# Patient Record
Sex: FEMALE | Race: WHITE | NOT HISPANIC OR LATINO | Employment: OTHER | URBAN - METROPOLITAN AREA
[De-identification: names, ages, dates, MRNs, and addresses within clinical notes are randomized per-mention and may not be internally consistent; named-entity substitution may affect disease eponyms.]

---

## 2017-01-25 ENCOUNTER — COMMUNICATION - HEALTHEAST (OUTPATIENT)
Dept: INTERNAL MEDICINE | Facility: CLINIC | Age: 77
End: 2017-01-25

## 2017-01-25 DIAGNOSIS — E61.2 MAGNESIUM DEFICIENCY: ICD-10-CM

## 2017-01-27 ENCOUNTER — COMMUNICATION - HEALTHEAST (OUTPATIENT)
Dept: INTERNAL MEDICINE | Facility: CLINIC | Age: 77
End: 2017-01-27

## 2017-01-27 DIAGNOSIS — E61.2 MAGNESIUM DEFICIENCY: ICD-10-CM

## 2017-02-19 ENCOUNTER — COMMUNICATION - HEALTHEAST (OUTPATIENT)
Dept: INTERNAL MEDICINE | Facility: CLINIC | Age: 77
End: 2017-02-19

## 2017-02-19 DIAGNOSIS — K21.9 ESOPHAGEAL REFLUX: ICD-10-CM

## 2017-02-21 ENCOUNTER — OFFICE VISIT - HEALTHEAST (OUTPATIENT)
Dept: INTERNAL MEDICINE | Facility: CLINIC | Age: 77
End: 2017-02-21

## 2017-02-21 DIAGNOSIS — J44.89 CHRONIC AIRWAY OBSTRUCTION, NOT ELSEWHERE CLASSIFIED: ICD-10-CM

## 2017-02-21 DIAGNOSIS — K56.609 SMALL BOWEL OBSTRUCTION (H): ICD-10-CM

## 2017-03-02 ENCOUNTER — OFFICE VISIT - HEALTHEAST (OUTPATIENT)
Dept: INTERNAL MEDICINE | Facility: CLINIC | Age: 77
End: 2017-03-02

## 2017-03-02 DIAGNOSIS — K56.609 BOWEL OBSTRUCTION (H): ICD-10-CM

## 2017-03-02 DIAGNOSIS — K43.9 ABDOMINAL WALL HERNIA: ICD-10-CM

## 2017-03-02 DIAGNOSIS — R19.7 DIARRHEA: ICD-10-CM

## 2017-03-07 ENCOUNTER — COMMUNICATION - HEALTHEAST (OUTPATIENT)
Dept: LAB | Facility: CLINIC | Age: 77
End: 2017-03-07

## 2017-03-07 ENCOUNTER — OFFICE VISIT - HEALTHEAST (OUTPATIENT)
Dept: INTERNAL MEDICINE | Facility: CLINIC | Age: 77
End: 2017-03-07

## 2017-03-07 ENCOUNTER — COMMUNICATION - HEALTHEAST (OUTPATIENT)
Dept: NURSING | Facility: CLINIC | Age: 77
End: 2017-03-07

## 2017-03-07 ENCOUNTER — HOSPITAL ENCOUNTER (OUTPATIENT)
Dept: CARDIOLOGY | Facility: CLINIC | Age: 77
Discharge: HOME | End: 2017-03-07
Attending: INTERNAL MEDICINE

## 2017-03-07 DIAGNOSIS — I48.92 ATRIAL FIBRILLATION AND FLUTTER (H): ICD-10-CM

## 2017-03-07 DIAGNOSIS — I48.91 ATRIAL FIBRILLATION AND FLUTTER (H): ICD-10-CM

## 2017-03-07 DIAGNOSIS — R00.9 HEART BEAT ABNORMALITY: ICD-10-CM

## 2017-03-07 LAB
AORTIC ROOT: 3.5 CM
ASCENDING AORTA: 3 CM
BSA FOR ECHO PROCEDURE: 2.04 M2
CV BLOOD PRESSURE: NORMAL MMHG
CV ECHO HEIGHT: 65.3 IN
CV ECHO WEIGHT: 199 LBS
DOP CALC LVOT AREA: 3.14 CM2
DOP CALC LVOT DIAMETER: 2 CM
DOP CALC LVOT PEAK VEL: 112 CM/S
DOP CALC LVOT STROKE VOLUME: 63.4 CM3
DOP CALCLVOT PEAK VEL VTI: 20.2 CM
EJECTION FRACTION: 56 % (ref 55–75)
FRACTIONAL SHORTENING: 18.9 % (ref 28–44)
INTERVENTRICULAR SEPTUM IN END DIASTOLE: 1.43 CM (ref 0.6–0.9)
IVS/PW RATIO: 1
LA AREA 1: 21.8 CM2
LA AREA 2: 22.4 CM2
LEFT ATRIUM LENGTH: 5.62 CM
LEFT ATRIUM SIZE: 4.8 CM
LEFT ATRIUM TO AORTIC ROOT RATIO: 1.37 NO UNITS
LEFT ATRIUM VOLUME INDEX: 36.2 ML/M2
LEFT ATRIUM VOLUME: 73.9 CM3
LEFT VENTRICLE CARDIAC INDEX: 2.5 L/MIN/M2
LEFT VENTRICLE CARDIAC OUTPUT: 5.1 L/MIN
LEFT VENTRICLE DIASTOLIC VOLUME INDEX: 46.1 CM3/M2 (ref 34–74)
LEFT VENTRICLE DIASTOLIC VOLUME: 94 CM3 (ref 46–106)
LEFT VENTRICLE HEART RATE: 80 BPM
LEFT VENTRICLE MASS INDEX: 117.8 G/M2
LEFT VENTRICLE SYSTOLIC VOLUME INDEX: 20.1 CM3/M2 (ref 11–31)
LEFT VENTRICLE SYSTOLIC VOLUME: 41 CM3 (ref 14–42)
LEFT VENTRICULAR INTERNAL DIMENSION IN DIASTOLE: 4.29 CM (ref 3.8–5.2)
LEFT VENTRICULAR INTERNAL DIMENSION IN SYSTOLE: 3.48 CM (ref 2.2–3.5)
LEFT VENTRICULAR MASS: 240.3 G
LEFT VENTRICULAR OUTFLOW TRACT MEAN GRADIENT: 2 MMHG
LEFT VENTRICULAR OUTFLOW TRACT MEAN VELOCITY: 70.1 CM/S
LEFT VENTRICULAR OUTFLOW TRACT PEAK GRADIENT: 5 MMHG
LEFT VENTRICULAR POSTERIOR WALL IN END DIASTOLE: 1.44 CM (ref 0.6–0.9)
LV STROKE VOLUME INDEX: 31.1 ML/M2
MITRAL VALVE E/A RATIO: 3.1
MV AVERAGE E/E' RATIO: 14.6 CM/S
MV DECELERATION TIME: 159 MS
MV E'TISSUE VEL-LAT: 7.9 CM/S
MV E'TISSUE VEL-MED: 6.04 CM/S
MV LATERAL E/E' RATIO: 12.9
MV MEDIAL E/E' RATIO: 16.9
MV PEAK A VELOCITY: 33.3 CM/S
MV PEAK E VELOCITY: 102 CM/S
NUC REST DIASTOLIC VOLUME INDEX: 3184 LBS
NUC REST SYSTOLIC VOLUME INDEX: 65.25 IN
TRICUSPID REGURGITATION PEAK PRESSURE GRADIENT: 29.8 MMHG
TRICUSPID VALVE PEAK REGURGITANT VELOCITY: 273 CM/S

## 2017-03-07 ASSESSMENT — MIFFLIN-ST. JEOR: SCORE: 1382.5

## 2017-03-08 ENCOUNTER — OFFICE VISIT - HEALTHEAST (OUTPATIENT)
Dept: CARDIOLOGY | Facility: CLINIC | Age: 77
End: 2017-03-08

## 2017-03-08 DIAGNOSIS — I48.19 PERSISTENT ATRIAL FIBRILLATION (H): ICD-10-CM

## 2017-03-08 DIAGNOSIS — I10 ESSENTIAL HYPERTENSION: ICD-10-CM

## 2017-03-08 ASSESSMENT — MIFFLIN-ST. JEOR: SCORE: 1319.9

## 2017-03-09 ENCOUNTER — HOSPITAL ENCOUNTER (OUTPATIENT)
Dept: ULTRASOUND IMAGING | Facility: CLINIC | Age: 77
Discharge: HOME | End: 2017-03-09
Attending: INTERNAL MEDICINE

## 2017-03-09 ENCOUNTER — AMBULATORY - HEALTHEAST (OUTPATIENT)
Dept: LAB | Facility: CLINIC | Age: 77
End: 2017-03-09

## 2017-03-09 ENCOUNTER — COMMUNICATION - HEALTHEAST (OUTPATIENT)
Dept: INTERNAL MEDICINE | Facility: CLINIC | Age: 77
End: 2017-03-09

## 2017-03-09 DIAGNOSIS — I48.92 ATRIAL FIBRILLATION AND FLUTTER (H): ICD-10-CM

## 2017-03-09 DIAGNOSIS — I10 ESSENTIAL HYPERTENSION: ICD-10-CM

## 2017-03-09 DIAGNOSIS — K43.9 ABDOMINAL WALL HERNIA: ICD-10-CM

## 2017-03-09 DIAGNOSIS — I48.91 ATRIAL FIBRILLATION AND FLUTTER (H): ICD-10-CM

## 2017-03-13 ENCOUNTER — COMMUNICATION - HEALTHEAST (OUTPATIENT)
Dept: INTERNAL MEDICINE | Facility: CLINIC | Age: 77
End: 2017-03-13

## 2017-03-13 ENCOUNTER — AMBULATORY - HEALTHEAST (OUTPATIENT)
Dept: LAB | Facility: CLINIC | Age: 77
End: 2017-03-13

## 2017-03-13 ENCOUNTER — AMBULATORY - HEALTHEAST (OUTPATIENT)
Dept: INTERNAL MEDICINE | Facility: CLINIC | Age: 77
End: 2017-03-13

## 2017-03-13 DIAGNOSIS — I48.92 ATRIAL FIBRILLATION AND FLUTTER (H): ICD-10-CM

## 2017-03-13 DIAGNOSIS — I48.91 ATRIAL FIBRILLATION AND FLUTTER (H): ICD-10-CM

## 2017-03-13 DIAGNOSIS — R10.32 LLQ PAIN: ICD-10-CM

## 2017-03-13 LAB
ATRIAL RATE - MUSE: 65 BPM
DIASTOLIC BLOOD PRESSURE - MUSE: NORMAL MMHG
INTERPRETATION ECG - MUSE: NORMAL
P AXIS - MUSE: NORMAL DEGREES
PR INTERVAL - MUSE: NORMAL MS
QRS DURATION - MUSE: 92 MS
QT - MUSE: 410 MS
QTC - MUSE: 457 MS
R AXIS - MUSE: 45 DEGREES
SYSTOLIC BLOOD PRESSURE - MUSE: NORMAL MMHG
T AXIS - MUSE: 263 DEGREES
VENTRICULAR RATE- MUSE: 75 BPM

## 2017-03-14 ENCOUNTER — HOSPITAL ENCOUNTER (OUTPATIENT)
Dept: CARDIOLOGY | Facility: CLINIC | Age: 77
Discharge: HOME | End: 2017-03-14
Attending: INTERNAL MEDICINE

## 2017-03-14 DIAGNOSIS — I48.19 PERSISTENT ATRIAL FIBRILLATION (H): ICD-10-CM

## 2017-03-16 ENCOUNTER — AMBULATORY - HEALTHEAST (OUTPATIENT)
Dept: LAB | Facility: CLINIC | Age: 77
End: 2017-03-16

## 2017-03-16 ENCOUNTER — COMMUNICATION - HEALTHEAST (OUTPATIENT)
Dept: INTERNAL MEDICINE | Facility: CLINIC | Age: 77
End: 2017-03-16

## 2017-03-16 DIAGNOSIS — I48.91 ATRIAL FIBRILLATION AND FLUTTER (H): ICD-10-CM

## 2017-03-16 DIAGNOSIS — I48.92 ATRIAL FIBRILLATION AND FLUTTER (H): ICD-10-CM

## 2017-03-17 ENCOUNTER — HOSPITAL ENCOUNTER (OUTPATIENT)
Dept: CT IMAGING | Facility: CLINIC | Age: 77
Discharge: HOME | End: 2017-03-17
Attending: INTERNAL MEDICINE

## 2017-03-17 DIAGNOSIS — R10.32 LLQ PAIN: ICD-10-CM

## 2017-03-21 ENCOUNTER — AMBULATORY - HEALTHEAST (OUTPATIENT)
Dept: LAB | Facility: CLINIC | Age: 77
End: 2017-03-21

## 2017-03-21 ENCOUNTER — AMBULATORY - HEALTHEAST (OUTPATIENT)
Dept: NURSING | Facility: CLINIC | Age: 77
End: 2017-03-21

## 2017-03-21 DIAGNOSIS — I48.91 ATRIAL FIBRILLATION AND FLUTTER (H): ICD-10-CM

## 2017-03-21 DIAGNOSIS — I48.92 ATRIAL FIBRILLATION AND FLUTTER (H): ICD-10-CM

## 2017-03-24 ENCOUNTER — AMBULATORY - HEALTHEAST (OUTPATIENT)
Dept: LAB | Facility: CLINIC | Age: 77
End: 2017-03-24

## 2017-03-24 ENCOUNTER — COMMUNICATION - HEALTHEAST (OUTPATIENT)
Dept: INTERNAL MEDICINE | Facility: CLINIC | Age: 77
End: 2017-03-24

## 2017-03-24 DIAGNOSIS — I48.91 ATRIAL FIBRILLATION AND FLUTTER (H): ICD-10-CM

## 2017-03-24 DIAGNOSIS — I48.92 ATRIAL FIBRILLATION AND FLUTTER (H): ICD-10-CM

## 2017-03-31 ENCOUNTER — AMBULATORY - HEALTHEAST (OUTPATIENT)
Dept: LAB | Facility: CLINIC | Age: 77
End: 2017-03-31

## 2017-03-31 ENCOUNTER — COMMUNICATION - HEALTHEAST (OUTPATIENT)
Dept: INTERNAL MEDICINE | Facility: CLINIC | Age: 77
End: 2017-03-31

## 2017-03-31 DIAGNOSIS — I48.92 ATRIAL FIBRILLATION AND FLUTTER (H): ICD-10-CM

## 2017-03-31 DIAGNOSIS — I48.91 ATRIAL FIBRILLATION AND FLUTTER (H): ICD-10-CM

## 2017-04-04 ENCOUNTER — OFFICE VISIT - HEALTHEAST (OUTPATIENT)
Dept: SURGERY | Facility: CLINIC | Age: 77
End: 2017-04-04

## 2017-04-04 DIAGNOSIS — K43.2 INCISIONAL HERNIA WITHOUT OBSTRUCTION OR GANGRENE: ICD-10-CM

## 2017-04-04 ASSESSMENT — MIFFLIN-ST. JEOR: SCORE: 1322.62

## 2017-04-06 ENCOUNTER — OFFICE VISIT - HEALTHEAST (OUTPATIENT)
Dept: INTERNAL MEDICINE | Facility: CLINIC | Age: 77
End: 2017-04-06

## 2017-04-06 ENCOUNTER — COMMUNICATION - HEALTHEAST (OUTPATIENT)
Dept: INTERNAL MEDICINE | Facility: CLINIC | Age: 77
End: 2017-04-06

## 2017-04-06 DIAGNOSIS — E61.2 MAGNESIUM DEFICIENCY: ICD-10-CM

## 2017-04-06 DIAGNOSIS — I48.92 ATRIAL FIBRILLATION AND FLUTTER (H): ICD-10-CM

## 2017-04-06 DIAGNOSIS — I48.91 ATRIAL FIBRILLATION AND FLUTTER (H): ICD-10-CM

## 2017-04-06 DIAGNOSIS — I10 ESSENTIAL HYPERTENSION: ICD-10-CM

## 2017-04-20 ENCOUNTER — COMMUNICATION - HEALTHEAST (OUTPATIENT)
Dept: INTERNAL MEDICINE | Facility: CLINIC | Age: 77
End: 2017-04-20

## 2017-04-20 ENCOUNTER — AMBULATORY - HEALTHEAST (OUTPATIENT)
Dept: LAB | Facility: CLINIC | Age: 77
End: 2017-04-20

## 2017-04-20 DIAGNOSIS — I48.92 ATRIAL FIBRILLATION AND FLUTTER (H): ICD-10-CM

## 2017-04-20 DIAGNOSIS — I48.91 ATRIAL FIBRILLATION AND FLUTTER (H): ICD-10-CM

## 2017-05-03 ENCOUNTER — COMMUNICATION - HEALTHEAST (OUTPATIENT)
Dept: INTERNAL MEDICINE | Facility: CLINIC | Age: 77
End: 2017-05-03

## 2017-05-03 ENCOUNTER — AMBULATORY - HEALTHEAST (OUTPATIENT)
Dept: LAB | Facility: CLINIC | Age: 77
End: 2017-05-03

## 2017-05-03 DIAGNOSIS — I48.92 ATRIAL FIBRILLATION AND FLUTTER (H): ICD-10-CM

## 2017-05-03 DIAGNOSIS — I48.91 ATRIAL FIBRILLATION AND FLUTTER (H): ICD-10-CM

## 2017-05-11 ENCOUNTER — COMMUNICATION - HEALTHEAST (OUTPATIENT)
Dept: INTERNAL MEDICINE | Facility: CLINIC | Age: 77
End: 2017-05-11

## 2017-05-11 DIAGNOSIS — I10 ESSENTIAL HYPERTENSION: ICD-10-CM

## 2017-05-11 DIAGNOSIS — I10 UNSPECIFIED ESSENTIAL HYPERTENSION: ICD-10-CM

## 2017-05-18 ENCOUNTER — AMBULATORY - HEALTHEAST (OUTPATIENT)
Dept: LAB | Facility: CLINIC | Age: 77
End: 2017-05-18

## 2017-05-18 ENCOUNTER — COMMUNICATION - HEALTHEAST (OUTPATIENT)
Dept: INTERNAL MEDICINE | Facility: CLINIC | Age: 77
End: 2017-05-18

## 2017-05-18 DIAGNOSIS — I48.91 ATRIAL FIBRILLATION AND FLUTTER (H): ICD-10-CM

## 2017-05-18 DIAGNOSIS — I48.92 ATRIAL FIBRILLATION AND FLUTTER (H): ICD-10-CM

## 2017-05-18 DIAGNOSIS — K21.9 ESOPHAGEAL REFLUX: ICD-10-CM

## 2017-06-05 ENCOUNTER — OFFICE VISIT - HEALTHEAST (OUTPATIENT)
Dept: INTERNAL MEDICINE | Facility: CLINIC | Age: 77
End: 2017-06-05

## 2017-06-05 ENCOUNTER — COMMUNICATION - HEALTHEAST (OUTPATIENT)
Dept: FAMILY MEDICINE | Facility: CLINIC | Age: 77
End: 2017-06-05

## 2017-06-05 DIAGNOSIS — I10 ESSENTIAL HYPERTENSION: ICD-10-CM

## 2017-06-05 DIAGNOSIS — J44.89 CHRONIC AIRWAY OBSTRUCTION, NOT ELSEWHERE CLASSIFIED: ICD-10-CM

## 2017-06-05 DIAGNOSIS — Z01.818 PREOP EXAM FOR INTERNAL MEDICINE: ICD-10-CM

## 2017-06-05 DIAGNOSIS — I48.92 ATRIAL FIBRILLATION AND FLUTTER (H): ICD-10-CM

## 2017-06-05 DIAGNOSIS — I48.91 ATRIAL FIBRILLATION AND FLUTTER (H): ICD-10-CM

## 2017-06-05 ASSESSMENT — MIFFLIN-ST. JEOR: SCORE: 1266.95

## 2017-07-06 ENCOUNTER — COMMUNICATION - HEALTHEAST (OUTPATIENT)
Dept: INTERNAL MEDICINE | Facility: CLINIC | Age: 77
End: 2017-07-06

## 2017-07-06 ENCOUNTER — AMBULATORY - HEALTHEAST (OUTPATIENT)
Dept: LAB | Facility: CLINIC | Age: 77
End: 2017-07-06

## 2017-07-06 DIAGNOSIS — I48.92 ATRIAL FIBRILLATION AND FLUTTER (H): ICD-10-CM

## 2017-07-06 DIAGNOSIS — I48.91 ATRIAL FIBRILLATION AND FLUTTER (H): ICD-10-CM

## 2017-07-17 ENCOUNTER — OFFICE VISIT - HEALTHEAST (OUTPATIENT)
Dept: INTERNAL MEDICINE | Facility: CLINIC | Age: 77
End: 2017-07-17

## 2017-07-17 DIAGNOSIS — I48.92 ATRIAL FIBRILLATION AND FLUTTER (H): ICD-10-CM

## 2017-07-17 DIAGNOSIS — I48.91 ATRIAL FIBRILLATION AND FLUTTER (H): ICD-10-CM

## 2017-07-17 DIAGNOSIS — I10 ESSENTIAL HYPERTENSION: ICD-10-CM

## 2017-07-20 ENCOUNTER — AMBULATORY - HEALTHEAST (OUTPATIENT)
Dept: LAB | Facility: CLINIC | Age: 77
End: 2017-07-20

## 2017-07-20 DIAGNOSIS — I48.91 ATRIAL FIBRILLATION AND FLUTTER (H): ICD-10-CM

## 2017-07-20 DIAGNOSIS — I48.92 ATRIAL FIBRILLATION AND FLUTTER (H): ICD-10-CM

## 2017-07-30 ENCOUNTER — COMMUNICATION - HEALTHEAST (OUTPATIENT)
Dept: INTERNAL MEDICINE | Facility: CLINIC | Age: 77
End: 2017-07-30

## 2017-07-30 DIAGNOSIS — F34.1 DYSTHYMIC DISORDER: ICD-10-CM

## 2017-08-04 ENCOUNTER — COMMUNICATION - HEALTHEAST (OUTPATIENT)
Dept: INTERNAL MEDICINE | Facility: CLINIC | Age: 77
End: 2017-08-04

## 2017-08-04 ENCOUNTER — AMBULATORY - HEALTHEAST (OUTPATIENT)
Dept: LAB | Facility: CLINIC | Age: 77
End: 2017-08-04

## 2017-08-04 DIAGNOSIS — I48.91 ATRIAL FIBRILLATION AND FLUTTER (H): ICD-10-CM

## 2017-08-04 DIAGNOSIS — I48.92 ATRIAL FIBRILLATION AND FLUTTER (H): ICD-10-CM

## 2017-08-10 ENCOUNTER — COMMUNICATION - HEALTHEAST (OUTPATIENT)
Dept: INTERNAL MEDICINE | Facility: CLINIC | Age: 77
End: 2017-08-10

## 2017-08-10 DIAGNOSIS — I10 ESSENTIAL HYPERTENSION: ICD-10-CM

## 2017-08-14 ENCOUNTER — HOSPITAL ENCOUNTER (OUTPATIENT)
Dept: MAMMOGRAPHY | Facility: CLINIC | Age: 77
Discharge: HOME | End: 2017-08-14
Attending: INTERNAL MEDICINE

## 2017-08-14 DIAGNOSIS — Z12.31 VISIT FOR SCREENING MAMMOGRAM: ICD-10-CM

## 2017-08-15 ENCOUNTER — RECORDS - HEALTHEAST (OUTPATIENT)
Dept: ADMINISTRATIVE | Facility: OTHER | Age: 77
End: 2017-08-15

## 2017-08-16 ENCOUNTER — OFFICE VISIT - HEALTHEAST (OUTPATIENT)
Dept: INTERNAL MEDICINE | Facility: CLINIC | Age: 77
End: 2017-08-16

## 2017-08-16 ENCOUNTER — COMMUNICATION - HEALTHEAST (OUTPATIENT)
Dept: INTERNAL MEDICINE | Facility: CLINIC | Age: 77
End: 2017-08-16

## 2017-08-16 DIAGNOSIS — N39.0 UTI (URINARY TRACT INFECTION): ICD-10-CM

## 2017-08-16 DIAGNOSIS — R60.9 EDEMA: ICD-10-CM

## 2017-08-16 DIAGNOSIS — R30.0 DYSURIA: ICD-10-CM

## 2017-08-18 ENCOUNTER — COMMUNICATION - HEALTHEAST (OUTPATIENT)
Dept: NURSING | Facility: CLINIC | Age: 77
End: 2017-08-18

## 2017-08-18 ENCOUNTER — AMBULATORY - HEALTHEAST (OUTPATIENT)
Dept: LAB | Facility: CLINIC | Age: 77
End: 2017-08-18

## 2017-08-18 DIAGNOSIS — I48.91 ATRIAL FIBRILLATION AND FLUTTER (H): ICD-10-CM

## 2017-08-18 DIAGNOSIS — I48.92 ATRIAL FIBRILLATION AND FLUTTER (H): ICD-10-CM

## 2017-08-22 ENCOUNTER — COMMUNICATION - HEALTHEAST (OUTPATIENT)
Dept: NURSING | Facility: CLINIC | Age: 77
End: 2017-08-22

## 2017-08-22 ENCOUNTER — AMBULATORY - HEALTHEAST (OUTPATIENT)
Dept: LAB | Facility: CLINIC | Age: 77
End: 2017-08-22

## 2017-08-22 DIAGNOSIS — I48.92 ATRIAL FIBRILLATION AND FLUTTER (H): ICD-10-CM

## 2017-08-22 DIAGNOSIS — I48.91 ATRIAL FIBRILLATION AND FLUTTER (H): ICD-10-CM

## 2017-08-29 ENCOUNTER — COMMUNICATION - HEALTHEAST (OUTPATIENT)
Dept: FAMILY MEDICINE | Facility: CLINIC | Age: 77
End: 2017-08-29

## 2017-08-29 ENCOUNTER — AMBULATORY - HEALTHEAST (OUTPATIENT)
Dept: LAB | Facility: CLINIC | Age: 77
End: 2017-08-29

## 2017-08-29 DIAGNOSIS — I48.91 ATRIAL FIBRILLATION AND FLUTTER (H): ICD-10-CM

## 2017-08-29 DIAGNOSIS — I48.92 ATRIAL FIBRILLATION AND FLUTTER (H): ICD-10-CM

## 2017-08-31 ENCOUNTER — COMMUNICATION - HEALTHEAST (OUTPATIENT)
Dept: INTERNAL MEDICINE | Facility: CLINIC | Age: 77
End: 2017-08-31

## 2017-08-31 DIAGNOSIS — E61.2 MAGNESIUM DEFICIENCY: ICD-10-CM

## 2017-09-05 ENCOUNTER — COMMUNICATION - HEALTHEAST (OUTPATIENT)
Dept: INTERNAL MEDICINE | Facility: CLINIC | Age: 77
End: 2017-09-05

## 2017-09-05 ENCOUNTER — AMBULATORY - HEALTHEAST (OUTPATIENT)
Dept: INTERNAL MEDICINE | Facility: CLINIC | Age: 77
End: 2017-09-05

## 2017-09-11 ENCOUNTER — COMMUNICATION - HEALTHEAST (OUTPATIENT)
Dept: INTERNAL MEDICINE | Facility: CLINIC | Age: 77
End: 2017-09-11

## 2017-09-11 ENCOUNTER — AMBULATORY - HEALTHEAST (OUTPATIENT)
Dept: LAB | Facility: CLINIC | Age: 77
End: 2017-09-11

## 2017-09-11 DIAGNOSIS — I48.92 ATRIAL FIBRILLATION AND FLUTTER (H): ICD-10-CM

## 2017-09-11 DIAGNOSIS — I48.91 ATRIAL FIBRILLATION AND FLUTTER (H): ICD-10-CM

## 2017-09-19 ENCOUNTER — AMBULATORY - HEALTHEAST (OUTPATIENT)
Dept: LAB | Facility: CLINIC | Age: 77
End: 2017-09-19

## 2017-09-19 ENCOUNTER — COMMUNICATION - HEALTHEAST (OUTPATIENT)
Dept: INTERNAL MEDICINE | Facility: CLINIC | Age: 77
End: 2017-09-19

## 2017-09-19 DIAGNOSIS — I48.91 ATRIAL FIBRILLATION AND FLUTTER (H): ICD-10-CM

## 2017-09-19 DIAGNOSIS — I48.92 ATRIAL FIBRILLATION AND FLUTTER (H): ICD-10-CM

## 2017-10-10 ENCOUNTER — COMMUNICATION - HEALTHEAST (OUTPATIENT)
Dept: INTERNAL MEDICINE | Facility: CLINIC | Age: 77
End: 2017-10-10

## 2017-10-10 ENCOUNTER — OFFICE VISIT - HEALTHEAST (OUTPATIENT)
Dept: INTERNAL MEDICINE | Facility: CLINIC | Age: 77
End: 2017-10-10

## 2017-10-10 DIAGNOSIS — J44.9 COPD (CHRONIC OBSTRUCTIVE PULMONARY DISEASE) (H): ICD-10-CM

## 2017-10-10 DIAGNOSIS — I48.91 ATRIAL FIBRILLATION AND FLUTTER (H): ICD-10-CM

## 2017-10-10 DIAGNOSIS — I48.92 ATRIAL FIBRILLATION AND FLUTTER (H): ICD-10-CM

## 2017-10-10 DIAGNOSIS — I10 ESSENTIAL HYPERTENSION: ICD-10-CM

## 2017-10-10 DIAGNOSIS — Z01.818 PREOP EXAM FOR INTERNAL MEDICINE: ICD-10-CM

## 2017-10-10 LAB
ATRIAL RATE - MUSE: 366 BPM
DIASTOLIC BLOOD PRESSURE - MUSE: NORMAL MMHG
INTERPRETATION ECG - MUSE: NORMAL
P AXIS - MUSE: NORMAL DEGREES
PR INTERVAL - MUSE: NORMAL MS
QRS DURATION - MUSE: 90 MS
QT - MUSE: 414 MS
QTC - MUSE: 430 MS
R AXIS - MUSE: 55 DEGREES
SYSTOLIC BLOOD PRESSURE - MUSE: NORMAL MMHG
T AXIS - MUSE: 235 DEGREES
VENTRICULAR RATE- MUSE: 65 BPM

## 2017-10-10 ASSESSMENT — MIFFLIN-ST. JEOR: SCORE: 1256.52

## 2017-10-13 ENCOUNTER — OFFICE VISIT - HEALTHEAST (OUTPATIENT)
Dept: INTERNAL MEDICINE | Facility: CLINIC | Age: 77
End: 2017-10-13

## 2017-10-13 ENCOUNTER — COMMUNICATION - HEALTHEAST (OUTPATIENT)
Dept: INTERNAL MEDICINE | Facility: CLINIC | Age: 77
End: 2017-10-13

## 2017-10-13 DIAGNOSIS — I48.92 ATRIAL FIBRILLATION AND FLUTTER (H): ICD-10-CM

## 2017-10-13 DIAGNOSIS — I48.91 ATRIAL FIBRILLATION AND FLUTTER (H): ICD-10-CM

## 2017-10-27 ENCOUNTER — AMBULATORY - HEALTHEAST (OUTPATIENT)
Dept: LAB | Facility: CLINIC | Age: 77
End: 2017-10-27

## 2017-10-27 ENCOUNTER — COMMUNICATION - HEALTHEAST (OUTPATIENT)
Dept: INTERNAL MEDICINE | Facility: CLINIC | Age: 77
End: 2017-10-27

## 2017-10-27 DIAGNOSIS — I48.92 ATRIAL FIBRILLATION AND FLUTTER (H): ICD-10-CM

## 2017-10-27 DIAGNOSIS — I48.91 ATRIAL FIBRILLATION AND FLUTTER (H): ICD-10-CM

## 2017-11-02 ENCOUNTER — COMMUNICATION - HEALTHEAST (OUTPATIENT)
Dept: INTERNAL MEDICINE | Facility: CLINIC | Age: 77
End: 2017-11-02

## 2017-11-02 DIAGNOSIS — I48.92 ATRIAL FIBRILLATION AND FLUTTER (H): ICD-10-CM

## 2017-11-02 DIAGNOSIS — I48.91 ATRIAL FIBRILLATION AND FLUTTER (H): ICD-10-CM

## 2017-11-10 ENCOUNTER — AMBULATORY - HEALTHEAST (OUTPATIENT)
Dept: LAB | Facility: CLINIC | Age: 77
End: 2017-11-10

## 2017-11-10 ENCOUNTER — COMMUNICATION - HEALTHEAST (OUTPATIENT)
Dept: INTERNAL MEDICINE | Facility: CLINIC | Age: 77
End: 2017-11-10

## 2017-11-10 DIAGNOSIS — I48.92 ATRIAL FIBRILLATION AND FLUTTER (H): ICD-10-CM

## 2017-11-10 DIAGNOSIS — I48.91 ATRIAL FIBRILLATION AND FLUTTER (H): ICD-10-CM

## 2017-12-07 ENCOUNTER — COMMUNICATION - HEALTHEAST (OUTPATIENT)
Dept: INTERNAL MEDICINE | Facility: CLINIC | Age: 77
End: 2017-12-07

## 2017-12-07 DIAGNOSIS — I10 ESSENTIAL HYPERTENSION: ICD-10-CM

## 2017-12-08 ENCOUNTER — AMBULATORY - HEALTHEAST (OUTPATIENT)
Dept: LAB | Facility: CLINIC | Age: 77
End: 2017-12-08

## 2017-12-08 ENCOUNTER — COMMUNICATION - HEALTHEAST (OUTPATIENT)
Dept: INTERNAL MEDICINE | Facility: CLINIC | Age: 77
End: 2017-12-08

## 2017-12-08 DIAGNOSIS — I48.92 ATRIAL FIBRILLATION AND FLUTTER (H): ICD-10-CM

## 2017-12-08 DIAGNOSIS — I48.91 ATRIAL FIBRILLATION AND FLUTTER (H): ICD-10-CM

## 2018-01-05 ENCOUNTER — AMBULATORY - HEALTHEAST (OUTPATIENT)
Dept: LAB | Facility: CLINIC | Age: 78
End: 2018-01-05

## 2018-01-05 ENCOUNTER — COMMUNICATION - HEALTHEAST (OUTPATIENT)
Dept: INTERNAL MEDICINE | Facility: CLINIC | Age: 78
End: 2018-01-05

## 2018-01-05 DIAGNOSIS — I48.91 ATRIAL FIBRILLATION AND FLUTTER (H): ICD-10-CM

## 2018-01-05 DIAGNOSIS — I48.92 ATRIAL FIBRILLATION AND FLUTTER (H): ICD-10-CM

## 2018-01-05 LAB — INR PPP: 3.2 (ref 0.9–1.1)

## 2018-01-09 ENCOUNTER — COMMUNICATION - HEALTHEAST (OUTPATIENT)
Dept: INTERNAL MEDICINE | Facility: CLINIC | Age: 78
End: 2018-01-09

## 2018-01-09 DIAGNOSIS — I48.92 ATRIAL FIBRILLATION AND FLUTTER (H): ICD-10-CM

## 2018-01-09 DIAGNOSIS — I48.91 ATRIAL FIBRILLATION AND FLUTTER (H): ICD-10-CM

## 2018-01-19 ENCOUNTER — COMMUNICATION - HEALTHEAST (OUTPATIENT)
Dept: INTERNAL MEDICINE | Facility: CLINIC | Age: 78
End: 2018-01-19

## 2018-01-19 ENCOUNTER — AMBULATORY - HEALTHEAST (OUTPATIENT)
Dept: LAB | Facility: CLINIC | Age: 78
End: 2018-01-19

## 2018-01-19 DIAGNOSIS — I48.91 ATRIAL FIBRILLATION AND FLUTTER (H): ICD-10-CM

## 2018-01-19 DIAGNOSIS — I48.92 ATRIAL FIBRILLATION AND FLUTTER (H): ICD-10-CM

## 2018-01-19 LAB — INR PPP: 3.4 (ref 0.9–1.1)

## 2018-01-22 ENCOUNTER — COMMUNICATION - HEALTHEAST (OUTPATIENT)
Dept: INTERNAL MEDICINE | Facility: CLINIC | Age: 78
End: 2018-01-22

## 2018-01-22 DIAGNOSIS — E78.5 HYPERLIPIDEMIA: ICD-10-CM

## 2018-01-31 ENCOUNTER — RECORDS - HEALTHEAST (OUTPATIENT)
Dept: ADMINISTRATIVE | Facility: OTHER | Age: 78
End: 2018-01-31

## 2018-02-02 ENCOUNTER — AMBULATORY - HEALTHEAST (OUTPATIENT)
Dept: LAB | Facility: CLINIC | Age: 78
End: 2018-02-02

## 2018-02-02 ENCOUNTER — COMMUNICATION - HEALTHEAST (OUTPATIENT)
Dept: INTERNAL MEDICINE | Facility: CLINIC | Age: 78
End: 2018-02-02

## 2018-02-02 DIAGNOSIS — I48.91 ATRIAL FIBRILLATION AND FLUTTER (H): ICD-10-CM

## 2018-02-02 DIAGNOSIS — I48.92 ATRIAL FIBRILLATION AND FLUTTER (H): ICD-10-CM

## 2018-02-02 LAB — INR PPP: 3 (ref 0.9–1.1)

## 2018-02-07 ENCOUNTER — COMMUNICATION - HEALTHEAST (OUTPATIENT)
Dept: INTERNAL MEDICINE | Facility: CLINIC | Age: 78
End: 2018-02-07

## 2018-02-07 DIAGNOSIS — K21.9 ESOPHAGEAL REFLUX: ICD-10-CM

## 2018-02-16 ENCOUNTER — COMMUNICATION - HEALTHEAST (OUTPATIENT)
Dept: INTERNAL MEDICINE | Facility: CLINIC | Age: 78
End: 2018-02-16

## 2018-02-16 ENCOUNTER — RECORDS - HEALTHEAST (OUTPATIENT)
Dept: ADMINISTRATIVE | Facility: OTHER | Age: 78
End: 2018-02-16

## 2018-02-18 ENCOUNTER — HOME CARE/HOSPICE - HEALTHEAST (OUTPATIENT)
Dept: HOME HEALTH SERVICES | Facility: HOME HEALTH | Age: 78
End: 2018-02-18

## 2018-02-19 ENCOUNTER — AMBULATORY - HEALTHEAST (OUTPATIENT)
Dept: MEDSURG UNIT | Facility: CLINIC | Age: 78
End: 2018-02-19

## 2018-02-19 ENCOUNTER — AMBULATORY - HEALTHEAST (OUTPATIENT)
Dept: NURSING | Facility: CLINIC | Age: 78
End: 2018-02-19

## 2018-02-19 DIAGNOSIS — Z71.89 COMPLEX CARE COORDINATION: ICD-10-CM

## 2018-02-20 ENCOUNTER — CARE COORDINATION (OUTPATIENT)
Dept: CARE COORDINATION | Facility: CLINIC | Age: 78
End: 2018-02-20

## 2018-02-20 ENCOUNTER — COMMUNICATION - HEALTHEAST (OUTPATIENT)
Dept: SCHEDULING | Facility: CLINIC | Age: 78
End: 2018-02-20

## 2018-02-20 ENCOUNTER — COMMUNICATION - HEALTHEAST (OUTPATIENT)
Dept: INTERNAL MEDICINE | Facility: CLINIC | Age: 78
End: 2018-02-20

## 2018-02-20 DIAGNOSIS — I48.20 CHRONIC ATRIAL FIBRILLATION (H): ICD-10-CM

## 2018-02-20 NOTE — PROGRESS NOTES
Clinic Care Coordination Contact  OUTREACH    Referral Information:  Referral Source: IP/TCU Report  Reason for Contact: Post Hospital Follow-up for COPD exacerbation with hypoxia, requiring outpatient oxygen therapy  Care Conference: No     Universal Utilization:   ED Visits in last year: 0  Hospital visits in last year: 1  Last PCP appointment: 10/13/17  Missed Appointments: 0  Concerns: No major concerns  Multiple Providers or Specialists: No    Clinical Concerns:  Current Medical Concerns: Pt admitted to [facility] with complaints of shortness of breath.  Pt has a history of paroxysmal atrial fibrillation and COPD with active ongoing tobacco abuse. During hospital stay pt was found to have hypoxia, A. Fib. and quinolone resistant E. coli. Cardiology was consulted and several medication adjustments were made. It was found that pt required home oxygen for ambulation. Orders were placed. Pt reports that [facility] successfully delivered the equipment to her home. Pt was started on a nicoderm patch and has been able to abstain from tobacco use since discharge. Pt denies needing any additional support or literature regarding tobacco cessation. Pt reports that her spouse is currently trying to quite as well which has been helpful.   Current Behavioral Concerns: Tobacco Use- pt is currently working on tobacco cessation. Pt has not smoked since admission 2/16 and is utilizing patches for assistance.    Education Provided to patient: Care Coordination, Home Care Services   Clinical Pathway Name: None    Medication Management:  Pt reports adherence, reviewed medication regimen. Pt was prescribed nebulizers, however it does not appear that an order for a nebulizer machine was placed. Pt has a follow-up appointment with PCP clinic 2/22/18 and will need orders placed at that time.   Nebulizer Orders must include: qualifying diagnosis, provider notes, ABN/noncovered form signed.   Discussed with pt who  reports understanding. Also, informed pt that she can purchase a nebulizer for $230 for desk top or $245 for portable if so desired. Pt declines and reports understanding.      Functional Status:  Mobility Status: Independent- will need oxygen during ambulation  Equipment Currently Used at Home: none  Transportation: Pt's spouse provides     Psychosocial:  Current living arrangement:: I live in a private home with spouse  Financial/Insurance: [facility], No financial concerns discussed     Resources and Interventions:  Current Resources: Home Care: [facility] home care  Advanced Care Plans/Directives on file:: Yes  Patient/Caregiver understanding: Pt reports understanding and denies any additional questions or concerns at this times. BRANDON CC engaged in AIDET communication during encounter.  Frequency of Care Coordination: 4 weeks  Upcoming appointment: 02/22/18     Plan: Care Navigator will follow pt's progression in therapy and follow-up upon discharge.    HANNY Burdick  [facility]   [phone]  [email or web address]

## 2018-02-20 NOTE — LETTER
[facility] CARE COORDINATION  [facility]   [address] [place] [address]        February 20, 2018      Christina Umana  [address] [place] [address]      Dear Christina,    I am a clinic care coordinator who works with ASHISH REGAN at [facility]. I wanted to thank you for spending the time to talk with me.Included is a flyer with a description of clinic care coordination and how I can further assist you.     Please feel free to contact me at [phone], with any questions or concerns.     Sincerely,     Luna Verdugo

## 2018-02-21 ENCOUNTER — HOME CARE/HOSPICE - HEALTHEAST (OUTPATIENT)
Dept: HOME HEALTH SERVICES | Facility: HOME HEALTH | Age: 78
End: 2018-02-21

## 2018-02-21 ENCOUNTER — COMMUNICATION - HEALTHEAST (OUTPATIENT)
Dept: INTERNAL MEDICINE | Facility: CLINIC | Age: 78
End: 2018-02-21

## 2018-02-21 ENCOUNTER — COMMUNICATION - HEALTHEAST (OUTPATIENT)
Dept: HOME HEALTH SERVICES | Facility: HOME HEALTH | Age: 78
End: 2018-02-21

## 2018-02-21 ENCOUNTER — AMBULATORY - HEALTHEAST (OUTPATIENT)
Dept: LAB | Facility: CLINIC | Age: 78
End: 2018-02-21

## 2018-02-21 DIAGNOSIS — I48.20 CHRONIC ATRIAL FIBRILLATION (H): ICD-10-CM

## 2018-02-21 DIAGNOSIS — I48.92 ATRIAL FIBRILLATION AND FLUTTER (H): ICD-10-CM

## 2018-02-21 DIAGNOSIS — J44.9 COPD (CHRONIC OBSTRUCTIVE PULMONARY DISEASE) (H): ICD-10-CM

## 2018-02-21 DIAGNOSIS — I48.91 ATRIAL FIBRILLATION AND FLUTTER (H): ICD-10-CM

## 2018-02-21 LAB — INR PPP: 1.8 (ref 0.9–1.1)

## 2018-02-22 ENCOUNTER — OFFICE VISIT - HEALTHEAST (OUTPATIENT)
Dept: INTERNAL MEDICINE | Facility: CLINIC | Age: 78
End: 2018-02-22

## 2018-02-22 DIAGNOSIS — N39.0 UTI (URINARY TRACT INFECTION): ICD-10-CM

## 2018-02-22 DIAGNOSIS — J96.01 ACUTE RESPIRATORY FAILURE WITH HYPOXIA (H): ICD-10-CM

## 2018-02-22 DIAGNOSIS — I50.9 ACUTE ON CHRONIC CONGESTIVE HEART FAILURE, UNSPECIFIED CONGESTIVE HEART FAILURE TYPE: ICD-10-CM

## 2018-02-22 DIAGNOSIS — I48.20 CHRONIC ATRIAL FIBRILLATION (H): ICD-10-CM

## 2018-02-22 DIAGNOSIS — J44.1 COPD EXACERBATION (H): ICD-10-CM

## 2018-02-22 DIAGNOSIS — Z09 HOSPITAL DISCHARGE FOLLOW-UP: ICD-10-CM

## 2018-02-22 LAB
ANION GAP SERPL CALCULATED.3IONS-SCNC: 11 MMOL/L (ref 5–18)
BUN SERPL-MCNC: 29 MG/DL (ref 8–28)
CALCIUM SERPL-MCNC: 8.8 MG/DL (ref 8.5–10.5)
CHLORIDE BLD-SCNC: 97 MMOL/L (ref 98–107)
CO2 SERPL-SCNC: 28 MMOL/L (ref 22–31)
CREAT SERPL-MCNC: 0.97 MG/DL (ref 0.6–1.1)
GFR SERPL CREATININE-BSD FRML MDRD: 56 ML/MIN/1.73M2
GLUCOSE BLD-MCNC: 184 MG/DL (ref 70–125)
POTASSIUM BLD-SCNC: 4.6 MMOL/L (ref 3.5–5)
SODIUM SERPL-SCNC: 136 MMOL/L (ref 136–145)

## 2018-02-23 ENCOUNTER — COMMUNICATION - HEALTHEAST (OUTPATIENT)
Dept: HOME HEALTH SERVICES | Facility: HOME HEALTH | Age: 78
End: 2018-02-23

## 2018-02-23 ENCOUNTER — HOME CARE/HOSPICE - HEALTHEAST (OUTPATIENT)
Dept: HOME HEALTH SERVICES | Facility: HOME HEALTH | Age: 78
End: 2018-02-23

## 2018-02-23 ENCOUNTER — COMMUNICATION - HEALTHEAST (OUTPATIENT)
Dept: PHYSICAL THERAPY | Age: 78
End: 2018-02-23

## 2018-02-26 ENCOUNTER — COMMUNICATION - HEALTHEAST (OUTPATIENT)
Dept: NURSING | Facility: CLINIC | Age: 78
End: 2018-02-26

## 2018-02-26 ENCOUNTER — HOME CARE/HOSPICE - HEALTHEAST (OUTPATIENT)
Dept: HOME HEALTH SERVICES | Facility: HOME HEALTH | Age: 78
End: 2018-02-26

## 2018-02-26 DIAGNOSIS — I48.20 CHRONIC ATRIAL FIBRILLATION (H): ICD-10-CM

## 2018-02-26 LAB
INR PPP: 4.5 (ref 0.9–1.1)
POC INR - HE - HISTORICAL: 4.5 (ref 0.9–1.1)

## 2018-02-28 ENCOUNTER — HOME CARE/HOSPICE - HEALTHEAST (OUTPATIENT)
Dept: HOME HEALTH SERVICES | Facility: HOME HEALTH | Age: 78
End: 2018-02-28

## 2018-03-01 ENCOUNTER — HOME CARE/HOSPICE - HEALTHEAST (OUTPATIENT)
Dept: HOME HEALTH SERVICES | Facility: HOME HEALTH | Age: 78
End: 2018-03-01

## 2018-03-01 ENCOUNTER — COMMUNICATION - HEALTHEAST (OUTPATIENT)
Dept: OTHER | Facility: CLINIC | Age: 78
End: 2018-03-01

## 2018-03-01 ENCOUNTER — COMMUNICATION - HEALTHEAST (OUTPATIENT)
Dept: INTERNAL MEDICINE | Facility: CLINIC | Age: 78
End: 2018-03-01

## 2018-03-01 DIAGNOSIS — I48.20 CHRONIC ATRIAL FIBRILLATION (H): ICD-10-CM

## 2018-03-01 LAB — INR PPP: 1.4 (ref 0.9–1.1)

## 2018-03-02 ENCOUNTER — HOME CARE/HOSPICE - HEALTHEAST (OUTPATIENT)
Dept: HOME HEALTH SERVICES | Facility: HOME HEALTH | Age: 78
End: 2018-03-02

## 2018-03-05 ENCOUNTER — HOME CARE/HOSPICE - HEALTHEAST (OUTPATIENT)
Dept: HOME HEALTH SERVICES | Facility: HOME HEALTH | Age: 78
End: 2018-03-05

## 2018-03-05 ENCOUNTER — COMMUNICATION - HEALTHEAST (OUTPATIENT)
Dept: INTERNAL MEDICINE | Facility: CLINIC | Age: 78
End: 2018-03-05

## 2018-03-05 DIAGNOSIS — I48.20 CHRONIC ATRIAL FIBRILLATION (H): ICD-10-CM

## 2018-03-05 LAB — INR PPP: 2.4 (ref 0.9–1.1)

## 2018-03-06 ENCOUNTER — HOME CARE/HOSPICE - HEALTHEAST (OUTPATIENT)
Dept: HOME HEALTH SERVICES | Facility: HOME HEALTH | Age: 78
End: 2018-03-06

## 2018-03-06 ENCOUNTER — COMMUNICATION - HEALTHEAST (OUTPATIENT)
Dept: CARE COORDINATION | Facility: CLINIC | Age: 78
End: 2018-03-06

## 2018-03-06 ENCOUNTER — AMBULATORY - HEALTHEAST (OUTPATIENT)
Dept: NURSING | Facility: CLINIC | Age: 78
End: 2018-03-06

## 2018-03-08 ENCOUNTER — HOME CARE/HOSPICE - HEALTHEAST (OUTPATIENT)
Dept: HOME HEALTH SERVICES | Facility: HOME HEALTH | Age: 78
End: 2018-03-08

## 2018-03-09 ENCOUNTER — OFFICE VISIT - HEALTHEAST (OUTPATIENT)
Dept: INTERNAL MEDICINE | Facility: CLINIC | Age: 78
End: 2018-03-09

## 2018-03-09 ENCOUNTER — HOME CARE/HOSPICE - HEALTHEAST (OUTPATIENT)
Dept: HOME HEALTH SERVICES | Facility: HOME HEALTH | Age: 78
End: 2018-03-09

## 2018-03-09 DIAGNOSIS — J44.1 COPD EXACERBATION (H): ICD-10-CM

## 2018-03-09 DIAGNOSIS — I10 ESSENTIAL HYPERTENSION: ICD-10-CM

## 2018-03-09 DIAGNOSIS — I48.20 CHRONIC ATRIAL FIBRILLATION (H): ICD-10-CM

## 2018-03-09 LAB
ANION GAP SERPL CALCULATED.3IONS-SCNC: 11 MMOL/L (ref 5–18)
BUN SERPL-MCNC: 17 MG/DL (ref 8–28)
CALCIUM SERPL-MCNC: 8.6 MG/DL (ref 8.5–10.5)
CHLORIDE BLD-SCNC: 100 MMOL/L (ref 98–107)
CO2 SERPL-SCNC: 27 MMOL/L (ref 22–31)
CREAT SERPL-MCNC: 0.91 MG/DL (ref 0.6–1.1)
ERYTHROCYTE [DISTWIDTH] IN BLOOD BY AUTOMATED COUNT: 13.2 % (ref 11–14.5)
GFR SERPL CREATININE-BSD FRML MDRD: 60 ML/MIN/1.73M2
GLUCOSE BLD-MCNC: 112 MG/DL (ref 70–125)
HCT VFR BLD AUTO: 42.1 % (ref 35–47)
HGB BLD-MCNC: 14.4 G/DL (ref 12–16)
MCH RBC QN AUTO: 28.5 PG (ref 27–34)
MCHC RBC AUTO-ENTMCNC: 34.1 G/DL (ref 32–36)
MCV RBC AUTO: 84 FL (ref 80–100)
PLATELET # BLD AUTO: 109 THOU/UL (ref 140–440)
PMV BLD AUTO: 7.3 FL (ref 7–10)
POTASSIUM BLD-SCNC: 3.8 MMOL/L (ref 3.5–5)
RBC # BLD AUTO: 5.03 MILL/UL (ref 3.8–5.4)
SODIUM SERPL-SCNC: 138 MMOL/L (ref 136–145)
WBC: 5.4 THOU/UL (ref 4–11)

## 2018-03-12 ENCOUNTER — HOME CARE/HOSPICE - HEALTHEAST (OUTPATIENT)
Dept: HOME HEALTH SERVICES | Facility: HOME HEALTH | Age: 78
End: 2018-03-12

## 2018-03-12 ENCOUNTER — COMMUNICATION - HEALTHEAST (OUTPATIENT)
Dept: INTERNAL MEDICINE | Facility: CLINIC | Age: 78
End: 2018-03-12

## 2018-03-12 DIAGNOSIS — I48.20 CHRONIC ATRIAL FIBRILLATION (H): ICD-10-CM

## 2018-03-12 LAB — INR PPP: 3 (ref 0.9–1.1)

## 2018-03-13 ENCOUNTER — HOME CARE/HOSPICE - HEALTHEAST (OUTPATIENT)
Dept: HOME HEALTH SERVICES | Facility: HOME HEALTH | Age: 78
End: 2018-03-13

## 2018-03-14 ENCOUNTER — HOME CARE/HOSPICE - HEALTHEAST (OUTPATIENT)
Dept: HOME HEALTH SERVICES | Facility: HOME HEALTH | Age: 78
End: 2018-03-14

## 2018-03-15 ENCOUNTER — COMMUNICATION - HEALTHEAST (OUTPATIENT)
Dept: INTERNAL MEDICINE | Facility: CLINIC | Age: 78
End: 2018-03-15

## 2018-03-15 ENCOUNTER — HOME CARE/HOSPICE - HEALTHEAST (OUTPATIENT)
Dept: HOME HEALTH SERVICES | Facility: HOME HEALTH | Age: 78
End: 2018-03-15

## 2018-03-15 DIAGNOSIS — I50.9 ACUTE ON CHRONIC CONGESTIVE HEART FAILURE, UNSPECIFIED CONGESTIVE HEART FAILURE TYPE: ICD-10-CM

## 2018-03-15 DIAGNOSIS — I48.20 CHRONIC ATRIAL FIBRILLATION (H): ICD-10-CM

## 2018-03-16 ENCOUNTER — HOME CARE/HOSPICE - HEALTHEAST (OUTPATIENT)
Dept: HOME HEALTH SERVICES | Facility: HOME HEALTH | Age: 78
End: 2018-03-16

## 2018-03-19 ENCOUNTER — HOME CARE/HOSPICE - HEALTHEAST (OUTPATIENT)
Dept: ADMINISTRATIVE | Facility: OTHER | Age: 78
End: 2018-03-19

## 2018-03-19 ENCOUNTER — AMBULATORY - HEALTHEAST (OUTPATIENT)
Dept: INTERNAL MEDICINE | Facility: CLINIC | Age: 78
End: 2018-03-19

## 2018-03-19 DIAGNOSIS — R35.0 URINARY FREQUENCY: ICD-10-CM

## 2018-03-19 DIAGNOSIS — R30.0 DYSURIA: ICD-10-CM

## 2018-03-20 ENCOUNTER — COMMUNICATION - HEALTHEAST (OUTPATIENT)
Dept: INTERNAL MEDICINE | Facility: CLINIC | Age: 78
End: 2018-03-20

## 2018-03-20 ENCOUNTER — AMBULATORY - HEALTHEAST (OUTPATIENT)
Dept: LAB | Facility: CLINIC | Age: 78
End: 2018-03-20

## 2018-03-20 DIAGNOSIS — R35.0 URINARY FREQUENCY: ICD-10-CM

## 2018-03-20 DIAGNOSIS — R30.0 DYSURIA: ICD-10-CM

## 2018-03-20 LAB
ALBUMIN UR-MCNC: ABNORMAL MG/DL
APPEARANCE UR: CLEAR
BACTERIA #/AREA URNS HPF: ABNORMAL HPF
BILIRUB UR QL STRIP: NEGATIVE
COLOR UR AUTO: YELLOW
GLUCOSE UR STRIP-MCNC: NEGATIVE MG/DL
HGB UR QL STRIP: ABNORMAL
KETONES UR STRIP-MCNC: NEGATIVE MG/DL
LEUKOCYTE ESTERASE UR QL STRIP: ABNORMAL
NITRATE UR QL: POSITIVE
PH UR STRIP: 7 [PH] (ref 5–8)
RBC #/AREA URNS AUTO: ABNORMAL HPF
SP GR UR STRIP: 1.02 (ref 1–1.03)
SQUAMOUS #/AREA URNS AUTO: ABNORMAL LPF
UROBILINOGEN UR STRIP-ACNC: ABNORMAL
WBC #/AREA URNS AUTO: ABNORMAL HPF

## 2018-03-22 LAB — BACTERIA SPEC CULT: ABNORMAL

## 2018-03-26 ENCOUNTER — OFFICE VISIT - HEALTHEAST (OUTPATIENT)
Dept: INTERNAL MEDICINE | Facility: CLINIC | Age: 78
End: 2018-03-26

## 2018-03-26 ENCOUNTER — COMMUNICATION - HEALTHEAST (OUTPATIENT)
Dept: INTERNAL MEDICINE | Facility: CLINIC | Age: 78
End: 2018-03-26

## 2018-03-26 ENCOUNTER — AMBULATORY - HEALTHEAST (OUTPATIENT)
Dept: INTERNAL MEDICINE | Facility: CLINIC | Age: 78
End: 2018-03-26

## 2018-03-26 DIAGNOSIS — I42.9 CARDIOMYOPATHY OF UNDETERMINED TYPE (H): ICD-10-CM

## 2018-03-26 DIAGNOSIS — I48.91 ATRIAL FIBRILLATION AND FLUTTER (H): ICD-10-CM

## 2018-03-26 DIAGNOSIS — I48.20 CHRONIC ATRIAL FIBRILLATION (H): ICD-10-CM

## 2018-03-26 DIAGNOSIS — I48.92 ATRIAL FIBRILLATION AND FLUTTER (H): ICD-10-CM

## 2018-03-26 DIAGNOSIS — I10 ESSENTIAL HYPERTENSION: ICD-10-CM

## 2018-03-26 LAB
ANION GAP SERPL CALCULATED.3IONS-SCNC: 10 MMOL/L (ref 5–18)
BUN SERPL-MCNC: 18 MG/DL (ref 8–28)
CALCIUM SERPL-MCNC: 8.9 MG/DL (ref 8.5–10.5)
CHLORIDE BLD-SCNC: 101 MMOL/L (ref 98–107)
CO2 SERPL-SCNC: 28 MMOL/L (ref 22–31)
CREAT SERPL-MCNC: 0.94 MG/DL (ref 0.6–1.1)
GFR SERPL CREATININE-BSD FRML MDRD: 58 ML/MIN/1.73M2
GLUCOSE BLD-MCNC: 125 MG/DL (ref 70–125)
INR PPP: 2.3 (ref 0.9–1.1)
POTASSIUM BLD-SCNC: 4 MMOL/L (ref 3.5–5)
SODIUM SERPL-SCNC: 139 MMOL/L (ref 136–145)

## 2018-04-05 ENCOUNTER — COMMUNICATION - HEALTHEAST (OUTPATIENT)
Dept: INTERNAL MEDICINE | Facility: CLINIC | Age: 78
End: 2018-04-05

## 2018-04-05 DIAGNOSIS — I10 ESSENTIAL HYPERTENSION: ICD-10-CM

## 2018-04-10 ENCOUNTER — COMMUNICATION - HEALTHEAST (OUTPATIENT)
Dept: INTERNAL MEDICINE | Facility: CLINIC | Age: 78
End: 2018-04-10

## 2018-04-10 DIAGNOSIS — N39.0 FREQUENT UTI: ICD-10-CM

## 2018-04-12 ENCOUNTER — COMMUNICATION - HEALTHEAST (OUTPATIENT)
Dept: INTERNAL MEDICINE | Facility: CLINIC | Age: 78
End: 2018-04-12

## 2018-04-13 ENCOUNTER — COMMUNICATION - HEALTHEAST (OUTPATIENT)
Dept: ANTICOAGULATION | Facility: CLINIC | Age: 78
End: 2018-04-13

## 2018-04-13 DIAGNOSIS — I48.20 CHRONIC ATRIAL FIBRILLATION (H): ICD-10-CM

## 2018-04-20 ENCOUNTER — PATIENT OUTREACH (OUTPATIENT)
Dept: CARE COORDINATION | Facility: CLINIC | Age: 78
End: 2018-04-20

## 2018-04-20 ENCOUNTER — COMMUNICATION - HEALTHEAST (OUTPATIENT)
Dept: ANTICOAGULATION | Facility: CLINIC | Age: 78
End: 2018-04-20

## 2018-04-20 DIAGNOSIS — I48.20 CHRONIC ATRIAL FIBRILLATION (H): ICD-10-CM

## 2018-04-23 ENCOUNTER — COMMUNICATION - HEALTHEAST (OUTPATIENT)
Dept: ANTICOAGULATION | Facility: CLINIC | Age: 78
End: 2018-04-23

## 2018-04-23 ENCOUNTER — COMMUNICATION - HEALTHEAST (OUTPATIENT)
Dept: SCHEDULING | Facility: CLINIC | Age: 78
End: 2018-04-23

## 2018-04-23 ENCOUNTER — OFFICE VISIT - HEALTHEAST (OUTPATIENT)
Dept: INTERNAL MEDICINE | Facility: CLINIC | Age: 78
End: 2018-04-23

## 2018-04-23 DIAGNOSIS — I48.92 ATRIAL FIBRILLATION AND FLUTTER (H): ICD-10-CM

## 2018-04-23 DIAGNOSIS — I42.9 CARDIOMYOPATHY OF UNDETERMINED TYPE (H): ICD-10-CM

## 2018-04-23 DIAGNOSIS — I48.20 CHRONIC ATRIAL FIBRILLATION (H): ICD-10-CM

## 2018-04-23 DIAGNOSIS — I10 ESSENTIAL HYPERTENSION: ICD-10-CM

## 2018-04-23 DIAGNOSIS — I48.91 ATRIAL FIBRILLATION AND FLUTTER (H): ICD-10-CM

## 2018-04-23 DIAGNOSIS — I48.19 PERSISTENT ATRIAL FIBRILLATION (H): ICD-10-CM

## 2018-04-23 DIAGNOSIS — J44.1 COPD EXACERBATION (H): ICD-10-CM

## 2018-04-23 LAB
ANION GAP SERPL CALCULATED.3IONS-SCNC: 13 MMOL/L (ref 5–18)
BUN SERPL-MCNC: 34 MG/DL (ref 8–28)
CALCIUM SERPL-MCNC: 9 MG/DL (ref 8.5–10.5)
CHLORIDE BLD-SCNC: 96 MMOL/L (ref 98–107)
CO2 SERPL-SCNC: 29 MMOL/L (ref 22–31)
CREAT SERPL-MCNC: 1.28 MG/DL (ref 0.6–1.1)
GFR SERPL CREATININE-BSD FRML MDRD: 41 ML/MIN/1.73M2
GLUCOSE BLD-MCNC: 141 MG/DL (ref 70–125)
INR PPP: 1.4 (ref 0.9–1.1)
POTASSIUM BLD-SCNC: 4.4 MMOL/L (ref 3.5–5)
SODIUM SERPL-SCNC: 138 MMOL/L (ref 136–145)

## 2018-04-23 NOTE — PROGRESS NOTES
Clinic Care Coordination Contact    Situation: Patient chart reviewed by care coordinator.    Acute exacerbation of CHF - recent hospitalization at [facility]      Plan/Recommendations: RN CC chart review note pt is schedule for follow up with PCP today. RN CC will outreach in 3-5 days for post visit call to assess care coordination needs.   Yaquelin Portillo RN  Clinic Care Coordinator  Mobile: [phone]  [email or web address]

## 2018-04-30 ENCOUNTER — OFFICE VISIT - HEALTHEAST (OUTPATIENT)
Dept: CARDIOLOGY | Facility: CLINIC | Age: 78
End: 2018-04-30

## 2018-04-30 ENCOUNTER — AMBULATORY - HEALTHEAST (OUTPATIENT)
Dept: CARDIOLOGY | Facility: CLINIC | Age: 78
End: 2018-04-30

## 2018-04-30 DIAGNOSIS — I48.19 PERSISTENT ATRIAL FIBRILLATION (H): ICD-10-CM

## 2018-04-30 DIAGNOSIS — I50.20 HEART FAILURE WITH REDUCED EJECTION FRACTION (H): ICD-10-CM

## 2018-04-30 DIAGNOSIS — I48.20 CHRONIC ATRIAL FIBRILLATION (H): ICD-10-CM

## 2018-04-30 LAB
ANION GAP SERPL CALCULATED.3IONS-SCNC: 12 MMOL/L (ref 5–18)
BUN SERPL-MCNC: 33 MG/DL (ref 8–28)
CALCIUM SERPL-MCNC: 9.1 MG/DL (ref 8.5–10.5)
CHLORIDE BLD-SCNC: 100 MMOL/L (ref 98–107)
CO2 SERPL-SCNC: 26 MMOL/L (ref 22–31)
CREAT SERPL-MCNC: 1.32 MG/DL (ref 0.6–1.1)
GFR SERPL CREATININE-BSD FRML MDRD: 39 ML/MIN/1.73M2
GLUCOSE BLD-MCNC: 147 MG/DL (ref 70–125)
POTASSIUM BLD-SCNC: 5.2 MMOL/L (ref 3.5–5)
SODIUM SERPL-SCNC: 138 MMOL/L (ref 136–145)

## 2018-04-30 ASSESSMENT — MIFFLIN-ST. JEOR: SCORE: 1224.31

## 2018-05-01 ENCOUNTER — OFFICE VISIT - HEALTHEAST (OUTPATIENT)
Dept: INTERNAL MEDICINE | Facility: CLINIC | Age: 78
End: 2018-05-01

## 2018-05-01 ENCOUNTER — COMMUNICATION - HEALTHEAST (OUTPATIENT)
Dept: ANTICOAGULATION | Facility: CLINIC | Age: 78
End: 2018-05-01

## 2018-05-01 ENCOUNTER — COMMUNICATION - HEALTHEAST (OUTPATIENT)
Dept: CARDIOLOGY | Facility: CLINIC | Age: 78
End: 2018-05-01

## 2018-05-01 DIAGNOSIS — I48.20 CHRONIC ATRIAL FIBRILLATION (H): ICD-10-CM

## 2018-05-01 DIAGNOSIS — I50.20 HEART FAILURE WITH REDUCED EJECTION FRACTION (H): ICD-10-CM

## 2018-05-01 DIAGNOSIS — I10 ESSENTIAL HYPERTENSION: ICD-10-CM

## 2018-05-01 DIAGNOSIS — I48.91 ATRIAL FIBRILLATION AND FLUTTER (H): ICD-10-CM

## 2018-05-01 DIAGNOSIS — I48.92 ATRIAL FIBRILLATION AND FLUTTER (H): ICD-10-CM

## 2018-05-01 DIAGNOSIS — I50.9 CHF (CONGESTIVE HEART FAILURE) (H): ICD-10-CM

## 2018-05-01 LAB — INR PPP: 3.1 (ref 0.9–1.1)

## 2018-05-13 ENCOUNTER — COMMUNICATION - HEALTHEAST (OUTPATIENT)
Dept: SCHEDULING | Facility: CLINIC | Age: 78
End: 2018-05-13

## 2018-05-14 ENCOUNTER — OFFICE VISIT - HEALTHEAST (OUTPATIENT)
Dept: INTERNAL MEDICINE | Facility: CLINIC | Age: 78
End: 2018-05-14

## 2018-05-14 ENCOUNTER — COMMUNICATION - HEALTHEAST (OUTPATIENT)
Dept: ANTICOAGULATION | Facility: CLINIC | Age: 78
End: 2018-05-14

## 2018-05-14 DIAGNOSIS — I48.91 ATRIAL FIBRILLATION AND FLUTTER (H): ICD-10-CM

## 2018-05-14 DIAGNOSIS — I50.20 HEART FAILURE WITH REDUCED EJECTION FRACTION (H): ICD-10-CM

## 2018-05-14 DIAGNOSIS — I48.20 CHRONIC ATRIAL FIBRILLATION (H): ICD-10-CM

## 2018-05-14 DIAGNOSIS — N28.9 MILD RENAL INSUFFICIENCY: ICD-10-CM

## 2018-05-14 DIAGNOSIS — I48.92 ATRIAL FIBRILLATION AND FLUTTER (H): ICD-10-CM

## 2018-05-14 DIAGNOSIS — I50.9 CHF (CONGESTIVE HEART FAILURE) (H): ICD-10-CM

## 2018-05-14 LAB
ANION GAP SERPL CALCULATED.3IONS-SCNC: 12 MMOL/L (ref 5–18)
BUN SERPL-MCNC: 26 MG/DL (ref 8–28)
CALCIUM SERPL-MCNC: 8.9 MG/DL (ref 8.5–10.5)
CHLORIDE BLD-SCNC: 101 MMOL/L (ref 98–107)
CO2 SERPL-SCNC: 25 MMOL/L (ref 22–31)
CREAT SERPL-MCNC: 1.28 MG/DL (ref 0.6–1.1)
GFR SERPL CREATININE-BSD FRML MDRD: 41 ML/MIN/1.73M2
GLUCOSE BLD-MCNC: 90 MG/DL (ref 70–125)
INR PPP: 2.1 (ref 0.9–1.1)
POTASSIUM BLD-SCNC: 4.6 MMOL/L (ref 3.5–5)
SODIUM SERPL-SCNC: 138 MMOL/L (ref 136–145)

## 2018-05-17 ENCOUNTER — COMMUNICATION - HEALTHEAST (OUTPATIENT)
Dept: INTERNAL MEDICINE | Facility: CLINIC | Age: 78
End: 2018-05-17

## 2018-05-17 DIAGNOSIS — I10 ESSENTIAL HYPERTENSION: ICD-10-CM

## 2018-05-17 RX ORDER — SPIRONOLACTONE 25 MG/1
TABLET ORAL
Qty: 90 TABLET | Refills: 3 | Status: SHIPPED | OUTPATIENT
Start: 2018-05-17 | End: 2022-12-18

## 2018-05-24 ENCOUNTER — COMMUNICATION - HEALTHEAST (OUTPATIENT)
Dept: ANTICOAGULATION | Facility: CLINIC | Age: 78
End: 2018-05-24

## 2018-05-24 ENCOUNTER — AMBULATORY - HEALTHEAST (OUTPATIENT)
Dept: LAB | Facility: CLINIC | Age: 78
End: 2018-05-24

## 2018-05-24 DIAGNOSIS — I48.91 ATRIAL FIBRILLATION AND FLUTTER (H): ICD-10-CM

## 2018-05-24 DIAGNOSIS — I48.92 ATRIAL FIBRILLATION AND FLUTTER (H): ICD-10-CM

## 2018-05-24 DIAGNOSIS — I48.20 CHRONIC ATRIAL FIBRILLATION (H): ICD-10-CM

## 2018-05-24 LAB — INR PPP: 1.9 (ref 0.9–1.1)

## 2018-06-07 ENCOUNTER — COMMUNICATION - HEALTHEAST (OUTPATIENT)
Dept: INTERNAL MEDICINE | Facility: CLINIC | Age: 78
End: 2018-06-07

## 2018-06-07 ENCOUNTER — AMBULATORY - HEALTHEAST (OUTPATIENT)
Dept: LAB | Facility: CLINIC | Age: 78
End: 2018-06-07

## 2018-06-07 DIAGNOSIS — I48.92 ATRIAL FIBRILLATION AND FLUTTER (H): ICD-10-CM

## 2018-06-07 DIAGNOSIS — I48.20 CHRONIC ATRIAL FIBRILLATION (H): ICD-10-CM

## 2018-06-07 DIAGNOSIS — I48.91 ATRIAL FIBRILLATION AND FLUTTER (H): ICD-10-CM

## 2018-06-07 LAB — INR PPP: 1.6 (ref 0.9–1.1)

## 2018-06-14 ENCOUNTER — COMMUNICATION - HEALTHEAST (OUTPATIENT)
Dept: INTERNAL MEDICINE | Facility: CLINIC | Age: 78
End: 2018-06-14

## 2018-06-14 DIAGNOSIS — I48.91 ATRIAL FIBRILLATION AND FLUTTER (H): ICD-10-CM

## 2018-06-14 DIAGNOSIS — I48.92 ATRIAL FIBRILLATION AND FLUTTER (H): ICD-10-CM

## 2018-06-19 ENCOUNTER — OFFICE VISIT - HEALTHEAST (OUTPATIENT)
Dept: CARDIOLOGY | Facility: CLINIC | Age: 78
End: 2018-06-19

## 2018-06-19 DIAGNOSIS — I10 ESSENTIAL HYPERTENSION: ICD-10-CM

## 2018-06-19 DIAGNOSIS — I48.20 CHRONIC ATRIAL FIBRILLATION (H): ICD-10-CM

## 2018-06-19 DIAGNOSIS — J44.9 CHRONIC OBSTRUCTIVE PULMONARY DISEASE, UNSPECIFIED COPD TYPE (H): ICD-10-CM

## 2018-06-19 DIAGNOSIS — I42.8 NONISCHEMIC CARDIOMYOPATHY (H): ICD-10-CM

## 2018-06-19 ASSESSMENT — MIFFLIN-ST. JEOR: SCORE: 1260.6

## 2018-06-21 ENCOUNTER — COMMUNICATION - HEALTHEAST (OUTPATIENT)
Dept: INTERNAL MEDICINE | Facility: CLINIC | Age: 78
End: 2018-06-21

## 2018-06-21 ENCOUNTER — AMBULATORY - HEALTHEAST (OUTPATIENT)
Dept: LAB | Facility: CLINIC | Age: 78
End: 2018-06-21

## 2018-06-21 DIAGNOSIS — I48.92 ATRIAL FIBRILLATION AND FLUTTER (H): ICD-10-CM

## 2018-06-21 DIAGNOSIS — I48.20 CHRONIC ATRIAL FIBRILLATION (H): ICD-10-CM

## 2018-06-21 DIAGNOSIS — I48.91 ATRIAL FIBRILLATION AND FLUTTER (H): ICD-10-CM

## 2018-06-21 LAB — INR PPP: 3.3 (ref 0.9–1.1)

## 2018-07-02 ENCOUNTER — AMBULATORY - HEALTHEAST (OUTPATIENT)
Dept: CARDIOLOGY | Facility: CLINIC | Age: 78
End: 2018-07-02

## 2018-07-02 ENCOUNTER — OFFICE VISIT - HEALTHEAST (OUTPATIENT)
Dept: CARDIOLOGY | Facility: CLINIC | Age: 78
End: 2018-07-02

## 2018-07-02 DIAGNOSIS — I10 ESSENTIAL HYPERTENSION: ICD-10-CM

## 2018-07-02 DIAGNOSIS — I50.22 CHRONIC SYSTOLIC HEART FAILURE (H): ICD-10-CM

## 2018-07-02 RX ORDER — METOPROLOL TARTRATE 50 MG
75 TABLET ORAL 2 TIMES DAILY
Qty: 270 TABLET | Refills: 3 | Status: ON HOLD | OUTPATIENT
Start: 2018-07-02 | End: 2022-12-21

## 2018-07-02 ASSESSMENT — MIFFLIN-ST. JEOR: SCORE: 1256.07

## 2018-07-05 ENCOUNTER — AMBULATORY - HEALTHEAST (OUTPATIENT)
Dept: LAB | Facility: CLINIC | Age: 78
End: 2018-07-05

## 2018-07-05 ENCOUNTER — COMMUNICATION - HEALTHEAST (OUTPATIENT)
Dept: ANTICOAGULATION | Facility: CLINIC | Age: 78
End: 2018-07-05

## 2018-07-05 DIAGNOSIS — I48.91 ATRIAL FIBRILLATION AND FLUTTER (H): ICD-10-CM

## 2018-07-05 DIAGNOSIS — I48.92 ATRIAL FIBRILLATION AND FLUTTER (H): ICD-10-CM

## 2018-07-05 DIAGNOSIS — I48.20 CHRONIC ATRIAL FIBRILLATION (H): ICD-10-CM

## 2018-07-05 LAB — INR PPP: 3.5 (ref 0.9–1.1)

## 2018-07-10 ENCOUNTER — COMMUNICATION - HEALTHEAST (OUTPATIENT)
Dept: INTERNAL MEDICINE | Facility: CLINIC | Age: 78
End: 2018-07-10

## 2018-07-19 ENCOUNTER — COMMUNICATION - HEALTHEAST (OUTPATIENT)
Dept: ANTICOAGULATION | Facility: CLINIC | Age: 78
End: 2018-07-19

## 2018-07-19 ENCOUNTER — AMBULATORY - HEALTHEAST (OUTPATIENT)
Dept: LAB | Facility: CLINIC | Age: 78
End: 2018-07-19

## 2018-07-19 DIAGNOSIS — I48.20 CHRONIC ATRIAL FIBRILLATION (H): ICD-10-CM

## 2018-07-19 DIAGNOSIS — I48.91 ATRIAL FIBRILLATION AND FLUTTER (H): ICD-10-CM

## 2018-07-19 DIAGNOSIS — I48.92 ATRIAL FIBRILLATION AND FLUTTER (H): ICD-10-CM

## 2018-07-19 LAB — INR PPP: 2.7 (ref 0.9–1.1)

## 2018-08-02 ENCOUNTER — COMMUNICATION - HEALTHEAST (OUTPATIENT)
Dept: INTERNAL MEDICINE | Facility: CLINIC | Age: 78
End: 2018-08-02

## 2018-08-02 DIAGNOSIS — F34.1 DYSTHYMIC DISORDER: ICD-10-CM

## 2018-08-03 ENCOUNTER — COMMUNICATION - HEALTHEAST (OUTPATIENT)
Dept: INTERNAL MEDICINE | Facility: CLINIC | Age: 78
End: 2018-08-03

## 2018-08-03 ENCOUNTER — OFFICE VISIT - HEALTHEAST (OUTPATIENT)
Dept: INTERNAL MEDICINE | Facility: CLINIC | Age: 78
End: 2018-08-03

## 2018-08-03 DIAGNOSIS — I50.22 CHRONIC SYSTOLIC HEART FAILURE (H): ICD-10-CM

## 2018-08-03 DIAGNOSIS — I48.91 ATRIAL FIBRILLATION AND FLUTTER (H): ICD-10-CM

## 2018-08-03 DIAGNOSIS — I48.20 CHRONIC ATRIAL FIBRILLATION (H): ICD-10-CM

## 2018-08-03 DIAGNOSIS — I48.19 PERSISTENT ATRIAL FIBRILLATION (H): ICD-10-CM

## 2018-08-03 DIAGNOSIS — Z86.0100 HISTORY OF COLONIC POLYPS: ICD-10-CM

## 2018-08-03 DIAGNOSIS — Z51.81 MEDICATION MONITORING ENCOUNTER: ICD-10-CM

## 2018-08-03 DIAGNOSIS — R53.83 FATIGUE, UNSPECIFIED TYPE: ICD-10-CM

## 2018-08-03 DIAGNOSIS — E78.00 HYPERCHOLESTEROLEMIA: ICD-10-CM

## 2018-08-03 DIAGNOSIS — J44.9 CHRONIC OBSTRUCTIVE PULMONARY DISEASE, UNSPECIFIED COPD TYPE (H): ICD-10-CM

## 2018-08-03 DIAGNOSIS — I48.92 ATRIAL FIBRILLATION AND FLUTTER (H): ICD-10-CM

## 2018-08-03 DIAGNOSIS — I10 ESSENTIAL HYPERTENSION: ICD-10-CM

## 2018-08-03 DIAGNOSIS — G25.81 RESTLESS LEGS SYNDROME: ICD-10-CM

## 2018-08-03 LAB
ALBUMIN SERPL-MCNC: 3.6 G/DL (ref 3.5–5)
ALP SERPL-CCNC: 61 U/L (ref 45–120)
ALT SERPL W P-5'-P-CCNC: 13 U/L (ref 0–45)
ANION GAP SERPL CALCULATED.3IONS-SCNC: 12 MMOL/L (ref 5–18)
AST SERPL W P-5'-P-CCNC: 15 U/L (ref 0–40)
BILIRUB SERPL-MCNC: 0.7 MG/DL (ref 0–1)
BNP SERPL-MCNC: 855 PG/ML (ref 0–144)
BUN SERPL-MCNC: 33 MG/DL (ref 8–28)
CALCIUM SERPL-MCNC: 9.3 MG/DL (ref 8.5–10.5)
CHLORIDE BLD-SCNC: 102 MMOL/L (ref 98–107)
CHOLEST SERPL-MCNC: 108 MG/DL
CO2 SERPL-SCNC: 26 MMOL/L (ref 22–31)
CREAT SERPL-MCNC: 1.34 MG/DL (ref 0.6–1.1)
ERYTHROCYTE [DISTWIDTH] IN BLOOD BY AUTOMATED COUNT: 13.8 % (ref 11–14.5)
FASTING STATUS PATIENT QL REPORTED: NO
FERRITIN SERPL-MCNC: 28 NG/ML (ref 10–130)
GFR SERPL CREATININE-BSD FRML MDRD: 38 ML/MIN/1.73M2
GLUCOSE BLD-MCNC: 131 MG/DL (ref 70–125)
HCT VFR BLD AUTO: 46.3 % (ref 35–47)
HDLC SERPL-MCNC: 37 MG/DL
HGB BLD-MCNC: 15.2 G/DL (ref 12–16)
INR PPP: 2.3 (ref 0.9–1.1)
IRON SATN MFR SERPL: 17 % (ref 20–50)
IRON SERPL-MCNC: 55 UG/DL (ref 42–175)
LDLC SERPL CALC-MCNC: 54 MG/DL
MCH RBC QN AUTO: 26.7 PG (ref 27–34)
MCHC RBC AUTO-ENTMCNC: 32.9 G/DL (ref 32–36)
MCV RBC AUTO: 81 FL (ref 80–100)
PLATELET # BLD AUTO: 95 THOU/UL (ref 140–440)
PMV BLD AUTO: 8.2 FL (ref 7–10)
POTASSIUM BLD-SCNC: 4.3 MMOL/L (ref 3.5–5)
PROT SERPL-MCNC: 6 G/DL (ref 6–8)
RBC # BLD AUTO: 5.71 MILL/UL (ref 3.8–5.4)
SODIUM SERPL-SCNC: 140 MMOL/L (ref 136–145)
TIBC SERPL-MCNC: 333 UG/DL (ref 313–563)
TRANSFERRIN SERPL-MCNC: 266 MG/DL (ref 212–360)
TRIGL SERPL-MCNC: 83 MG/DL
TSH SERPL DL<=0.005 MIU/L-ACNC: 2.29 UIU/ML (ref 0.3–5)
WBC: 5.9 THOU/UL (ref 4–11)

## 2018-08-03 ASSESSMENT — MIFFLIN-ST. JEOR: SCORE: 1256.07

## 2018-08-06 ENCOUNTER — COMMUNICATION - HEALTHEAST (OUTPATIENT)
Dept: INTERNAL MEDICINE | Facility: CLINIC | Age: 78
End: 2018-08-06

## 2018-08-08 ENCOUNTER — COMMUNICATION - HEALTHEAST (OUTPATIENT)
Dept: ANTICOAGULATION | Facility: CLINIC | Age: 78
End: 2018-08-08

## 2018-08-08 ENCOUNTER — COMMUNICATION - HEALTHEAST (OUTPATIENT)
Dept: ADMINISTRATIVE | Facility: CLINIC | Age: 78
End: 2018-08-08

## 2018-08-08 DIAGNOSIS — I48.20 CHRONIC ATRIAL FIBRILLATION (H): ICD-10-CM

## 2018-08-09 ENCOUNTER — COMMUNICATION - HEALTHEAST (OUTPATIENT)
Dept: CARE COORDINATION | Facility: CLINIC | Age: 78
End: 2018-08-09

## 2018-08-09 ENCOUNTER — AMBULATORY - HEALTHEAST (OUTPATIENT)
Dept: CARE COORDINATION | Facility: CLINIC | Age: 78
End: 2018-08-09

## 2018-08-09 DIAGNOSIS — J44.1 COPD EXACERBATION (H): ICD-10-CM

## 2018-08-10 ENCOUNTER — AMBULATORY - HEALTHEAST (OUTPATIENT)
Dept: LAB | Facility: CLINIC | Age: 78
End: 2018-08-10

## 2018-08-10 ENCOUNTER — COMMUNICATION - HEALTHEAST (OUTPATIENT)
Dept: ANTICOAGULATION | Facility: CLINIC | Age: 78
End: 2018-08-10

## 2018-08-10 DIAGNOSIS — I48.20 CHRONIC ATRIAL FIBRILLATION (H): ICD-10-CM

## 2018-08-10 DIAGNOSIS — I48.92 ATRIAL FIBRILLATION AND FLUTTER (H): ICD-10-CM

## 2018-08-10 DIAGNOSIS — I48.91 ATRIAL FIBRILLATION AND FLUTTER (H): ICD-10-CM

## 2018-08-10 LAB — INR PPP: 2.1 (ref 0.9–1.1)

## 2018-08-13 ENCOUNTER — COMMUNICATION - HEALTHEAST (OUTPATIENT)
Dept: ANTICOAGULATION | Facility: CLINIC | Age: 78
End: 2018-08-13

## 2018-08-13 ENCOUNTER — OFFICE VISIT - HEALTHEAST (OUTPATIENT)
Dept: INTERNAL MEDICINE | Facility: CLINIC | Age: 78
End: 2018-08-13

## 2018-08-13 DIAGNOSIS — I48.91 ATRIAL FIBRILLATION AND FLUTTER (H): ICD-10-CM

## 2018-08-13 DIAGNOSIS — J44.1 COPD EXACERBATION (H): ICD-10-CM

## 2018-08-13 DIAGNOSIS — I50.9 ACUTE ON CHRONIC CONGESTIVE HEART FAILURE, UNSPECIFIED CONGESTIVE HEART FAILURE TYPE: ICD-10-CM

## 2018-08-13 DIAGNOSIS — I48.92 ATRIAL FIBRILLATION AND FLUTTER (H): ICD-10-CM

## 2018-08-13 DIAGNOSIS — Z51.81 MEDICATION MONITORING ENCOUNTER: ICD-10-CM

## 2018-08-13 DIAGNOSIS — I50.23 ACUTE ON CHRONIC SYSTOLIC CHF (CONGESTIVE HEART FAILURE), NYHA CLASS 3 (H): ICD-10-CM

## 2018-08-13 DIAGNOSIS — I48.20 CHRONIC ATRIAL FIBRILLATION (H): ICD-10-CM

## 2018-08-13 DIAGNOSIS — I10 ESSENTIAL HYPERTENSION: ICD-10-CM

## 2018-08-13 LAB
ANION GAP SERPL CALCULATED.3IONS-SCNC: 14 MMOL/L (ref 5–18)
BUN SERPL-MCNC: 44 MG/DL (ref 8–28)
CALCIUM SERPL-MCNC: 9.4 MG/DL (ref 8.5–10.5)
CHLORIDE BLD-SCNC: 97 MMOL/L (ref 98–107)
CO2 SERPL-SCNC: 28 MMOL/L (ref 22–31)
CREAT SERPL-MCNC: 1.45 MG/DL (ref 0.6–1.1)
GFR SERPL CREATININE-BSD FRML MDRD: 35 ML/MIN/1.73M2
GLUCOSE BLD-MCNC: 146 MG/DL (ref 70–125)
INR PPP: 3 (ref 0.9–1.1)
POTASSIUM BLD-SCNC: 4.7 MMOL/L (ref 3.5–5)
SODIUM SERPL-SCNC: 139 MMOL/L (ref 136–145)

## 2018-08-13 ASSESSMENT — MIFFLIN-ST. JEOR: SCORE: 1264

## 2018-08-14 ENCOUNTER — COMMUNICATION - HEALTHEAST (OUTPATIENT)
Dept: INTERNAL MEDICINE | Facility: CLINIC | Age: 78
End: 2018-08-14

## 2018-08-20 ENCOUNTER — COMMUNICATION - HEALTHEAST (OUTPATIENT)
Dept: ANTICOAGULATION | Facility: CLINIC | Age: 78
End: 2018-08-20

## 2018-08-20 ENCOUNTER — AMBULATORY - HEALTHEAST (OUTPATIENT)
Dept: LAB | Facility: CLINIC | Age: 78
End: 2018-08-20

## 2018-08-20 DIAGNOSIS — I48.91 ATRIAL FIBRILLATION AND FLUTTER (H): ICD-10-CM

## 2018-08-20 DIAGNOSIS — I48.20 CHRONIC ATRIAL FIBRILLATION (H): ICD-10-CM

## 2018-08-20 DIAGNOSIS — I48.92 ATRIAL FIBRILLATION AND FLUTTER (H): ICD-10-CM

## 2018-08-20 LAB — INR PPP: 3.1 (ref 0.9–1.1)

## 2018-08-21 ENCOUNTER — COMMUNICATION - HEALTHEAST (OUTPATIENT)
Dept: NURSING | Facility: CLINIC | Age: 78
End: 2018-08-21

## 2018-09-04 ENCOUNTER — OFFICE VISIT - HEALTHEAST (OUTPATIENT)
Dept: INTERNAL MEDICINE | Facility: CLINIC | Age: 78
End: 2018-09-04

## 2018-09-04 ENCOUNTER — COMMUNICATION - HEALTHEAST (OUTPATIENT)
Dept: INTERNAL MEDICINE | Facility: CLINIC | Age: 78
End: 2018-09-04

## 2018-09-04 ENCOUNTER — COMMUNICATION - HEALTHEAST (OUTPATIENT)
Dept: ANTICOAGULATION | Facility: CLINIC | Age: 78
End: 2018-09-04

## 2018-09-04 DIAGNOSIS — I50.22 CHRONIC SYSTOLIC CONGESTIVE HEART FAILURE (H): ICD-10-CM

## 2018-09-04 DIAGNOSIS — Z86.0100 HISTORY OF COLONIC POLYPS: ICD-10-CM

## 2018-09-04 DIAGNOSIS — I48.92 ATRIAL FIBRILLATION AND FLUTTER (H): ICD-10-CM

## 2018-09-04 DIAGNOSIS — Z51.81 MEDICATION MONITORING ENCOUNTER: ICD-10-CM

## 2018-09-04 DIAGNOSIS — I48.91 ATRIAL FIBRILLATION AND FLUTTER (H): ICD-10-CM

## 2018-09-04 DIAGNOSIS — J44.9 CHRONIC OBSTRUCTIVE PULMONARY DISEASE, UNSPECIFIED COPD TYPE (H): ICD-10-CM

## 2018-09-04 DIAGNOSIS — Z12.31 VISIT FOR SCREENING MAMMOGRAM: ICD-10-CM

## 2018-09-04 DIAGNOSIS — I48.20 CHRONIC ATRIAL FIBRILLATION (H): ICD-10-CM

## 2018-09-04 LAB
ANION GAP SERPL CALCULATED.3IONS-SCNC: 11 MMOL/L (ref 5–18)
BUN SERPL-MCNC: 24 MG/DL (ref 8–28)
CALCIUM SERPL-MCNC: 9.4 MG/DL (ref 8.5–10.5)
CHLORIDE BLD-SCNC: 99 MMOL/L (ref 98–107)
CO2 SERPL-SCNC: 29 MMOL/L (ref 22–31)
CREAT SERPL-MCNC: 1.26 MG/DL (ref 0.6–1.1)
GFR SERPL CREATININE-BSD FRML MDRD: 41 ML/MIN/1.73M2
GLUCOSE BLD-MCNC: 149 MG/DL (ref 70–125)
INR PPP: 2.1 (ref 0.9–1.1)
POTASSIUM BLD-SCNC: 4 MMOL/L (ref 3.5–5)
SODIUM SERPL-SCNC: 139 MMOL/L (ref 136–145)

## 2018-09-04 ASSESSMENT — MIFFLIN-ST. JEOR: SCORE: 1259.46

## 2018-09-06 ENCOUNTER — COMMUNICATION - HEALTHEAST (OUTPATIENT)
Dept: INTERNAL MEDICINE | Facility: CLINIC | Age: 78
End: 2018-09-06

## 2018-09-06 DIAGNOSIS — F34.1 DYSTHYMIC DISORDER: ICD-10-CM

## 2018-09-08 RX ORDER — PAROXETINE 20 MG/1
20 TABLET, FILM COATED ORAL DAILY
Qty: 90 TABLET | Refills: 3 | Status: SHIPPED | OUTPATIENT
Start: 2018-09-08

## 2018-09-18 ENCOUNTER — COMMUNICATION - HEALTHEAST (OUTPATIENT)
Dept: ANTICOAGULATION | Facility: CLINIC | Age: 78
End: 2018-09-18

## 2018-09-18 ENCOUNTER — AMBULATORY - HEALTHEAST (OUTPATIENT)
Dept: LAB | Facility: CLINIC | Age: 78
End: 2018-09-18

## 2018-09-18 DIAGNOSIS — I48.20 CHRONIC ATRIAL FIBRILLATION (H): ICD-10-CM

## 2018-09-18 DIAGNOSIS — I48.92 ATRIAL FIBRILLATION AND FLUTTER (H): ICD-10-CM

## 2018-09-18 DIAGNOSIS — I48.91 ATRIAL FIBRILLATION AND FLUTTER (H): ICD-10-CM

## 2018-09-18 LAB — INR PPP: 4.1 (ref 0.9–1.1)

## 2018-10-02 ENCOUNTER — COMMUNICATION - HEALTHEAST (OUTPATIENT)
Dept: ANTICOAGULATION | Facility: CLINIC | Age: 78
End: 2018-10-02

## 2018-10-02 ENCOUNTER — AMBULATORY - HEALTHEAST (OUTPATIENT)
Dept: LAB | Facility: CLINIC | Age: 78
End: 2018-10-02

## 2018-10-02 ENCOUNTER — HOSPITAL ENCOUNTER (OUTPATIENT)
Dept: MAMMOGRAPHY | Facility: CLINIC | Age: 78
Discharge: HOME | End: 2018-10-02
Attending: INTERNAL MEDICINE

## 2018-10-02 DIAGNOSIS — I48.92 ATRIAL FIBRILLATION AND FLUTTER (H): ICD-10-CM

## 2018-10-02 DIAGNOSIS — I48.20 CHRONIC ATRIAL FIBRILLATION (H): ICD-10-CM

## 2018-10-02 DIAGNOSIS — Z12.31 VISIT FOR SCREENING MAMMOGRAM: ICD-10-CM

## 2018-10-02 DIAGNOSIS — I48.91 ATRIAL FIBRILLATION AND FLUTTER (H): ICD-10-CM

## 2018-10-02 LAB — INR PPP: 2.5 (ref 0.9–1.1)

## 2018-10-22 ENCOUNTER — COMMUNICATION - HEALTHEAST (OUTPATIENT)
Dept: ANTICOAGULATION | Facility: CLINIC | Age: 78
End: 2018-10-22

## 2018-10-22 ENCOUNTER — AMBULATORY - HEALTHEAST (OUTPATIENT)
Dept: LAB | Facility: CLINIC | Age: 78
End: 2018-10-22

## 2018-10-22 DIAGNOSIS — I48.20 CHRONIC ATRIAL FIBRILLATION (H): ICD-10-CM

## 2018-10-22 DIAGNOSIS — I48.91 ATRIAL FIBRILLATION AND FLUTTER (H): ICD-10-CM

## 2018-10-22 DIAGNOSIS — I48.92 ATRIAL FIBRILLATION AND FLUTTER (H): ICD-10-CM

## 2018-10-22 LAB — INR PPP: 3.9 (ref 0.9–1.1)

## 2018-10-24 ENCOUNTER — OFFICE VISIT - HEALTHEAST (OUTPATIENT)
Dept: CARDIOLOGY | Facility: CLINIC | Age: 78
End: 2018-10-24

## 2018-10-24 DIAGNOSIS — I48.20 CHRONIC ATRIAL FIBRILLATION (H): ICD-10-CM

## 2018-10-24 DIAGNOSIS — I10 ESSENTIAL HYPERTENSION: ICD-10-CM

## 2018-10-24 DIAGNOSIS — I50.22 CHRONIC SYSTOLIC HEART FAILURE (H): ICD-10-CM

## 2018-10-24 ASSESSMENT — MIFFLIN-ST. JEOR: SCORE: 1273.07

## 2018-11-09 ENCOUNTER — COMMUNICATION - HEALTHEAST (OUTPATIENT)
Dept: ANTICOAGULATION | Facility: CLINIC | Age: 78
End: 2018-11-09

## 2018-11-09 ENCOUNTER — OFFICE VISIT - HEALTHEAST (OUTPATIENT)
Dept: INTERNAL MEDICINE | Facility: CLINIC | Age: 78
End: 2018-11-09

## 2018-11-09 DIAGNOSIS — J44.9 CHRONIC OBSTRUCTIVE PULMONARY DISEASE, UNSPECIFIED COPD TYPE (H): ICD-10-CM

## 2018-11-09 DIAGNOSIS — I48.20 CHRONIC ATRIAL FIBRILLATION (H): ICD-10-CM

## 2018-11-09 DIAGNOSIS — I50.22 CHRONIC SYSTOLIC HEART FAILURE (H): ICD-10-CM

## 2018-11-09 DIAGNOSIS — I10 ESSENTIAL HYPERTENSION: ICD-10-CM

## 2018-11-09 DIAGNOSIS — Z51.81 MEDICATION MONITORING ENCOUNTER: ICD-10-CM

## 2018-11-09 DIAGNOSIS — F34.1 DYSTHYMIC DISORDER: ICD-10-CM

## 2018-11-09 LAB
ALBUMIN SERPL-MCNC: 3.7 G/DL (ref 3.5–5)
ALP SERPL-CCNC: 75 U/L (ref 45–120)
ALT SERPL W P-5'-P-CCNC: 11 U/L (ref 0–45)
ANION GAP SERPL CALCULATED.3IONS-SCNC: 11 MMOL/L (ref 5–18)
AST SERPL W P-5'-P-CCNC: 15 U/L (ref 0–40)
BILIRUB SERPL-MCNC: 0.6 MG/DL (ref 0–1)
BUN SERPL-MCNC: 34 MG/DL (ref 8–28)
CALCIUM SERPL-MCNC: 9.6 MG/DL (ref 8.5–10.5)
CHLORIDE BLD-SCNC: 99 MMOL/L (ref 98–107)
CO2 SERPL-SCNC: 28 MMOL/L (ref 22–31)
CREAT SERPL-MCNC: 1.5 MG/DL (ref 0.6–1.1)
GFR SERPL CREATININE-BSD FRML MDRD: 34 ML/MIN/1.73M2
GLUCOSE BLD-MCNC: 110 MG/DL (ref 70–125)
INR PPP: 2.7 (ref 0.9–1.1)
POTASSIUM BLD-SCNC: 4.4 MMOL/L (ref 3.5–5)
PROT SERPL-MCNC: 6.3 G/DL (ref 6–8)
SODIUM SERPL-SCNC: 138 MMOL/L (ref 136–145)

## 2018-11-12 ENCOUNTER — COMMUNICATION - HEALTHEAST (OUTPATIENT)
Dept: INTERNAL MEDICINE | Facility: CLINIC | Age: 78
End: 2018-11-12

## 2018-11-16 ENCOUNTER — COMMUNICATION - HEALTHEAST (OUTPATIENT)
Dept: INTERNAL MEDICINE | Facility: CLINIC | Age: 78
End: 2018-11-16

## 2018-11-16 DIAGNOSIS — K21.9 ESOPHAGEAL REFLUX: ICD-10-CM

## 2018-11-19 ENCOUNTER — COMMUNICATION - HEALTHEAST (OUTPATIENT)
Dept: INTERNAL MEDICINE | Facility: CLINIC | Age: 78
End: 2018-11-19

## 2018-11-19 ENCOUNTER — COMMUNICATION - HEALTHEAST (OUTPATIENT)
Dept: ANTICOAGULATION | Facility: CLINIC | Age: 78
End: 2018-11-19

## 2018-11-19 ENCOUNTER — OFFICE VISIT - HEALTHEAST (OUTPATIENT)
Dept: INTERNAL MEDICINE | Facility: CLINIC | Age: 78
End: 2018-11-19

## 2018-11-19 DIAGNOSIS — I48.20 CHRONIC ATRIAL FIBRILLATION (H): ICD-10-CM

## 2018-11-19 DIAGNOSIS — F34.1 DYSTHYMIC DISORDER: ICD-10-CM

## 2018-11-19 DIAGNOSIS — K21.9 GASTROESOPHAGEAL REFLUX DISEASE WITHOUT ESOPHAGITIS: ICD-10-CM

## 2018-11-19 DIAGNOSIS — R35.0 URINARY FREQUENCY: ICD-10-CM

## 2018-11-19 DIAGNOSIS — I50.22 CHRONIC SYSTOLIC HEART FAILURE (H): ICD-10-CM

## 2018-11-19 DIAGNOSIS — I48.92 ATRIAL FIBRILLATION AND FLUTTER (H): ICD-10-CM

## 2018-11-19 DIAGNOSIS — Z51.81 MEDICATION MONITORING ENCOUNTER: ICD-10-CM

## 2018-11-19 DIAGNOSIS — I48.91 ATRIAL FIBRILLATION AND FLUTTER (H): ICD-10-CM

## 2018-11-19 DIAGNOSIS — I50.22 CHRONIC SYSTOLIC CONGESTIVE HEART FAILURE (H): ICD-10-CM

## 2018-11-19 DIAGNOSIS — J44.9 CHRONIC OBSTRUCTIVE PULMONARY DISEASE, UNSPECIFIED COPD TYPE (H): ICD-10-CM

## 2018-11-19 LAB
ALBUMIN UR-MCNC: ABNORMAL MG/DL
ANION GAP SERPL CALCULATED.3IONS-SCNC: 13 MMOL/L (ref 5–18)
APPEARANCE UR: CLEAR
BACTERIA #/AREA URNS HPF: ABNORMAL HPF
BILIRUB UR QL STRIP: NEGATIVE
BUN SERPL-MCNC: 39 MG/DL (ref 8–28)
CALCIUM SERPL-MCNC: 9.4 MG/DL (ref 8.5–10.5)
CHLORIDE BLD-SCNC: 98 MMOL/L (ref 98–107)
CO2 SERPL-SCNC: 26 MMOL/L (ref 22–31)
COLOR UR AUTO: YELLOW
CREAT SERPL-MCNC: 1.48 MG/DL (ref 0.6–1.1)
GFR SERPL CREATININE-BSD FRML MDRD: 34 ML/MIN/1.73M2
GLUCOSE BLD-MCNC: 135 MG/DL (ref 70–125)
GLUCOSE UR STRIP-MCNC: NEGATIVE MG/DL
HGB UR QL STRIP: ABNORMAL
INR PPP: 1.9 (ref 0.9–1.1)
KETONES UR STRIP-MCNC: NEGATIVE MG/DL
LEUKOCYTE ESTERASE UR QL STRIP: ABNORMAL
NITRATE UR QL: NEGATIVE
PH UR STRIP: 7 [PH] (ref 5–8)
POTASSIUM BLD-SCNC: 3.7 MMOL/L (ref 3.5–5)
RBC #/AREA URNS AUTO: ABNORMAL HPF
SODIUM SERPL-SCNC: 137 MMOL/L (ref 136–145)
SP GR UR STRIP: 1.02 (ref 1–1.03)
SQUAMOUS #/AREA URNS AUTO: ABNORMAL LPF
UROBILINOGEN UR STRIP-ACNC: ABNORMAL
WBC #/AREA URNS AUTO: ABNORMAL HPF

## 2018-11-20 ENCOUNTER — COMMUNICATION - HEALTHEAST (OUTPATIENT)
Dept: INTERNAL MEDICINE | Facility: CLINIC | Age: 78
End: 2018-11-20

## 2018-11-20 LAB — BACTERIA SPEC CULT: NO GROWTH

## 2018-12-04 ENCOUNTER — COMMUNICATION - HEALTHEAST (OUTPATIENT)
Dept: ANTICOAGULATION | Facility: CLINIC | Age: 78
End: 2018-12-04

## 2018-12-04 ENCOUNTER — AMBULATORY - HEALTHEAST (OUTPATIENT)
Dept: LAB | Facility: CLINIC | Age: 78
End: 2018-12-04

## 2018-12-04 DIAGNOSIS — I48.92 ATRIAL FIBRILLATION AND FLUTTER (H): ICD-10-CM

## 2018-12-04 DIAGNOSIS — I48.20 CHRONIC ATRIAL FIBRILLATION (H): ICD-10-CM

## 2018-12-04 DIAGNOSIS — I48.91 ATRIAL FIBRILLATION AND FLUTTER (H): ICD-10-CM

## 2018-12-04 LAB — INR PPP: 3.1 (ref 0.9–1.1)

## 2018-12-05 ENCOUNTER — COMMUNICATION - HEALTHEAST (OUTPATIENT)
Dept: ANTICOAGULATION | Facility: CLINIC | Age: 78
End: 2018-12-05

## 2018-12-05 DIAGNOSIS — I48.20 CHRONIC ATRIAL FIBRILLATION (H): ICD-10-CM

## 2018-12-11 ENCOUNTER — OFFICE VISIT - HEALTHEAST (OUTPATIENT)
Dept: CARDIOLOGY | Facility: CLINIC | Age: 78
End: 2018-12-11

## 2018-12-11 DIAGNOSIS — I10 ESSENTIAL HYPERTENSION: ICD-10-CM

## 2018-12-11 DIAGNOSIS — I48.20 CHRONIC ATRIAL FIBRILLATION (H): ICD-10-CM

## 2018-12-11 DIAGNOSIS — I42.8 NONISCHEMIC CARDIOMYOPATHY (H): ICD-10-CM

## 2018-12-11 ASSESSMENT — MIFFLIN-ST. JEOR: SCORE: 1259.46

## 2018-12-18 ENCOUNTER — AMBULATORY - HEALTHEAST (OUTPATIENT)
Dept: LAB | Facility: CLINIC | Age: 78
End: 2018-12-18

## 2018-12-18 ENCOUNTER — COMMUNICATION - HEALTHEAST (OUTPATIENT)
Dept: ANTICOAGULATION | Facility: CLINIC | Age: 78
End: 2018-12-18

## 2018-12-18 DIAGNOSIS — I48.20 CHRONIC ATRIAL FIBRILLATION (H): ICD-10-CM

## 2019-01-01 ENCOUNTER — COMMUNICATION - HEALTHEAST (OUTPATIENT)
Dept: INTERNAL MEDICINE | Facility: CLINIC | Age: 79
End: 2019-01-01

## 2019-01-01 DIAGNOSIS — I48.92 ATRIAL FIBRILLATION AND FLUTTER (H): ICD-10-CM

## 2019-01-01 DIAGNOSIS — I48.91 ATRIAL FIBRILLATION AND FLUTTER (H): ICD-10-CM

## 2019-01-02 RX ORDER — WARFARIN SODIUM 5 MG/1
TABLET ORAL
Qty: 90 TABLET | Refills: 1 | Status: SHIPPED | OUTPATIENT
Start: 2019-01-02 | End: 2022-12-18

## 2019-01-04 ENCOUNTER — OFFICE VISIT - HEALTHEAST (OUTPATIENT)
Dept: INTERNAL MEDICINE | Facility: CLINIC | Age: 79
End: 2019-01-04

## 2019-01-04 ENCOUNTER — COMMUNICATION - HEALTHEAST (OUTPATIENT)
Dept: ANTICOAGULATION | Facility: CLINIC | Age: 79
End: 2019-01-04

## 2019-01-04 DIAGNOSIS — I10 ESSENTIAL HYPERTENSION: ICD-10-CM

## 2019-01-04 DIAGNOSIS — N28.9 MILD RENAL INSUFFICIENCY: ICD-10-CM

## 2019-01-04 DIAGNOSIS — I50.22 CHRONIC SYSTOLIC CONGESTIVE HEART FAILURE (H): ICD-10-CM

## 2019-01-04 DIAGNOSIS — I48.20 CHRONIC ATRIAL FIBRILLATION (H): ICD-10-CM

## 2019-01-04 DIAGNOSIS — J44.9 CHRONIC OBSTRUCTIVE PULMONARY DISEASE, UNSPECIFIED COPD TYPE (H): ICD-10-CM

## 2019-01-04 LAB
ANION GAP SERPL CALCULATED.3IONS-SCNC: 14 MMOL/L (ref 5–18)
BUN SERPL-MCNC: 32 MG/DL (ref 8–28)
CALCIUM SERPL-MCNC: 9.4 MG/DL (ref 8.5–10.5)
CHLORIDE BLD-SCNC: 99 MMOL/L (ref 98–107)
CO2 SERPL-SCNC: 25 MMOL/L (ref 22–31)
CREAT SERPL-MCNC: 1.54 MG/DL (ref 0.6–1.1)
GFR SERPL CREATININE-BSD FRML MDRD: 33 ML/MIN/1.73M2
GLUCOSE BLD-MCNC: 110 MG/DL (ref 70–125)
INR PPP: 3 (ref 0.9–1.1)
POTASSIUM BLD-SCNC: 4.4 MMOL/L (ref 3.5–5)
SODIUM SERPL-SCNC: 138 MMOL/L (ref 136–145)

## 2019-01-07 ENCOUNTER — COMMUNICATION - HEALTHEAST (OUTPATIENT)
Dept: SCHEDULING | Facility: CLINIC | Age: 79
End: 2019-01-07

## 2019-01-07 ENCOUNTER — COMMUNICATION - HEALTHEAST (OUTPATIENT)
Dept: INTERNAL MEDICINE | Facility: CLINIC | Age: 79
End: 2019-01-07

## 2019-01-18 ENCOUNTER — COMMUNICATION - HEALTHEAST (OUTPATIENT)
Dept: ANTICOAGULATION | Facility: CLINIC | Age: 79
End: 2019-01-18

## 2019-01-18 ENCOUNTER — AMBULATORY - HEALTHEAST (OUTPATIENT)
Dept: LAB | Facility: CLINIC | Age: 79
End: 2019-01-18

## 2019-01-18 DIAGNOSIS — I48.20 CHRONIC ATRIAL FIBRILLATION (H): ICD-10-CM

## 2019-01-18 LAB — INR PPP: 2.8 (ref 0.9–1.1)

## 2019-01-21 ENCOUNTER — COMMUNICATION - HEALTHEAST (OUTPATIENT)
Dept: INTERNAL MEDICINE | Facility: CLINIC | Age: 79
End: 2019-01-21

## 2019-01-21 DIAGNOSIS — E78.5 HYPERLIPIDEMIA: ICD-10-CM

## 2019-01-23 ENCOUNTER — OFFICE VISIT - HEALTHEAST (OUTPATIENT)
Dept: CARDIOLOGY | Facility: CLINIC | Age: 79
End: 2019-01-23

## 2019-01-23 DIAGNOSIS — I42.0 DILATED CARDIOMYOPATHY (H): ICD-10-CM

## 2019-01-23 DIAGNOSIS — I50.22 CHRONIC SYSTOLIC HEART FAILURE (H): ICD-10-CM

## 2019-01-23 ASSESSMENT — MIFFLIN-ST. JEOR: SCORE: 1264

## 2019-01-24 RX ORDER — ATORVASTATIN CALCIUM 40 MG/1
TABLET, FILM COATED ORAL
Qty: 45 TABLET | Refills: 2 | Status: SHIPPED | OUTPATIENT
Start: 2019-01-24 | End: 2022-12-18

## 2019-01-30 ENCOUNTER — COMMUNICATION - HEALTHEAST (OUTPATIENT)
Dept: INTERNAL MEDICINE | Facility: CLINIC | Age: 79
End: 2019-01-30

## 2019-01-30 DIAGNOSIS — I10 ESSENTIAL HYPERTENSION: ICD-10-CM

## 2019-02-05 ENCOUNTER — RECORDS - HEALTHEAST (OUTPATIENT)
Dept: ADMINISTRATIVE | Facility: OTHER | Age: 79
End: 2019-02-05

## 2019-02-07 ENCOUNTER — COMMUNICATION - HEALTHEAST (OUTPATIENT)
Dept: ANTICOAGULATION | Facility: CLINIC | Age: 79
End: 2019-02-07

## 2019-02-07 ENCOUNTER — AMBULATORY - HEALTHEAST (OUTPATIENT)
Dept: LAB | Facility: CLINIC | Age: 79
End: 2019-02-07

## 2019-02-07 DIAGNOSIS — I48.20 CHRONIC ATRIAL FIBRILLATION (H): ICD-10-CM

## 2019-02-07 LAB — INR PPP: 2.4 (ref 0.9–1.1)

## 2019-02-15 ENCOUNTER — COMMUNICATION - HEALTHEAST (OUTPATIENT)
Dept: ANTICOAGULATION | Facility: CLINIC | Age: 79
End: 2019-02-15

## 2019-02-15 ENCOUNTER — OFFICE VISIT - HEALTHEAST (OUTPATIENT)
Dept: INTERNAL MEDICINE | Facility: CLINIC | Age: 79
End: 2019-02-15

## 2019-02-15 DIAGNOSIS — S22.42XD CLOSED FRACTURE OF MULTIPLE RIBS OF LEFT SIDE WITH ROUTINE HEALING, SUBSEQUENT ENCOUNTER: ICD-10-CM

## 2019-02-15 DIAGNOSIS — F34.1 DYSTHYMIC DISORDER: ICD-10-CM

## 2019-02-15 DIAGNOSIS — I48.20 CHRONIC ATRIAL FIBRILLATION (H): ICD-10-CM

## 2019-02-15 DIAGNOSIS — J44.9 CHRONIC OBSTRUCTIVE PULMONARY DISEASE, UNSPECIFIED COPD TYPE (H): ICD-10-CM

## 2019-02-15 DIAGNOSIS — I50.22 CHRONIC SYSTOLIC CONGESTIVE HEART FAILURE (H): ICD-10-CM

## 2019-02-15 DIAGNOSIS — I10 ESSENTIAL HYPERTENSION: ICD-10-CM

## 2019-02-15 LAB — INR PPP: 3.3 (ref 0.9–1.1)

## 2019-02-19 ENCOUNTER — COMMUNICATION - HEALTHEAST (OUTPATIENT)
Dept: ANTICOAGULATION | Facility: CLINIC | Age: 79
End: 2019-02-19

## 2019-02-19 ENCOUNTER — COMMUNICATION - HEALTHEAST (OUTPATIENT)
Dept: INTERNAL MEDICINE | Facility: CLINIC | Age: 79
End: 2019-02-19

## 2019-02-19 DIAGNOSIS — I48.20 CHRONIC ATRIAL FIBRILLATION (H): ICD-10-CM

## 2019-02-20 ENCOUNTER — COMMUNICATION - HEALTHEAST (OUTPATIENT)
Dept: INTERNAL MEDICINE | Facility: CLINIC | Age: 79
End: 2019-02-20

## 2019-02-22 ENCOUNTER — COMMUNICATION - HEALTHEAST (OUTPATIENT)
Dept: ANTICOAGULATION | Facility: CLINIC | Age: 79
End: 2019-02-22

## 2019-02-22 ENCOUNTER — AMBULATORY - HEALTHEAST (OUTPATIENT)
Dept: LAB | Facility: CLINIC | Age: 79
End: 2019-02-22

## 2019-02-22 DIAGNOSIS — I48.20 CHRONIC ATRIAL FIBRILLATION (H): ICD-10-CM

## 2019-02-22 LAB — INR PPP: 2.6 (ref 0.9–1.1)

## 2019-03-05 ENCOUNTER — AMBULATORY - HEALTHEAST (OUTPATIENT)
Dept: LAB | Facility: CLINIC | Age: 79
End: 2019-03-05

## 2019-03-05 ENCOUNTER — COMMUNICATION - HEALTHEAST (OUTPATIENT)
Dept: ANTICOAGULATION | Facility: CLINIC | Age: 79
End: 2019-03-05

## 2019-03-05 DIAGNOSIS — I48.20 CHRONIC ATRIAL FIBRILLATION (H): ICD-10-CM

## 2019-03-05 LAB — INR PPP: 2.7 (ref 0.9–1.1)

## 2019-03-18 ENCOUNTER — COMMUNICATION - HEALTHEAST (OUTPATIENT)
Dept: ADMINISTRATIVE | Facility: CLINIC | Age: 79
End: 2019-03-18

## 2019-03-18 ENCOUNTER — OFFICE VISIT - HEALTHEAST (OUTPATIENT)
Dept: INTERNAL MEDICINE | Facility: CLINIC | Age: 79
End: 2019-03-18

## 2019-03-18 ENCOUNTER — COMMUNICATION - HEALTHEAST (OUTPATIENT)
Dept: ANTICOAGULATION | Facility: CLINIC | Age: 79
End: 2019-03-18

## 2019-03-18 DIAGNOSIS — J96.11 CHRONIC RESPIRATORY FAILURE WITH HYPOXIA (H): ICD-10-CM

## 2019-03-18 DIAGNOSIS — Z51.81 MEDICATION MONITORING ENCOUNTER: ICD-10-CM

## 2019-03-18 DIAGNOSIS — J44.9 CHRONIC OBSTRUCTIVE PULMONARY DISEASE, UNSPECIFIED COPD TYPE (H): ICD-10-CM

## 2019-03-18 DIAGNOSIS — G47.34 NOCTURNAL HYPOXIA: ICD-10-CM

## 2019-03-18 DIAGNOSIS — I10 ESSENTIAL HYPERTENSION: ICD-10-CM

## 2019-03-18 DIAGNOSIS — I50.22 CHRONIC SYSTOLIC CONGESTIVE HEART FAILURE (H): ICD-10-CM

## 2019-03-18 DIAGNOSIS — I48.20 CHRONIC ATRIAL FIBRILLATION (H): ICD-10-CM

## 2019-03-18 DIAGNOSIS — S22.42XD CLOSED FRACTURE OF MULTIPLE RIBS OF LEFT SIDE WITH ROUTINE HEALING, SUBSEQUENT ENCOUNTER: ICD-10-CM

## 2019-03-18 LAB
ANION GAP SERPL CALCULATED.3IONS-SCNC: 13 MMOL/L (ref 5–18)
BUN SERPL-MCNC: 31 MG/DL (ref 8–28)
CALCIUM SERPL-MCNC: 9.4 MG/DL (ref 8.5–10.5)
CHLORIDE BLD-SCNC: 98 MMOL/L (ref 98–107)
CO2 SERPL-SCNC: 25 MMOL/L (ref 22–31)
CREAT SERPL-MCNC: 1.33 MG/DL (ref 0.6–1.1)
GFR SERPL CREATININE-BSD FRML MDRD: 39 ML/MIN/1.73M2
GLUCOSE BLD-MCNC: 104 MG/DL (ref 70–125)
INR PPP: 2.5 (ref 0.9–1.1)
POTASSIUM BLD-SCNC: 3.9 MMOL/L (ref 3.5–5)
SODIUM SERPL-SCNC: 136 MMOL/L (ref 136–145)

## 2019-03-18 RX ORDER — IPRATROPIUM BROMIDE AND ALBUTEROL SULFATE 2.5; .5 MG/3ML; MG/3ML
3 SOLUTION RESPIRATORY (INHALATION) 3 TIMES DAILY
Qty: 60 VIAL | Refills: 6 | Status: SHIPPED | OUTPATIENT
Start: 2019-03-18

## 2019-03-19 ENCOUNTER — COMMUNICATION - HEALTHEAST (OUTPATIENT)
Dept: INTERNAL MEDICINE | Facility: CLINIC | Age: 79
End: 2019-03-19

## 2019-03-25 ENCOUNTER — COMMUNICATION - HEALTHEAST (OUTPATIENT)
Dept: INTERNAL MEDICINE | Facility: CLINIC | Age: 79
End: 2019-03-25

## 2019-03-25 DIAGNOSIS — I10 ESSENTIAL HYPERTENSION: ICD-10-CM

## 2019-03-25 RX ORDER — LOSARTAN POTASSIUM 100 MG/1
100 TABLET ORAL DAILY
Qty: 90 TABLET | Refills: 3 | Status: ON HOLD | OUTPATIENT
Start: 2019-03-25 | End: 2022-12-21

## 2019-04-18 ENCOUNTER — COMMUNICATION - HEALTHEAST (OUTPATIENT)
Dept: ANTICOAGULATION | Facility: CLINIC | Age: 79
End: 2019-04-18

## 2019-04-18 ENCOUNTER — AMBULATORY - HEALTHEAST (OUTPATIENT)
Dept: LAB | Facility: CLINIC | Age: 79
End: 2019-04-18

## 2019-04-18 DIAGNOSIS — I48.20 CHRONIC ATRIAL FIBRILLATION (H): ICD-10-CM

## 2019-04-18 LAB — INR PPP: 2.4 (ref 0.9–1.1)

## 2019-05-10 ENCOUNTER — COMMUNICATION - HEALTHEAST (OUTPATIENT)
Dept: ANTICOAGULATION | Facility: CLINIC | Age: 79
End: 2019-05-10

## 2019-05-10 ENCOUNTER — OFFICE VISIT - HEALTHEAST (OUTPATIENT)
Dept: INTERNAL MEDICINE | Facility: CLINIC | Age: 79
End: 2019-05-10

## 2019-05-10 DIAGNOSIS — I10 ESSENTIAL HYPERTENSION: ICD-10-CM

## 2019-05-10 DIAGNOSIS — I48.20 CHRONIC ATRIAL FIBRILLATION (H): ICD-10-CM

## 2019-05-10 DIAGNOSIS — I50.22 CHRONIC SYSTOLIC CONGESTIVE HEART FAILURE (H): ICD-10-CM

## 2019-05-10 DIAGNOSIS — J44.9 CHRONIC OBSTRUCTIVE PULMONARY DISEASE, UNSPECIFIED COPD TYPE (H): ICD-10-CM

## 2019-05-10 LAB — INR PPP: 2 (ref 0.9–1.1)

## 2019-05-10 RX ORDER — AMLODIPINE BESYLATE 5 MG/1
5 TABLET ORAL DAILY
Qty: 90 TABLET | Refills: 1 | Status: ON HOLD | OUTPATIENT
Start: 2019-05-10 | End: 2022-12-21

## 2019-05-10 RX ORDER — MAGNESIUM OXIDE 400 MG/1
TABLET ORAL
Qty: 270 TABLET | Refills: 3 | Status: SHIPPED | OUTPATIENT
Start: 2019-05-10

## 2019-05-21 ENCOUNTER — RECORDS - HEALTHEAST (OUTPATIENT)
Dept: ADMINISTRATIVE | Facility: OTHER | Age: 79
End: 2019-05-21

## 2019-07-01 ENCOUNTER — COMMUNICATION - HEALTHEAST (OUTPATIENT)
Dept: ANTICOAGULATION | Facility: CLINIC | Age: 79
End: 2019-07-01

## 2019-07-01 DIAGNOSIS — I48.20 CHRONIC ATRIAL FIBRILLATION (H): ICD-10-CM

## 2019-07-02 ENCOUNTER — OFFICE VISIT - HEALTHEAST (OUTPATIENT)
Dept: CARDIOLOGY | Facility: CLINIC | Age: 79
End: 2019-07-02

## 2019-07-02 DIAGNOSIS — I42.8 NONISCHEMIC CARDIOMYOPATHY (H): ICD-10-CM

## 2019-07-02 DIAGNOSIS — I10 ESSENTIAL HYPERTENSION: ICD-10-CM

## 2019-07-02 DIAGNOSIS — I48.20 CHRONIC ATRIAL FIBRILLATION (H): ICD-10-CM

## 2019-07-02 ASSESSMENT — MIFFLIN-ST. JEOR: SCORE: 1236.78

## 2019-07-11 ENCOUNTER — RECORDS - HEALTHEAST (OUTPATIENT)
Dept: ADMINISTRATIVE | Facility: OTHER | Age: 79
End: 2019-07-11

## 2019-07-11 LAB — HBA1C MFR BLD: 6.3 % (ref 0–5.6)

## 2019-08-06 ENCOUNTER — AMBULATORY - HEALTHEAST (OUTPATIENT)
Dept: INFUSION THERAPY | Facility: HOSPITAL | Age: 79
End: 2019-08-06

## 2019-08-06 ENCOUNTER — OFFICE VISIT - HEALTHEAST (OUTPATIENT)
Dept: ONCOLOGY | Facility: HOSPITAL | Age: 79
End: 2019-08-06

## 2019-08-06 DIAGNOSIS — D69.6 THROMBOCYTOPENIA (H): ICD-10-CM

## 2019-08-06 LAB
ALBUMIN SERPL-MCNC: 3.9 G/DL (ref 3.5–5)
ALP SERPL-CCNC: 82 U/L (ref 45–120)
ALT SERPL W P-5'-P-CCNC: 14 U/L (ref 0–45)
ANION GAP SERPL CALCULATED.3IONS-SCNC: 8 MMOL/L (ref 5–18)
AST SERPL W P-5'-P-CCNC: 15 U/L (ref 0–40)
BASOPHILS # BLD AUTO: 0 THOU/UL (ref 0–0.2)
BASOPHILS NFR BLD AUTO: 0 % (ref 0–2)
BILIRUB SERPL-MCNC: 1 MG/DL (ref 0–1)
BUN SERPL-MCNC: 31 MG/DL (ref 8–28)
CALCIUM SERPL-MCNC: 9.9 MG/DL (ref 8.5–10.5)
CHLORIDE BLD-SCNC: 102 MMOL/L (ref 98–107)
CO2 SERPL-SCNC: 28 MMOL/L (ref 22–31)
CREAT SERPL-MCNC: 1.45 MG/DL (ref 0.6–1.1)
EOSINOPHIL # BLD AUTO: 0 THOU/UL (ref 0–0.4)
EOSINOPHIL NFR BLD AUTO: 0 % (ref 0–6)
ERYTHROCYTE [DISTWIDTH] IN BLOOD BY AUTOMATED COUNT: 14.3 % (ref 11–14.5)
GFR SERPL CREATININE-BSD FRML MDRD: 35 ML/MIN/1.73M2
GLUCOSE BLD-MCNC: 96 MG/DL (ref 70–125)
HCT VFR BLD AUTO: 48.4 % (ref 35–47)
HGB BLD-MCNC: 16.1 G/DL (ref 12–16)
LYMPHOCYTES # BLD AUTO: 0.9 THOU/UL (ref 0.8–4.4)
LYMPHOCYTES NFR BLD AUTO: 21 % (ref 20–40)
MCH RBC QN AUTO: 27.9 PG (ref 27–34)
MCHC RBC AUTO-ENTMCNC: 33.3 G/DL (ref 32–36)
MCV RBC AUTO: 84 FL (ref 80–100)
MONOCYTES # BLD AUTO: 0.3 THOU/UL (ref 0–0.9)
MONOCYTES NFR BLD AUTO: 7 % (ref 2–10)
NEUTROPHILS # BLD AUTO: 3.3 THOU/UL (ref 2–7.7)
NEUTROPHILS NFR BLD AUTO: 72 % (ref 50–70)
PLATELET # BLD AUTO: 79 THOU/UL (ref 140–440)
PMV BLD AUTO: 10.1 FL (ref 8.5–12.5)
POTASSIUM BLD-SCNC: 4.5 MMOL/L (ref 3.5–5)
PROT SERPL-MCNC: 6.8 G/DL (ref 6–8)
RBC # BLD AUTO: 5.77 MILL/UL (ref 3.8–5.4)
SODIUM SERPL-SCNC: 138 MMOL/L (ref 136–145)
T4 FREE SERPL-MCNC: 1.1 NG/DL (ref 0.7–1.8)
TSH SERPL DL<=0.005 MIU/L-ACNC: 2.08 UIU/ML (ref 0.3–5)
WBC: 4.5 THOU/UL (ref 4–11)

## 2019-08-06 ASSESSMENT — MIFFLIN-ST. JEOR: SCORE: 1211.15

## 2019-08-07 ENCOUNTER — RECORDS - HEALTHEAST (OUTPATIENT)
Dept: HEALTH INFORMATION MANAGEMENT | Facility: CLINIC | Age: 79
End: 2019-08-07

## 2019-08-07 LAB
HBV CORE AB SERPL QL IA: NEGATIVE
HCV AB SERPL QL IA: NEGATIVE
HEPATITIS B SURFACE ANTIBODY LHE- HISTORICAL: NEGATIVE

## 2019-08-08 ENCOUNTER — HOSPITAL ENCOUNTER (OUTPATIENT)
Dept: ULTRASOUND IMAGING | Facility: HOSPITAL | Age: 79
Setting detail: RADIATION/ONCOLOGY SERIES
Discharge: STILL ADMITTED OR ADMINISTRATIVE | End: 2019-08-08
Attending: INTERNAL MEDICINE

## 2019-08-08 DIAGNOSIS — D69.6 THROMBOCYTOPENIA (H): ICD-10-CM

## 2019-08-19 ENCOUNTER — COMMUNICATION - HEALTHEAST (OUTPATIENT)
Dept: PEDIATRICS | Facility: CLINIC | Age: 79
End: 2019-08-19

## 2019-08-20 ENCOUNTER — COMMUNICATION - HEALTHEAST (OUTPATIENT)
Dept: ONCOLOGY | Facility: HOSPITAL | Age: 79
End: 2019-08-20

## 2019-09-09 ENCOUNTER — OFFICE VISIT - HEALTHEAST (OUTPATIENT)
Dept: CARDIOLOGY | Facility: CLINIC | Age: 79
End: 2019-09-09

## 2019-09-09 DIAGNOSIS — I48.20 CHRONIC ATRIAL FIBRILLATION (H): ICD-10-CM

## 2019-09-09 DIAGNOSIS — Z72.0 TOBACCO USE: ICD-10-CM

## 2019-09-09 DIAGNOSIS — I50.22 CHRONIC SYSTOLIC HEART FAILURE (H): ICD-10-CM

## 2019-09-09 DIAGNOSIS — I10 ESSENTIAL HYPERTENSION: ICD-10-CM

## 2019-09-09 RX ORDER — FUROSEMIDE 20 MG
40 TABLET ORAL DAILY
Status: SHIPPED | COMMUNITY
Start: 2019-09-09 | End: 2022-12-18

## 2019-09-09 ASSESSMENT — MIFFLIN-ST. JEOR: SCORE: 1182.58

## 2019-11-01 ENCOUNTER — HOSPITAL ENCOUNTER (OUTPATIENT)
Dept: MAMMOGRAPHY | Facility: CLINIC | Age: 79
Discharge: HOME | End: 2019-11-01

## 2019-11-01 DIAGNOSIS — Z12.31 VISIT FOR SCREENING MAMMOGRAM: ICD-10-CM

## 2019-11-12 ENCOUNTER — AMBULATORY - HEALTHEAST (OUTPATIENT)
Dept: INFUSION THERAPY | Facility: HOSPITAL | Age: 79
End: 2019-11-12

## 2019-11-12 DIAGNOSIS — D69.6 THROMBOCYTOPENIA (H): ICD-10-CM

## 2019-11-12 LAB
BASOPHILS # BLD AUTO: 0 THOU/UL (ref 0–0.2)
BASOPHILS NFR BLD AUTO: 0 % (ref 0–2)
EOSINOPHIL # BLD AUTO: 0 THOU/UL (ref 0–0.4)
EOSINOPHIL NFR BLD AUTO: 0 % (ref 0–6)
ERYTHROCYTE [DISTWIDTH] IN BLOOD BY AUTOMATED COUNT: 13.7 % (ref 11–14.5)
HCT VFR BLD AUTO: 48 % (ref 35–47)
HGB BLD-MCNC: 15.8 G/DL (ref 12–16)
LYMPHOCYTES # BLD AUTO: 1.2 THOU/UL (ref 0.8–4.4)
LYMPHOCYTES NFR BLD AUTO: 26 % (ref 20–40)
MCH RBC QN AUTO: 27.2 PG (ref 27–34)
MCHC RBC AUTO-ENTMCNC: 32.9 G/DL (ref 32–36)
MCV RBC AUTO: 83 FL (ref 80–100)
MONOCYTES # BLD AUTO: 0.4 THOU/UL (ref 0–0.9)
MONOCYTES NFR BLD AUTO: 8 % (ref 2–10)
NEUTROPHILS # BLD AUTO: 3 THOU/UL (ref 2–7.7)
NEUTROPHILS NFR BLD AUTO: 65 % (ref 50–70)
PLATELET # BLD AUTO: 78 THOU/UL (ref 140–440)
PMV BLD AUTO: 10.3 FL (ref 8.5–12.5)
RBC # BLD AUTO: 5.81 MILL/UL (ref 3.8–5.4)
WBC: 4.6 THOU/UL (ref 4–11)

## 2019-11-13 ENCOUNTER — COMMUNICATION - HEALTHEAST (OUTPATIENT)
Dept: ONCOLOGY | Facility: HOSPITAL | Age: 79
End: 2019-11-13

## 2019-11-25 ENCOUNTER — RECORDS - HEALTHEAST (OUTPATIENT)
Dept: ADMINISTRATIVE | Facility: OTHER | Age: 79
End: 2019-11-25

## 2019-11-25 LAB — HBA1C MFR BLD: 6.4 % (ref 0–5.6)

## 2019-12-17 ENCOUNTER — RECORDS - HEALTHEAST (OUTPATIENT)
Dept: HEALTH INFORMATION MANAGEMENT | Facility: CLINIC | Age: 79
End: 2019-12-17

## 2020-02-04 ENCOUNTER — AMBULATORY - HEALTHEAST (OUTPATIENT)
Dept: INFUSION THERAPY | Facility: HOSPITAL | Age: 80
End: 2020-02-04

## 2020-02-04 ENCOUNTER — OFFICE VISIT - HEALTHEAST (OUTPATIENT)
Dept: ONCOLOGY | Facility: HOSPITAL | Age: 80
End: 2020-02-04

## 2020-02-04 DIAGNOSIS — D69.6 THROMBOCYTOPENIA (H): ICD-10-CM

## 2020-02-04 LAB
BASOPHILS # BLD AUTO: 0 THOU/UL (ref 0–0.2)
BASOPHILS NFR BLD AUTO: 1 % (ref 0–2)
EOSINOPHIL # BLD AUTO: 0 THOU/UL (ref 0–0.4)
EOSINOPHIL NFR BLD AUTO: 1 % (ref 0–6)
ERYTHROCYTE [DISTWIDTH] IN BLOOD BY AUTOMATED COUNT: 13.4 % (ref 11–14.5)
HCT VFR BLD AUTO: 47.5 % (ref 35–47)
HGB BLD-MCNC: 15.4 G/DL (ref 12–16)
LYMPHOCYTES # BLD AUTO: 0.9 THOU/UL (ref 0.8–4.4)
LYMPHOCYTES NFR BLD AUTO: 22 % (ref 20–40)
MCH RBC QN AUTO: 26.7 PG (ref 27–34)
MCHC RBC AUTO-ENTMCNC: 32.4 G/DL (ref 32–36)
MCV RBC AUTO: 83 FL (ref 80–100)
MONOCYTES # BLD AUTO: 0.3 THOU/UL (ref 0–0.9)
MONOCYTES NFR BLD AUTO: 7 % (ref 2–10)
NEUTROPHILS # BLD AUTO: 2.8 THOU/UL (ref 2–7.7)
NEUTROPHILS NFR BLD AUTO: 69 % (ref 50–70)
PLATELET # BLD AUTO: 67 THOU/UL (ref 140–440)
PMV BLD AUTO: 10.1 FL (ref 8.5–12.5)
RBC # BLD AUTO: 5.76 MILL/UL (ref 3.8–5.4)
WBC: 4.1 THOU/UL (ref 4–11)

## 2020-03-15 ENCOUNTER — COMMUNICATION - HEALTHEAST (OUTPATIENT)
Dept: INTERNAL MEDICINE | Facility: CLINIC | Age: 80
End: 2020-03-15

## 2020-03-15 DIAGNOSIS — I10 ESSENTIAL HYPERTENSION: ICD-10-CM

## 2020-04-17 ENCOUNTER — OFFICE VISIT - HEALTHEAST (OUTPATIENT)
Dept: CARDIOLOGY | Facility: CLINIC | Age: 80
End: 2020-04-17

## 2020-04-17 DIAGNOSIS — I42.0 DILATED CARDIOMYOPATHY (H): ICD-10-CM

## 2020-04-17 DIAGNOSIS — I50.22 CHRONIC SYSTOLIC HEART FAILURE (H): ICD-10-CM

## 2020-06-04 ENCOUNTER — AMBULATORY - HEALTHEAST (OUTPATIENT)
Dept: INFUSION THERAPY | Facility: HOSPITAL | Age: 80
End: 2020-06-04

## 2020-06-04 DIAGNOSIS — D69.6 THROMBOCYTOPENIA (H): ICD-10-CM

## 2020-06-04 LAB
ALBUMIN SERPL-MCNC: 4.3 G/DL (ref 3.5–5)
ALP SERPL-CCNC: 74 U/L (ref 45–120)
ALT SERPL W P-5'-P-CCNC: 14 U/L (ref 0–45)
ANION GAP SERPL CALCULATED.3IONS-SCNC: 11 MMOL/L (ref 5–18)
AST SERPL W P-5'-P-CCNC: 17 U/L (ref 0–40)
BASOPHILS # BLD AUTO: 0 THOU/UL (ref 0–0.2)
BASOPHILS NFR BLD AUTO: 0 % (ref 0–2)
BILIRUB SERPL-MCNC: 0.7 MG/DL (ref 0–1)
BUN SERPL-MCNC: 42 MG/DL (ref 8–28)
CALCIUM SERPL-MCNC: 9.7 MG/DL (ref 8.5–10.5)
CHLORIDE BLD-SCNC: 103 MMOL/L (ref 98–107)
CO2 SERPL-SCNC: 25 MMOL/L (ref 22–31)
CREAT SERPL-MCNC: 1.64 MG/DL (ref 0.6–1.1)
EOSINOPHIL # BLD AUTO: 0 THOU/UL (ref 0–0.4)
EOSINOPHIL NFR BLD AUTO: 1 % (ref 0–6)
ERYTHROCYTE [DISTWIDTH] IN BLOOD BY AUTOMATED COUNT: 13.9 % (ref 11–14.5)
FOLATE SERPL-MCNC: 12.7 NG/ML
GFR SERPL CREATININE-BSD FRML MDRD: 30 ML/MIN/1.73M2
GLUCOSE BLD-MCNC: 103 MG/DL (ref 70–125)
HCT VFR BLD AUTO: 50.5 % (ref 35–47)
HGB BLD-MCNC: 17 G/DL (ref 12–16)
LYMPHOCYTES # BLD AUTO: 1.2 THOU/UL (ref 0.8–4.4)
LYMPHOCYTES NFR BLD AUTO: 23 % (ref 20–40)
MCH RBC QN AUTO: 27.4 PG (ref 27–34)
MCHC RBC AUTO-ENTMCNC: 33.7 G/DL (ref 32–36)
MCV RBC AUTO: 82 FL (ref 80–100)
MONOCYTES # BLD AUTO: 0.3 THOU/UL (ref 0–0.9)
MONOCYTES NFR BLD AUTO: 7 % (ref 2–10)
NEUTROPHILS # BLD AUTO: 3.5 THOU/UL (ref 2–7.7)
NEUTROPHILS NFR BLD AUTO: 69 % (ref 50–70)
PLATELET # BLD AUTO: 107 THOU/UL (ref 140–440)
PMV BLD AUTO: 10.3 FL (ref 8.5–12.5)
POTASSIUM BLD-SCNC: 4.4 MMOL/L (ref 3.5–5)
PROT SERPL-MCNC: 7.4 G/DL (ref 6–8)
RBC # BLD AUTO: 6.2 MILL/UL (ref 3.8–5.4)
SODIUM SERPL-SCNC: 139 MMOL/L (ref 136–145)
VIT B12 SERPL-MCNC: 444 PG/ML (ref 213–816)
WBC: 5.1 THOU/UL (ref 4–11)

## 2020-06-04 ASSESSMENT — MIFFLIN-ST. JEOR: SCORE: 1190.29

## 2020-06-05 ENCOUNTER — OFFICE VISIT - HEALTHEAST (OUTPATIENT)
Dept: ONCOLOGY | Facility: HOSPITAL | Age: 80
End: 2020-06-05

## 2020-06-05 DIAGNOSIS — D69.6 THROMBOCYTOPENIA (H): ICD-10-CM

## 2020-06-08 ENCOUNTER — COMMUNICATION - HEALTHEAST (OUTPATIENT)
Dept: INTERNAL MEDICINE | Facility: CLINIC | Age: 80
End: 2020-06-08

## 2020-07-05 ENCOUNTER — COMMUNICATION - HEALTHEAST (OUTPATIENT)
Dept: INTERNAL MEDICINE | Facility: CLINIC | Age: 80
End: 2020-07-05

## 2020-07-05 DIAGNOSIS — I50.22 CHRONIC SYSTOLIC CONGESTIVE HEART FAILURE (H): ICD-10-CM

## 2020-07-05 DIAGNOSIS — I10 ESSENTIAL HYPERTENSION: ICD-10-CM

## 2020-07-07 ENCOUNTER — COMMUNICATION - HEALTHEAST (OUTPATIENT)
Dept: INTERNAL MEDICINE | Facility: CLINIC | Age: 80
End: 2020-07-07

## 2020-09-30 ENCOUNTER — COMMUNICATION - HEALTHEAST (OUTPATIENT)
Dept: INTERNAL MEDICINE | Facility: CLINIC | Age: 80
End: 2020-09-30

## 2020-09-30 DIAGNOSIS — E78.5 HYPERLIPIDEMIA: ICD-10-CM

## 2020-10-05 ENCOUNTER — OFFICE VISIT - HEALTHEAST (OUTPATIENT)
Dept: ONCOLOGY | Facility: HOSPITAL | Age: 80
End: 2020-10-05

## 2020-10-05 ENCOUNTER — AMBULATORY - HEALTHEAST (OUTPATIENT)
Dept: INFUSION THERAPY | Facility: HOSPITAL | Age: 80
End: 2020-10-05

## 2020-10-05 DIAGNOSIS — D69.6 THROMBOCYTOPENIA (H): ICD-10-CM

## 2020-10-05 LAB
BASOPHILS # BLD AUTO: 0 THOU/UL (ref 0–0.2)
BASOPHILS NFR BLD AUTO: 1 % (ref 0–2)
EOSINOPHIL # BLD AUTO: 0 THOU/UL (ref 0–0.4)
EOSINOPHIL NFR BLD AUTO: 1 % (ref 0–6)
ERYTHROCYTE [DISTWIDTH] IN BLOOD BY AUTOMATED COUNT: 13.6 % (ref 11–14.5)
HCT VFR BLD AUTO: 49.1 % (ref 35–47)
HGB BLD-MCNC: 16.4 G/DL (ref 12–16)
IMM GRANULOCYTES # BLD: 0 THOU/UL
IMM GRANULOCYTES NFR BLD: 0 %
LYMPHOCYTES # BLD AUTO: 1.2 THOU/UL (ref 0.8–4.4)
LYMPHOCYTES NFR BLD AUTO: 21 % (ref 20–40)
MCH RBC QN AUTO: 28.3 PG (ref 27–34)
MCHC RBC AUTO-ENTMCNC: 33.4 G/DL (ref 32–36)
MCV RBC AUTO: 85 FL (ref 80–100)
MONOCYTES # BLD AUTO: 0.5 THOU/UL (ref 0–0.9)
MONOCYTES NFR BLD AUTO: 8 % (ref 2–10)
NEUTROPHILS # BLD AUTO: 4.1 THOU/UL (ref 2–7.7)
NEUTROPHILS NFR BLD AUTO: 70 % (ref 50–70)
PLATELET # BLD AUTO: 111 THOU/UL (ref 140–440)
PMV BLD AUTO: 10.9 FL (ref 8.5–12.5)
RBC # BLD AUTO: 5.79 MILL/UL (ref 3.8–5.4)
WBC: 5.8 THOU/UL (ref 4–11)

## 2020-10-05 RX ORDER — TIOTROPIUM BROMIDE 18 UG/1
18 CAPSULE ORAL; RESPIRATORY (INHALATION) DAILY
Status: SHIPPED | COMMUNITY
Start: 2020-10-05

## 2020-10-05 ASSESSMENT — MIFFLIN-ST. JEOR: SCORE: 1200.27

## 2020-10-21 ENCOUNTER — COMMUNICATION - HEALTHEAST (OUTPATIENT)
Dept: INTERNAL MEDICINE | Facility: CLINIC | Age: 80
End: 2020-10-21

## 2020-10-21 DIAGNOSIS — I48.92 ATRIAL FIBRILLATION AND FLUTTER (H): ICD-10-CM

## 2020-10-21 DIAGNOSIS — I48.91 ATRIAL FIBRILLATION AND FLUTTER (H): ICD-10-CM

## 2020-11-02 ENCOUNTER — COMMUNICATION - HEALTHEAST (OUTPATIENT)
Dept: CARDIOLOGY | Facility: CLINIC | Age: 80
End: 2020-11-02

## 2020-11-03 ENCOUNTER — OFFICE VISIT - HEALTHEAST (OUTPATIENT)
Dept: CARDIOLOGY | Facility: CLINIC | Age: 80
End: 2020-11-03

## 2020-11-03 DIAGNOSIS — I50.22 CHRONIC SYSTOLIC HEART FAILURE (H): ICD-10-CM

## 2021-02-02 ENCOUNTER — AMBULATORY - HEALTHEAST (OUTPATIENT)
Dept: INFUSION THERAPY | Facility: HOSPITAL | Age: 81
End: 2021-02-02

## 2021-02-02 DIAGNOSIS — D69.6 THROMBOCYTOPENIA (H): ICD-10-CM

## 2021-02-02 LAB
BASOPHILS # BLD AUTO: 0 THOU/UL (ref 0–0.2)
BASOPHILS NFR BLD AUTO: 1 % (ref 0–2)
EOSINOPHIL # BLD AUTO: 0.5 THOU/UL (ref 0–0.4)
EOSINOPHIL NFR BLD AUTO: 8 % (ref 0–6)
ERYTHROCYTE [DISTWIDTH] IN BLOOD BY AUTOMATED COUNT: 13.8 % (ref 11–14.5)
HCT VFR BLD AUTO: 49.6 % (ref 35–47)
HGB BLD-MCNC: 15.8 G/DL (ref 12–16)
IMM GRANULOCYTES # BLD: 0 THOU/UL
IMM GRANULOCYTES NFR BLD: 0 %
LYMPHOCYTES # BLD AUTO: 1.4 THOU/UL (ref 0.8–4.4)
LYMPHOCYTES NFR BLD AUTO: 23 % (ref 20–40)
MCH RBC QN AUTO: 27.7 PG (ref 27–34)
MCHC RBC AUTO-ENTMCNC: 31.9 G/DL (ref 32–36)
MCV RBC AUTO: 87 FL (ref 80–100)
MONOCYTES # BLD AUTO: 0.6 THOU/UL (ref 0–0.9)
MONOCYTES NFR BLD AUTO: 10 % (ref 2–10)
NEUTROPHILS # BLD AUTO: 3.5 THOU/UL (ref 2–7.7)
NEUTROPHILS NFR BLD AUTO: 58 % (ref 50–70)
PLATELET # BLD AUTO: 100 THOU/UL (ref 140–440)
PMV BLD AUTO: 10.3 FL (ref 8.5–12.5)
RBC # BLD AUTO: 5.7 MILL/UL (ref 3.8–5.4)
WBC: 6.2 THOU/UL (ref 4–11)

## 2021-05-27 ENCOUNTER — RECORDS - HEALTHEAST (OUTPATIENT)
Dept: ADMINISTRATIVE | Facility: CLINIC | Age: 81
End: 2021-05-27

## 2021-05-27 NOTE — TELEPHONE ENCOUNTER
ANTICOAGULATION  MANAGEMENT    Assessment     Today's INR result of 2.4 is Therapeutic (goal INR of 2.0-3.0)        Warfarin taken as previously instructed    No new diet changes affecting INR    No new medication/supplements affecting INR    Continues to tolerate warfarin with no reported s/s of bleeding or thromboembolism     Previous INR was Therapeutic    Plan:     Spoke with Christina regarding INR result and instructed:     Warfarin Dosing Instructions:  Continue current warfarin dose 5 mg daily on Sundays and Wednesdays; and 2.5 mg daily rest of week  (0 % change)    Instructed patient to follow up no later than: 4-6 weeks.    Education provided: importance of therapeutic range, importance of following up for INR monitoring at instructed interval and importance of taking warfarin as instructed    Christina verbalizes understanding and agrees to warfarin dosing plan.    Instructed to call the [facility] for any changes, questions or concerns. (#[phone])   ?   Graciela Conde RN    Subjective/Objective:      Christina Umana, a 78 y.o. female is on warfarin.     Christina reports:     Home warfarin dose: template incorrect; verbally confirmed home dose with Christina and updated on anticoagulation calendar     Missed doses: No     Medication changes:  No     S/S of bleeding or thromboembolism:  No     New Injury or illness:  No     Changes in diet or alcohol consumption:  No     Upcoming surgery, procedure or cardioversion:  No    Anticoagulation Episode Summary     Current INR goal:   2.0-3.0   TTR:   68.7 % (2 y)   Next INR check:   5/30/2019   INR from last check:   2.40 (4/18/2019)   Weekly max warfarin dose:      Target end date:   Indefinite   INR check location:      Preferred lab:      Send INR reminders to:   ANTICOAGULATION POOL A (WBY,WBE,MID,RSC)    Indications    Chronic atrial fibrillation (H) [I48.2]           Comments:            Anticoagulation Care Providers     Provider Role Specialty Phone number     Adan Elizabeth MD Referring Internal Medicine [phone]    Thor Santamaria MD Responsible Internal Medicine [phone]

## 2021-05-27 NOTE — TELEPHONE ENCOUNTER
Refill Approved    Rx renewed per Medication Renewal Policy. Medication was last renewed on 3/16/18.    Patricia Olivera, Care Connection Triage/Med Refill 3/25/2019     Requested Prescriptions   Pending Prescriptions Disp Refills     losartan (COZAAR) 100 MG tablet [Pharmacy Med Name: LOSARTAN 100MG TABLETS] 90 tablet 0     Sig: TAKE 1 TABLET(100 MG) BY MOUTH DAILY    Angiotensin Receptor Blocker Protocol Passed - 3/25/2019 12:12 PM       Passed - PCP or prescribing provider visit in past 12 months      Last office visit with prescriber/PCP: 5/14/2018 Adan Elizabeth MD OR same dept: 3/18/2019 Thor Santamaria MD OR same specialty: 3/18/2019 Thor Santamaria MD  Last physical: 10/10/2017 Last MTM visit: Visit date not found   Next visit within 3 mo: Visit date not found  Next physical within 3 mo: Visit date not found  Prescriber OR PCP: Adan Elizabeth MD  Last diagnosis associated with med order: 1. Essential hypertension  - losartan (COZAAR) 100 MG tablet [Pharmacy Med Name: LOSARTAN 100MG TABLETS]; TAKE 1 TABLET(100 MG) BY MOUTH DAILY  Dispense: 90 tablet; Refill: 0    If protocol passes may refill for 12 months if within 3 months of last provider visit (or a total of 15 months).            Passed - Serum potassium within the past 12 months    Lab Results   Component Value Date    Potassium 3.9 03/18/2019            Passed - Blood pressure filed in past 12 months    BP Readings from Last 1 Encounters:   03/18/19 152/80            Passed - Serum creatinine within the past 12 months    Creatinine   Date Value Ref Range Status   03/18/2019 1.33 (H) 0.60 - 1.10 mg/dL Final

## 2021-05-28 ENCOUNTER — RECORDS - HEALTHEAST (OUTPATIENT)
Dept: ADMINISTRATIVE | Facility: CLINIC | Age: 81
End: 2021-05-28

## 2021-05-28 NOTE — PATIENT INSTRUCTIONS - HE
Increase amlodipine to 5 mg a day.    Avoid salty foods such as barbecue.    Goal blood pressure less than 130/80.    If you do get lightheaded dizzy spells or blood pressures less than 110/60, contact clinic and you may need to go back down in your amlodipine dose.    Continue other medications the same.    Follow-up in clinic to see nurse practitioner in approximately 3 weeks for blood pressure recheck.    Follow-up with me in 3 months.    INR checked today.    Make appointment with ophthalmology, you are overdue for your follow-up appointment.  Let me know if you need help making that appointment.

## 2021-05-28 NOTE — TELEPHONE ENCOUNTER
ANTICOAGULATION  MANAGEMENT    Assessment     Today's INR result of 2.0 is Therapeutic (goal INR of 2.0-3.0)        Warfarin taken as previously instructed    No new diet changes affecting INR    No new medication/supplements affecting INR    Continues to tolerate warfarin with no reported s/s of bleeding or thromboembolism     Previous INR was Therapeutic    Plan:     Left a detailed message for Christina regarding INR result and instructed:     Warfarin Dosing Instructions:  Continue current warfarin dose 5 mg daily on Sundays and Wednesdays; and 2.5 mg daily rest of week  (0 % change)    Instructed patient to follow up no later than: 4-6 weeks.    Education provided:     Instructed to call the [facility] for any changes, questions or concerns. (#[phone])   ?   Graciela Conde RN    Subjective/Objective:      Christina REDDY Lyndsay, a 78 y.o. female is on warfarin.     Christina reports:     Home warfarin dose: as updated on anticoagulation calendar per template     Missed doses: No     Medication changes:  No     S/S of bleeding or thromboembolism:  No     New Injury or illness:  No     Changes in diet or alcohol consumption:  No     Upcoming surgery, procedure or cardioversion:  No    Anticoagulation Episode Summary     Current INR goal:   2.0-3.0   TTR:   69.6 % (2.1 y)   Next INR check:   6/21/2019   INR from last check:   2.00 (5/10/2019)   Weekly max warfarin dose:      Target end date:   Indefinite   INR check location:      Preferred lab:      Send INR reminders to:   ANTICOAGULATION POOL A (WBY,WBE,MID,RSC)    Indications    Chronic atrial fibrillation (H) [I48.2]           Comments:            Anticoagulation Care Providers     Provider Role Specialty Phone number    Adan Elizabeth MD Referring Internal Medicine [phone]    Thor Santamaria MD Responsible Internal Medicine [phone]

## 2021-05-28 NOTE — PROGRESS NOTES
[facility] clinic Follow Up Note    Christina Umana   78 y.o. female    Date of Visit: 5/10/2019    Chief Complaint   Patient presents with     Follow-up     Blood pressure recheck and reevaluation of other medical issues     Subjective  Lissy is here for follow-up on multiple medical issues.    She has significant COPD and continues to smoke although down to just a few cigarettes a day.  She did set Mother's Day, this weekend as her quit date.    She is having less shortness of breath and dyspnea on exertion.  Her oximetry testing has been above 90% on room air.  She got a home oximeter and has been testing herself at home even at night and it still above 90%.  She is planning on returning the home oxygen now.    She still has a nebulizer, uses it occasionally.  No increased cough or worsening dyspnea on exertion.    She has chronic atrial fibrillation but no symptoms of palpitations.  No lightheaded dizzy spells.  No bleeding issues.  INR lab review from April 18 shows INR 2.4.    She has suspected sleep apnea with moderate kyphosis but she still refusing sleep study or CPAP assessment.    She has chronic systolic congestive heart failure.  Cardiac echo from August 2018 reviewed with ejection fraction 40% and mild mitral regurgitation.    She has not checked her blood pressure on her own.    At last visit on March 18 her blood pressure was 152/80.    In February I had her start on amlodipine 2.5 mg a day.    Still on losartan 100 mg a day, spinal lactone 25 mg a day and metoprolol 75 mg twice daily.    He has mild chronic renal insufficiency with a creatinine 1.3.    She had fallen in February with some rib fractures but those are healed now.  Some mild back pain with certain movements but not severe.    Not taking NSAIDs.    Chronic anxiety stable on Paxil.  She lost her  in February.  She is now moved in with her son and she likes that quite a bit.    The son is building her a raised vegetable  garden that she is excited about starting her gardening.    She has a past history of macular degeneration.  No acute vision changes but she is still not made her routine ophthalmology follow-up appointment.    PMHx:    Past Medical History:   Diagnosis Date     Arthritis      Cancer (H) 2005     Clotting disorder (H) 4/2017     Degenerative disc disease, lumbar      Emphysema lung (H)      Hyperlipidemia      Hypertension      Kidney stone 4/2017     Scoliosis      Ventral hernia      PSHx:    Past Surgical History:   Procedure Laterality Date     BREAST BIOPSY Right 2012     BREAST LUMPECTOMY Right 04/03/2009     BUNIONECTOMY Bilateral 1988     COLECTOMY N/A 03/31/2003     COLON SURGERY       CORRECTION HAMMER TOE Bilateral 1988     CYST REMOVAL Left 02/01/2007     HYSTERECTOMY  2004     OOPHORECTOMY  2004     STOMACH SURGERY      hyernia repair     VENTRAL HERNIA REPAIR  02/25/2005     Immunizations:   Immunization History   Administered Date(s) Administered     Influenza V9a5-33, 02/01/2010     Influenza high dose, seasonal 09/14/2016, 09/24/2018     Influenza, Seasonal, Inj PF IIV3 09/30/2010, 09/13/2011     Influenza, inj, historic,unspecified 10/22/2007, 10/27/2008, 09/28/2009, 09/17/2013, 10/09/2015, 09/25/2017     Influenza, seasonal,quad inj 6-35 mos 09/27/2012     Pneumo Conj 13-V (2010&after) 03/19/2015     Pneumo Polysac 23-V 11/15/2002, 11/18/2008     Td,adult,historic,unspecified 01/08/2004     Tdap 10/28/2016     ZOSTER, LIVE 10/08/2015       ROS A comprehensive review of systems was performed and was otherwise negative    Medications, allergies, and problem list were reviewed and updated    Exam  /88 (Patient Site: Right Arm, Patient Position: Sitting, Cuff Size: Adult Regular)   Pulse (!) 58   Resp 16   Wt 164 lb 9.6 oz (74.7 kg)   SpO2 98%   BMI 25.40 kg/m    Lungs show some decreased breath sounds throughout but no wheezing or crackles.  Moderate kyphosis.  Heart rate is irregularly  irregular but good heart rate control.  No murmur.  No ankle edema.    I did recheck the blood pressure myself and it was 170/80.    Assessment/Plan  1. Chronic systolic congestive heart failure (H)  Still compensated.  Significantly high blood pressure is concerning.    She had barbecue twice earlier this week.  I did  her on a low-salt diet.    Increase amlodipine from 2.5 mg a day to 5 mg a day.    If she still has persistent severe elevated blood pressure she may need to start a low-dose furosemide.    Follow-up in approximate 3 weeks with nurse practitioner.  Follow-up with me in 3 months.  - amLODIPine (NORVASC) 5 MG tablet; Take 1 tablet (5 mg total) by mouth daily.  Dispense: 90 tablet; Refill: 1    2. Chronic atrial fibrillation (H)  Rate controlled with metoprolol.  Warfarin.  - magnesium oxide (MAG-OX) 400 mg (241.3 mg magnesium) tablet; 400mg in the morning and 800 mg at bedtime  Dispense: 270 tablet; Refill: 3  - INR    3. Essential hypertension  As above, not controlled  - amLODIPine (NORVASC) 5 MG tablet; Take 1 tablet (5 mg total) by mouth daily.  Dispense: 90 tablet; Refill: 1    4. Chronic obstructive pulmonary disease, unspecified COPD type (H)  Improved.  Just mild to moderate dyspnea on exertion.  O2 sats of remained above 90% on room air.  She is returning her oxygen.  Continue inhalers/nebulizers as current.    Quit smoking this coming Sunday.  Is going to quit smoking with her son.    History of back with degeneration, she was reminded to make her ophthalmology appointment.    Chronic anxiety, improved since moving with her son.  Continue Paxil.  Grieving appropriately from death of  in February.    Past history of colon polyps.  August 2017 colonoscopy with 3 polyps and 3-year follow-up plan.  Irritable bowel, 2 scoops twice a day Metamucil recommended.    October 2018- mammogram    Return in about 3 weeks (around 5/31/2019) for Recheck.   Patient Instructions   Increase  amlodipine to 5 mg a day.    Avoid salty foods such as barbecue.    Goal blood pressure less than 130/80.    If you do get lightheaded dizzy spells or blood pressures less than 110/60, contact clinic and you may need to go back down in your amlodipine dose.    Continue other medications the same.    Follow-up in clinic to see nurse practitioner in approximately 3 weeks for blood pressure recheck.    Follow-up with me in 3 months.    INR checked today.    Make appointment with ophthalmology, you are overdue for your follow-up appointment.  Let me know if you need help making that appointment.        Thor Santamaria MD      Current Outpatient Medications   Medication Sig Dispense Refill     amLODIPine (NORVASC) 5 MG tablet Take 1 tablet (5 mg total) by mouth daily. 90 tablet 1     atorvastatin (LIPITOR) 40 MG tablet TAKE 1/2 TABLET(20 MG) BY MOUTH DAILY 45 tablet 2     calcium, as carbonate, (OS-GILL) 500 mg calcium (1,250 mg) tablet Take 1 tablet by mouth 2 (two) times a day.       fluorometholone (FML) 0.1 % ophthalmic suspension SHAKE LQ AND INT 1 GTT IN OU TID  2     furosemide (LASIX) 20 MG tablet Take 2 tablets in the morning and 1 tablet in the afternoon or early evening.. 120 tablet 4     ipratropium-albuterol (DUO-NEB) 0.5-2.5 mg/3 mL nebulizer Take 3 mL by nebulization 3 (three) times a day. Then use up to 4 times daily as needed for shortness of breath. 60 vial 6     losartan (COZAAR) 100 MG tablet Take 1 tablet (100 mg total) by mouth daily. 90 tablet 3     magnesium oxide (MAG-OX) 400 mg (241.3 mg magnesium) tablet 400mg in the morning and 800 mg at bedtime 270 tablet 3     metoprolol tartrate (LOPRESSOR) 50 MG tablet Take 1.5 tablets (75 mg total) by mouth 2 (two) times a day. 270 tablet 3     omeprazole (PRILOSEC) 20 MG capsule Take 1 capsule (20 mg total) by mouth daily before breakfast. 90 capsule 3     PARoxetine (PAXIL) 20 MG tablet Take 1 tablet (20 mg total) by mouth daily. 90 tablet 3      spironolactone (ALDACTONE) 25 MG tablet TAKE 1 TABLET(25 MG) BY MOUTH DAILY 90 tablet 3     vitamin E 200 UNIT capsule Take 200 Units by mouth daily.       warfarin (COUMADIN/JANTOVEN) 5 MG tablet Take 1/2-1 tablet (2.5-5 mg) daily as directed. Adjust dose per INR results. 90 tablet 1     No current facility-administered medications for this visit.      No Known Allergies  Social History     Tobacco Use     Smoking status: Current Some Day Smoker     Packs/day: 0.25     Years: 60.00     Pack years: 15.00     Last attempt to quit: 2018     Years since quittin.2     Smokeless tobacco: Never Used   Substance Use Topics     Alcohol use: No     Drug use: No

## 2021-05-29 ENCOUNTER — RECORDS - HEALTHEAST (OUTPATIENT)
Dept: ADMINISTRATIVE | Facility: CLINIC | Age: 81
End: 2021-05-29

## 2021-05-30 ENCOUNTER — RECORDS - HEALTHEAST (OUTPATIENT)
Dept: ADMINISTRATIVE | Facility: CLINIC | Age: 81
End: 2021-05-30

## 2021-05-30 VITALS — BODY MASS INDEX: 30.86 KG/M2 | HEIGHT: 65 IN | WEIGHT: 185.2 LBS

## 2021-05-30 VITALS — WEIGHT: 185.8 LBS | HEIGHT: 65 IN | BODY MASS INDEX: 30.96 KG/M2

## 2021-05-30 VITALS — HEIGHT: 65 IN | WEIGHT: 199 LBS | BODY MASS INDEX: 33.15 KG/M2

## 2021-05-30 NOTE — TELEPHONE ENCOUNTER
ANTICOAGULATION  MANAGEMENT PROGRAM    Christina J Lyndsay is overdue for INR check.  Reminder call made.    She has now established care with Dr Elizabeth. ACM Referral discontinued at this time.    Graciela Conde RN

## 2021-05-30 NOTE — PROGRESS NOTES
[facility] Progress Note    Assessment:    1.  Mild to moderate nonischemic cardiomyopathy compensated  2.  Chronic atrial fibrillation good rate control  3.  Essential hypertension    Plan:    Will attempt to continue her furosemide on a once a day basis and use the afternoon dose as needed for weight gain of more than 2 pounds.  I have made no other changes in the patient's current medical regiment.  Will arrange follow-up for Christina in 1 year with no interim cardiac testing ordered    An After Visit Summary was printed and given to the patient.    Subjective:    79-year-old female who was found to have congestive heart failure with decreased ejection fraction in February 2018.  She has known chronic atrial fibrillation on anticoagulation therapy.  Patient's last echocardiogram was in August 2018 with ejection fraction of 40% observed.  Over the last 6 months patient denies any symptoms of congestive heart failure.  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)     Esophageal Reflux     Osteopenia     Asymmetrical Polyarticular Inflammation     Hyperlipemia     Essential hypertension     Hyperglycemia     Depression With Anxiety     Chronic atrial fibrillation (H)     Heart failure with reduced ejection fraction (H)     History of colonic polyps     Dilated cardiomyopathy (H)     CKD (chronic kidney disease) stage 3, GFR 30-59 ml/min (H)     Chronic obstructive pulmonary disease, unspecified COPD type (H)     Medication monitoring encounter         Current Outpatient Medications   Medication Sig Dispense Refill     amLODIPine (NORVASC) 5 MG tablet Take 1 tablet (5 mg total) by mouth daily. 90 tablet 1     atorvastatin (LIPITOR) 40 MG tablet TAKE 1/2 TABLET(20 MG) BY MOUTH DAILY 45 tablet 2     calcium, as  carbonate, (OS-GILL) 500 mg calcium (1,250 mg) tablet Take 1 tablet by mouth 2 (two) times a day.       cholecalciferol, vitamin D3, 2,000 unit Tab Take 2,000 Units by mouth daily.       fluorometholone (FML) 0.1 % ophthalmic suspension SHAKE LQ AND INT 1 GTT IN OU TID  2     furosemide (LASIX) 20 MG tablet Take 2 tablets in the morning and 1 tablet in the afternoon or early evening.. 120 tablet 4     ipratropium-albuterol (DUO-NEB) 0.5-2.5 mg/3 mL nebulizer Take 3 mL by nebulization 3 (three) times a day. Then use up to 4 times daily as needed for shortness of breath. 60 vial 6     losartan (COZAAR) 100 MG tablet Take 1 tablet (100 mg total) by mouth daily. 90 tablet 3     magnesium oxide (MAG-OX) 400 mg (241.3 mg magnesium) tablet 400mg in the morning and 800 mg at bedtime 270 tablet 3     metoprolol tartrate (LOPRESSOR) 50 MG tablet Take 1.5 tablets (75 mg total) by mouth 2 (two) times a day. 270 tablet 3     omeprazole (PRILOSEC) 20 MG capsule Take 1 capsule (20 mg total) by mouth daily before breakfast. 90 capsule 3     PARoxetine (PAXIL) 20 MG tablet Take 1 tablet (20 mg total) by mouth daily. 90 tablet 3     spironolactone (ALDACTONE) 25 MG tablet TAKE 1 TABLET(25 MG) BY MOUTH DAILY 90 tablet 3     vitamin E 200 UNIT capsule Take 200 Units by mouth daily.       warfarin (COUMADIN/JANTOVEN) 5 MG tablet Take 1/2-1 tablet (2.5-5 mg) daily as directed. Adjust dose per INR results. 90 tablet 1     No current facility-administered medications for this visit.        .  Past Surgical History:   Procedure Laterality Date     BREAST BIOPSY Right 2012     BREAST LUMPECTOMY Right 04/03/2009     BUNIONECTOMY Bilateral 1988     COLECTOMY N/A 03/31/2003     COLON SURGERY       CORRECTION HAMMER TOE Bilateral 1988     CYST REMOVAL Left 02/01/2007     HYSTERECTOMY  2004     OOPHORECTOMY  2004     STOMACH SURGERY      hyernia repair     VENTRAL HERNIA REPAIR  02/25/2005       .  Social History     Socioeconomic History      Marital status:      Spouse name: Not on file     Number of children: Not on file     Years of education: Not on file     Highest education level: Not on file   Occupational History     Not on file   Social Needs     Financial resource strain: Not on file     Food insecurity:     Worry: Not on file     Inability: Not on file     Transportation needs:     Medical: Not on file     Non-medical: Not on file   Tobacco Use     Smoking status: Current Some Day Smoker     Packs/day: 0.25     Years: 60.00     Pack years: 15.00     Last attempt to quit: 2018     Years since quittin.3     Smokeless tobacco: Never Used   Substance and Sexual Activity     Alcohol use: No     Drug use: No     Sexual activity: Never   Lifestyle     Physical activity:     Days per week: Not on file     Minutes per session: Not on file     Stress: Not on file   Relationships     Social connections:     Talks on phone: Not on file     Gets together: Not on file     Attends Jehovah's witness service: Not on file     Active member of club or organization: Not on file     Attends meetings of clubs or organizations: Not on file     Relationship status: Not on file     Intimate partner violence:     Fear of current or ex partner: Not on file     Emotionally abused: Not on file     Physically abused: Not on file     Forced sexual activity: Not on file   Other Topics Concern     Not on file   Social History Narrative     Not on file       .  Family History   Problem Relation Age of Onset     Brain cancer Son 29     Dementia Mother      Stroke Mother      Prostate cancer Father      Aortic aneurysm Father         Abdominal     Hypertension Sister      No Medical Problems Daughter      No Medical Problems Maternal Grandmother      No Medical Problems Maternal Grandfather      No Medical Problems Paternal Grandmother      No Medical Problems Paternal Grandfather      Breast cancer Maternal Aunt 70     No Medical Problems Paternal Aunt      Breast  "cancer Maternal Aunt      Other Brother         Polio     Other Brother         polio     BRCA 1/2 Neg Hx      Colon cancer Neg Hx      Endometrial cancer Neg Hx      Ovarian cancer Neg Hx      Review of Systems:  General: WNL  Eyes: WNL  Ears/Nose/Throat: WNL  Lungs: Cough, Shortness of Breath  Heart: Shortness of Breath with activity, Irregular Heartbeat  Stomach: Diarrhea  Bladder: WNL  Muscle/Joints: WNL  Skin: WNL  Nervous System: WNL  Mental Health: Depression     Blood: WNL    Objective:     /70 (Patient Site: Right Arm, Patient Position: Sitting, Cuff Size: Adult Regular)   Pulse 64   Resp 16   Ht 5' 7.5\" (1.715 m)   Wt 159 lb (72.1 kg)   BMI 24.54 kg/m    159 lb (72.1 kg)   [unfilled]    Physical Exam:    GENERAL APPEARANCE: alert, no apparent distress  HEENT: no scleral icterus or xanthelasma  NECK: jugular venous pressure normal  CHEST: symmetric, the lungs were clear to auscultation  CARDIOVASCULAR: regular rhythm without murmur, click, or gallop; no carotid bruits  ABDOMEN: nondistended, nontender, bowel sounds present  EXTREMITIES: no cyanosis, clubbing or edema.    Cardiac Testing:    echocardiogram from August 5, 2018 results reviewed as above      Lab Results:    LIPIDS:  Lab Results   Component Value Date    CHOL 108 08/03/2018    CHOL 118 03/19/2015    CHOL 114 09/17/2013     Lab Results   Component Value Date    HDL 37 (L) 08/03/2018    HDL 35 (L) 03/19/2015    HDL 39 (L) 09/17/2013     Lab Results   Component Value Date    LDLCALC 54 08/03/2018    LDLCALC 41 03/19/2015    LDLCALC 47 09/17/2013     Lab Results   Component Value Date    TRIG 83 08/03/2018    TRIG 208 (H) 03/19/2015    TRIG 141 09/17/2013     No components found for: CHOLHDL    BMP:  Lab Results   Component Value Date    CREATININE 1.33 (H) 03/18/2019    BUN 31 (H) 03/18/2019     03/18/2019    K 3.9 03/18/2019    CL 98 03/18/2019    CO2 25 03/18/2019         Felix Beckett MD,F.A.C.C.  [facility] " [place] [phone]

## 2021-05-31 ENCOUNTER — RECORDS - HEALTHEAST (OUTPATIENT)
Dept: ADMINISTRATIVE | Facility: CLINIC | Age: 81
End: 2021-05-31

## 2021-05-31 VITALS — BODY MASS INDEX: 28.67 KG/M2 | WEIGHT: 172.1 LBS | HEIGHT: 65 IN

## 2021-05-31 VITALS — WEIGHT: 174.4 LBS | BODY MASS INDEX: 29.06 KG/M2 | HEIGHT: 65 IN

## 2021-05-31 NOTE — TELEPHONE ENCOUNTER
"Call placed to patient per Dr hodges:\"Please let her know the abdominal ultrasound looks okay.  She still has some mild splenomegaly probably is contributing to her low platelet count.  Hepatitis B and C evaluations were negative.  I would like her to come back in November for labs only.  See me back in clinic with labs in February. \"  Spoke to patient with this information and she verbalized understanding  Message sent to schedulers for November appt.  Recall in for Feb RTC.    "

## 2021-05-31 NOTE — CONSULTS
[facility] Consult Note    Patient: Christina Umana  MRN: [id number]  Date of Service: 08/06/2019      Reason for Visit:    1.  Thrombocytopenia    Assessment/Plan:    1.  Thrombocytopenia: The patient has a long-standing history of thrombocytopenia.  Upon chart review this is been present to some degree since 2010.  Over time there does not seem to be much of a trend to worsening.  Platelet count was 99 and 2011 as it is today.  Hemoglobin and white count remained normal.  Given the chronicity and general stability of the thrombocytopenia, I think it is from a benign etiology most likely and not from a primary bone marrow disorder.  It is noted that she had splenomegaly on a CT scan from March, 2017 which could be causing splenic sequestration of the platelets.  Today we will check some other labs looking for chronic infection.  We will also perform an ultrasound of the liver and spleen to evaluate her splenomegaly.  Otherwise, at this point I do not think she needs a bone marrow biopsy.  Would continue to monitor every 6 months and reevaluate if she develops a sustained thrombocytopenia below 80,000 or if she develops anemia or leukopenia.  Plan was reviewed with the patient.  Questions were answered.    ECOG Performance   ECOG Performance Status: 1    Distress Assessment  Distress Assessment Score: 2    Problem List:    1. Thrombocytopenia (H)  US Abdomen Limited    HM1(CBC and Differential)    Comprehensive Metabolic Panel    T4, Free    TSH    Hepatitis C Antibody (Anti-HCV)    Hepatitis B Chronic Evaluation     Staging History:    Cancer Staging  No matching staging information was found for the patient.    History:    Lissy is a 71-year-old woman who is referred to us for an evaluation of thrombocytopenia.  This is been an ongoing problem for her.  She is known about this problem for some time.  Today she is feeling well and does not endorse any new or acute symptoms.  Denies bleeding  or bruising.  Denies fevers, chills, night sweats.  No unintentional weight loss or change in appetite or energy lately    Past History:    Past Medical History:   Diagnosis Date     Arthritis      Cancer (H)      CHF (congestive heart failure) (H)      Clotting disorder (H) 2017     COPD (chronic obstructive pulmonary disease) (H)      Degenerative disc disease, lumbar      Emphysema lung (H)      GERD (gastroesophageal reflux disease)      Hyperlipidemia      Hypertension      Kidney stone 2017     Osteoporosis      Scoliosis      Ventral hernia     Family History   Problem Relation Age of Onset     Brain cancer Son 29     Dementia Mother      Stroke Mother      Prostate cancer Father      Aortic aneurysm Father         Abdominal     Hypertension Sister      No Medical Problems Daughter      No Medical Problems Maternal Grandmother      No Medical Problems Maternal Grandfather      No Medical Problems Paternal Grandmother      No Medical Problems Paternal Grandfather      Breast cancer Maternal Aunt 70     No Medical Problems Paternal Aunt      Breast cancer Maternal Aunt      Other Brother         Polio     Other Brother         polio     BRCA 1/2 Neg Hx      Colon cancer Neg Hx      Endometrial cancer Neg Hx      Ovarian cancer Neg Hx       [unfilled] Social History     Socioeconomic History     Marital status:      Spouse name: Not on file     Number of children: Not on file     Years of education: Not on file     Highest education level: Not on file   Occupational History     Not on file   Social Needs     Financial resource strain: Not on file     Food insecurity:     Worry: Not on file     Inability: Not on file     Transportation needs:     Medical: Not on file     Non-medical: Not on file   Tobacco Use     Smoking status: Current Some Day Smoker     Packs/day: 0.50     Years: 60.00     Pack years: 30.00     Last attempt to quit: 2018     Years since quittin.4     Smokeless tobacco:  Never Used   Substance and Sexual Activity     Alcohol use: No     Drug use: No     Sexual activity: Never   Lifestyle     Physical activity:     Days per week: Not on file     Minutes per session: Not on file     Stress: Not on file   Relationships     Social connections:     Talks on phone: Not on file     Gets together: Not on file     Attends Yazidism service: Not on file     Active member of club or organization: Not on file     Attends meetings of clubs or organizations: Not on file     Relationship status: Not on file     Intimate partner violence:     Fear of current or ex partner: Not on file     Emotionally abused: Not on file     Physically abused: Not on file     Forced sexual activity: Not on file   Other Topics Concern     Not on file   Social History Narrative     Not on file        Allergies:    No Known Allergies    Review of Systems:    General  Generalized Muscle Weakness: Yes - Recent (Less than 3 months)  ENT  Changes in vision: Yes - Chronic (Greater than 3 months)  Glasses or Contacts: Yes - Chronic (Greater than 3 months)  Hearing loss: Yes - Chronic (Greater than 3 months)  Tinnitus: Yes - Chronic (Greater than 3 months)  Sinus Congestion/Drainage: Yes - Chronic (Greater than 3 months)  Dentures: Yes - Chronic (Greater than 3 months)(upper and lower)  Respiratory  Dyspnea: Yes - Chronic (Greater than 3 months)  Cough: Yes - Chronic (Greater than 3 months)  Cardiovascular  Palpitations: Yes - Chronic (Greater than 3 months)  Irregular Heart Beat: Yes - Chronic (Greater than 3 months)  Endocrine  Endocrine (WDL): All endocrine elements are within defined limits  Gastrointestinal  Heartburn: Yes - Chronic (Greater than 3 months)  Musculoskeletal  Back Pain: Yes - Chronic (Greater than 3 months)  Pain interfering with walking: Yes - Recent (Less than 3 months)  Recent fall: Yes - Recent (Less than 3 months)  Neurological  Difficulty walking: Yes - Chronic (Greater than 3 months)  Dominant Hand:  "Right  Psychological/Emotional  Depression: Yes - Chronic (Greater than 3 months)  Anxiety: Yes - Chronic (Greater than 3 months)  Hematological/Lymphatic  Hematological/Lymphatic (WDL): All hematological/lymphatic elements are within defined limits  Dermatological  Dermatologic (WDL): All dermatological elements are within defined limits  Genitourinary/Reproductive  Genitourinary/Reproductive (WDL): All genitourinary/reproductive elements are within defined limits  Reproductive (Females only)     Pain  Currently in Pain: No/denies    Physical Exam:    Recent Vitals 8/6/2019   Height 5' 6\"   Weight 158 lbs 10 oz   BSA (m2) 1.83 m2   /78   Pulse 53   Temp 98   Temp src 1   SpO2 97   Some recent data might be hidden     General: patient appears stated age of 79 y.o.. Nontoxic and in no distress.   HEENT: Head: atraumatic, normocephalic. Sclerae anicteric.  Chest:  Normal respiratory effort  Cardiac:  No edema.   Abdomen: abdomen is non-distended  Extremities: normal tone and muscle bulk.  Skin: no lesions or rash. Warm and dry.   CNS: alert and oriented. Grossly non-focal.   Psychiatric: normal mood and affect.     Lab Results:    Recent Results (from the past 168 hour(s))   HM1 (CBC with Diff)   Result Value Ref Range    WBC 4.5 4.0 - 11.0 thou/uL    RBC 5.77 (H) 3.80 - 5.40 mill/uL    Hemoglobin 16.1 (H) 12.0 - 16.0 g/dL    Hematocrit 48.4 (H) 35.0 - 47.0 %    MCV 84 80 - 100 fL    MCH 27.9 27.0 - 34.0 pg    MCHC 33.3 32.0 - 36.0 g/dL    RDW 14.3 11.0 - 14.5 %    Platelets  140 - 440 thou/uL    MPV 10.1 8.5 - 12.5 fL     Imaging Results:    No results found.    Pathology:    Final Diagnosis   PERIPHERAL BLOOD:     1) ERYTHROCYTOSIS WITH MILD INCREASE IN HEMOGLOBIN AND HEMATOCRIT     2) THROMBOCYTOPENIA     3) NEGATIVE FOR ACUTE LEUKEMIA     4) FEW REACTIVE-APPEARING LYMPHOCYTES AND MONOCYTES     5) PLEASE SEE COMMENT   Electronically signed by Pablito Lopes MD on 6/26/2015 at 1606   Comment     This " patient's erythrocytosis could be related to an underlying polycythemia syndrome (secondary or primary). If there is concern for an underlying chronic myeloproliferative disorder such as polycythemia vera, a peripheral blood sample may be submitted to a reference laboratory for molecular diagnostic analysis to exclude the presence of GIANCARLO-2 [id number] mutation.     Thrombocytopenia is a nonspecific finding and may be due to multiple etiologies that cause decreased platelet production, increased platelet destruction by immune and by non-immune processes, and abnormal platelet pooling. Please correlate with clinical findings.     The reactive-appearing monocytes and lymphocytes identified in this sample could be explained by a recent inflammatory process. Suggest clinical correlation.        Signed by: Ge Killian MD

## 2021-06-01 ENCOUNTER — RECORDS - HEALTHEAST (OUTPATIENT)
Dept: ADMINISTRATIVE | Facility: CLINIC | Age: 81
End: 2021-06-01

## 2021-06-01 VITALS — WEIGHT: 161.9 LBS | BODY MASS INDEX: 25.36 KG/M2

## 2021-06-01 VITALS — BODY MASS INDEX: 23.49 KG/M2 | WEIGHT: 150 LBS

## 2021-06-01 VITALS — BODY MASS INDEX: 25.01 KG/M2 | WEIGHT: 165 LBS | HEIGHT: 68 IN

## 2021-06-01 VITALS — BODY MASS INDEX: 24.8 KG/M2 | HEIGHT: 67 IN | WEIGHT: 158 LBS

## 2021-06-01 VITALS — BODY MASS INDEX: 26.06 KG/M2 | WEIGHT: 166.4 LBS

## 2021-06-01 VITALS — WEIGHT: 165 LBS | BODY MASS INDEX: 25.9 KG/M2 | HEIGHT: 67 IN

## 2021-06-01 VITALS — WEIGHT: 165 LBS | HEIGHT: 67 IN | BODY MASS INDEX: 25.9 KG/M2

## 2021-06-01 VITALS — HEIGHT: 67 IN | WEIGHT: 166 LBS | BODY MASS INDEX: 26.06 KG/M2

## 2021-06-01 VITALS — HEIGHT: 68 IN | BODY MASS INDEX: 24.86 KG/M2 | WEIGHT: 164 LBS

## 2021-06-01 VITALS — BODY MASS INDEX: 23.96 KG/M2 | WEIGHT: 153 LBS

## 2021-06-01 NOTE — PATIENT INSTRUCTIONS - HE
Christina Umana,    It was a pleasure to see you today at the [facility].     My recommendations after this visit include:  - No changes to your medications.    - Follow up in 6 months.   Betsey Rizvi CNP

## 2021-06-02 VITALS — BODY MASS INDEX: 24.86 KG/M2 | WEIGHT: 164 LBS | HEIGHT: 68 IN

## 2021-06-02 VITALS — BODY MASS INDEX: 25.15 KG/M2 | WEIGHT: 163 LBS

## 2021-06-02 VITALS — WEIGHT: 167 LBS | BODY MASS INDEX: 25.31 KG/M2 | HEIGHT: 68 IN

## 2021-06-02 VITALS — BODY MASS INDEX: 25.29 KG/M2 | WEIGHT: 163.9 LBS

## 2021-06-02 VITALS — WEIGHT: 165 LBS | BODY MASS INDEX: 25.46 KG/M2

## 2021-06-02 VITALS — WEIGHT: 164.9 LBS | BODY MASS INDEX: 25.45 KG/M2

## 2021-06-02 VITALS — WEIGHT: 166 LBS | BODY MASS INDEX: 25.62 KG/M2

## 2021-06-02 VITALS — BODY MASS INDEX: 25.01 KG/M2 | WEIGHT: 165 LBS | HEIGHT: 68 IN

## 2021-06-03 ENCOUNTER — RECORDS - HEALTHEAST (OUTPATIENT)
Dept: ADMINISTRATIVE | Facility: CLINIC | Age: 81
End: 2021-06-03

## 2021-06-03 VITALS
WEIGHT: 155.8 LBS | BODY MASS INDEX: 25.96 KG/M2 | HEART RATE: 60 BPM | DIASTOLIC BLOOD PRESSURE: 70 MMHG | RESPIRATION RATE: 18 BRPM | SYSTOLIC BLOOD PRESSURE: 130 MMHG | HEIGHT: 65 IN

## 2021-06-03 VITALS — BODY MASS INDEX: 25.49 KG/M2 | WEIGHT: 158.6 LBS | HEIGHT: 66 IN

## 2021-06-03 VITALS — BODY MASS INDEX: 25.4 KG/M2 | WEIGHT: 164.6 LBS

## 2021-06-03 VITALS — BODY MASS INDEX: 24.1 KG/M2 | HEIGHT: 68 IN | WEIGHT: 159 LBS

## 2021-06-03 NOTE — TELEPHONE ENCOUNTER
After review by Dr. Killian, call placed to Lissy to let her know that her labs from yesterday are stable and we will continue to monitor.  We will call her closer to February which is when she is due for labs and an MD visit.  She verbalized understanding and was appreciative of the call.    Mahi Sanderson RN

## 2021-06-04 VITALS
BODY MASS INDEX: 27.49 KG/M2 | HEIGHT: 64 IN | WEIGHT: 161 LBS | SYSTOLIC BLOOD PRESSURE: 148 MMHG | TEMPERATURE: 98.4 F | DIASTOLIC BLOOD PRESSURE: 69 MMHG | OXYGEN SATURATION: 97 % | HEART RATE: 69 BPM

## 2021-06-04 VITALS
BODY MASS INDEX: 27.32 KG/M2 | HEART RATE: 65 BPM | SYSTOLIC BLOOD PRESSURE: 130 MMHG | WEIGHT: 164.2 LBS | TEMPERATURE: 96.6 F | DIASTOLIC BLOOD PRESSURE: 69 MMHG

## 2021-06-05 VITALS
WEIGHT: 163.2 LBS | SYSTOLIC BLOOD PRESSURE: 195 MMHG | OXYGEN SATURATION: 98 % | DIASTOLIC BLOOD PRESSURE: 95 MMHG | HEIGHT: 64 IN | BODY MASS INDEX: 27.86 KG/M2 | HEART RATE: 57 BPM

## 2021-06-05 NOTE — PROGRESS NOTES
[facility] Hematology and Oncology Progress Note    Patient: Christina Umana  MRN: [id number]  Date of Service: 02/04/2020      Assessment and Plan:    1.  Thrombocytopenia: Counts a little bit lower than they were 6 months ago.  White blood cell count and hemoglobin remain within normal limits.  No other new symptoms suggesting an emerging lymphoproliferative disorder.  For now we will continue to monitor blood a little more frequently.  We will see her in 4 months.  If she continues to have a downward trend of her platelets then we will do body imaging and get a bone marrow.  Aldactone can cause thrombocytopenia in approximately 10% of users.    2.  Low serum iron:  From a blood draw in November.  She is having an upper endoscopy tomorrow.  We will follow-up on results.    ECOG Performance   ECOG Performance Status: 0    Distress Assessment  Distress Assessment Score: No distress    Pain  Currently in Pain: No/denies    Diagnosis:    1.  Thrombocytopenia: Chronic, progressive.  Dates back to at least February 2018 at which time her platelets were 122,000.  Previous evaluation has included thyroid function, hepatitis B, hepatitis C.     Treatment:    Observation    Interim History:    Lissy returns today for a follow-up visit.  She is been feeling okay.  No significant changes in her health since her last visit 6 months ago.  No fevers, chills, night sweats.    Review of Systems:    Constitutional  Constitutional (WDL): All constitutional elements are within defined limits  Neurosensory  Neurosensory (WDL): Exceptions to WDL  Cardiovascular  Cardiovascular (WDL): All cardiovascular elements are within defined limits  Pulmonary  Respiratory (WDL): Exceptions to WDL  Cough: Mild symptoms, nonprescription intervention indicated  Gastrointestinal  Gastrointestinal (WDL): All gastrointestinal elements are within defined limits  Diarrhea: Increase of <4 stools per day over baseline, mild increase in ostomy output compared  "to baseline(occ)  Genitourinary  Genitourinary (WDL): All genitourinary elements are within defined limits  Integumentary  Integumentary (WDL): All integumentary elements are within defined limits  Patient Coping  Patient Coping: Accepting  Accompanied by  Accompanied by: Alone    Past History:    Past Medical History:   Diagnosis Date     Arthritis      Cancer (H) 2005     CHF (congestive heart failure) (H)      Clotting disorder (H) 4/2017     COPD (chronic obstructive pulmonary disease) (H)      Degenerative disc disease, lumbar      Emphysema lung (H)      GERD (gastroesophageal reflux disease)      Hyperlipidemia      Hypertension      Kidney stone 4/2017     Osteoporosis      Scoliosis      Ventral hernia      Physical Exam:    Recent Vitals 9/9/2019   Height 5' 5\"   Weight 155 lbs 13 oz   BSA (m2) 1.8 m2   /70   Pulse 60   Temp -   Temp src -   SpO2 -   Some recent data might be hidden     General: patient appears stated age of 79 y.o.. Nontoxic and in no distress.   HEENT: Head: atraumatic, normocephalic. Sclerae anicteric.  Chest:  Normal respiratory effort  Cardiac:  No edema.   Abdomen: abdomen is non-distended  Extremities: normal tone and muscle bulk.  Skin: no lesions or rash. Warm and dry.   CNS: alert and oriented. Grossly non-focal.   Psychiatric: normal mood and affect.     Lab Results:    Recent Results (from the past 168 hour(s))   HM1 (CBC with Diff)   Result Value Ref Range    WBC 4.1 4.0 - 11.0 thou/uL    RBC 5.76 (H) 3.80 - 5.40 mill/uL    Hemoglobin 15.4 12.0 - 16.0 g/dL    Hematocrit 47.5 (H) 35.0 - 47.0 %    MCV 83 80 - 100 fL    MCH 26.7 (L) 27.0 - 34.0 pg    MCHC 32.4 32.0 - 36.0 g/dL    RDW 13.4 11.0 - 14.5 %    Platelets 67 (L) 140 - 440 thou/uL    MPV 10.1 8.5 - 12.5 fL    Neutrophils % 69 50 - 70 %    Lymphocytes % 22 20 - 40 %    Monocytes % 7 2 - 10 %    Eosinophils % 1 0 - 6 %    Basophils % 1 0 - 2 %    Neutrophils Absolute 2.8 2.0 - 7.7 thou/uL    Lymphocytes Absolute 0.9 " 0.8 - 4.4 thou/uL    Monocytes Absolute 0.3 0.0 - 0.9 thou/uL    Eosinophils Absolute 0.0 0.0 - 0.4 thou/uL    Basophils Absolute 0.0 0.0 - 0.2 thou/uL     Imaging:    No results found.      Signed by: Ge Killian MD

## 2021-06-06 NOTE — TELEPHONE ENCOUNTER
Former patient of Clara & has not established care with another provider.  Please assign refill request to covering provider per Clinic standard process.      Refill Approved    Rx renewed per Medication Renewal Policy. Medication was last renewed on .3/25/19    Maddy Gonzalez, [facility] [place] Connection Triage/Med Refill 3/16/2020     Requested Prescriptions   Pending Prescriptions Disp Refills     losartan (COZAAR) 100 MG tablet [Pharmacy Med Name: LOSARTAN 100MG TABLETS] 90 tablet 3     Sig: TAKE 1 TABLET(100 MG) BY MOUTH DAILY       Angiotensin Receptor Blocker Protocol Passed - 3/15/2020 11:29 AM        Passed - PCP or prescribing provider visit in past 12 months       Last office visit with prescriber/PCP: 5/14/2018 Adan Elizabeth MD OR same dept: 5/10/2019 Thor Santamaria MD OR same specialty: 5/10/2019 Thor Santamaria MD  Last physical: 10/10/2017 Last MTM visit: Visit date not found   Next visit within 3 mo: Visit date not found  Next physical within 3 mo: Visit date not found  Prescriber OR PCP: Adan Elizabeth MD  Last diagnosis associated with med order: 1. Essential hypertension  - losartan (COZAAR) 100 MG tablet [Pharmacy Med Name: LOSARTAN 100MG TABLETS]; TAKE 1 TABLET(100 MG) BY MOUTH DAILY  Dispense: 90 tablet; Refill: 3    If protocol passes may refill for 12 months if within 3 months of last provider visit (or a total of 15 months).             Passed - Serum potassium within the past 12 months     Lab Results   Component Value Date    Potassium 4.8 03/13/2020             Passed - Blood pressure filed in past 12 months     BP Readings from Last 1 Encounters:   03/13/20 124/71             Passed - Serum creatinine within the past 12 months     Creatinine   Date Value Ref Range Status   03/13/2020 2.24 (H) 0.60 - 1.10 mg/dL Final

## 2021-06-07 NOTE — PROGRESS NOTES
Review of Systems - History obtained from the patient  General ROS: negative  Psychological ROS: negative  Ophthalmic ROS: negative  ENT ROS: positive for - hearing loss  Hematological and Lymphatic ROS: negative  Respiratory ROS: positive for - cough  Cardiovascular ROS: positive for - shortness of breath with activity  Gastrointestinal ROS: positive for - constipation  Genito-Urinary ROS: negative  Musculoskeletal ROS: negative  Neurological ROS: negative  Dermatological ROS: negative

## 2021-06-08 NOTE — PROGRESS NOTES
".Christina Umana is a 79 y.o. female who is being evaluated via a billable telephone visit regarding Thrombocytopenia.    The patient has been notified of following:     \"This telephone visit will be conducted via a call between you and your physician/provider. We have found that certain health care needs can be provided without the need for a physical exam.  This service lets us provide the care you need with a short phone conversation.  If a prescription is necessary we can send it directly to your pharmacy.  If lab work is needed we can place an order for that and you can then stop by our lab to have the test done at a later time.    Telephone visits are billed at different rates depending on your insurance coverage. During this emergency period, for some insurers they may be billed the same as an in-person visit.  Please reach out to your insurance provider with any questions.    If during the course of the call the physician/provider feels a telephone visit is not appropriate, you will not be charged for this service.\"    Patient has given verbal consent to a Telephone visit? Yes      Phone call duration: 6 minutes    Radha Douglass CMA   "

## 2021-06-08 NOTE — PROGRESS NOTES
[facility] Hematology and Oncology Progress Note    Patient: Christina Umana  MRN: [id number]  Date of Service: 06/05/2020      Assessment and Plan:    1.  Thrombocytopenia: Platelet count is actually improved overall compared to her baseline over the past 2 years.  No worsening leukopenia.  No worsening anemia.  No systemic symptoms.  For now we will continue observation.  Reviewed with her that if she develops persistently decreased platelets or other cytopenias in the next up would be a bone marrow.  For now we will continue clinical observation.  Return to clinic in 4 months with labs.  Aldactone can cause thrombocytopenia in a small percentage of patients.    2.  Low serum iron: Upper endoscopy a few months ago was negative.  Today her mean cell volume is normal and her hemoglobin is actually slightly high.  This is probably a result of her diuretics and some volume contraction.  Usually her hemoglobin is normal.  If it persists high then we can check for myeloproliferative disorder in the future.      Total time spent on the phone was 12 minutes.    ECOG Performance   ECOG Performance Status: 0    Distress Assessment  Distress Assessment Score: No distress    Pain  Currently in Pain: No/denies    Diagnosis:    1.  Thrombocytopenia: Chronic, progressive.  Dates back to at least February 2018 at which time her platelets were 122,000.  Previous evaluation has included thyroid function, hepatitis B, hepatitis C.     Treatment:    Observation    Interim History:    Lissy is contacted today for 4-month follow-up visit.  Overall she is been doing fine.  Has not noted any changes in her health.  No fevers, chills, night sweats.  No unintentional weight loss.      Review of Systems:    Constitutional  Constitutional (WDL): All constitutional elements are within defined limits  Neurosensory  Neurosensory (WDL): All neurosensory elements are within defined limits  Eye   Eye Disorder (WDL): All eye disorder elements are  "within defined limits  Ear  Ear Disorder (WDL): Exceptions to WDL  Tinnitus: Concerns(bilateral)  Cardiovascular  Cardiovascular (WDL): All cardiovascular elements are within defined limits  Pulmonary  Respiratory (WDL): Exceptions to WDL  Cough: Concerns(related to smoking)  Gastrointestinal  Gastrointestinal (WDL): All gastrointestinal elements are within defined limits  Genitourinary  Genitourinary (WDL): All genitourinary elements are within defined limits  Lymphatic  Lymph (WDL): All lymph disorder elements are within defined limits  Musculoskeletal and Connective Tissue  Musculoskeletal and Connetive Tissue Disorders (WDL): All Musculoskeletal and Connetive Tissue Disorder elements are within defined limits  Integumentary  Integumentary (WDL): All integumentary elements are within defined limits  Patient Coping  Patient Coping: Accepting;Open/discussion  Accompanied by  Accompanied by: Alone  Oral Chemo Adherence         Constitutional     Neurosensory     Cardiovascular     Pulmonary     Gastrointestinal     Genitourinary     Integumentary     Patient Coping  Patient Coping: Accepting;Open/discussion  Accompanied by  Accompanied by: Alone    Past History:    Past Medical History:   Diagnosis Date     Arthritis      Cancer (H) 2005     CHF (congestive heart failure) (H)      Clotting disorder (H) 4/2017     COPD (chronic obstructive pulmonary disease) (H)      Degenerative disc disease, lumbar      Emphysema lung (H)      GERD (gastroesophageal reflux disease)      Hyperlipidemia      Hypertension      Kidney stone 4/2017     Osteoporosis      Scoliosis      Ventral hernia      Physical Exam:    Recent Vitals 6/4/2020   Height 5' 4\"   Weight 161 lbs   BSA (m2) 1.82 m2   /69   Pulse 69   Temp 98.4   Temp src 1   SpO2 97   Some recent data might be hidden       Lab Results:    Recent Results (from the past 168 hour(s))   Vitamin B12   Result Value Ref Range    Vitamin B-12 444 213 - 816 pg/mL   Folate, " Serum   Result Value Ref Range    Folate 12.7 >=3.5 ng/mL   Comprehensive Metabolic Panel   Result Value Ref Range    Sodium 139 136 - 145 mmol/L    Potassium 4.4 3.5 - 5.0 mmol/L    Chloride 103 98 - 107 mmol/L    CO2 25 22 - 31 mmol/L    Anion Gap, Calculation 11 5 - 18 mmol/L    Glucose 103 70 - 125 mg/dL    BUN 42 (H) 8 - 28 mg/dL    Creatinine 1.64 (H) 0.60 - 1.10 mg/dL    GFR MDRD Af Amer 37 (L) >60 mL/min/1.73m2    GFR MDRD Non Af Amer 30 (L) >60 mL/min/1.73m2    Bilirubin, Total 0.7 0.0 - 1.0 mg/dL    Calcium 9.7 8.5 - 10.5 mg/dL    Protein, Total 7.4 6.0 - 8.0 g/dL    Albumin 4.3 3.5 - 5.0 g/dL    Alkaline Phosphatase 74 45 - 120 U/L    AST 17 0 - 40 U/L    ALT 14 0 - 45 U/L   HM1 (CBC with Diff)   Result Value Ref Range    WBC 5.1 4.0 - 11.0 thou/uL    RBC 6.20 (H) 3.80 - 5.40 mill/uL    Hemoglobin 17.0 (H) 12.0 - 16.0 g/dL    Hematocrit 50.5 (H) 35.0 - 47.0 %    MCV 82 80 - 100 fL    MCH 27.4 27.0 - 34.0 pg    MCHC 33.7 32.0 - 36.0 g/dL    RDW 13.9 11.0 - 14.5 %    Platelets 107 (L) 140 - 440 thou/uL    MPV 10.3 8.5 - 12.5 fL    Neutrophils % 69 50 - 70 %    Lymphocytes % 23 20 - 40 %    Monocytes % 7 2 - 10 %    Eosinophils % 1 0 - 6 %    Basophils % 0 0 - 2 %    Neutrophils Absolute 3.5 2.0 - 7.7 thou/uL    Lymphocytes Absolute 1.2 0.8 - 4.4 thou/uL    Monocytes Absolute 0.3 0.0 - 0.9 thou/uL    Eosinophils Absolute 0.0 0.0 - 0.4 thou/uL    Basophils Absolute 0.0 0.0 - 0.2 thou/uL     Imaging:    No results found.      Signed by: Ge Killian MD

## 2021-06-09 NOTE — PROGRESS NOTES
ASSESSMENT & PLAN:  1. Magnesium deficiency  She will have magnesium checked today and we will see what happens.  Continue with current dosing for now.  - magnesium oxide (MAG-OX) 400 mg tablet; Take 1 tablet (400 mg total) by mouth see administration instructions. TAKE 1 TABLET(400 MG)  IN THE MORNING AND 2  AT NIGHT  Dispense: 180 tablet; Refill: 0  - Basic Metabolic Panel  - Magnesium    2. Atrial fibrillation and flutter 3/17  Rate is well controlled without any meds.  She does go up a little bit high during the day on occasion with activity.  Cardiology did not apparently recommend any rate control.  Echo was fine and she will remain on anticoagulation.  - INR    3. Essential hypertension  Poorly controlled and if remains elevated we may need to increase meds.          Patient Instructions   Stop smoking!    Return to clinic in 3 months for Afib and HTN follow up.      Orders Placed This Encounter   Procedures     Basic Metabolic Panel     Magnesium     Medications Discontinued During This Encounter   Medication Reason     magnesium oxide (MAG-OX) 400 mg tablet Reorder     potassium chloride SA (K-DUR,KLOR-CON) 20 MEQ tablet Reorder       Return in about 3 months (around 7/6/2017) for Afib and HTN follow up.    CHIEF COMPLAINT:  Chief Complaint   Patient presents with     follow up A-Fib       HISTORY OF PRESENT ILLNESS:  Christina is a 76 y.o. female presenting to the clinic today for Afib follow up. An echocardiogram on 03/07/17 was normal with an EF of 56%. She saw cardiology on 03/08/17 who recommended she start on long term Coumadin. She had a Holter monitor on 03/14/17 which showed persistent atrial fibrillation with controlled ventricular response. The heart rate ranged from 49 to 146 beats per minute. She is taking Coumadin 5 mg daily. Her INR on 03/31/17 was 2.30.    REVIEW OF SYSTEMS:   Her magnesium on 03/07/17 was 1.7 and potassium was 3.1. She smoked 1 cigarette today after lunch. All other systems are  negative.    PFSH:  Tobacco Use:  History   Smoking Status     Current Some Day Smoker   Smokeless Tobacco     Never Used       VITALS:  Vitals:    04/06/17 1324 04/06/17 1325   BP: 142/88 146/82   Pulse: 62      Wt Readings from Last 3 Encounters:   04/04/17 185 lb 12.8 oz (84.3 kg)   03/08/17 185 lb 3.2 oz (84 kg)   03/07/17 199 lb (90.3 kg)     There is no height or weight on file to calculate BMI.    PHYSICAL EXAM:  General:  Patient alert, in no acute distress.  Resp:  She coughed a few times sounding congested, most likely smokers cough.  CV:  Irregular rhythm and rate was reasonably controlled.   Neuro:  CN II-XII intact.  Psychiatric:  Alert & oriented with normal mood and affect.    ADDITIONAL HISTORY SUMMARIZED (2): Cardio note reviewed 03/08/17.  DECISION TO OBTAIN EXTRA INFORMATION (1): None.   RADIOLOGY TESTS (1): None.  LABS (1): Labs reviewed 03/31/17. Labs ordered today.  MEDICINE TESTS (1): Cardiac echo reviewed 03/07/17. Holter monitor reviewed 03/14/17.  INDEPENDENT REVIEW (2 each): None.     The visit lasted a total of 10 minutes face to face with the patient. Over 50% of the time was spent counseling and educating the patient about Afib, Coumadin, Mg, potassium.    IVy, am scribing for and in the presence of, Dr. Elizabeth.    IDr. Elizabeth, personally performed the services described in this documentation, as scribed by Vy Dorman in my presence, and it is both accurate and complete.    Dragon dictation was used for this note.  Speech recognition errors are a possibility.    MEDICATIONS:  Current Outpatient Prescriptions   Medication Sig Dispense Refill     amLODIPine (NORVASC) 10 MG tablet TAKE ONE TABLET BY MOUTH ONCE DAILY 90 tablet 2     aspirin 81 mg chewable tablet        atorvastatin (LIPITOR) 40 MG tablet Take 0.5 tablets (20 mg total) by mouth daily. 45 tablet 3     beclomethasone (QVAR) 80 mcg/actuation inhaler Inhale 2 puffs 2 (two) times a day. 2 Inhaler 12      bisacodyl (DULCOLAX) 5 mg EC tablet TK ALL 4 TS PO AT NOON THE DAY BEFORE PROCEDURE.  0     cholecalciferol, vitamin D3, (VITAMIN D3) 1,000 unit capsule Take 1,000 Units by mouth daily.       hydrochlorothiazide (HYDRODIURIL) 25 MG tablet TAKE ONE TABLET BY MOUTH ONCE DAILY (Patient taking differently: taking 1/2 tab daily) 90 tablet 1     ipratropium-albuterol (COMBIVENT RESPIMAT)  mcg/actuation Mist inhaler Inhale 1 puff every 4 (four) hours as needed. 4 g 2     magnesium oxide (MAG-OX) 400 mg tablet Take 1 tablet (400 mg total) by mouth see administration instructions. TAKE 1 TABLET(400 MG)  IN THE MORNING AND 2  AT NIGHT 180 tablet 0     mometasone (ELOCON) 0.1 % cream APPLY SPARINGLY TO AFFECTED AREA(S) ONCE DAILY 45 g 1     omeprazole (PRILOSEC) 20 MG capsule Take 1 capsule (20 mg total) by mouth daily. 90 capsule 0     PARoxetine (PAXIL) 20 MG tablet Take 1 tablet (20 mg total) by mouth every morning. 30 tablet 11     potassium chloride SA (K-DUR,KLOR-CON) 20 MEQ tablet Take 1 tablet (20 mEq total) by mouth 2 (two) times a day. Table in the morning and tablet at night 180 tablet 3     SMOOTHLAX 17 gram/dose powder   0     warfarin (COUMADIN) 5 MG tablet One tab daily or as directed 30 tablet 1     No current facility-administered medications for this visit.        Total data points: 4

## 2021-06-09 NOTE — PROGRESS NOTES
ANTICOAGULATION  MANAGEMENT    Assessment     Today's INR result of 2.2 is Therapeutic (goal INR of 2.0-3.0)        Warfarin was taken as previously instructed    No medication or diet factors that may be affecting INR    Continues to tolerate warfarin with no reported s/s of bleeding or thromboembolism     Previous INR was Therapeutic      Plan:     Spoke with Christina discussed INR result and instructed: She stopped in office to answer all questions she has had since newly starting warfarin.    Warfarin Dosing Instructions:  Take 5 mg daily until next INR check on Friday.    Instructed patient to follow up no later than: 3/24/17.     Education provided: Education provided in regards to  Diet and Medication changes, Importance of consistent vitamin K intake , High Vitamin K foods discussed in detail , Low Vitamin K foods discussed in detail , Importance of therapeutic range , Importance of taking medication as instructed , Allowing no longer than 4-6  weeks between INR monitoring, New patient education packet reviewed in  and Written instructions provided      Christina verbalizes understanding and agrees to warfarin dosing plan.     Instructed to call the [facility] for any changes, questions or concerns. (#[phone])   ?   Graciela Conde RN    Subjective/Objective:      Christina Umana, a 76 y.o. female  is on warfarin    Christina reports:    Home warfarin dose: as updated on anticoagulation calendar     Missed doses: No    Medication changes: No    S/S of bleeding of or thromboembolism:  No    New Injury or illness: No    Changes in diet or alcohol consumption: No    Upcoming surgery, procedure or cardioversion:  No    Anticoagulation Episode Summary     Current INR goal 2.0-3.0   Next INR check 3/24/2017   INR from last check 2.20 (3/21/2017)   Weekly max dose    Target end date Indefinite   INR check location    Preferred lab    Send INR reminders to ANTICOAGULATION POOL A ([facility])    Indications    Atrial fibrillation and flutter 3/17 [I48.91  I48.92]           Comments          Anticoagulation Care Providers     Provider Role Specialty Phone number    Adan Elizabeth MD Referring Internal Medicine [phone]

## 2021-06-09 NOTE — PROGRESS NOTES
HPI:  I am consulted by Adan Elizabeth MD in this 76 y.o. female regarding an incisional hernia. She has noted this for the past several months. She has discomfort. The pain is mild.  She has also had intermittent episodes of what sounds like bowel obstruction, although these were partial obstructions which did not require hospitalization.  She had a CT scan that demonstrated a small hernia adjacent to her umbilical hernia repair mesh, as well as one inferiorly in the previous incision which she had for a colectomy for colon cancer.    No Known Allergies      Current Outpatient Prescriptions:      amLODIPine (NORVASC) 10 MG tablet, TAKE ONE TABLET BY MOUTH ONCE DAILY, Disp: 90 tablet, Rfl: 2     aspirin 81 mg chewable tablet, , Disp: , Rfl:      atorvastatin (LIPITOR) 40 MG tablet, Take 0.5 tablets (20 mg total) by mouth daily., Disp: 45 tablet, Rfl: 3     beclomethasone (QVAR) 80 mcg/actuation inhaler, Inhale 2 puffs 2 (two) times a day., Disp: 2 Inhaler, Rfl: 12     bisacodyl (DULCOLAX) 5 mg EC tablet, TK ALL 4 TS PO AT NOON THE DAY BEFORE PROCEDURE., Disp: , Rfl: 0     cholecalciferol, vitamin D3, (VITAMIN D3) 1,000 unit capsule, Take 1,000 Units by mouth daily., Disp: , Rfl:      hydrochlorothiazide (HYDRODIURIL) 25 MG tablet, TAKE ONE TABLET BY MOUTH ONCE DAILY (Patient taking differently: taking 1/2 tab daily), Disp: 90 tablet, Rfl: 1     ipratropium-albuterol (COMBIVENT RESPIMAT)  mcg/actuation Mist inhaler, Inhale 1 puff every 4 (four) hours as needed., Disp: 4 g, Rfl: 2     magnesium oxide (MAG-OX) 400 mg tablet, TAKE 1 TABLET(400 MG) BY MOUTH TWICE DAILY, Disp: 60 tablet, Rfl: 6     mometasone (ELOCON) 0.1 % cream, APPLY SPARINGLY TO AFFECTED AREA(S) ONCE DAILY, Disp: 45 g, Rfl: 1     omeprazole (PRILOSEC) 20 MG capsule, Take 1 capsule (20 mg total) by mouth daily., Disp: 90 capsule, Rfl: 0     PARoxetine (PAXIL) 20 MG tablet, Take 1 tablet (20 mg total) by mouth every morning., Disp: 30 tablet,  "Rfl: 11     potassium chloride SA (K-DUR,KLOR-CON) 20 MEQ tablet, Take 3 tablets today and tomorrow and then 1 tablet daily after that., Disp: 90 tablet, Rfl: 2     SMOOTHLAX 17 gram/dose powder, , Disp: , Rfl: 0     warfarin (COUMADIN) 5 MG tablet, One tab daily or as directed, Disp: 30 tablet, Rfl: 1    Past Medical History:   Diagnosis Date     Arthritis      Cancer 2005     Clotting disorder 4/2017     Degenerative disc disease, lumbar      Emphysema lung      Hyperlipidemia      Hypertension      Kidney stone 4/2017     Scoliosis      Ventral hernia        Past Surgical History:   Procedure Laterality Date     BREAST BIOPSY Right 2012     BREAST LUMPECTOMY Right 04/03/2009     BUNIONECTOMY Bilateral 1988     COLECTOMY N/A 03/31/2003     COLON SURGERY       CORRECTION HAMMER TOE Bilateral 1988     CYST REMOVAL Left 02/01/2007     HYSTERECTOMY  2004     OOPHORECTOMY  2004     STOMACH SURGERY      hyernia repair     VENTRAL HERNIA REPAIR  02/25/2005       Social History     Social History     Marital status:      Spouse name: N/A     Number of children: N/A     Years of education: N/A     Occupational History     Not on file.     Social History Main Topics     Smoking status: Current Some Day Smoker     Smokeless tobacco: Never Used     Alcohol use No     Drug use: No     Sexual activity: No     Other Topics Concern     Not on file     Social History Narrative       Review of Systems - Negative except   Respiratory ROS: positive for - shortness of breath and wheezing.  She has palpitations, and was recently diagnosed with atrial fibrillation and is on Coumadin.    BP (!) 175/98 (Patient Site: Left Arm, Patient Position: Sitting, Cuff Size: Adult Regular)  Pulse 78  Ht 5' 5.25\" (1.657 m)  Wt 185 lb 12.8 oz (84.3 kg)  SpO2 97%  Breastfeeding? No  BMI 30.68 kg/m2  Body mass index is 30.68 kg/(m^2).    EXAM:  GENERAL: Obese female in no distress.  CARDIAC: RRR w/out murmur   CHEST/LUNG: Clear  ABDOMEN: It " is difficult to identify the hernias.  I examined her lying and standing, with cough and Valsalva.  Abdomen soft and nontender.    IMAGING:  CT ABDOMEN PELVIS W ORAL WO IV CONTRAST  3/17/2017 3:29 PM      INDICATION: LLQ distension at times with pain and tenderness and she feels it is like an abdominal hernia? Check for abdominal wall hernia or other pathology.  TECHNIQUE: CT abdomen and pelvis. Multiplanar reformation images (MPR). Dose reduction techniques were used.   IV CONTRAST: None.  COMPARISON: CT abdomen and pelvis 12/05/2007.     FINDINGS:  LUNG BASES: Chronic mild fibrosis of the posterior base of the right lower lobe.     ABDOMEN: Tiny calcified granulomata of the spleen which is enlarged to 15.0 cm in craniocaudal extent. No change in small left adrenal nodule. Left kidney is somewhat small in size with a tiny 2 mm nonobstructing stone. Noncontrast liver, gallbladder,   bile ducts, pancreas, right adrenal gland, and right kidney negative. Extensive atherosclerotic plaque of the aorta and iliac arteries without aneurysm.     PELVIS: Surgical changes of partial colonic resection with rectosigmoid anastomosis and hysterectomy. Diastases of the rectus sheath and surgical changes of the mesh repair of periumbilical abdominal wall hernia are chronic findings similar to prior.   Mild protrusion of small bowel along the right margin of the mesh and a small residual fat-containing umbilical hernia are similar to prior. Mild protrusion of small bowel between the rectus musculature of the ventral pelvic wall more inferiorly also   unchanged. No dilated loops of bowel or inflammatory changes.     MUSCULOSKELETAL: Degenerative changes and mild convex right scoliosis of the lumbar spine.     IMPRESSION:   CONCLUSION:  1. Diastases of the rectus sheath and surgical changes of mesh repair of periumbilical abdominal wall hernia. Mild protrusion of small bowel along the right margin of the mesh and small residual  fat-containing umbilical hernia are similar to prior. Mild  protrusion of small bowel between the rectus musculature of the ventral pelvic wall more inferiorly also unchanged.  2. Surgical changes of partial colonic resection with rectosigmoid anastomosis.  3. Post hysterectomy.  4. Chronic granulomatous disease and enlargement of the spleen.  5. Nonobstructive nephrolithiasis on the left.    Assessment/Plan: This patient has  incisional hernias. I discussed this at length with her.  I went over conservative management as well as surgical treatment of this.  At the present time, she is not really having much in way of symptoms and she is certainly at increased risk due to her comorbidities and her anticoagulation.  Given that the CT appearance has not changed in 10 years, I do not think that there is any urgency to repair the hernias and since they are not giving her much problem I think it is reasonable to observe them at this time.  She does develop more symptoms, or if she has any further episodes suggestive of obstruction, we'll probably need to repair it.  She understands this, and will let us know if anything changes.     Osito Roberts MD  [facility] Department of Surgery

## 2021-06-09 NOTE — PROGRESS NOTES
ASSESSMENT/PLAN:  1. Small bowel obstruction  This is resolving but I don't think it's finished resolving yet.  She still has some bloating and gas.  Please see the recommendations and patient instructions below.  Follow-up next week if things have not improved significantly.    2. Chronic Obstructive Pulmonary Disease  The lungs are horrible shape relating to her smoking still and chronic disease.  We'll switch to this inhaler below because of insurance purposes.  Quit smoking.  - beclomethasone (QVAR) 80 mcg/actuation inhaler; Inhale 2 puffs 2 (two) times a day.  Dispense: 2 Inhaler; Refill: 12    Patient Instructions   Stop taking Metamucil.    Start taking MiraLAX daily.     Eat small amounts of food and push liquids throughout the day. Avoid gassy foods such as cabbage, beans, etc.    If you are not better in one week, see Dr. Elizabeth. You can cancel the appointment if you are feeling better.      Return in about 1 week (around 2/28/2017) for f/u obstruction.    CHIEF COMPLAINT:  Chief Complaint   Patient presents with     issues with her bowel obstruction. a lot of adam       HISTORY OF PRESENT ILLNESS:  Christina Umana is a 76 y.o. female presenting to the clinic today for a bowel obstruction. She states that she had the obstruction for about 1.5 weeks. She was vomiting for a couple of days and was in significant pain when she believed she was instructed. She was trying to stay well hydrated but her urine was dark. She mentions that the night before her symptoms began she ate a bunch of apples. She states that she is still very gaseous including belching and flatulating. She has had a bowel movement, but it is still not back to her normal. She has been having a bowel movement every other day but it is not very much; her normal bowel movement pattern is every other day. She states that her abdomen was very distended and bloated during the obstruction. She states that something still does not feel quite right.  DATE OF CONSULTATION:  02/12/2020  

 

Consultation to Dr. Lane Hardin.

 

HISTORY OF PRESENT ILLNESS:  Mr. White is a 77-year-old  male, who is

known to have metastatic cancer of the pancreas.  My initial consult I suggested

hospice, however, the patient nor the family had accepted hospice.  I also had spoken to

them that the only drug, which is approved by FDA Gemzar is for palliation.  This is not

a curative chemotherapy.  However, he chose to have Gemzar as the patient had presented

with bowel obstruction and subsequently admitted. 

 

SOCIAL HISTORY:  History of smoking in the past.

 

FAMILY HISTORY:  Noncontributory.

 

ALLERGIES:  REPORTED TO PENICILLIN.

 

MEDICATIONS:  At this time:

1. Metronidazole.

2. Azactam.

3. Dextrose.

4. Sodium chloride.

5. Ondansetron.

6. Morphine.

7. Metoprolol.

8. Promethazine.

 

REVIEW OF SYSTEMS:

HEENT:  Normal. 

CARDIAC:  Normal. 

RESPIRATORY:  Normal. 

GI:  Metastatic cancer of the pancreas. 

:  Normal. 

MUSCULOSKELETAL:  Normal. 

SKIN:  Normal. 

BREASTS:  Normal. 

NEUROENDOCRINE:  Normal.

 

PHYSICAL EXAMINATION:

GENERAL:  A marked adult male with nasogastric tube.  No palpable adenopathy. 

HEART:  Within normal limits. 

LUNGS:  Clear. 

ABDOMEN:  Obese and distended. 

RECTAL:  Deferred. 

CENTRAL NERVOUS SYSTEM:  Essentially normal. 

EXTREMITIES:  Essentially normal.

LABORATORY DATA:  Sodium of 136, potassium 3.9, chloride 102, CO2 of 26, BUN 19,

creatinine 0.73, hemoglobin 11.3, hematocrit 34.3, white count 15,100, platelets of

445,000.  Bilirubin 0.7, SGOT 19, SGPT 39, alkaline phosphatase 96.  The CT scan does

show the patient to have bowel obstruction. 

 

IMPRESSION:  

1. Metastatic cancer of the pancreas.

2. Partial bowel obstruction.

3. Leukocytosis.

4. Anemia of chronic disease.

 

PLAN:  To have nasogastric tube, consider TPN. 

 

I noted the surgical note, the patient basically is a hospice candidate, however, the

patient's family is very protective and not accepting hospice. 

 

 

 

 

______________________________

MD IAN Blackman/MICHAEL

D:  02/26/2020 09:19:11

T:  02/26/2020 11:39:11

Job #:  [id number] "She has not been trying to eat or drink very much. She states that there are times currently where it feels like she might vomit, but she does not actually vomit. She take Metamucil every morning.     REVIEW OF SYSTEMS:   Comprehensive review of systems negative except as noted above.    PFSH:  Reviewed as above.     TOBACCO USE:  History   Smoking Status     Current Some Day Smoker   Smokeless Tobacco     Not on file       VITALS:  Vitals:    02/21/17 1138   BP: 134/64   Pulse: 72     Wt Readings from Last 3 Encounters:   10/28/16 199 lb 1.6 oz (90.3 kg)   06/25/15 205 lb 11.2 oz (93.3 kg)     Estimated body mass index is 32.93 kg/(m^2) as calculated from the following:    Height as of 10/28/16: 5' 5.2\" (1.656 m).    Weight as of 10/28/16: 199 lb 1.6 oz (90.3 kg).    PHYSICAL EXAM:  General Appearance: Alert, cooperative, no distress, appears stated age. Smoker's cough sounds horrible.   Abdomen: Soft, slightly distended, lots of air, active bowel sounds. Non-tender, no masses.  Psychiatric:  She has a normal mood and affect.     ADDITIONAL HISTORY SUMMARIZED (FROM OLD RECORDS OR HISTORY FROM SOMEONE OTHER THAN THE PATIENT OR ANOTHER HEALTHCARE PROVIDER) (2 TOTAL): None.    DECISION TO OBTAIN EXTRA INFORMATION (OLD RECORDS REQUESTED OR HISTORY FROM ANOTHER PERSON OR ACCESSING CARE EVERYWHERE) (1 TOTAL): None.     RADIOLOGY TESTS SUMMARIZED OR ORDERED (XRAY/CT/MRI/DXA) (1 TOTAL): None.    LABS REVIEWED OR ORDERED (1 TOTAL): None.    MEDICINE TESTS SUMMARIZED OR ORDERED (EKG/ECHO/COLONOSCOPY/EGD) (1 TOTAL): None.    INDEPENDENT REVIEW OF EKG OR X-RAY (2 EACH): None.       The visit lasted a total of 11 minutes face to face with the patient. Over 50% of the time was spent counseling and educating the patient about bowel obstruction. An additional 3 minutes was spent counseling the patient on tobacco cessation.     Val LEE, am scribing for and in the presence of, Dr. Elizabeth.    IDr. Elizabeth, personally " performed the services described in this documentation, as scribed by Val Kruse in my presence, and it is both accurate and complete.    MEDICATIONS:  Current Outpatient Prescriptions   Medication Sig Dispense Refill     amLODIPine (NORVASC) 10 MG tablet TAKE ONE TABLET BY MOUTH ONCE DAILY 90 tablet 1     aspirin 81 mg chewable tablet        atorvastatin (LIPITOR) 40 MG tablet Take 0.5 tablets (20 mg total) by mouth daily. 45 tablet 3     cholecalciferol, vitamin D3, (VITAMIN D3) 1,000 unit capsule Take 1,000 Units by mouth daily.       hydrochlorothiazide (HYDRODIURIL) 25 MG tablet TAKE ONE TABLET BY MOUTH ONCE DAILY (Patient taking differently: taking 1/2 tab daily) 90 tablet 1     ipratropium-albuterol (COMBIVENT RESPIMAT)  mcg/actuation Mist inhaler Inhale 1 puff every 4 (four) hours as needed. 4 g 2     magnesium oxide (MAG-OX) 400 mg tablet TAKE 1 TABLET(400 MG) BY MOUTH TWICE DAILY 60 tablet 6     mometasone (ELOCON) 0.1 % cream APPLY SPARINGLY TO AFFECTED AREA(S) ONCE DAILY 45 g 1     omeprazole (PRILOSEC) 20 MG capsule Take 1 capsule (20 mg total) by mouth daily. 90 capsule 0     PARoxetine (PAXIL) 20 MG tablet Take 1 tablet (20 mg total) by mouth every morning. 30 tablet 11     potassium chloride SA (K-DUR,KLOR-CON) 20 MEQ tablet Take 3 tablets today and tomorrow and then 1 tablet daily after that. 90 tablet 2     fluticasone (FLOVENT HFA) 110 mcg/actuation inhaler Inhale 2 puffs 2 (two) times a day. 1 Inhaler 12     No current facility-administered medications for this visit.        Total data points: None.

## 2021-06-09 NOTE — PROGRESS NOTES
ASSESSMENT/PLAN:  1. Heart beat abnormality    - Electrocardiogram Perform and Read- a fib flutter read by me    2. Atrial fibrillation and flutter 3/17-new onset    This is new onset.  Apparently it's been persistent for an low likelihood is been persistent since yesterday.  This was first noticed when she was being evaluated for her colonoscopy.  They did not go through with the procedure and center to be evaluated by me.  Her heart rate is fine and under good control and the patient doesn't have any other symptoms problems or concerns.  She is not falling.  I discussed the risk of stroke and the fact that she should be on Coumadin and will start that now.  She has had some potassium and magnesium issues previously and we'll check those and TSH today.  She'll be scheduled for an echocardiogram and cardiology follow-up.  See me in one month.  SHe has quit smoking again which is great.      - Basic Metabolic Panel  - HM2(CBC w/o Differential)  - Thyroid Stimulating Hormone (TSH)  - INR  - Magnesium  - Ambulatory referral to Anticoagulation Monitoring    The complexity was in the new diagnosis, the risk of management, the follow-up, the testing that's necessary,    Patient Instructions   Echocardiogram- #[phone]    Set up a cardiology appointment- #[phone]    Start taking Coumadin, 7.5 mg once daily today (3/7) and Wednesday (3/8). Have an INR rechecked on Thursday (3/9).     Referral to anticoagulation nurse.       Return in about 4 weeks (around 4/4/2017) for f/u atrial fibrillation and flutter.    CHIEF COMPLAINT:  Chief Complaint   Patient presents with     complaints of irregular heart rate.       HISTORY OF PRESENT ILLNESS:  Christina Umana is a 76 y.o. female presenting to the clinic today with concerns of an irregular heart rate. She was scheduled to have a colonoscopy yesterday but she was unable to have the procedure because of her irregular heart rate. She had a rhythm strip completed when she was  "there. She had an ECG completed in the clinic today and Dr. Elizabeth interprets it as \"atrial fibrillation flutter\". She has not had an irregular heart beat in the past. She denies any pain or troubles with her breathing. She is currently taking aspirin daily. She is not currently on an anticoagulant. Of note, she continues to smoke; she was strongly encouraged to stop smoking at this visit.     Hypertension: She had a blood pressure of 166/76 the first time that it was checked in clinic today. Her hydrochlorothiazide was decreased back in November due to low potassium. She is also taking 10 mg of amlodipine daily.     Hypokalemia: She had a potassium level of 2.8 back on 11/23/16. She was instructed to decrease her hydrochlorothiazide to 12.5 mg daily and to start taking 60 mEq of potassium daily for two days. Her potassium was rechecked a couple of days later and was 3.4. She is currently taking 20 mEq of potassium daily at this time. She has been taking magnesium daily as well.     REVIEW OF SYSTEMS:   She has the pre and post void residual ultrasound scheduled for Thursday of this week.   Comprehensive review of systems negative except as noted above.    PFSH:  Social: Her  has surgery scheduled for Friday.  Reviewed as above.     TOBACCO USE:  History   Smoking Status     Current Some Day Smoker   Smokeless Tobacco     Not on file       VITALS:  Vitals:    03/07/17 0908   BP: 166/76   Pulse: 81     Wt Readings from Last 3 Encounters:   10/28/16 199 lb 1.6 oz (90.3 kg)   06/25/15 205 lb 11.2 oz (93.3 kg)     Estimated body mass index is 32.93 kg/(m^2) as calculated from the following:    Height as of 10/28/16: 5' 5.2\" (1.656 m).    Weight as of 10/28/16: 199 lb 1.6 oz (90.3 kg).  I have counseled the patient for tobacco cessation and the follow up will occur  at the next visit.    PHYSICAL EXAM:  General Appearance: Alert, cooperative, no distress, appears stated age.  Lungs: Clear to auscultation " bilaterally, good air movement.  Cough sounds productive  Heart: Irregular rhythm, S1 and S2 normal, no murmur or bruit.  Rate is well-controlled in the 70s  Psychiatric: She has a normal mood and affect.   Extremities-there is 1+ edema bilateral but no cords or erythema to suggest DVT.    ADDITIONAL HISTORY SUMMARIZED (FROM OLD RECORDS OR HISTORY FROM SOMEONE OTHER THAN THE PATIENT OR ANOTHER HEALTHCARE PROVIDER) (2 TOTAL): None.    DECISION TO OBTAIN EXTRA INFORMATION (OLD RECORDS REQUESTED OR HISTORY FROM ANOTHER PERSON OR ACCESSING CARE EVERYWHERE) (1 TOTAL): None.     RADIOLOGY TESTS SUMMARIZED OR ORDERED (XRAY/CT/MRI/DXA) (1 TOTAL): None.    LABS REVIEWED OR ORDERED (1 TOTAL): Ordered CMP, HM2, INR, and TSH labs today.    MEDICINE TESTS SUMMARIZED OR ORDERED (EKG/ECHO/COLONOSCOPY/EGD) (1 TOTAL): Ordered ECG today. Ordered echocardiogram today.     INDEPENDENT REVIEW OF EKG OR X-RAY (2 EACH): Independent review of ECG as ordered above; a-fib flutter.       The visit lasted a total of 10 minutes face to face with the patient. Over 50% of the time was spent counseling and educating the patient about atrial fibrillation flutter. An additional 3 minutes was spent counseling the patient on tobacco cessation.    IVal, am scribing for and in the presence of, Dr. Elizabeth.    I, Dr. Elizabeth, personally performed the services described in this documentation, as scribed by Val Kruse in my presence, and it is both accurate and complete.    MEDICATIONS:  Current Outpatient Prescriptions   Medication Sig Dispense Refill     amLODIPine (NORVASC) 10 MG tablet TAKE ONE TABLET BY MOUTH ONCE DAILY 90 tablet 1     aspirin 81 mg chewable tablet        atorvastatin (LIPITOR) 40 MG tablet Take 0.5 tablets (20 mg total) by mouth daily. 45 tablet 3     beclomethasone (QVAR) 80 mcg/actuation inhaler Inhale 2 puffs 2 (two) times a day. 2 Inhaler 12     cholecalciferol, vitamin D3, (VITAMIN D3) 1,000 unit capsule  Take 1,000 Units by mouth daily.       hydrochlorothiazide (HYDRODIURIL) 25 MG tablet TAKE ONE TABLET BY MOUTH ONCE DAILY (Patient taking differently: taking 1/2 tab daily) 90 tablet 1     ipratropium-albuterol (COMBIVENT RESPIMAT)  mcg/actuation Mist inhaler Inhale 1 puff every 4 (four) hours as needed. 4 g 2     magnesium oxide (MAG-OX) 400 mg tablet TAKE 1 TABLET(400 MG) BY MOUTH TWICE DAILY 60 tablet 6     mometasone (ELOCON) 0.1 % cream APPLY SPARINGLY TO AFFECTED AREA(S) ONCE DAILY 45 g 1     omeprazole (PRILOSEC) 20 MG capsule Take 1 capsule (20 mg total) by mouth daily. 90 capsule 0     PARoxetine (PAXIL) 20 MG tablet Take 1 tablet (20 mg total) by mouth every morning. 30 tablet 11     potassium chloride SA (K-DUR,KLOR-CON) 20 MEQ tablet Take 3 tablets today and tomorrow and then 1 tablet daily after that. 90 tablet 2     No current facility-administered medications for this visit.        Total data points: 4

## 2021-06-09 NOTE — PROGRESS NOTES
ANTICOAGULATION  MANAGEMENT-Patient Agreement      Spoke with Christina and reviewed patient agreement.    [facility] Anticoagulation Management Program Patient Agreement     Your provider, Adan Elizabeth MD, has referred you to the [facility] Anticoagulation Management Program in order to help ensure you have safe and effective dosing and monitoring of your anticoagulant medication.     In order to be a patient in the Anticoagulation Management Program:       Patient/Caregiver response   You will need to have regular blood testing done as directed by the clinic staff.     Yes   You will take your warfarin as directed by the anticoagulation clinic staff.     Yes   You have access to a telephone which you can routinely be reached at. If you cannot be reached, you authorize the anticoagulation staff to leave a detailed message either via voicemail, email, web-based format or with a designated person.     Yes   You will contact the anticoagulation staff if you do not receive instructions within 24 hours after a blood test.     Yes   You will notify the anticoagulation staff whenever you start, stop or change the dose of a prescription, over-the counter medication or supplement.      Yes   You will notify the anticoagulation staff of any planned procedures or surgeries that you may need at least 2 weeks prior to the procedure or surgery.     Yes   You will have regular office visits, at least one a year, with your [facility] primary care provider.   Yes     Repeated failure to follow dosing or follow up instructions for blood testing will result in notification of my primary care provider and may result in termination with the program.     Yes     Christina is in agreement with all Anticoagulation Management program conditions.     Graciela Conde RN  11:33 AM

## 2021-06-09 NOTE — TELEPHONE ENCOUNTER
RN cannot approve Refill Request    RN can NOT refill this medication PCP messaged that patient is overdue for Labs and Office Visit, Protocol failed and NO refill given and historical medication requested. Last office visit: 5/10/2019 Thor Santamaria MD Last Physical: Visit date not found Last MTM visit: Visit date not found Last visit same specialty: 5/10/2019 Thor Santamaria MD.  Next visit within 3 mo: Visit date not found  Next physical within 3 mo: Visit date not found      Catalina Hagan, [facility] Triage/Med Refill 7/5/2020    Requested Prescriptions   Pending Prescriptions Disp Refills     amLODIPine (NORVASC) 2.5 MG tablet [Pharmacy Med Name: AMLODIPINE BESYLATE 2.5MG TABLETS] 30 tablet 11     Sig: TAKE 1 TABLET BY MOUTH DAILY       Calcium-Channel Blockers Protocol Failed - 7/5/2020  3:58 PM        Failed - PCP or prescribing provider visit in past 12 months or next 3 months     Last office visit with prescriber/PCP: 5/10/2019 Thor Santamaria MD OR same dept: Visit date not found OR same specialty: 5/10/2019 Thor Santamaria MD  Last physical: Visit date not found Last MTM visit: Visit date not found   Next visit within 3 mo: Visit date not found  Next physical within 3 mo: Visit date not found  Prescriber OR PCP: Thor Santamaria MD  Last diagnosis associated with med order: 1. Chronic systolic congestive heart failure (H)  - amLODIPine (NORVASC) 2.5 MG tablet [Pharmacy Med Name: AMLODIPINE BESYLATE 2.5MG TABLETS]; TAKE 1 TABLET BY MOUTH DAILY  Dispense: 30 tablet; Refill: 11    2. Essential hypertension  - amLODIPine (NORVASC) 2.5 MG tablet [Pharmacy Med Name: AMLODIPINE BESYLATE 2.5MG TABLETS]; TAKE 1 TABLET BY MOUTH DAILY  Dispense: 30 tablet; Refill: 11    If protocol passes may refill for 12 months if within 3 months of last provider visit (or a total of 15 months).             Passed - Blood pressure filed in past 12 months     BP Readings from Last 1 Encounters:   06/04/20 148/69                 furosemide (LASIX) 20 MG tablet [Pharmacy Med Name: FUROSEMIDE 20MG TABLETS] 120 tablet 0     Sig: TAKE 2 TABLETS(40 MG) BY MOUTH TWICE DAILY OR AS DIRECTED       Diuretics/Combination Diuretics Refill Protocol  Failed - 7/5/2020  3:58 PM        Failed - Visit with PCP or prescribing provider visit in past 12 months     Last office visit with prescriber/PCP: 5/10/2019 Thor Santamaria MD OR same dept: Visit date not found OR same specialty: 5/10/2019 Thor Santamaria MD  Last physical: Visit date not found Last MTM visit: Visit date not found   Next visit within 3 mo: Visit date not found  Next physical within 3 mo: Visit date not found  Prescriber OR PCP: Thor Santamaria MD  Last diagnosis associated with med order: 1. Chronic systolic congestive heart failure (H)  - amLODIPine (NORVASC) 2.5 MG tablet [Pharmacy Med Name: AMLODIPINE BESYLATE 2.5MG TABLETS]; TAKE 1 TABLET BY MOUTH DAILY  Dispense: 30 tablet; Refill: 11    2. Essential hypertension  - amLODIPine (NORVASC) 2.5 MG tablet [Pharmacy Med Name: AMLODIPINE BESYLATE 2.5MG TABLETS]; TAKE 1 TABLET BY MOUTH DAILY  Dispense: 30 tablet; Refill: 11    If protocol passes may refill for 12 months if within 3 months of last provider visit (or a total of 15 months).             Passed - Serum Potassium in past 12 months      Lab Results   Component Value Date    Potassium 4.4 06/04/2020             Passed - Serum Sodium in past 12 months      Lab Results   Component Value Date    Sodium 139 06/04/2020             Passed - Blood pressure on file in past 12 months     BP Readings from Last 1 Encounters:   06/04/20 148/69             Passed - Serum Creatinine in past 12 months      Creatinine   Date Value Ref Range Status   06/04/2020 1.64 (H) 0.60 - 1.10 mg/dL Final

## 2021-06-09 NOTE — PROGRESS NOTES
[facility] Consultation Note    Thank you, Dr. Elizabeth, for asking the [facility] team to see Christina Umana in consultation today at our clinic to evaluate atrial fibrillation.      Assessment:   1.  Persistent atrial fibrillation of unknown onset  2.  Essential hypertension controlled  3.  No evidence of structural heart disease by recent echocardiogram     Plan:   Would recommend following a rate control strategy with no attempts at cardioversion of her atrial fibrillation.  Will have a Holter monitor placed to document good rate control with activity.  The patient appears to be adequately rate controlled at rest on her current medical regimen.  Agree with long-term Coumadin therapy.  No further cardiac evaluation is needed at this time other than a Holter monitor.  Can proceed with colonoscopy at any point in the near future with the patient off Coumadin with no bridging 5 days before colonoscopy            Current History:   76-year-old female with no previous cardiac history.  The patient was to have an outpatient colonoscopy performed on Monday, March 6 when she was noted to be in atrial fibrillation with a controlled ventricular response at colonoscopy Center.  Patient was subsequently seen by her primary physician yesterday on March 7 months with an EKG revealing atrial fibrillation with a rate of 75 bpm nonspecific ST and T-wave changes.  Patient is totally asymptomatic and is unaware of her underlying irregular rhythm with onset unable to be accurately determined.  Patient denies any chest pain, dyspnea on exertion or change in exercise tolerance    Past Medical History:     Past Medical History:   Diagnosis Date     Arthritis      Emphysema lung      Scoliosis      Ventral hernia        Past Surgical History:     Past Surgical History:   Procedure Laterality Date     BREAST BIOPSY Right 2012     BREAST LUMPECTOMY Right 04/03/2009     BUNIONECTOMY Bilateral 1988     COLECTOMY  "N/A 03/31/2003     CORRECTION HAMMER TOE Bilateral 1988     CYST REMOVAL Left 02/01/2007     HYSTERECTOMY  2004     OOPHORECTOMY  2004     VENTRAL HERNIA REPAIR  02/25/2005       Family History:     Family History   Problem Relation Age of Onset     Brain cancer Son 29     Dementia Mother      Prostate cancer Father      Aortic aneurysm Father      Abdominal     Hypertension Sister      No Medical Problems Daughter      No Medical Problems Maternal Grandmother      No Medical Problems Maternal Grandfather      No Medical Problems Paternal Grandmother      No Medical Problems Paternal Grandfather      Breast cancer Maternal Aunt 70     No Medical Problems Paternal Aunt      Breast cancer Maternal Aunt      Other Brother      Polio     BRCA 1/2 Neg Hx      Colon cancer Neg Hx      Endometrial cancer Neg Hx      Ovarian cancer Neg Hx        Social History:    reports that she has been smoking.  She does not have any smokeless tobacco history on file. She reports that she does not drink alcohol or use illicit drugs.  Patient is a 55-pack-year history of tobacco abuse and currently is smoking a quarter of a pack of cigarettes a day is attempting to quit.  Patient is  and lives with her     Meds:   Scheduled Meds:  PRN Meds:.    Allergies:   Review of patient's allergies indicates no known allergies.    Review of Systems:   General: WNL  Eyes: WNL  Ears/Nose/Throat: WNL  Lungs: Cough, Shortness of Breath  Heart: Shortness of Breath with activity, Irregular Heartbeat  Stomach: WNL  Bladder: WNL  Muscle/Joints: WNL  Skin: WNL  Nervous System: WNL  Mental Health: WNL     Blood: WNL      Objective:      Physical Exam  185 lb 3.2 oz (84 kg)  5' 5.25\" (1.657 m)  Body mass index is 30.58 kg/(m^2).  Visit Vitals     /80 (Patient Site: Left Arm, Patient Position: Sitting, Cuff Size: Adult Regular)     Pulse 74     Ht 5' 5.25\" (1.657 m)     Wt 185 lb 3.2 oz (84 kg)     SpO2 96%     BMI 30.58 kg/m2       General " Appearance:   alert, no apparent distress   HEENT:  no scleral icterus; the mucous membranes were pink and moist                                  Neck: jugular venous pressure is normal, no thyromegaly   Chest: the spine was straight and the chest was symmetric   Lungs:   respirations unlabored; the lungs are clear to auscultation   Cardiovascular:   irregular rhythm with normal first and second heart sounds and no murmurs, clicks, or gallops. Carotid, radial, femoral, and posterior tibial pulses are intact; there are no carotid or femoral bruits.   Abdomen:  no organomegaly, masses, bruits, or tenderness; bowel sounds are present   Extremities: no cyanosis, clubbing, or edema.  No obvious musculoskeletal abnormalities    Skin: no xanthelasma   Neurologic: mood and affect are appropriate         echocardiogram from March 7, 2017 revealed a normal left ventricular systolic function with no significant valvular abnormalities          Lab Review   Lab Results   Component Value Date     03/07/2017    K 3.1 (L) 03/07/2017    CL 98 03/07/2017    CO2 29 03/07/2017    BUN 13 03/07/2017    CREATININE 1.03 03/07/2017    CALCIUM 9.7 03/07/2017     Lab Results   Component Value Date    WBC 6.4 03/07/2017    WBC 6.3 03/19/2015    HGB 16.6 (H) 03/07/2017    HCT 49.3 (H) 03/07/2017    MCV 86 03/07/2017     (L) 03/07/2017     Lab Results   Component Value Date    CHOL 118 03/19/2015    TRIG 208 (H) 03/19/2015    HDL 35 (L) 03/19/2015         Felix Beckett M.D., F.A.C.C.  [facility]  [phone]

## 2021-06-09 NOTE — PROGRESS NOTES
ASSESSMENT/PLAN:  1. Bowel obstruction  This seems to have resolved and she is now having some looser stools.  Please see #2.    2. Diarrhea  I think her GI tract is just getting back from having had this obstructive problem.  I think that the fiber will be able to help to absorb some of the fluid and will help to minimize the amount of diarrhea.    3. Abdominal wall hernia  This is a new issue that were dealing with today.  She is comment about some irritation before butis really bothering her.  Smokes a bothering her in the evening.  She states that she wakes up and feels this uncomfortable bulge into her left lower abdomen and when she pushes on it to 60s pushing on her bladder immediately needs to urinate.  It feels very uncomfortable.  We have not actually documented a hernia with any formal studies but on exam today feels sick to definitely is some defect in the left lower abdominal wall.  I discussed the case with radiology and it's possible that the ultrasound will be able to show this area and would like to see if the bladder is bulging near this abdominal wall defect.  This is bothering her enough that if it were indeed herniation she is wondering if she wants to have it fixed.  With her multiple medical problems and smoking etc. I'm not going to encourage this but we may need to have her see a surgeon.    - US Bladder With Pre and Post Void Residual; Future    Patient Instructions   Pre and post void residual ultrasound- #[phone]    bladder emptying issues, and possible protrusion into an abdominal wall hernia.  Please eval the abd wall hernia as well?      Return if symptoms worsen or fail to improve.    CHIEF COMPLAINT:  Chief Complaint   Patient presents with     follow up bowel obstruction     doing much better       HISTORY OF PRESENT ILLNESS:  Christina Umana is a 76 y.o. female presenting to the clinic today to follow up on bowel obstruction. She has been feeling much better and has been having  "bowel movements. She states that it has swung the other way and now has diarrhea. She has been having 2-3 bowel movements per day. She does not feel bloated at this time.     Nocturia: She has been waking up every couple of hours at night to go to the bathroom. When she wakes up she notices a \"ball\" in her abdomen. Once she urinates the ball will disappear. If she pushes on the lump it causes her to urinate. She does not notice the ball during the day at all, because whenever she has the urge to urinate she goes (as she is awake). She has not noticed any increased swelling in her legs. She does not watch the amount of salt she consumes daily. She is currently taking 12.5 mg of hydrochlorothiazide once daily in the morning; she is certain that she is taking the medication in the morning. If she takes an Aleve PM she will sleep soundly and only wake up twice to urinate. She is wondering why she has been waking up so frequently. She states that this has been happening for a while.     REVIEW OF SYSTEMS:   She has a colonoscopy scheduled for Monday, March 6, 2017.   Comprehensive review of systems negative except as noted above.    PFSH:  Reviewed as above.     TOBACCO USE:  History   Smoking Status     Current Some Day Smoker   Smokeless Tobacco     Not on file       VITALS:  Vitals:    03/02/17 1056   BP: 110/62   Pulse: 63     Wt Readings from Last 3 Encounters:   10/28/16 199 lb 1.6 oz (90.3 kg)   06/25/15 205 lb 11.2 oz (93.3 kg)     Estimated body mass index is 32.93 kg/(m^2) as calculated from the following:    Height as of 10/28/16: 5' 5.2\" (1.656 m).    Weight as of 10/28/16: 199 lb 1.6 oz (90.3 kg).    PHYSICAL EXAM:  General Appearance: Alert, cooperative, no distress, appears stated age.  Abdomen: The abdomen is nondistended and nontender at this point.  In length flat I can't see or feel any defect in the abdomen.  There is no pain or tenderness.  Was standing up and palpating carefully but does appear that " there is a defect in the left lower abdominal wall and is probably about between 6 and 8 cm.  I did feel some air that felt like there was some viscus that was potentially in this hernia was standing up but was able to be reduced.  There was no significant pain and palpating this area.  Extremities: No CCE.   Psychiatric:  She has a normal mood and affect.     ADDITIONAL HISTORY SUMMARIZED (FROM OLD RECORDS OR HISTORY FROM SOMEONE OTHER THAN THE PATIENT OR ANOTHER HEALTHCARE PROVIDER) (2 TOTAL): None.    DECISION TO OBTAIN EXTRA INFORMATION (OLD RECORDS REQUESTED OR HISTORY FROM ANOTHER PERSON OR ACCESSING CARE EVERYWHERE) (1 TOTAL): Consulted with radiology regarding appropriate imaging regarding symptoms described in HPI above.     RADIOLOGY TESTS SUMMARIZED OR ORDERED (XRAY/CT/MRI/DXA) (1 TOTAL): Ordered pre and post void residual US today.     LABS REVIEWED OR ORDERED (1 TOTAL): None.     MEDICINE TESTS SUMMARIZED OR ORDERED (EKG/ECHO/COLONOSCOPY/EGD) (1 TOTAL): None.    INDEPENDENT REVIEW OF EKG OR X-RAY (2 EACH): None.       The visit lasted a total of 10 minutes face to face with the patient. Over 50% of the time was spent counseling and educating the patient about bowel obstruction.    I, Val Kruse, am scribing for and in the presence of, Dr. Elizabeth.    I, Dr. Elizabeth, personally performed the services described in this documentation, as scribed by Val Kruse in my presence, and it is both accurate and complete.    MEDICATIONS:  Current Outpatient Prescriptions   Medication Sig Dispense Refill     amLODIPine (NORVASC) 10 MG tablet TAKE ONE TABLET BY MOUTH ONCE DAILY 90 tablet 1     aspirin 81 mg chewable tablet        atorvastatin (LIPITOR) 40 MG tablet Take 0.5 tablets (20 mg total) by mouth daily. 45 tablet 3     beclomethasone (QVAR) 80 mcg/actuation inhaler Inhale 2 puffs 2 (two) times a day. 2 Inhaler 12     cholecalciferol, vitamin D3, (VITAMIN D3) 1,000 unit capsule Take 1,000 Units by  mouth daily.       hydrochlorothiazide (HYDRODIURIL) 25 MG tablet TAKE ONE TABLET BY MOUTH ONCE DAILY (Patient taking differently: taking 1/2 tab daily) 90 tablet 1     ipratropium-albuterol (COMBIVENT RESPIMAT)  mcg/actuation Mist inhaler Inhale 1 puff every 4 (four) hours as needed. 4 g 2     magnesium oxide (MAG-OX) 400 mg tablet TAKE 1 TABLET(400 MG) BY MOUTH TWICE DAILY 60 tablet 6     mometasone (ELOCON) 0.1 % cream APPLY SPARINGLY TO AFFECTED AREA(S) ONCE DAILY 45 g 1     omeprazole (PRILOSEC) 20 MG capsule Take 1 capsule (20 mg total) by mouth daily. 90 capsule 0     PARoxetine (PAXIL) 20 MG tablet Take 1 tablet (20 mg total) by mouth every morning. 30 tablet 11     potassium chloride SA (K-DUR,KLOR-CON) 20 MEQ tablet Take 3 tablets today and tomorrow and then 1 tablet daily after that. 90 tablet 2     No current facility-administered medications for this visit.        Total data points: 2

## 2021-06-11 NOTE — PROGRESS NOTES
ASSESSMENT:  1. Preop exam for internal medicine  No contraindication for the surgical procedure.  Potassium was fine 2 months ago and will check again now and it is being run stat but is not back yet.   INR - 6/5 is 2.70   - Potassium- pending    2. Essential hypertension  Well-controlled on current meds and will continue same  - Potassium    3. Chronic Obstructive Pulmonary Disease  She has very poor air movement and chronic COPD changes.  She is on a steroid inhaler.  I suggested that she use her Combivent half hour to an hour before her eye procedure.    PLAN:  Patient Instructions   Needs INR today.     Due for colonoscopy - after your eye surgery. #[phone]    Use Combivent inhaler 30 minutes prior to procedure.       Orders Placed This Encounter   Procedures     Potassium     There are no discontinued medications.    Return if symptoms worsen or fail to improve.    ASSESSED PROBLEMS:  Problem List Items Addressed This Visit     Essential hypertension    Relevant Orders    Potassium    Chronic Obstructive Pulmonary Disease      Other Visit Diagnoses     Preop exam for internal medicine    -  Primary    Relevant Orders    Potassium          CHIEF COMPLAINT:  Chief Complaint   Patient presents with     Pre-op Exam       HISTORY OF PRESENT ILLNESS:  Christina Umana is a 76 y.o. female here for an internal medicine pre-operative consultation. The exam is requested by Dr. Zavala in preparation for bilateral cataract extraction to be performed at [facility] on 06/06/2017 and two weeks later. Today s examination on 6/5/2017 is done to review the underlying surgical condition of bilateral cataracts, clear for anesthesia, and review medical problems with appropriate changes in medications.    She states th her vision is very blurry and she is not able to drive at night anymore. She states that she does not like things in her eyes at all and did not want to use eye drops.     Tobacco Cessation: She states that she  has decreased her cigarette smoking to one in the morning and one in the evening.     Christina Umana has tolerated previous surgeries well without bleeding or anesthesia difficulty.     Past/Current Medical Problems:  Previous bleeding disorders: Negative    History of anesthesia reactions: Negative    Past Medical History:   Diagnosis Date     Arthritis      Cancer 2005     Clotting disorder 4/2017     Degenerative disc disease, lumbar      Emphysema lung      Hyperlipidemia      Hypertension      Kidney stone 4/2017     Scoliosis      Ventral hernia        REVIEW OF SYSTEMS:   Constitutional: She mentions that she has lost about 11 lbs since her bowel obstruction. She has been eating normally.   Eyes: Bilateral cataracts.   ENT: Negative  Respiratory: COPD  Breast/Skin: Negative  Cardiovascular:Negative   GI: Negative  Urinary: Negative  Reproductive: Negative  Musculoskeletal: Negative  Neuro: Negative  Endocrine: Negative  Mental Health: Negative   Lymph: Negative  Heme: Negative     Remaining Review of Systems negative.    QUESTIONS/HISTORY PERTINENT TO PRE-OP:  Body Piercings: None    Family history of bleeding disorders: Negative    Family history of anesthesia reactions: Negative    Hospitalizations/ Year:  Noncontributory except as associated with surgical history below.     Surgeries:  Past Surgical History:   Procedure Laterality Date     BREAST BIOPSY Right 2012     BREAST LUMPECTOMY Right 04/03/2009     BUNIONECTOMY Bilateral 1988     COLECTOMY N/A 03/31/2003     COLON SURGERY       CORRECTION HAMMER TOE Bilateral 1988     CYST REMOVAL Left 02/01/2007     HYSTERECTOMY  2004     OOPHORECTOMY  2004     STOMACH SURGERY      hyernia repair     VENTRAL HERNIA REPAIR  02/25/2005       Family History:   Family History   Problem Relation Age of Onset     Brain cancer Son 29     Dementia Mother      Stroke Mother      Prostate cancer Father      Aortic aneurysm Father      Abdominal     Hypertension Sister       "No Medical Problems Daughter      No Medical Problems Maternal Grandmother      No Medical Problems Maternal Grandfather      No Medical Problems Paternal Grandmother      No Medical Problems Paternal Grandfather      Breast cancer Maternal Aunt 70     No Medical Problems Paternal Aunt      Breast cancer Maternal Aunt      Other Brother      Polio     Other Brother      polio     BRCA 1/2 Neg Hx      Colon cancer Neg Hx      Endometrial cancer Neg Hx      Ovarian cancer Neg Hx        Social History:  Social History     Social History     Marital status:      Spouse name: N/A     Number of children: N/A     Years of education: N/A     Occupational History     Not on file.     Social History Main Topics     Smoking status: Current Some Day Smoker     Smokeless tobacco: Never Used     Alcohol use No     Drug use: No     Sexual activity: No     Other Topics Concern     Not on file     Social History Narrative       VITALS:  Vitals:    06/05/17 1533   BP: 124/62   Pulse: 64   Weight: 174 lb 6.4 oz (79.1 kg)   Height: 5' 5\" (1.651 m)     Wt Readings from Last 3 Encounters:   06/05/17 174 lb 6.4 oz (79.1 kg)   04/04/17 185 lb 12.8 oz (84.3 kg)   03/08/17 185 lb 3.2 oz (84 kg)     Body mass index is 29.02 kg/(m^2).   The following are part of a depression follow up plan for the patient: patient follow-up to return when and if necessary      PHYSICAL EXAM:  General Appearance: Alert, cooperative, no distress, appears stated age.  Neck: Supple without adenopathy or thyromegaly.  Back: No CVA tenderness or spinous process pain.  Lungs: Clear to auscultation bilaterally, good air movement.  Heart: Regular rate and rhythm, S1 and S2 normal, no murmur or bruit.  Abdomen: Soft, non-tender, no HSM or masses.  Musculoskeletal: No gross abnormalities.  Extremities: 2+ edema a little worse in left than right, pulses II/IV and symmetric.  Skin: No worrisome lesions noted.  Lymph nodes: Cervical, supraclavicular, groin, and " axillary nodes normal.  Neurologic: CNII-XII intact, strength appeared normal, sensory grossly intact.  Psychiatric:  She has a normal mood and affect.     Component      Latest Ref Rng & Units 4/6/2017   Anion Gap, Calculation      5 - 18 mmol/L 11   BUN      8 - 28 mg/dL 14   Calcium      8.5 - 10.5 mg/dL 9.4   CO2      22 - 31 mmol/L 28   Chloride      98 - 107 mmol/L 103   Creatinine      0.60 - 1.10 mg/dL 0.76   Glucose      70 - 125 mg/dL 94   Potassium      3.5 - 5.0 mmol/L 3.8   Sodium      136 - 145 mmol/L 142   GFR MDRD Non Af Amer      >60 mL/min/1.73m2 >60   GFR MDRD Af Amer      >60 mL/min/1.73m2 >60       Notes Reviewed, additional history from source other than patient (2 TOTAL): None.    Accessed Care Everywhere, Requested Records, Consult with Physician (1 TOTAL): None.     Radiology tests summarized or ordered (XR, CT, MRI, DXA, US) (1 TOTAL): None.    Labs reviewed or ordered (1 TOTAL): Reviewed labs from 4/6/17; BMP. Ordered labs today.      Medicine tests reviewed or ordered (ECG, echocardiogram, colonoscopy, EGD, venous US) (1 TOTAL): None.    Independent review of ECG or XR (2 EACH): None.      The visit lasted a total of 6 minutes face to face with the patient. Over 50% of the time was spent counseling and educating the patient about surgical preparation. An additional 3 minutes was spent counseling the patient on tobacco cessation (see HPI above).    IVal, am scribing for and in the presence of, Dr. Elizabeth.    IDr. Elizabeth, personally performed the services described in this documentation, as scribed by Val Kruse in my presence, and it is both accurate and complete.    MEDICATIONS:  Current Outpatient Prescriptions   Medication Sig Dispense Refill     amLODIPine (NORVASC) 10 MG tablet TAKE ONE TABLET BY MOUTH ONCE DAILY 90 tablet 2     aspirin 81 mg chewable tablet        atorvastatin (LIPITOR) 40 MG tablet Take 0.5 tablets (20 mg total) by mouth daily. 45 tablet 3      beclomethasone (QVAR) 80 mcg/actuation inhaler Inhale 2 puffs 2 (two) times a day. 2 Inhaler 12     bisacodyl (DULCOLAX) 5 mg EC tablet TK ALL 4 TS PO AT NOON THE DAY BEFORE PROCEDURE.  0     cholecalciferol, vitamin D3, (VITAMIN D3) 1,000 unit capsule Take 1,000 Units by mouth daily.       hydroCHLOROthiazide (HYDRODIURIL) 25 MG tablet Take 1 tablet (25 mg total) by mouth daily. 90 tablet 0     ipratropium-albuterol (COMBIVENT RESPIMAT)  mcg/actuation Mist inhaler Inhale 1 puff every 4 (four) hours as needed. 4 g 2     magnesium oxide (MAG-OX) 400 mg tablet Take 1 tablet (400 mg total) by mouth see administration instructions. TAKE 1 TABLET(400 MG)  IN THE MORNING AND 2  AT NIGHT 180 tablet 0     mometasone (ELOCON) 0.1 % cream APPLY SPARINGLY TO AFFECTED AREA(S) ONCE DAILY 45 g 1     omeprazole (PRILOSEC) 20 MG capsule TAKE 1 CAPSULE(20 MG) BY MOUTH DAILY 90 capsule 2     PARoxetine (PAXIL) 20 MG tablet Take 1 tablet (20 mg total) by mouth every morning. 30 tablet 11     potassium chloride SA (K-DUR,KLOR-CON) 20 MEQ tablet Take 1 tablet (20 mEq total) by mouth 2 (two) times a day. Table in the morning and tablet at night 180 tablet 3     SMOOTHLAX 17 gram/dose powder   0     warfarin (COUMADIN) 5 MG tablet Take 1 tablet (5 mg) by mouth daily as directed. Adjust dose per INR results. 30 tablet 5     No current facility-administered medications for this visit.        ALLERGIES:  No Known Allergies    Total data points: 1

## 2021-06-11 NOTE — PROGRESS NOTES
Clinic Note    Assessment:     Assessment and Plan:  1. Atrial fibrillation and flutter 3/17  Rate is normal and she is not on any rate control.  She does not have any symptoms side effects or problems.  She is on Coumadin and tolerating well without bleeding complications.  In reviewing her medical problems, risks, benefits etc., I think there is no indication that she needs attempted cardioversion.  She is tolerating this just fine and her wrist to be greater with her number of medical problems and the likelihood of success with her other problems is low.  Continue current management with anticoagulation.  - HM2(CBC w/o Differential)    2. Essential hypertension  Well-controlled on current meds and will continue the same for now.  - HM2(CBC w/o Differential)    She is going to need to stop Coumadin before colonoscopy and can stop 4 days before hand.  She is to try and get her eye surgery done at that same time so that she can be off Coumadin just the ones.  She will see me for preop.     There are no Patient Instructions on file for this visit.  No Follow-up on file.         Subjective:      Christina Umana is a 77 y.o. female here for follow-up of her atrial fibrillation.  She had no symptoms before she was seen and found to have this.  May have been intermittent which is in A. fib now.  She does not feel any symptoms at all.  She has dyspnea but she has lung disease.  There is nothing new or different this time to suspect it is related to her A. fib.  Rate has been well controlled.  She is still smoking.    The following portions of the patient's history were reviewed and updated as appropriate: Past medical history, allergies, medications, lab work    Review of Systems:    She is doing fine coughs occasionally but otherwise negative  History   Smoking Status     Current Some Day Smoker   Smokeless Tobacco     Never Used         Objective:     Vitals:    07/17/17 1051   BP: 122/62   Pulse: 63       Exam:  Patient  looks well in no acute distress and vital signs are stable    CV irregularly irregular with rate in the 70s    Lungs-decreased air movement but clear no wheezing    Extremities-no edema    Patient Active Problem List   Diagnosis     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 45 tablet 3     beclomethasone (QVAR) 80 mcg/actuation inhaler Inhale 2 puffs 2 (two) times a day. 2 Inhaler 12     bisacodyl (DULCOLAX) 5 mg EC tablet TK ALL 4 TS PO AT NOON THE DAY BEFORE PROCEDURE.  0     cholecalciferol, vitamin D3, (VITAMIN D3) 1,000 unit capsule Take 1,000 Units by mouth daily.       hydroCHLOROthiazide (HYDRODIURIL) 25 MG tablet Take 1 tablet (25 mg total) by mouth daily. 90 tablet 0     magnesium oxide (MAG-OX) 400 mg tablet Take 1 tablet (400 mg total) by mouth see administration instructions. TAKE 1 TABLET(400 MG)  IN THE MORNING AND 2  AT NIGHT 180 tablet 0     mometasone (ELOCON) 0.1 % cream APPLY SPARINGLY TO AFFECTED AREA(S) ONCE DAILY 45 g 1     omeprazole (PRILOSEC) 20 MG capsule TAKE 1 CAPSULE(20 MG) BY MOUTH DAILY 90 capsule 2     PARoxetine (PAXIL) 20 MG tablet Take 1 tablet (20 mg total) by mouth every morning. 30 tablet 11     potassium chloride SA (K-DUR,KLOR-CON) 20 MEQ tablet Take 1 tablet (20 mEq total) by mouth 2 (two) times a day. Table in the morning and tablet at night 180 tablet 3     SMOOTHLAX 17 gram/dose powder   0     warfarin (COUMADIN) 5 MG tablet Take 1  tablet (5 mg) by mouth daily as directed. Adjust dose per INR results. 30 tablet 5     No current facility-administered medications for this visit.          Adan Elizabeth    7/17/2017

## 2021-06-12 NOTE — TELEPHONE ENCOUNTER
Spoke to the patient on the phone, who stated that she got the med refilled by Dr. Haresh Altamirano at [facility] and that this refill request was sent to us in error. Please okay the refusal this refill request for documentation purposes.

## 2021-06-12 NOTE — PROGRESS NOTES
Review of systems done with patient over the phone. Positive cough, shortness of breath, wheezing, irregular heartbeat. All other review of systems within normal limits.

## 2021-06-12 NOTE — PROGRESS NOTES
Clinic Note    Assessment:     Assessment and Plan:  1. Dysuria    - Urinalysis Macro & Micro    2. UTI (urinary tract infection)  Appears that she has a pretty significant infection.  She is not septic or anything however.  Will put her on Cipro as it will be less likely to impact her anticoagulation.  However, she just had her colonoscopy yesterday was off of Coumadin just restarting.  Therefore, the really should be no problem with using the 10 days worth of ciprofloxacin.  We will make sure that she is urine culture that shows that she is sensitive to it.  - Culture, Urine    3.  Peripheral edema with nocturia.  I would suggest that she make sure she decreases her fluid intake later in the evening but also perhaps changing the amlodipine to taking it at bedtime so that there is perhaps less opportunity for fluid accumulation with her in a supine position.  Obviously this is dealing with the messing with a half-life of the medication and it may not change anything but I think is worthwhile.  Continue with the hydrochlorothiazide in the a.m.       Patient Instructions   Please note that if over the counter medications were taken off of your medication list, it is not because you are being asked to stop taking them.  does not want all of the over the counter medications on your medication list, as it bogs down the list.     Take hydrochlorothiazide in AM  Take amlodipine at bedtime  Less salt      Cirpro 500 mg twice per day for 10 days for the urine infection         Return if symptoms worsen or fail to improve.         Subjective:      Christina Umana is a 77 y.o. female comes in with many days worth of urinary frequency and incomplete voiding followed by incontinence and other issues.  No fever chills or back pain flank pain per se from her bladder.  No pyelonephritis type symptoms.    The following portions of the patient's history were reviewed and updated as appropriate: Past medical history,  allergies, medications,    Review of Systems:    Negative except as above.  History   Smoking Status     Current Some Day Smoker   Smokeless Tobacco     Never Used         Objective:     Vitals:    08/16/17 0946   BP: 132/64   Pulse: 63   Temp: 98.2  F (36.8  C)       Exam:  Patient looks well in no acute distress and vital signs are stable afebrile  CV-irregular but rate well controlled  Lungs-pretty reasonable air movement at this time which I was surprised about.  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 45 tablet 3     beclomethasone (QVAR) 80 mcg/actuation inhaler Inhale 2 puffs 2 (two) times a day. 2 Inhaler 12     bisacodyl (DULCOLAX) 5 mg EC tablet TK ALL 4 TS PO AT NOON THE DAY BEFORE PROCEDURE.  0     ciprofloxacin HCl (CIPRO) 500 MG tablet Take 1 tablet (500 mg total) by mouth 2 (two) times a day for 10 days. 20 tablet 0     hydroCHLOROthiazide (HYDRODIURIL) 25 MG tablet TAKE 1 TABLET BY MOUTH DAILY 90 tablet 3     magnesium oxide (MAG-OX) 400 mg tablet Take 1 tablet (400 mg total) by mouth see administration instructions. TAKE 1 TABLET(400 MG)  IN THE MORNING AND 2  AT NIGHT 180 tablet 0     mometasone (ELOCON) 0.1 % cream APPLY SPARINGLY TO AFFECTED AREA(S) ONCE DAILY 45 g 1     omeprazole (PRILOSEC) 20 MG capsule TAKE 1 CAPSULE(20 MG) BY MOUTH DAILY 90 capsule 2      PARoxetine (PAXIL) 20 MG tablet TAKE 1 TABLET(20 MG) BY MOUTH EVERY MORNING 30 tablet 12     potassium chloride SA (K-DUR,KLOR-CON) 20 MEQ tablet Take 1 tablet (20 mEq total) by mouth 2 (two) times a day. Table in the morning and tablet at night 180 tablet 3     SMOOTHLAX 17 gram/dose powder   0     warfarin (COUMADIN) 5 MG tablet Take 1 tablet (5 mg) by mouth daily as directed. Adjust dose per INR results. 30 tablet 5     No current facility-administered medications for this visit.          Adan Elizabeth    8/16/2017

## 2021-06-12 NOTE — TELEPHONE ENCOUNTER
Left voicemail for patient to return call to clinic. When patient returns call, please give them below message.

## 2021-06-12 NOTE — PROGRESS NOTES
[facility] Hematology and Oncology Progress Note    Patient: Christina Umana  MRN: [id number]  Date of Service: 10/05/2020      Assessment and Plan:    1.  Thrombocytopenia:   Platelets have improved, overall, compared to her baseline over the past 2 years.    No worsening leukopenia.    No worsening anemia.        For now we will continue observation. If platelets worsen or she develops other cytopenias the next step would be a bone marrow biopsy.      Return to clinic in 4 months with labs.    Aldactone can cause thrombocytopenia in a small percentage of patients.    2.  Low serum iron:  Prior upper endoscopy was normal.   Typically, her hemoglobin has been normal and in last year running high. It is trending back down, remaining slightly high at 16.4. Will follow and if it doesn't continue to normalize,  we can check for myeloproliferative disorder in the future.        ECOG Performance   ECOG Performance Status: 0    Distress Assessment  Distress Assessment Score: 2    Pain  Currently in Pain: No/denies    Diagnosis:    1.  Thrombocytopenia: Chronic, progressive.  Dates back to at least February 2018 at which time her platelets were 122,000.  Previous evaluation has included thyroid function, hepatitis B, hepatitis C.     Treatment:    Observation    Interim History:    Lissy returns for 4-month follow-up visit.    Overall, she is doing well and stable. Has not noted any changes in her health.  No fevers, chills, night sweats.  No unintentional weight loss. No bleeding     Review of Systems:    Constitutional  Constitutional (WDL): All constitutional elements are within defined limits  Neurosensory  Neurosensory (WDL): All neurosensory elements are within defined limits    Cardiovascular  Cardiovascular (WDL): Exceptions to WDL  Edema: Concerns(left ankle)  Pulmonary  Respiratory (WDL): Exceptions to WDL  Cough: Concerns(smokers cough)  Dyspnea: Concerns(with activity)  Gastrointestinal  Gastrointestinal (WDL):  "All gastrointestinal elements are within defined limits  Genitourinary  Genitourinary (WDL): All genitourinary elements are within defined limits  Lymphatic  Lymph (WDL): All lymph disorder elements are within defined limits  Musculoskeletal and Connective Tissue  Musculoskeletal and Connetive Tissue Disorders (WDL): Exceptions to WDL  Arthralgia: Concerns(generalized aches and pains when over doing it)  Integumentary  Integumentary (WDL): All integumentary elements are within defined limits  Patient Coping  Patient Coping: Accepting;Open/discussion  Accompanied by  Accompanied by: Alone  Oral Chemo Adherence         Past History:    Past Medical History:   Diagnosis Date     Arthritis      Cancer (H) 2005     CHF (congestive heart failure) (H)      Clotting disorder (H) 4/2017     COPD (chronic obstructive pulmonary disease) (H)      Degenerative disc disease, lumbar      Emphysema lung (H)      GERD (gastroesophageal reflux disease)      Hyperlipidemia      Hypertension      Kidney stone 4/2017     Osteoporosis      Scoliosis      Ventral hernia      Physical Exam:    Recent Vitals 10/5/2020   Height 5' 4\"   Weight 163 lbs 3 oz   BSA (m2) 1.83 m2   /95   Pulse 57   Temp -   Temp src -   SpO2 98   Some recent data might be hidden     General: Alert and oriented. Alone, masked.   Lymph: No palpable cervical adenopathy.  Lungs: Diminished, but clear bilaterally.  Heart: Regular rate and rhythm.  Abd: Soft, non-tender. No evident organomegaly.  Extremities: No edema.    Lab Results:    Recent Results (from the past 168 hour(s))   HM1 (CBC with Diff)   Result Value Ref Range    WBC 5.8 4.0 - 11.0 thou/uL    RBC 5.79 (H) 3.80 - 5.40 mill/uL    Hemoglobin 16.4 (H) 12.0 - 16.0 g/dL    Hematocrit 49.1 (H) 35.0 - 47.0 %    MCV 85 80 - 100 fL    MCH 28.3 27.0 - 34.0 pg    MCHC 33.4 32.0 - 36.0 g/dL    RDW 13.6 11.0 - 14.5 %    Platelets 111 (L) 140 - 440 thou/uL    MPV 10.9 8.5 - 12.5 fL    Neutrophils % 70 50 - 70 % "    Lymphocytes % 21 20 - 40 %    Monocytes % 8 2 - 10 %    Eosinophils % 1 0 - 6 %    Basophils % 1 0 - 2 %    Immature Granulocyte % 0 <=0 %    Neutrophils Absolute 4.1 2.0 - 7.7 thou/uL    Lymphocytes Absolute 1.2 0.8 - 4.4 thou/uL    Monocytes Absolute 0.5 0.0 - 0.9 thou/uL    Eosinophils Absolute 0.0 0.0 - 0.4 thou/uL    Basophils Absolute 0.0 0.0 - 0.2 thou/uL    Immature Granulocyte Absolute 0.0 <=0.0 thou/uL     Imaging:    No results found.    Total time: 15 min; 10 min towards counseling/coordination of care    Signed by: Meghan Ayers, CNP

## 2021-06-12 NOTE — TELEPHONE ENCOUNTER
Former patient of Dr TEO Elizabeth & has not established care with another provider.  Please assign refill request to covering provider per Clinic standard process.    RN cannot approve Refill Request    RN can NOT refill this medication med is not covered by policy/route to provider. Last office visit: 5/14/2018 Adan Elizabeth MD Last Physical: 10/10/2017 Last MTM visit: Visit date not found Last visit same specialty: 5/10/2019 Thor Santamaria MD.  Next visit within 3 mo: Visit date not found  Next physical within 3 mo: Visit date not found      Rupa Driver, [facility] Triage/Med Refill 10/21/2020    Requested Prescriptions   Pending Prescriptions Disp Refills     warfarin ANTICOAGULANT (COUMADIN/JANTOVEN) 5 MG tablet [Pharmacy Med Name: WARFARIN SOD 5MG TABLETS (PEACH)] 90 tablet 1     Sig: TAKE 1 TABLET BY MOUTH DAILY AS DIRECTED PER INR RESULTS       Warfarin Refill Protocol  Failed - 10/21/2020 10:34 AM        Failed - Provider visit in last year     Last office visit with prescriber/PCP: 5/14/2018 Adan Elizabeth MD OR same dept: Visit date not found OR same specialty: 5/10/2019 Thor Santamaria MD  Last physical: 10/10/2017 Last MTM visit: Visit date not found    Next appt within 3 mo: Visit date not found Next physical within 3 mo: Visit date not found  Prescriber OR PCP: Adan Elizabeth MD  Last diagnosis associated with med order: 1. Atrial fibrillation and flutter 3/17  - warfarin ANTICOAGULANT (COUMADIN/JANTOVEN) 5 MG tablet [Pharmacy Med Name: WARFARIN SOD 5MG TABLETS (PEACH)]; TAKE 1 TABLET BY MOUTH DAILY AS DIRECTED PER INR RESULTS  Dispense: 90 tablet; Refill: 1    If protocol passes may refill for 6 months if within 3 months of last provider visit (or a total of 9 months).          Failed -  Route to appropriate pool/provider     Last Anticoagulation Summary:

## 2021-06-12 NOTE — TELEPHONE ENCOUNTER
Patient is requesting a warfarin refill but she has not been seen in the past year and I do not see any recent INRs.  Is patient seeing Dr. Altamirano at his private clinic?  I will not be able to refill the warfarin.  Please contact patient to have her speak with her current doctor about warfarin.

## 2021-06-12 NOTE — TELEPHONE ENCOUNTER
Patient contacted, she is being followed by Dr. Elizabeth at his personal clinic.  Advised she contact pharmacy to advise them of his new location.  PRAUFL Reynoso CMA

## 2021-06-12 NOTE — TELEPHONE ENCOUNTER
RN cannot approve Refill Request    RN can NOT refill this medication No established PCP. Last office visit: 5/14/2018 Adan Elizabeth MD Last Physical: 10/10/2017 Last MTM visit: Visit date not found Last visit same specialty: 5/10/2019 Thor Santamaria MD.  Next visit within 3 mo: Visit date not found  Next physical within 3 mo: Visit date not found      Nisreen Umana, [facility] Triage/Med Refill 10/3/2020    Requested Prescriptions   Pending Prescriptions Disp Refills     atorvastatin (LIPITOR) 40 MG tablet [Pharmacy Med Name: ATORVASTATIN 40MG TABLETS] 45 tablet 2     Sig: TAKE 1/2 TABLET BY MOUTH EVERY DAY       Statins Refill Protocol (Hmg CoA Reductase Inhibitors) Failed - 9/30/2020  2:32 PM        Failed - PCP or prescribing provider visit in past 12 months      Last office visit with prescriber/PCP: 5/14/2018 Adan Elizabeth MD OR same dept: Visit date not found OR same specialty: 5/10/2019 Thor Santamaria MD  Last physical: 10/10/2017 Last MTM visit: Visit date not found   Next visit within 3 mo: Visit date not found  Next physical within 3 mo: Visit date not found  Prescriber OR PCP: Adan Elizabeth MD  Last diagnosis associated with med order: 1. Hyperlipidemia  - atorvastatin (LIPITOR) 40 MG tablet [Pharmacy Med Name: ATORVASTATIN 40MG TABLETS]; TAKE 1/2 TABLET BY MOUTH EVERY DAY  Dispense: 45 tablet; Refill: 2    If protocol passes may refill for 12 months if within 3 months of last provider visit (or a total of 15 months).

## 2021-06-13 NOTE — PROGRESS NOTES
ASSESSMENT:    1. Preop exam for internal medicine  No contraindications for the surgical procedure, but I am concerned about the length of time that she would be off of Coumadin.  I will discuss the case with the surgeon or PA and did discuss it with a cardiologist.  Please see #2 below    2. Atrial fibrillation and flutter 3/17  I would prefer that she not be off of Coumadin for more than a few days and therefore we will stop after the dose tonight then check INR on Friday and start Lovenox once daily likely Friday Saturday Sunday then she will be off it for more than 36 hours before the surgery.  She should restart her Coumadin the day after surgery and it will take time for her INR to build back up therefore giving her time for good hemostasis and prevention of significant bleeding after the procedure.  Going for an extended period of time in chronic A. fib off of anticoagulation I believe increase the risk for stroke.   Dosing looks to be milligram per kilograms of use 80 mg every 12 hours starting Friday last dose Sunday which is 36 hours prior to her procedure which should be plenty of time for the anticoagulation to wear off.  Typical is 24 hours.  I will make sure that this is okay with the surgeon as well.  - Comprehensive Metabolic Panel  - HM2(CBC w/o Differential)  - INR  - Electrocardiogram Perform and Read    3. Essential hypertension  Well-controlled at this time on current meds will continue same and check lab work.  - Comprehensive Metabolic Panel  - HM2(CBC w/o Differential)  - Electrocardiogram Perform and Read    4. COPD (chronic obstructive pulmonary disease)  Rather significant and severe and she still smoking.  She is quit off and on.  Every time I see her encourage her to quit.  Hopefully she will quit again.  This would certainly help decrease her risk of bad outcomes both related to surgeries as well as heart and lungs.    The complexity is a number of medical problems, the potential risk  of going off of anticoagulation, coordinating her care, having a teacher what to do, speaking to multiple physicians in consultation.    PLAN:  There are no Patient Instructions on file for this visit.    Orders Placed This Encounter   Procedures     Comprehensive Metabolic Panel     HM2(CBC w/o Differential)     INR     Electrocardiogram Perform and Read     No Follow-up on file.    ASSESSED PROBLEMS:  Problem List Items Addressed This Visit     Essential hypertension    Relevant Orders    Comprehensive Metabolic Panel    HM2(CBC w/o Differential) (Completed)    Electrocardiogram Perform and Read (Completed)    COPD (chronic obstructive pulmonary disease)    Atrial fibrillation and flutter 3/17    Relevant Orders    Comprehensive Metabolic Panel    HM2(CBC w/o Differential) (Completed)    INR (Completed)    Electrocardiogram Perform and Read (Completed)      Other Visit Diagnoses     Preop exam for internal medicine    -  Primary          CHIEF COMPLAINT:  Chief Complaint   Patient presents with     Pre-op Exam     Bilateral eye lid surgery upper and lower - [facility] - Dr. Valencia- 10/17/2017       HISTORY OF PRESENT ILLNESS:  Christina Umana is a 77 y.o. female here for an internal medicine pre-operative consultation. The exam is requested by Figueroa Amato in preparation for Bilateral upper and Lower Eyelids and  to be performed at [facility]- [phone] on 10/17/2017. Symptoms are significant obstruction of vision related to upper lid lag as well as some lower lid lag.  It is affecting enough that it is affecting her vision..     Christina Umana has tolerated previous surgeries well without bleeding or anesthesia difficulty, however she is on Coumadin for atrial fibrillation.  Please see discussion above for anticoagulation discussion and prevention of stroke related to A. fib.     Past/Current Medical Problems:  Previous bleeding disorders: Negative    History of anesthesia  reactions: Negative    Past Medical History:   Diagnosis Date     Arthritis      Cancer 2005     Clotting disorder 4/2017     Degenerative disc disease, lumbar      Emphysema lung      Hyperlipidemia      Hypertension      Kidney stone 4/2017     Scoliosis      Ventral hernia          REVIEW OF SYSTEMS:   Constitutional: Negative  Eyes: difficulty seeing due to eye lids  ENT: Negative  Respiratory: Negative  Breast/Skin: Negative  Cardiovascular:A-Fib   GI: Negative  Urinary: Negative  Reproductive: Negative  Musculoskeletal: Negative  Neuro: Negative  Endocrine: Negative  Mental Health: Negative   Lymph: Negative  Heme: Negative     Remaining Review of Systems negative.    QUESTIONS/HISTORY PERTINENT TO PRE-OP:  Body Piercings: ears, patient will remove    Family history of bleeding disorders: Negative    Family history of anesthesia reactions: Negative      Surgeries:  Past Surgical History:   Procedure Laterality Date     BREAST BIOPSY Right 2012     BREAST LUMPECTOMY Right 04/03/2009     BUNIONECTOMY Bilateral 1988     COLECTOMY N/A 03/31/2003     COLON SURGERY       CORRECTION HAMMER TOE Bilateral 1988     CYST REMOVAL Left 02/01/2007     HYSTERECTOMY  2004     OOPHORECTOMY  2004     STOMACH SURGERY      hyernia repair     VENTRAL HERNIA REPAIR  02/25/2005       Family History:   Family History   Problem Relation Age of Onset     Brain cancer Son 29     Dementia Mother      Stroke Mother      Prostate cancer Father      Aortic aneurysm Father      Abdominal     Hypertension Sister      No Medical Problems Daughter      No Medical Problems Maternal Grandmother      No Medical Problems Maternal Grandfather      No Medical Problems Paternal Grandmother      No Medical Problems Paternal Grandfather      Breast cancer Maternal Aunt 70     No Medical Problems Paternal Aunt      Breast cancer Maternal Aunt      Other Brother      Polio     Other Brother      polio     BRCA 1/2 Neg Hx      Colon cancer Neg Hx       "Endometrial cancer Neg Hx      Ovarian cancer Neg Hx        Social History:  Social History     Social History     Marital status:      Spouse name: N/A     Number of children: N/A     Years of education: N/A     Occupational History     Not on file.     Social History Main Topics     Smoking status: Current Some Day Smoker     Smokeless tobacco: Never Used     Alcohol use No     Drug use: No     Sexual activity: No     Other Topics Concern     Not on file     Social History Narrative       VITALS:  Vitals:    10/10/17 0837   BP: 124/62   Pulse: 66   Weight: 172 lb 1.6 oz (78.1 kg)   Height: 5' 5\" (1.651 m)     Wt Readings from Last 3 Encounters:   10/10/17 172 lb 1.6 oz (78.1 kg)   06/05/17 174 lb 6.4 oz (79.1 kg)   04/04/17 185 lb 12.8 oz (84.3 kg)     Body mass index is 28.64 kg/(m^2).    PHYSICAL EXAM:  General Appearance: Alert, cooperative, no distress, appears stated age.  HEENT: EMOI,  Neck: Supple without adenopathy   Back: No CVA tenderness   Lungs: Smoker's cough, dramatic decreased air movement, no wheezing  Heart: Irregular rhythm, S1 and S2 normal, no murmur or bruit.  Rate is well controlled  Abdomen: Soft, non-tender, no HSM or masses.  Musculoskeletal: No gross abnormalities.  Extremities: No CCE,  Skin: No worrisome lesions noted..  Neurologic: CNII-XII intact, strength V/V and symmetric, DTRs II/IV and symmetric, sensory grossly intact  Psychiatric: Normal mood and affect.   ECG read by me- atrial fibrillation flutter rate well controlled    MEDICATIONS:  Current Outpatient Prescriptions   Medication Sig Dispense Refill     amLODIPine (NORVASC) 10 MG tablet TAKE ONE TABLET BY MOUTH ONCE DAILY 90 tablet 2     atorvastatin (LIPITOR) 40 MG tablet Take 0.5 tablets (20 mg total) by mouth daily. 45 tablet 3     beclomethasone (QVAR) 80 mcg/actuation inhaler Inhale 2 puffs 2 (two) times a day. 2 Inhaler 12     hydroCHLOROthiazide (HYDRODIURIL) 25 MG tablet TAKE 1 TABLET BY MOUTH DAILY 90 tablet 3 "     magnesium oxide (MAG-OX) 400 mg tablet Take 1 tablet (400 mg total) by mouth see administration instructions. TAKE 1 TABLET(400 MG)  IN THE MORNING AND 2  AT NIGHT 180 tablet 0     magnesium oxide (MAG-OX) 400 mg tablet TAKE 1 TABLET(400 MG) BY MOUTH TWICE DAILY 60 tablet 9     mometasone (ELOCON) 0.1 % cream APPLY SPARINGLY TO AFFECTED AREA(S) ONCE DAILY 45 g 1     omeprazole (PRILOSEC) 20 MG capsule TAKE 1 CAPSULE(20 MG) BY MOUTH DAILY 90 capsule 2     PARoxetine (PAXIL) 20 MG tablet TAKE 1 TABLET(20 MG) BY MOUTH EVERY MORNING 30 tablet 12     potassium chloride SA (K-DUR,KLOR-CON) 20 MEQ tablet Take 1 tablet (20 mEq total) by mouth 2 (two) times a day. Table in the morning and tablet at night 180 tablet 3     warfarin (COUMADIN) 5 MG tablet Take 1 tablet (5 mg) by mouth daily as directed. Adjust dose per INR results. 30 tablet 5     No current facility-administered medications for this visit.        ALLERGIES:  No Known Allergies

## 2021-06-13 NOTE — PROGRESS NOTES
Patient came back in 2 discuss and be instructed how to take her Lovenox.  She is not used to before.  I discussed the reason with her once again as well as with cardiology previously.  She is needing to be off of Coumadin for an extended period of time because the surgery it can cause some significant bruising and bleeding around the eyes and it can be detrimental for the procedure itself.  For that reason she has been off Coumadin since Tuesday I believe.  Her INR is 1.5 today.  Her INR nurses teaching her how to use the Lovenox and will give dose now and then tomorrow morning and evening Sunday morning and evening and that is it.  The ideas that this way we decrease her length of time being off anticoagulation and therefore reduce her risk of stroke especially since she is a smoker.    Assessment and plan    A. fib needing some bridging because of the length of time off of Coumadin recommended for this procedure.  She will use it just for the next 2 days and restart her Coumadin the day after the procedure.  He will thus be another couple of days before her INR is therapeutic.  She is not going to require a bridging afterwards because of the low risk of stroke, and higher risk of bruising and bleeding after the procedure and using bridging.

## 2021-06-15 ENCOUNTER — RECORDS - HEALTHEAST (OUTPATIENT)
Dept: FAMILY MEDICINE | Facility: CLINIC | Age: 81
End: 2021-06-15

## 2021-06-15 ENCOUNTER — RECORDS - HEALTHEAST (OUTPATIENT)
Dept: ADMINISTRATIVE | Facility: OTHER | Age: 81
End: 2021-06-15

## 2021-06-15 DIAGNOSIS — R29.890 HEIGHT LOSS: ICD-10-CM

## 2021-06-15 DIAGNOSIS — Z78.0 POST-MENOPAUSAL: ICD-10-CM

## 2021-06-15 PROBLEM — I48.20 CHRONIC ATRIAL FIBRILLATION (H): Status: ACTIVE | Noted: 2017-03-07

## 2021-06-16 PROBLEM — D64.9 ANEMIA DUE TO UNKNOWN MECHANISM: Status: ACTIVE | Noted: 2020-02-05

## 2021-06-16 PROBLEM — Z51.81 MEDICATION MONITORING ENCOUNTER: Status: ACTIVE | Noted: 2018-09-04

## 2021-06-16 PROBLEM — J44.9 CHRONIC OBSTRUCTIVE PULMONARY DISEASE, UNSPECIFIED COPD TYPE (H): Status: ACTIVE | Noted: 2018-09-04

## 2021-06-16 PROBLEM — I50.20 HEART FAILURE WITH REDUCED EJECTION FRACTION (H): Status: ACTIVE | Noted: 2018-04-30

## 2021-06-16 PROBLEM — Z72.0 TOBACCO USE: Status: ACTIVE | Noted: 2019-09-09

## 2021-06-16 PROBLEM — D12.3 BENIGN NEOPLASM OF TRANSVERSE COLON: Status: ACTIVE | Noted: 2017-08-17

## 2021-06-16 PROBLEM — H34.8320 BRANCH RETINAL VEIN OCCLUSION OF LEFT EYE WITH MACULAR EDEMA (H): Status: ACTIVE | Noted: 2018-03-04

## 2021-06-16 PROBLEM — Z86.0100 HISTORY OF COLONIC POLYPS: Status: ACTIVE | Noted: 2018-08-03

## 2021-06-16 PROBLEM — D69.6 THROMBOCYTOPENIA (H): Status: ACTIVE | Noted: 2020-02-04

## 2021-06-16 NOTE — PROGRESS NOTES
3:40pm- Saw that the dr had put in the discharge summaries and they discussed the patient's need for oxygen. Sent  to hospital with oxygen equipment.

## 2021-06-16 NOTE — PROGRESS NOTES
ASSESSMENT/PLAN:  1. Essential hypertension  Minimally improved.  Will increase the losartan to 100 mg to help her pump function by lowering her blood pressure.  Check a BMP today make certain that she can tolerate it and there is no renal insufficiency.  - Basic Metabolic Panel    2. Cardiomyopathy of undetermined type  EF had dropped from 56 to 45%.  This over the past year.  Global hypokinesis.  No other good explanation.  Will increase the losartan as above    Overall the patient is feeling dramatically better and will continue to abstain from smoking and taking her medications correctly.  Follow-up in 1 month.  Follow a reasonable low-sodium diet.    Recurrent UTIs.  If she has recurrence after this treatment with antibiotics she is can need to see urology to sort out if she has some sort of cause in her bladder.  Bladder diverticulum as an example.  She will let us know.    Patient Instructions   Increase losartan to 100 mg daily. (take two 50 mg tablets daily until you get the prescription for the 100 mg tablets)     If the urinary tract infections continue, you may need to see urology.     Continue to work on a low sodium diet.       Return in about 1 month (around 4/26/2018) for heart failure and BP.    CHIEF COMPLAINT:  Chief Complaint   Patient presents with     Hypertension       HISTORY OF PRESENT ILLNESS:  Christina Umana is a 77 y.o. female presenting to the clinic today to follow up on her hypertension and COPD.     Hypertension: She was started on 50 mg of losartan daily when she was seen in the clinic two weeks ago. She does not feel any different since starting the medication, and her blood pressure has not changed.     COPD: She was hospitalized for a COPD exacerbation in February. She has been able to stay away from cigarettes since then and thinks she has felt a little more alert.     UTI: She had another UTI about a week ago and inquires why she keeps getting them. She is currently taking  nitrofurantoin.     Cardiomyopathy: Her last echo done in February showed a LVEF of 45%, which was slightly down from the year prior.     Atrial Fibrillation: She thinks she has been in a-fib for the past year or so.    REVIEW OF SYSTEMS:   Comprehensive review of systems negative except as noted above.    PFSH:  She has stayed away from cigarettes.     TOBACCO USE:  History   Smoking Status     Former Smoker     Packs/day: 0.50     Years: 60.00     Quit date: 2/16/2018   Smokeless Tobacco     Never Used       VITALS:  Vitals:    03/26/18 1017   BP: 134/68   Pulse: 63     Wt Readings from Last 3 Encounters:   03/16/18 153 lb (69.4 kg)   03/05/18 150 lb (68 kg)   02/22/18 166 lb 6.4 oz (75.5 kg)     There is no height or weight on file to calculate BMI.    PHYSICAL EXAM:  General Appearance: Alert, cooperative, no distress, appears stated age.  Lungs: Clear to auscultation bilaterally, good air movement.  Heart: Regular rate and rhythm, S1 and S2 normal, no murmur or bruit.  Psychiatric: she has a normal mood and affect.     Notes Reviewed, additional history from source other than patient (2 TOTAL): Reviewed 2/18/2018 Dr. Galo note regarding cardiomyopathy.     Accessed Care Everywhere, Requested Records, Consult with Physician (1 TOTAL): None.     Radiology tests summarized or ordered (XR, CT, MRI, DXA, US): None.    Labs reviewed or ordered (1 TOTAL): Labs from 3/9/2018 reviewed. Labs ordered.     Medicine tests reviewed or ordered (ECG, echocardiogram, colonoscopy, EGD, venous US) (1 TOTAL): Reviewed 2/16/2018 echo LVEF 45%, which is down from 56% the year before.     Independent review of ECG or XR (2 EACH): None.    MEDICATIONS:  Current Outpatient Prescriptions   Medication Sig Dispense Refill     atorvastatin (LIPITOR) 40 MG tablet Take 40 mg by mouth at bedtime.       beclomethasone (QVAR) 80 mcg/actuation inhaler Inhale 2 puffs 2 (two) times a day. 2 Inhaler 12     diltiazem (CARDIZEM CD) 300 MG 24 hr  capsule Take 1 capsule (300 mg total) by mouth daily. 90 capsule 3     furosemide (LASIX) 40 MG tablet Take 1 tablet (40 mg total) by mouth daily. 90 tablet 3     losartan (COZAAR) 50 MG tablet Take 1 tablet (50 mg total) by mouth daily. 30 tablet 0     magnesium oxide (MAG-OX) 400 mg tablet Take 1 tablet (400 mg total) by mouth every morning. And 800 mg at bedtime 180 tablet 3     nitrofurantoin, macrocrystal-monohydrate, (MACROBID) 100 MG capsule Take 1 capsule (100 mg total) by mouth 2 (two) times a day for 10 days. 20 capsule 0     omeprazole (PRILOSEC) 20 MG capsule Take 20 mg by mouth daily before breakfast.       PARoxetine (PAXIL) 20 MG tablet Take 20 mg by mouth at bedtime.       potassium chloride SA (K-DUR,KLOR-CON) 20 MEQ tablet Take 1 tablet (20 mEq total) by mouth 2 (two) times a day. Table in the morning and tablet at night 180 tablet 3     prednisoLONE acetate (PRED-FORTE) 1 % ophthalmic suspension Administer 1 drop to both eyes 3 (three) times a day.       warfarin (COUMADIN) 5 MG tablet Take 2.5-5 mg by mouth See Admin Instructions. 1/2 tab (2.5mg) on Mon and Fri, 5mg all other days       ipratropium-albuterol (DUO-NEB) 0.5-2.5 mg/3 mL nebulizer Take 3 mL by nebulization 4 (four) times a day for 15 days. 180 mL 0     No current facility-administered medications for this visit.        The visit lasted a total of 8 minutes face to face with the patient. Over 50% of the time was spent counseling and educating the patient about her hypertension and COPD.    I, Jamison Johnson, am scribing for and in the presence of, Dr. Elizabeth.    I, Dr. Elizabeth, personally performed the services described in this documentation, as scribed by Jamison Johnson in my presence, and it is both accurate and complete.    Dragon dictation was used for this note.  Speech recognition errors are a possibility.      Total data points: 4

## 2021-06-16 NOTE — PROGRESS NOTES
[facility] RN called the patient for initial assessment for enrollment into Clinic Care Coordination. Home care was at the patient's home so she was unable to complete the assessment today. Patient's  was given contact information for the Care Guide at [facility] to reschedule.     Delegation:  Due by 3/13/18  Call the patient to offer reschedule of CCC RN assessment if she is still wanting to enroll.

## 2021-06-16 NOTE — PROGRESS NOTES
ASSESSMENT/PLAN:  1. Essential hypertension  This is poorly controlled.  With the recent episode and some question about failure, it raises the question of whether or not she had ventricular stiffness that was exacerbating this issue.  Therefore better breath blood pressure control is important.  Think ARB would be reasonable as I do not want to cause her more potential tickle in the throat or cough with her COPD related issues.  Follow-up in 2 weeks.  Continue with the diltiazem and furosemide as is.  Will check renal function today as well since she is on the furosemide and potassium.  - Basic Metabolic Panel  - HM2(CBC w/o Differential)    2. Chronic atrial fibrillation  On Coumadin and rate is well controlled on the diltiazem.    3. COPD exacerbation  The question is what the exacerbating factor was when she was hospitalized.  She is been told that it was heart failure.  In the discharge terminates in COPD exacerbation.  I think it is likely that it was both.  Her pump function is fine but she likely has a stiff heart related to age and her medical problems including her COPD.  She has been scared now because of this problem, and states that she has quit smoking for good.  I believe I have heard this from before.  Her lungs actually were quite clear today.  Cough still sounds a little harsh but less so than the last time I saw her.  Continue with current pulmonary treatments and follow-up in 2 weeks.      Patient Instructions   We will check some lab work today to see how you are doing.     Start taking losartan 50 mg once daily in addition to your current medications.     Return in two weeks to follow up on blood pressure.         Return in about 2 weeks (around 3/23/2018) for hypertension.    CHIEF COMPLAINT:  Chief Complaint   Patient presents with     Hospital Visit Follow Up       HISTORY OF PRESENT ILLNESS:  Christina Umana is a 77 y.o. female presenting to the clinic today for a hospital follow up. She was  in [facility] from 2/16 to 2/19 for a COPD exacerbation. She notes that she had been very active the week prior to going to the hospital and she was getting progressively more winded until she had to go to the hospital. She had not been coughing much. She is feeling better now and has quit smoking. She occasionally still uses supplemental oxygen at night. Her breathing and activity levels have improved since she got home and she attributes some of this benefit to the therapy she has received. She does not plan to ever smoke again.     CHF: She was put on a low sodium diet in the hospital and was started on furosemide. She had a BNP of 846 on 2/16/2018. Her echo done last month showed a LVEF of 45%, which was minimally changed. She initially had to urinate a lot when she was started on the furosemide, but she is not going quite as frequently now. Her left leg is always a little more swollen than her right, but they are both improved.     UTI: She had a urinary tract infection in the hospital. Her culture grew out E. coli that was resistant to Levaquin. She was started on Macrobid.     Hypertension: A nurse has been checking her blood pressure at home, and her values have been a little elevated. Her blood pressure is in a good range today.     Chronic Atrial Fibrillation: She has chronic atrial fibrillation and has been on warfarin for this.     REVIEW OF SYSTEMS:   She states she has lost over 15 pounds. Comprehensive review of systems negative except as noted above.    PFSH:  She quit smoking and plans to stay away from cigarettes for good.     TOBACCO USE:  History   Smoking Status     Former Smoker     Packs/day: 0.50     Years: 60.00     Quit date: 2/16/2018   Smokeless Tobacco     Never Used       VITALS:  Vitals:    03/09/18 1410   BP: 132/72   Pulse: 72     Wt Readings from Last 3 Encounters:   03/05/18 150 lb (68 kg)   02/22/18 166 lb 6.4 oz (75.5 kg)   02/19/18 167 lb 12.8 oz (76.1 kg)     There is  no height or weight on file to calculate BMI.    PHYSICAL EXAM:  General Appearance: Alert, cooperative, no distress, appears stated age.  Neck: No JVD  Lungs: Clear to auscultation bilaterally, good air movement. No wheezing, crackles, or rales.   Heart: Irregular rhythm - Afib, S1 and S2 normal, no murmur or bruit.  Extremities: 2-3+ edema up to the mid shin on the left, 1+ edema on the right.   Psychiatric: she has a normal mood and affect.     Notes Reviewed, additional history from source other than patient (2 TOTAL): Reviewed 2/22/2018 Dr. Gunderson note regarding COPD exacerbation and CHF. Reviewed 2/19/2018 discharge summary regarding COPD exacerbation, UTI (e coli resistant to Levaquin).     Accessed Care Everywhere, Requested Records, Consult with Physician (1 TOTAL): None.     Radiology tests summarized or ordered (XR, CT, MRI, DXA, US): None.    Labs reviewed or ordered (1 TOTAL): Labs from 2/16-2/19 hospitalization reviewed. Labs ordered.     Medicine tests reviewed or ordered (ECG, echocardiogram, colonoscopy, EGD, venous US) (1 TOTAL): Reviewed 2/16/2018 echo - LVEF 45% minimally changed from previous.     Independent review of ECG or XR (2 EACH): None.    MEDICATIONS:  Current Outpatient Prescriptions   Medication Sig Dispense Refill     atorvastatin (LIPITOR) 40 MG tablet Take 40 mg by mouth at bedtime.       beclomethasone (QVAR) 80 mcg/actuation inhaler Inhale 2 puffs 2 (two) times a day. 2 Inhaler 12     diltiazem (CARDIZEM CD) 300 MG 24 hr capsule Take 1 capsule (300 mg total) by mouth daily. 90 capsule 3     furosemide (LASIX) 40 MG tablet Take 1 tablet (40 mg total) by mouth daily. 30 tablet 0     ipratropium-albuterol (DUO-NEB) 0.5-2.5 mg/3 mL nebulizer Take 3 mL by nebulization 4 (four) times a day for 15 days. 180 mL 0     magnesium oxide (MAG-OX) 400 mg tablet Take 1 tablet (400 mg total) by mouth every morning. And 800 mg at bedtime 180 tablet 3     nebulizer and compressor ([employer]  DELIVERY SYSTEM) [employer] nebulizer machine and supplies (tubing, mask and filter) per insurance guidelines 1 each 0     omeprazole (PRILOSEC) 20 MG capsule Take 20 mg by mouth daily before breakfast.       PARoxetine (PAXIL) 20 MG tablet Take 20 mg by mouth at bedtime.       potassium chloride SA (K-DUR,KLOR-CON) 20 MEQ tablet Take 1 tablet (20 mEq total) by mouth 2 (two) times a day. Table in the morning and tablet at night 180 tablet 3     prednisoLONE acetate (PRED-FORTE) 1 % ophthalmic suspension Administer 1 drop to both eyes 3 (three) times a day.       warfarin (COUMADIN) 5 MG tablet Take 2.5-5 mg by mouth See Admin Instructions. 1/2 tab (2.5mg) on Mon and Fri, 5mg all other days       losartan (COZAAR) 50 MG tablet Take 1 tablet (50 mg total) by mouth daily. 30 tablet 0     No current facility-administered medications for this visit.        The visit lasted a total of 13 minutes face to face with the patient. Over 50% of the time was spent counseling and educating the patient about her recent hospitalization.    IJamison, am scribing for and in the presence of, Dr. Elizabeth.    I, Dr. Elizabeth, personally performed the services described in this documentation, as scribed by Jamison Johnson in my presence, and it is both accurate and complete.    Dragon dictation was used for this note.  Speech recognition errors are a possibility.      Total data points: 4

## 2021-06-16 NOTE — PROGRESS NOTES
Received intake call for home oxygen at 2:23pm Reviewed patient's chart; Patient qualifies under Medicare guidelines and all documentation is in the chart except signed face to face notes from the physician.   2:40pm Spoke with respiratory therapist, Yaquelin, confirmed we received the order, but still needed face to face notes discussing patient's need for home oxygen. I explained we cannot deliver oxygen until we have those added, and she was going to reach out to the doctor to get those added.   2:44pm-Called to offer choice and patient is okay with [facility] setting her up. Discussed equipment with patient and she wants the POC unit informed them that we would bring equipment to bedside and complete education, but are waiting on all the documentation to be added to the chart first. She understood.

## 2021-06-16 NOTE — PROGRESS NOTES
Assessment/Plan:        1. Acute on chronic congestive heart failure, unspecified congestive heart failure type  Basic Metabolic Panel   2. Acute respiratory failure with hypoxia     3. COPD exacerbation     4. UTI (urinary tract infection)     5. Chronic atrial fibrillation     6. Hospital discharge follow-up  Basic Metabolic Panel     Med recon done.  1. COPD exacerbation with hypoxia and long standing history of smoking, now on 2l O2, Duoneb, Qvar, prednisone weaning protocol, doxycycline.   2. She did quit smoking.  3. CHF exacerbation, ECHO done 2/16/18 - on Furosemide and potassium now. Dr Norwood saw her in the hospital.  1. The left ventricle is normal in size. Left ventricular systolic performance is mildly reduced. The ejection fraction is estimated to be 45%.   2. There is mild global reduction in left ventricular systolic performance.   3. There is mild concentric increase in left ventricular wall thickness.  4. There is mild, to perhaps mild-moderate, mitral insufficiency.   5. Normal right ventricular size and systolic performance.   6. There is mild to moderate biatrial enlargement.  4. A-fib, on diltiazem, and coumadin. BP elevated per patient over the last few months. I increased diltiazem dose today from 240 to 300 mg. INR recheck on Monday.  5. UTI, will complete Macrobid    This note has been dictated using voice recognition software. Any grammatical or context distortions are unintentional and inherent to the software.      Return in about 2 weeks (around 3/8/2018) for Recheck.    Patient Instructions   Follow up with PCP in 2 weeks.    We are changing diltiazem 240 mg to 300 mg pill.          Subjective:    Christina Umana is a 77 y.o. female  here for    Chief Complaint   Patient presents with     Hospital Visit Follow Up     Hospital stay 2/16-2/19/18:  77-year-old woman who was admitted with complaints of shortness of breath.  Has known history of paroxysmal atrial fibrillation as well as  COPD with active ongoing tobacco abuse.  She was found to have evidence of hypoxia as well as A. fib with RVR on presentation.  Additionally she was found to have evidence of a urinary tract infection which later on turned out to be quinolone resistant E. coli.  During the hospitalization she was evaluated by cardiology, medications adjustments were made.  Amlodipine was discontinued and diltiazem initiated and titrated upwards as appropriate.  She was found to be supratherapeutic with her INR at discharge and hence appropriate warfarin doses have been requested to be held.  Additionally it was determined that she requires home oxygen for ambulation.  However at rest she is saturating fairly well.  Extensive discussion took place between the patient and the care team regarding tobacco cessation and she seems to be agreeable to the same and hence an appropriate strength nicotine patch has been prescribed for the patient.  Additionally she has been initiated on furosemide but seems to help with her overall condition.    Hydrochlorothiazide has been discontinued because Lasix has been initiated  Amlodipine has been discontinued and diltiazem has been initiated  Nitrofurantoin for Levaquin resistant UTI  Doxycycline and prednisone for COPD exacerbation  Duo nebs for COPD  Nicotine patch for tobacco dependence    Pt reports that [facility] successfully delivered the equipment to her home. Pt was started on a nicoderm patch and has been able to abstain from tobacco use since discharge. SHe is doing Duoneb qid. She is feeling better, less SOB, legs significantly less swollen then before. She denies palpitation, abdominal pain, diarrhea, blood in the stool.  INR was checked yesterday - 1.8 and will be rechecked again on Monday.    Social History     Social History     Marital status:      Spouse name: N/A     Number of children: N/A     Years of education: N/A     Occupational History     Not on file.      Social History Main Topics     Smoking status: Current Every Day Smoker     Packs/day: 0.50     Years: 60.00     Smokeless tobacco: Never Used     Alcohol use No     Drug use: No     Sexual activity: No     Other Topics Concern     Not on file     Social History Narrative       Family History   Problem Relation Age of Onset     Brain cancer Son 29     Dementia Mother      Stroke Mother      Prostate cancer Father      Aortic aneurysm Father      Abdominal     Hypertension Sister      No Medical Problems Daughter      No Medical Problems Maternal Grandmother      No Medical Problems Maternal Grandfather      No Medical Problems Paternal Grandmother      No Medical Problems Paternal Grandfather      Breast cancer Maternal Aunt 70     No Medical Problems Paternal Aunt      Breast cancer Maternal Aunt      Other Brother      Polio     Other Brother      polio     BRCA 1/2 Neg Hx      Colon cancer Neg Hx      Endometrial cancer Neg Hx      Ovarian cancer Neg Hx      Review of Systems:     A 12 point comprehensive review of systems was negative except as noted in HPI.            Objective:    Physical Exam   /76  Pulse 64  Wt 166 lb 6.4 oz (75.5 kg)  BMI 26.06 kg/m2    Constitutional: oriented to person, place, and time, appears well-nourished. No distress.   HENT:   Head: Normocephalic.   Mouth/Throat: Oropharynx is clear and moist.   Eyes: Conjunctivae are normal.   Neck: Normal range of motion. Neck supple.   Cardiovascular: Irregular rhythm and normal heart sounds.    Pulmonary/Chest: Effort normal and decreased air entry bilat. No wheezing.  Abdominal: Soft.   Musculoskeletal: Bilat LE edema, L>R. Left leg 2+ lymphedema, right leg 1+.  Neurological: alert and oriented to person, place, and time.  Psychiatric:  normal mood and affect.    Patient Active Problem List   Diagnosis     Adenocarcinoma Of The Large Intestine- tubular adenomas (03/2012)     Esophageal Reflux     Osteopenia     Asymmetrical  Polyarticular Inflammation     Hyperlipemia     Essential hypertension     COPD exacerbation     Hyperglycemia     Depression With Anxiety     Chronic atrial fibrillation     UTI (urinary tract infection)       Current Outpatient Prescriptions on File Prior to Visit   Medication Sig Dispense Refill     ascorbic acid, vitamin C, (ASCORBIC ACID WITH JAKE HIPS) 500 MG tablet Take 500 mg by mouth daily.       atorvastatin (LIPITOR) 40 MG tablet Take 40 mg by mouth at bedtime.       beclomethasone (QVAR) 80 mcg/actuation inhaler Inhale 2 puffs 2 (two) times a day. 2 Inhaler 12     calcium, as carbonate, (OS-GILL) 500 mg calcium (1,250 mg) tablet Take 1 tablet by mouth 2 (two) times a day.       doxycycline (VIBRA-TABS) 100 MG tablet Take 1 tablet (100 mg total) by mouth 2 (two) times a day for 7 days. 14 tablet 0     furosemide (LASIX) 40 MG tablet Take 1 tablet (40 mg total) by mouth daily. 30 tablet 0     HELIUM-OXYGEN INHL 2 L into each nostril continuous. Indications: SOB/COPD/CHF       ipratropium-albuterol (DUO-NEB) 0.5-2.5 mg/3 mL nebulizer Take 3 mL by nebulization 4 (four) times a day for 15 days. 180 mL 0     magnesium oxide (MAG-OX) 400 mg tablet Take 400 mg by mouth every morning. And 800 mg at bedtime       nebulizer and compressor ([employer] AEROSOL DELIVERY SYSTEM) [employer] nebulizer machine and supplies (tubing, mask and filter) per insurance guidelines 1 each 0     nicotine (NICODERM CQ) 14 mg/24 hr Place 1 patch on the skin daily. 28 patch 0     nitrofurantoin, macrocrystal-monohydrate, (MACROBID) 100 MG capsule Take 1 capsule (100 mg total) by mouth 2 (two) times a day for 5 days. 10 capsule 0     omeprazole (PRILOSEC) 20 MG capsule Take 20 mg by mouth daily before breakfast.       PARoxetine (PAXIL) 20 MG tablet Take 20 mg by mouth at bedtime.       potassium chloride SA (K-DUR,KLOR-CON) 20 MEQ tablet Take 1 tablet (20 mEq total) by mouth 2 (two) times a day. Table in the morning and tablet at night 180  tablet 3     prednisoLONE acetate (PRED-FORTE) 1 % ophthalmic suspension Administer 1 drop to both eyes 3 (three) times a day.       predniSONE (DELTASONE) 10 mg tablet Take 4 tabs daily for 5 days, then 2 tabs daily for 5 days, then stop. 30 tablet 0     warfarin (COUMADIN) 5 MG tablet Take 2.5-5 mg by mouth See Admin Instructions. 1/2 tab (2.5mg) on Mon and Fri, 5mg all other days       [DISCONTINUED] diltiazem (DILACOR XR) 240 MG 24 hr capsule Take 1 capsule (240 mg total) by mouth daily. 30 capsule 0     [DISCONTINUED] magnesium oxide (MAG-OX) 400 mg tablet Take 800 mg by mouth at bedtime.       No current facility-administered medications on file prior to visit.                Znia Gunderson  2/22/2018

## 2021-06-17 NOTE — PROGRESS NOTES
ASSESSMENT/PLAN:  1. Cardiomyopathy of undetermined type  She has had some failure symptoms is better than when she was in the hospital.  Still little confusing whether this was heart lung or combo.  Her blood pressure should be lower abdomen add spironolactone to her current management and follow-up.  Will get a BMP now and follow-up shortly.  She should follow-up with the heart failure clinic as well.  This would be in addition to the Lasix.  - Basic Metabolic Panel  - INR    2. Chronic atrial fibrillation  This is no change.  She is on Coumadin.    3. COPD exacerbation  She is not smoking.  Nathalia has significant COPD.  This process came on over a few days rather than abruptly.  She feels better with the inhalers as well.  This does not necessarily mean that it is pulmonary rather than GI is obviously there is cardiac asthma as well.  Very difficult situation to sort out but treat both are being treated right now.    4. Essential hypertension  Certainly hypertension can exacerbate diastolic dysfunction and failure symptoms.  O2 sats are fine now.  We will go ahead and add spironolactone and follow good low-sodium diet.  - Basic Metabolic Panel    5. Persistent atrial fibrillation  She is on Coumadin needs to be checked again today.  - INR     The complexity is in the severity of her symptoms, the need to prevent recurrent hospitalizations and help her through this, coordination of care with cardiology and encouraging her to refrain from starting smoking again.  From a pulmonary standpoint she needs to find out what medicine will replace her Qvar which is no longer covered by insurance.  She will need close follow-up and supervision relating to preventing her recurrent hospitalization.      Patient Instructions   Let Dr. Elizabeth know if you start to develop shortness of breath like this again. He can try to change something before it gets bad enough that you have to go the hospital again.     Continue to avoid  salt and salty foods.     Check with your insurance to see what inhaler they will cover instead of the Qvar    Start spironolactone one tablet daily in the morning.     Let us know if the medication is expensive and we can go through [facility] instead.     Return next week for your blood pressure.       Return in about 1 week (around 4/30/2018) for hypertension.    CHIEF COMPLAINT:  Chief Complaint   Patient presents with     Follow Up     inpatient heart failure 4/15-4/19       HISTORY OF PRESENT ILLNESS:  Christina Umana is a 77 y.o. female presenting to the clinic today for a hospital follow up.  She was in [facility] from 4/15 to 4/19 for increased shortness of breath. The question was whether her shortness of breath was due to her heart failure or COPD or both.     CHF: She had an echo done that showed a LVEF of 35%. She discontinued diltiazem in the hospital and was started on metoprolol instead. She is taking Lasix 40 mg twice daily, and it does make her urinate more than usual. She has not had too much lower extremity swelling. She weighs herself regularly, and her weight has been down from where it was before she went into the hospital. She has appointments to be seen at the heart failure clinic and then by Dr. Beckett in cardiology.     COPD: She felt like she was wheezing when she went into the hospital. She was started on doxycycline in the hospital. Nebulizer treatments seem to be helpful, but her insurance is no longer going to cover the Qvar inhaler.     Hypertension: Her blood pressure is somewhat elevated in the clinic today.     Atrial Fibrillation: She anticoagulates on warfarin and needs an INR checked today.     UTI: She started taking an antibiotic on Thursday night for a UTI. She is scheduled to see urology next month to discuss her recurrent UTI's.     REVIEW OF SYSTEMS:   She denies chest pain, pressure, or tightness in the chest. Comprehensive review of systems negative except as noted  above.    PFSH:  Reviewed, as below.     TOBACCO USE:  History   Smoking Status     Former Smoker     Packs/day: 0.50     Years: 60.00     Quit date: 2/16/2018   Smokeless Tobacco     Never Used       VITALS:  Vitals:    04/23/18 1021   BP: 154/90   Patient Site: Right Arm   Patient Position: Sitting   Cuff Size: Adult Regular   Pulse: 84   SpO2: 95%   Weight: 161 lb 14.4 oz (73.4 kg)     Wt Readings from Last 3 Encounters:   04/23/18 161 lb 14.4 oz (73.4 kg)   04/19/18 167 lb 11.2 oz (76.1 kg)   03/16/18 153 lb (69.4 kg)     Body mass index is 25.36 kg/(m^2).    PHYSICAL EXAM:  General Appearance: Alert, cooperative, no distress, appears stated age.  Neck: No JVD  Lungs: Clear to auscultation bilaterally, good air movement.  Heart: Irregular as usual, no gallop, S1 and S2 normal, no murmur or bruit.   Extremities: 1+ lower extremity edema.   Psychiatric: she has a normal mood and affect.     Notes Reviewed, additional history from source other than patient (2 TOTAL): Reviewed 4/19/2018 Dr. Vang cardiology note regarding diltiazem discontinuation and starting metoprolol. Reviewed hospital discharge summary regarding shortness of breath.     Accessed Care Everywhere, Requested Records, Consult with Physician (1 TOTAL): None.     Radiology tests summarized or ordered (XR, CT, MRI, DXA, US): None.    Labs reviewed or ordered (1 TOTAL): Labs from 4/15 to 4/19 hospitalization reviewed. Labs ordered.     Medicine tests reviewed or ordered (ECG, echocardiogram, colonoscopy, EGD, venous US) (1 TOTAL): Reviewed 4/17/2018 echo - LVEF 35%    Independent review of ECG or XR (2 EACH): None.    MEDICATIONS:  Current Outpatient Prescriptions   Medication Sig Dispense Refill     ascorbic acid, vitamin C, (ASCORBIC ACID WITH JAKE HIPS) 500 MG tablet Take 500 mg by mouth daily.       atorvastatin (LIPITOR) 40 MG tablet Take 20 mg by mouth at bedtime.       calcium-vitamin D (CALCIUM-VITAMIN D) 500 mg(1,250mg) -200 unit per tablet  Take 1 tablet by mouth 3 (three) times a day with meals.       doxycycline (MONODOX) 100 MG capsule Take 1 capsule (100 mg total) by mouth 2 (two) times a day for 4 days. 8 capsule 0     furosemide (LASIX) 40 MG tablet Take 1 tablet (40 mg total) by mouth 2 (two) times a day at 9am and 6pm. 60 tablet 0     ipratropium-albuterol (DUO-NEB) 0.5-2.5 mg/3 mL nebulizer Take 3 mL by nebulization 4 (four) times a day for 15 days. 200 vial 2     losartan (COZAAR) 100 MG tablet Take 1 tablet (100 mg total) by mouth daily. 90 tablet 3     magnesium oxide (MAG-OX) 400 mg tablet Take 1 tablet (400 mg total) by mouth every morning. And 800 mg at bedtime 180 tablet 3     metoprolol tartrate (LOPRESSOR) 50 MG tablet Take 1 tablet (50 mg total) by mouth 2 (two) times a day. 60 tablet 0     omeprazole (PRILOSEC) 20 MG capsule Take 20 mg by mouth daily before breakfast.       PARoxetine (PAXIL) 20 MG tablet Take 20 mg by mouth at bedtime.       potassium chloride SA (K-DUR,KLOR-CON) 20 MEQ tablet Take 20 mEq by mouth 2 (two) times a day.       prednisoLONE acetate (PRED-FORTE) 1 % ophthalmic suspension Administer 1 drop to both eyes 3 (three) times a day.       predniSONE (DELTASONE) 10 mg tablet Take 3 tabs daily for 3 days, then 2 tabs daily for 3 days, then 2 tabs daily for 3 days, then stop. 30 tablet 0     warfarin (COUMADIN) 5 MG tablet Take 2.5-5 mg by mouth See Admin Instructions. 1/2 tab (2.5mg) on Mon, Fri, and Sat and  5mg all other days       spironolactone (ALDACTONE) 25 MG tablet Take 1 tablet (25 mg total) by mouth daily. 30 tablet 0     No current facility-administered medications for this visit.        The visit lasted a total of 18 minutes face to face with the patient. Over 50% of the time was spent counseling and educating the patient about her CHF and COPD.    Jamison LEE, am scribing for and in the presence of, Dr. Elizabeth.    I, Dr. Elizabeth, personally performed the services described in this  documentation, as scribed by Jamison Johnson in my presence, and it is both accurate and complete.    Dragon dictation was used for this note.  Speech recognition errors are a possibility.      Total data points: 4

## 2021-06-17 NOTE — PROGRESS NOTES
ASSESSMENT/PLAN:  1. Essential hypertension  Much better control but with the changes we appear to affect his renal function will bit.  I think it is more complicated than that however.  So, cardiology stopped her potassium, but also decreased her Lasix dose.  She is on max dose losartan.  I plan right at this time is to go ahead and have her stay off the potassium and on the lower dose of Lasix, stay on the spironolactone and losartan, and see me next week.  Stay well-hydrated and we discussed how to make sure she does this.  Without being excessive.  She is having a hard time but will follow a good low-sodium diet.  Next week we will see what her blood pressure is, edema, symptoms, and repeat a BMP and a BNP.  Depending on those results we will decide if we need to decrease the losartan.  It appears that the spironolactone helped significantly with her blood pressure.  This is a complex related decision making process.  - Basic Metabolic Panel    2. Heart failure with reduced ejection fraction  She is doing fine at this time and is struggling to follow low-sodium diet and stick with an appropriate amount of fluid.  Meds have been changed as above.        Patient Instructions   Please note that if over the counter medications were taken off of your medication list, it is not because you are being asked to stop taking them.  does not want all of the over the counter medications on your medication list, as it bogs down the list.     Please call your insurance company and discuss Shingrix vaccine. This is a series of 2 injections that MUST be given 2-6 months apart and will cost around $287.00 here at [facility].    Monitor your hydration level based on the color of your urine. Drink a glass of water if your urine is darker yellow.     Do your best with the sodium. This is difficult, but just try to avoid the foods that have a lot of salt relative to others.     No potassium.     Lasix 40 mg once  daily.     Spironolactone 25 mg once daily.     Losartan 100 mg once daily.     Return next week to check your blood pressure and potassium levels.       Return in about 1 week (around 5/8/2018) for labs, heart.    CHIEF COMPLAINT:  Chief Complaint   Patient presents with     Hypertension       HISTORY OF PRESENT ILLNESS:  Christina Umana is a 77 y.o. female presenting to the clinic today to follow up on her hypertension.     Hypertension: Her blood pressure was slightly elevated when she was last seen in the clinic on 4/23 so she was started on spironolactone 25 mg daily. Her blood pressure is in a much better range today.      Heart Failure: She has lost a couple of pounds and has less swelling in her legs since starting spironolactone last week. She was seen by cardiology yesterday and had a BMP checked, which showed an elevated potassium and slightly worsened renal function. She was instructed to stop her potassium supplement and decrease furosemide from 40 mg twice daily to 40 mg once daily. She has had a difficult time cutting sodium out of her diet.     REVIEW OF SYSTEMS:   Her urine can be clear or more bright yellow; it fluctuates. Comprehensive review of systems negative except as noted above.    PFSH:  She drinks about four glasses of water during the day. She also drinks some coffee and soda during the day.     TOBACCO USE:  History   Smoking Status     Former Smoker     Packs/day: 0.50     Years: 60.00     Quit date: 2/16/2018   Smokeless Tobacco     Never Used       VITALS:  Vitals:    05/01/18 1131   BP: 122/62   Pulse: 63     Wt Readings from Last 3 Encounters:   04/30/18 158 lb (71.7 kg)   04/23/18 161 lb 14.4 oz (73.4 kg)   04/19/18 167 lb 11.2 oz (76.1 kg)     There is no height or weight on file to calculate BMI.    PHYSICAL EXAM:  General Appearance: Alert, cooperative, no distress, appears stated age.  Lungs: Initially wheezing but this clears after coughing.   Heart: Irregular rhythm, no  murmur.   Extremities: Trace edema in bilateral lower extremities.   Psychiatric: she has a normal mood and affect.     Notes Reviewed, additional history from source other than patient (2 TOTAL): Reviewed 4/30/2018 cardiology note regarding heart failure and medications.     Accessed Care Everywhere, Requested Records, Consult with Physician (1 TOTAL): None.     Radiology tests summarized or ordered (XR, CT, MRI, DXA, US): None.    Labs reviewed or ordered (1 TOTAL): Labs from 4/23 and 4/30 reviewed. Labs ordered.     Medicine tests reviewed or ordered (ECG, echocardiogram, colonoscopy, EGD, venous US) (1 TOTAL): None.    Independent review of ECG or XR (2 EACH): None.    MEDICATIONS:  Current Outpatient Prescriptions   Medication Sig Dispense Refill     atorvastatin (LIPITOR) 40 MG tablet Take 20 mg by mouth at bedtime.       furosemide (LASIX) 40 MG tablet Take 1 tablet (40 mg total) by mouth 2 (two) times a day at 9am and 6pm. (Patient taking differently: Take 40 mg by mouth daily. ) 60 tablet 0     ipratropium-albuterol (DUO-NEB) 0.5-2.5 mg/3 mL nebulizer Take 3 mL by nebulization 4 (four) times a day for 15 days. 200 vial 2     losartan (COZAAR) 100 MG tablet Take 1 tablet (100 mg total) by mouth daily. 90 tablet 3     magnesium oxide (MAG-OX) 400 mg tablet Take 1 tablet (400 mg total) by mouth every morning. And 800 mg at bedtime 180 tablet 3     metoprolol tartrate (LOPRESSOR) 50 MG tablet Take 1 tablet (50 mg total) by mouth 2 (two) times a day. 60 tablet 0     omeprazole (PRILOSEC) 20 MG capsule Take 20 mg by mouth daily before breakfast.       PARoxetine (PAXIL) 20 MG tablet Take 20 mg by mouth at bedtime.       prednisoLONE acetate (PRED-FORTE) 1 % ophthalmic suspension Administer 1 drop to both eyes 3 (three) times a day.       spironolactone (ALDACTONE) 25 MG tablet Take 1 tablet (25 mg total) by mouth daily. 30 tablet 0     warfarin (COUMADIN) 5 MG tablet Take 2.5-5 mg by mouth See Admin Instructions.  1/2 tab (2.5mg) on Mon, Fri, and Sat and  5mg all other days       predniSONE (DELTASONE) 10 mg tablet Take 3 tabs daily for 3 days, then 2 tabs daily for 3 days, then 2 tabs daily for 3 days, then stop. (Patient taking differently: 2 tabs daily for 3 days, then stop.) 30 tablet 0     No current facility-administered medications for this visit.        The visit lasted a total of 20 minutes face to face with the patient. Over 50% of the time was spent counseling and educating the patient about her hypertension, heart failure, and renal function.    I, Jamison Johnson, am scribing for and in the presence of, Dr. Elizabeth.    I, Dr. Elizabeth, personally performed the services described in this documentation, as scribed by Jamison Johnson in my presence, and it is both accurate and complete.    Dragon dictation was used for this note.  Speech recognition errors are a possibility.      Total data points: 3

## 2021-06-17 NOTE — PROGRESS NOTES
Patient and her  were seen in clinic for HF education s/p recent hospital discharge 4/19/18.  Reviewed HF Binder that includes the  HF Sx Awareness & Action plan  handout and  A [employer] Pump  booklet and Weight log booklet highlighting :  __X_patient s type of heart failure _X__Na management in diet  __X_importance of daily wts  _X__Fluid Guidelines, if applicable  __X_medication review and importance of compliance     Instructed patient and her  in signs and sx of heart failure, reiterated when to call clinic - reviewed HF hotline # [phone] and after hours call # [phone].  Majority of time was spent reviewing: Maintaining a low sodium diet. Patient has been having the [employer] trio cold-cut combo; consisting of 1650 mg of sodium. Encouraged patient to find a new favorite sandwich; including chicken and turkey breast.   Patient verbalized understanding of HF discussion.  Plan for f/u with continued HF education reviewed.

## 2021-06-18 NOTE — PROGRESS NOTES
[facility] Progress Note    Assessment:    1.  Moderate nonischemic cardiomyopathy currently compensated  2.  Chronic atrial fibrillation with good rate control  3.  Essential hypertension  4.  COPD    Plan:    Would continue the patient's current medical regimen.  Would give an extra Lasix dose as needed for weight gain of more than 3 pounds.  Would like to see Christina in follow-up in 3 months and consider repeating her echo in 6 months    An After Visit Summary was printed and given to the patient.    Subjective:    78-year-old female who was seen in March 2017 with atrial fibrillation.  It was decided to follow rate control strategy at that time.  She had normal ejection fraction on echocardiogram in March 2017.  Patient was admitted with dyspnea on exertion in February where her ejection fraction on echo was mildly decreased with mild mitral regurgitation observed.  She was subsequently readmitted 2 months later in mid April of this year.  Repeat echo showed moderate decrease in LV function with moderate mitral regurgitation she subsequently has been stable with her furosemide decreased from twice daily dosing to once a day dosing.  She does monitor her weight.  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       furosemide (LASIX) 40 MG tablet Take 1 tablet (40 mg  total) by mouth 2 (two) times a day at 9am and 6pm. (Patient taking differently: Take 40 mg by mouth daily. ) 60 tablet 0     ipratropium-albuterol (DUO-NEB) 0.5-2.5 mg/3 mL nebulizer U 3 ML VIA NEB QID FOR 15 DAYS  2     losartan (COZAAR) 100 MG tablet Take 1 tablet (100 mg total) by mouth daily. 90 tablet 3     magnesium oxide (MAG-OX) 400 mg tablet Take 1 tablet (400 mg total) by mouth every morning. And 800 mg at bedtime 180 tablet 3     metoprolol tartrate (LOPRESSOR) 50 MG tablet Take 1 tablet (50 mg total) by mouth 2 (two) times a day. 180 tablet 3     omeprazole (PRILOSEC) 20 MG capsule Take 20 mg by mouth daily before breakfast.       PARoxetine (PAXIL) 20 MG tablet Take 20 mg by mouth at bedtime.       prednisoLONE acetate (PRED-FORTE) 1 % ophthalmic suspension Administer 1 drop to both eyes 3 (three) times a day.       spironolactone (ALDACTONE) 25 MG tablet TAKE 1 TABLET(25 MG) BY MOUTH DAILY 90 tablet 3     warfarin (COUMADIN) 5 MG tablet Take 2.5-5 mg by mouth See Admin Instructions. 1/2 tab (2.5mg) on Mon, Fri, and Sat and  5mg all other days       warfarin (COUMADIN) 5 MG tablet Take 1 tablet (5 mg) daily as directed. Adjust dose per INR results. 90 tablet 1     ipratropium-albuterol (DUO-NEB) 0.5-2.5 mg/3 mL nebulizer Take 3 mL by nebulization 4 (four) times a day for 15 days. 200 vial 2     predniSONE (DELTASONE) 10 mg tablet Take 3 tabs daily for 3 days, then 2 tabs daily for 3 days, then 2 tabs daily for 3 days, then stop. (Patient taking differently: 2 tabs daily for 3 days, then stop.) 30 tablet 0     No current facility-administered medications for this visit.        .  Past Surgical History:   Procedure Laterality Date     BREAST BIOPSY Right 2012     BREAST LUMPECTOMY Right 04/03/2009     BUNIONECTOMY Bilateral 1988     COLECTOMY N/A 03/31/2003     COLON SURGERY       CORRECTION HAMMER TOE Bilateral 1988     CYST REMOVAL Left 02/01/2007     HYSTERECTOMY  2004     OOPHORECTOMY  2004      "STOMACH SURGERY      hyernia repair     VENTRAL HERNIA REPAIR  02/25/2005       .  Social History     Social History     Marital status:      Spouse name: N/A     Number of children: N/A     Years of education: N/A     Occupational History     Not on file.     Social History Main Topics     Smoking status: Former Smoker     Packs/day: 0.50     Years: 60.00     Quit date: 2/16/2018     Smokeless tobacco: Never Used     Alcohol use No     Drug use: No     Sexual activity: No     Other Topics Concern     Not on file     Social History Narrative       .  Family History   Problem Relation Age of Onset     Brain cancer Son 29     Dementia Mother      Stroke Mother      Prostate cancer Father      Aortic aneurysm Father      Abdominal     Hypertension Sister      No Medical Problems Daughter      No Medical Problems Maternal Grandmother      No Medical Problems Maternal Grandfather      No Medical Problems Paternal Grandmother      No Medical Problems Paternal Grandfather      Breast cancer Maternal Aunt 70     No Medical Problems Paternal Aunt      Breast cancer Maternal Aunt      Other Brother      Polio     Other Brother      polio     BRCA 1/2 Neg Hx      Colon cancer Neg Hx      Endometrial cancer Neg Hx      Ovarian cancer Neg Hx      Review of Systems:  General: WNL  Eyes: WNL  Ears/Nose/Throat: WNL  Lungs: Shortness of Breath  Heart: Shortness of Breath with activity, Irregular Heartbeat  Stomach: WNL  Bladder: WNL  Muscle/Joints: WNL  Skin: WNL  Nervous System: WNL  Mental Health: WNL     Blood: WNL    Objective:     /90 (Patient Site: Right Arm, Patient Position: Sitting, Cuff Size: Adult Regular)  Pulse 80  Resp 16  Ht 5' 7\" (1.702 m)  Wt 166 lb (75.3 kg)  BMI 26 kg/m2  166 lb (75.3 kg)   [unfilled]    Physical Exam:    GENERAL APPEARANCE: alert, no apparent distress  HEENT: no scleral icterus or xanthelasma  NECK: jugular venous pressure normal  CHEST: symmetric, the lungs were clear to " auscultation except for a few fine scattered rhonchi  CARDIOVASCULAR: irregular rhythm without murmur, click, or gallop; no carotid bruits  ABDOMEN: nondistended, nontender, bowel sounds present  EXTREMITIES: no cyanosis, clubbing or edema.    Cardiac Testing:    echocardiogram from April 17, 2018 results reviewed as above      Lab Results:    LIPIDS:  Lab Results   Component Value Date    CHOL 118 03/19/2015    CHOL 114 09/17/2013    CHOL 111 07/17/2012     Lab Results   Component Value Date    HDL 35 (L) 03/19/2015    HDL 39 (L) 09/17/2013    HDL 34 (L) 07/17/2012     Lab Results   Component Value Date    LDLCALC 41 03/19/2015    LDLCALC 47 09/17/2013    LDLCALC 41.4 07/17/2012     Lab Results   Component Value Date    TRIG 208 (H) 03/19/2015    TRIG 141 09/17/2013    TRIG 178 (H) 07/17/2012     No components found for: CHOLHDL    BMP:  Lab Results   Component Value Date    CREATININE 1.28 (H) 05/14/2018    BUN 26 05/14/2018     05/14/2018    K 4.6 05/14/2018     05/14/2018    CO2 25 05/14/2018         Felix Beckett MD,F.A.C.C.  [facility]  [phone]

## 2021-06-18 NOTE — LETTER
Letter by Thor Santamaria MD at      Author: Thor Santamaria MD Service: -- Author Type: --    Filed:  Encounter Date: 1/7/2019 Status: (Other)       Christina Umana  [address] [place] [address]             January 7, 2019         Dear Ms. Umana,    Below are the results from your recent visit:    Resulted Orders   Basic Metabolic Panel   Result Value Ref Range    Sodium 138 136 - 145 mmol/L    Potassium 4.4 3.5 - 5.0 mmol/L    Chloride 99 98 - 107 mmol/L    CO2 25 22 - 31 mmol/L    Anion Gap, Calculation 14 5 - 18 mmol/L    Glucose 110 70 - 125 mg/dL    Calcium 9.4 8.5 - 10.5 mg/dL    BUN 32 (H) 8 - 28 mg/dL    Creatinine 1.54 (H) 0.60 - 1.10 mg/dL    GFR MDRD Af Amer 40 (L) >60 mL/min/1.73m2    GFR MDRD Non Af Amer 33 (L) >60 mL/min/1.73m2    Narrative    Fasting Glucose reference range is 70-99 mg/dL per  American Diabetes Association (ADA) guidelines.       Elevated creatinine, slightly higher than previous measurement, but within your baseline range.  Potassium level is normal.  Blood sugar is normal.    Follow-up in 1 month for recheck.    No change in treatment plan.    Please call with questions or contact us using [employer].    Sincerely,        Electronically signed by Thor Santamaria MD

## 2021-06-19 NOTE — PROGRESS NOTES
"TCM DISCHARGE FOLLOW UP CALL    Discharge Date:  8/7/2018  Reason for hospital stay (discharge diagnosis)::  Acute resp failure with hypoxia due to CHF and COPD  Are you feeling better, the same or worse since your discharge?:  Patient is feeling better  Do you feel like you have a plan in the event of a health emergency?: Yes (\"Call 911\".  RN informed of 24/7 nurse triage availability through Dr. Santamaria's office, as well as [facility].)    As part of your discharge plan, were  home care services ordered for you?: No    Did you receive any new medications, or was there a change to your medications?: Yes    Are you taking those medications, or do you have any established regiment?:  Yes - Doxycycline 100 mg two times a day for 5 days, Prednisone taper, both are new rxs.  Med changes include Duo-Nebs three times a day scheduled, w/up to four times a day for shortness of breath, Magnesium oxide 400 mg in AM, & 800 mg at HS, & Lasix was increased to 60 mg in AM (RN sent message to provider for clarification on Lasix instructions as per d/c summary, \"pt was transitioned to PO Lasix 40mg two times a day, will increase morning dose to 60mg with plans to follow up with PCP.\"  Pt is taking all as prescribed, and RN will send f/u message to Dr. Santamaria for clarification on Lasix instructions.        Do you have any follow up visits scheduled with your PCP or Specialist?:  Yes, with PCP and Yes, with Specialist (Dr. Santamaria 8/13/18)  (RN) Is PCP appt scheduled soon enough (within 14 days of discharge date)?: Yes    Who are you seeing and when is it scheduled?:  Betsey Rizvi CNP in the  [facility]   8/15/2018  RN NOTES::  - RN reviewed d/c instructions w/pt, and noted pt was d/cd w/a rx for Lasix 60 mg in AM.  Per d/c summary, \"transitioned to PO Lasix 40mg two times a day, will increase morning dose to 60mg with plans to follow up with PCP.\"  RN will obtain clarification on Lasix orders & request clinic f/u w/pt as to dosing " instructions.  Pt verbalized understanding & agrees w/plan.    - RN informed pt of [employer] program for CHF monitoring, and mailed information for pt to review.  She will contact nurse if interested in enrollment.        Megha Zamudio RN Care Manager, Population Health

## 2021-06-19 NOTE — LETTER
Letter by Thor Santamaria MD at      Author: Thor Santamaria MD Service: -- Author Type: --    Filed:  Encounter Date: 3/19/2019 Status: (Other)         Christina Umana  [address] [place] [address]             March 19, 2019         Dear Ms. Umana,    Below are the results from your recent visit:    Resulted Orders   Basic Metabolic Panel   Result Value Ref Range    Sodium 136 136 - 145 mmol/L    Potassium 3.9 3.5 - 5.0 mmol/L    Chloride 98 98 - 107 mmol/L    CO2 25 22 - 31 mmol/L    Anion Gap, Calculation 13 5 - 18 mmol/L    Glucose 104 70 - 125 mg/dL    Calcium 9.4 8.5 - 10.5 mg/dL    BUN 31 (H) 8 - 28 mg/dL    Creatinine 1.33 (H) 0.60 - 1.10 mg/dL    GFR MDRD Af Amer 47 (L) >60 mL/min/1.73m2    GFR MDRD Non Af Amer 39 (L) >60 mL/min/1.73m2    Narrative    Fasting Glucose reference range is 70-99 mg/dL per  American Diabetes Association (ADA) guidelines.       Creatinine is within baseline range/okay.  Potassium level is normal.    Blood sugar is normal.    No change in treatment plan.    Please call with questions or contact us using [employer].    Sincerely,        Electronically signed by Thor Santamaria MD

## 2021-06-19 NOTE — PROGRESS NOTES
[facility] clinic Follow Up Note    Christina Umana   78 y.o. female    Date of Visit: 8/13/2018    Chief Complaint   Patient presents with     Hospital Visit Follow Up     INR     Subjective  Christina is here for hospital follow-up admitted to the hospital from August 4 until August 7.  Since review of medical record by me today.  Review of chest x-ray report, lab work and cardiac echo from August 5, 2018.    Patient has a past history of systolic congestive heart failure.  See outline of my note on August 3, 2018.    She also has chronic smoking she had tried to quit in February but still smoking intermittently as her  is dying of cancer and she is quite stressed about that.  Is on chronic anxiety on Paxil.    Patient saw me in August 3 was just developing URI symptoms likely viral.  The next day she had increasing shortness of breath and was admitted to the hospital.  She had been smoking some.    Admission x-ray showed suggestion of congestive heart failure and no pulmonary infiltrate.  She was treated with antibiotics and prednisone.  She still on oxygen and DuoNeb at home.    BNP was elevated at 1578.  Previous BNP in April was 718.    April 2018 echo showed ejection fraction 35% with moderate mitral regurgitation and some mild increased pulmonary artery pressure at that time.    Cardiac echo on August 5 showed ejection fraction of 40% with mild mitral regurgitation.  Troponin I was negative and no chest pain.    She was on Lasix 40 mg a day was changed to Lasix 60 mg a day.    She had mild elevation of BUN at 55 and creatinine 1.1.  August 7 sodium is 142 and potassium 4.4.    She was put on prednisone taper is on 30 mg a day of prednisone.  She just finished doxycycline yesterday.  Still on DuoNeb.    Her breathing is improving since discharge from the hospital but still not yet back to baseline.  As needed albuterol use.    She has chronic atrial fibrillation, no rapid heart rate spell.  Narrow  QRS.  Still on metoprolol 75 mg twice daily which was increased on July 2    Blood pressure has been labile.  On August 30 was mildly high at 154/74.  He had very high blood pressures in the past, today is normal.    She has does not have any lower extremity edema currently.    She does not have the diagnosis of vascular disease.  She is on Lipitor and the warfarin for chronic atrial fibrillation.    In the hospital her hemoglobin A1c was 6.3%, but was on steroids at the time.  No previous diagnosis of diabetes.    Past history of stage I colon cancer resected in 2003.  Colonoscopy August 2017 with 3 polyps and 3 year follow-up plan.    Hysterectomy with BSO.  Cholecystectomy.  His abdominal surgery with mesh.    Walks her dog.  PMHx:    Past Medical History:   Diagnosis Date     Arthritis      Cancer (H) 2005     Clotting disorder (H) 4/2017     Degenerative disc disease, lumbar      Emphysema lung (H)      Hyperlipidemia      Hypertension      Kidney stone 4/2017     Scoliosis      Ventral hernia      PSHx:    Past Surgical History:   Procedure Laterality Date     BREAST BIOPSY Right 2012     BREAST LUMPECTOMY Right 04/03/2009     BUNIONECTOMY Bilateral 1988     COLECTOMY N/A 03/31/2003     COLON SURGERY       CORRECTION HAMMER TOE Bilateral 1988     CYST REMOVAL Left 02/01/2007     HYSTERECTOMY  2004     OOPHORECTOMY  2004     STOMACH SURGERY      hyernia repair     VENTRAL HERNIA REPAIR  02/25/2005     Immunizations:   Immunization History   Administered Date(s) Administered     Influenza [id number], 02/01/2010     Influenza high dose, seasonal 09/14/2016     Influenza, Seasonal, Inj PF IIV3 09/30/2010, 09/13/2011     Influenza, inj, historic,unspecified 10/22/2007, 10/27/2008, 09/28/2009, 09/17/2013, 10/09/2015, 09/25/2017     Influenza, seasonal,quad inj 6-35 mos 09/27/2012     Pneumo Conj 13-V (2010&after) 03/19/2015     Pneumo Polysac 23-V 11/15/2002, 11/18/2008     Td,adult,historic,unspecified 01/08/2004      "Tdap 10/28/2016     ZOSTER, LIVE 10/08/2015       ROS A comprehensive review of systems was performed and was otherwise negative    Medications, allergies, and problem list were reviewed and updated    Exam  /82  Pulse 70  Ht 5' 7.5\" (1.715 m)  Wt 165 lb (74.8 kg)  SpO2 91%  BMI 25.46 kg/m2  Decreased breath sounds throughout with some mild expiratory wheezing.  No crackles.  Reduced respiratory excursion.  Not is using accessory muscle use.  O2 sat was 91% on room air.  Heart is irregularly irregular but rate controlled.  Did not hear murmur.  Abdomen is nontender no ankle edema.  Able to clamp on the exam table.    Assessment/Plan  1. Acute on chronic systolic CHF (congestive heart failure), NYHA class 3 (H)  Exacerbation of her congestive heart failure related to COPD exacerbation.    Weight is still up 5 pounds.  I will have her increase Lasix slightly from 60 mg once a day to 40 mg twice a day.  She was warned about risk of volume depletion, lightheaded dizzy spells even kidney injury with higher dose of diuretic.  If she does develop increasing lightheaded dizzy spells or weak spells she was told to contact office for reevaluation.  Otherwise follow-up on September 4 with me.  Does see cardiology again in 2 days.    Still on metoprolol 75 mg twice daily.  Could consider changing that to XL form in the long run.    Still on losartan 100 mg a day and Spironolactone 25 mg a day.    2. COPD exacerbation (H)  Likely viral URI precipitating.  Just finished doxycycline.  Finish the prednisone taper.    Has oxygen supplement at home.  DuoNeb.    I stressed the importance of not smoking anymore.    Anticipate improvement over the next couple of weeks.  Seek medical attention if there is significant worsening.    3. Essential hypertension  Blood pressure currently controlled.  Increasing Lasix slightly as above.    Intermittent spiking blood pressures and restless leg history with fatigue.  Moderate suspicion " for sleep apnea she does have a sleep clinic appointment scheduled for September 19.    Restless legs stable, iron labs are okay earlier this month.    4. Chronic atrial fibrillation (H)  Rate controlled with metoprolol.  Chronic Coumadin.  - INR    5. Medication monitoring encounter    - Basic Metabolic Panel    6. Acute on chronic congestive heart failure, unspecified congestive heart failure type (H)    - furosemide (LASIX) 20 MG tablet; Take 2 tablets (40 mg total) by mouth 2 (two) times a day.  Dispense: 120 tablet; Refill: 0    Elevated hemoglobin A1c in the hospital just borderline and was on steroids at time.  Check nonfasting blood sugar today.  Will follow blood sugars in the future.    Continue on atorvastatin for hypercholesterolemia.    History of colon cancer with polyps, 3 year colonoscopy will be due August 2020.    Significant chronic anxiety and stress with her  dying of cancer.  On Paxil.    Return in 3 weeks (on 9/4/2018) for Recheck.   Patient Instructions   Change furosemide to 40 mg twice a day, instead of the 60 mg once a day.    If you do develop lightheaded dizzy spells, you may need to go back to 40 mg once a day.  Contact me if that occurs.    Otherwise, follow-up with me in September for this plan.    Check kidney labs and potassium level today.    INR today.    No more smoking!        Thor Santamaria MD        Current Outpatient Prescriptions   Medication Sig Dispense Refill     ascorbic acid, vitamin C, (ASCORBIC ACID WITH JAKE HIPS) 500 MG tablet Take 500 mg by mouth daily.       atorvastatin (LIPITOR) 40 MG tablet Take 20 mg by mouth at bedtime.       calcium, as carbonate, (OS-GILL) 500 mg calcium (1,250 mg) tablet Take 1 tablet by mouth 2 (two) times a day.       furosemide (LASIX) 20 MG tablet Take 2 tablets (40 mg total) by mouth 2 (two) times a day. 120 tablet 0     losartan (COZAAR) 100 MG tablet Take 1 tablet (100 mg total) by mouth daily. 90 tablet 3     magnesium oxide  (MAG-OX) 400 mg tablet Take 1 tablet (400 mg total) by mouth every morning. And 800 mg at bedtime (Patient taking differently: Take 400-800 mg by mouth see administration instructions. 400mg in the morning and 800 mg at bedtime) 180 tablet 3     metoprolol tartrate (LOPRESSOR) 50 MG tablet Take 1.5 tablets (75 mg total) by mouth 2 (two) times a day. 270 tablet 3     omeprazole (PRILOSEC) 20 MG capsule Take 20 mg by mouth daily before breakfast.       PARoxetine (PAXIL) 20 MG tablet TAKE 1 TABLET(20 MG) BY MOUTH EVERY MORNING (Patient taking differently: TAKE 1 TABLET(20 MG) BY MOUTH at bedtime) 30 tablet 0     prednisoLONE acetate (PRED-FORTE) 1 % ophthalmic suspension Administer 1 drop to both eyes 3 (three) times a day.       predniSONE (DELTASONE) 10 mg tablet Take 40mg daily for 3d, then 30mg daily for 3d, then 20mg daily for 3d then 10mg daily for 3d, then stop. 30 tablet 0     spironolactone (ALDACTONE) 25 MG tablet TAKE 1 TABLET(25 MG) BY MOUTH DAILY 90 tablet 3     vitamin E 200 UNIT capsule Take 200 Units by mouth daily.       warfarin (COUMADIN) 5 MG tablet Take 1 tablet (5 mg) daily as directed. Adjust dose per INR results. (Patient taking differently: Take by mouth See Admin Instructions. 2.5mg on Mondays and Fridays; 5mg all other days of week.) 90 tablet 1     ipratropium-albuterol (DUO-NEB) 0.5-2.5 mg/3 mL nebulizer Take 3 mL by nebulization 3 (three) times a day for 4 days. Then use up to 4 times daily as needed for shortness of breath. 60 vial 2     No current facility-administered medications for this visit.      No Known Allergies  Social History   Substance Use Topics     Smoking status: Former Smoker     Packs/day: 0.50     Years: 60.00     Quit date: 2/16/2018     Smokeless tobacco: Never Used     Alcohol use No

## 2021-06-19 NOTE — PROGRESS NOTES
[facility] clinic Follow Up Note    Christina Umana   78 y.o. female    Date of Visit: 8/3/2018    Chief Complaint   Patient presents with     Establish Care     restless legs x 3 wks, sniffles and sore throat x 1 day, fasting     Subjective  Christina is here to establish care and review multiple medical problems.  She also has a number of new issues including increasing restless legs and daytime sleepiness and fatigue over the past month.    Over the past 1 day she has also had some postnasal drip and sore throat with increased nonproductive cough.    She has extensive past medical history including congestive heart failure.  February 2008 echo with 45% ejection fraction and mild mitral regurgitation.  He is in the hospital in April with a respiratory exacerbation which also caused atrial fibrillation with rapid ventricular rate.  Cardiac echo at that time in April showed ejection fraction of 35% and moderate mitral regurgitation which was worse.  Mild reduction in RV systolic function and moderate biatrial enlargement noted at that time.  BNP was 718.    He does have chronic atrial fibrillation on Coumadin.  INR is been therapeutic and no history of stroke or TIA.  She has a narrow QRS.    She does have the diagnosis of COPD and has quit smoking since February, but admits to smoking cigarettes occasionally.  She does have albuterol nebulizer which she uses about twice a day.  She wears oxygen at night which does state helps her sleep at night.    He is on Lipitor 40 mg a day.  She does not have the diagnosis of ischemic heart disease and no chest pain or chest pressure.    Borderline creatinine 1.28 in May.  Potassium level normal.    No history of diabetes.    Hemoglobin was 14.1 in May.  She has not had any bleeding issues or blood in stool.    He does have a past history of stage I colon cancer resected in 2003 with a rectosigmoid anastomosis.  Colonoscopy August 2017 did show a polyp, 3 tubular adenomas.   3 year follow-up given.    She has had a previous abdominal wall hernia with mesh repair.  CT scan of the abdomen in March of last year that showed left kidney stone but no symptoms with that.  Status post hysterectomy and BSO.  Status post cholecystectomy.    Mammogram in August 2017 was negative.    DEXA scan June 2015 was normal.    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  Hypertensive retinopathy noted.  She is getting shots now and get seen by ophthalmology next month.    Over the past month or more she has had increasing restless leg symptoms interfering with her sleep at night.  She has some mild symptoms in the evening.  She has significant daytime sleepiness.  She has not had the diagnosis of sleep apnea yet.  Her graph recently her  is going through cancer treatment things have been quite stressful for her.  She has chronic anxiety on Paxil.    No epigastric pain or history of GI bleeding, on omeprazole.    Cardiology saw her on July 2 with a blood pressure 120/60 and heart rate 71.  She had her metoprolol increased from 50 mg twice daily up to 75 mg twice daily and tolerated that okay.  No orthostasis.  She has not checked her blood pressure on her own.  Her graph she is compliant with losartan 100 mg a day which was increased in March of this year.  On Lasix 40 mg a day and no significant edema.  Also on spironolactone 25 mg a day.    He does have a dog at home.    PMHx:    Past Medical History:   Diagnosis Date     Arthritis      Cancer (H) 2005     Clotting disorder (H) 4/2017     Degenerative disc disease, lumbar      Emphysema lung (H)      Hyperlipidemia      Hypertension      Kidney stone 4/2017     Scoliosis      Ventral hernia      PSHx:    Past Surgical History:   Procedure Laterality Date     BREAST BIOPSY Right 2012     BREAST LUMPECTOMY Right 04/03/2009     BUNIONECTOMY Bilateral 1988      "COLECTOMY N/A 03/31/2003     COLON SURGERY       CORRECTION HAMMER TOE Bilateral 1988     CYST REMOVAL Left 02/01/2007     HYSTERECTOMY  2004     OOPHORECTOMY  2004     STOMACH SURGERY      hyernia repair     VENTRAL HERNIA REPAIR  02/25/2005     Immunizations:   Immunization History   Administered Date(s) Administered     Influenza [id number], 02/01/2010     Influenza high dose, seasonal 09/14/2016     Influenza, Seasonal, Inj PF IIV3 09/30/2010, 09/13/2011     Influenza, inj, historic,unspecified 10/22/2007, 10/27/2008, 09/28/2009, 09/17/2013, 10/09/2015, 09/25/2017     Influenza, seasonal,quad inj 6-35 mos 09/27/2012     Pneumo Conj 13-V (2010&after) 03/19/2015     Pneumo Polysac 23-V 11/15/2002, 11/18/2008     Td,adult,historic,unspecified 01/08/2004     Tdap 10/28/2016     ZOSTER, LIVE 10/08/2015       ROS A comprehensive review of systems was performed and was otherwise negative    Medications, allergies, and problem list were reviewed and updated    Exam  /74  Pulse 72  Ht 5' 7\" (1.702 m)  Wt 165 lb (74.8 kg)  BMI 25.84 kg/m2  Alert and oriented ×3.  Does not appear acutely ill.  Pupils and irises equal and reactive and no jaundice.  No carotid bruits.  No JVD.  Lungs with slight decreased breath sounds throughout but still good respiratory excursion and no wheezing or crackles.  Heart is irregularly irregular but otherwise no murmur heard, no gallop.  No cervical or supraclavicular adenopathy.  Some mild postnasal drip type pharyngitis without exudate.  No leukoplakia.  Teeth in good condition.  Abdomen is nontender and no hepatosplenomegaly.  No ankle edema.  Gait within normal limits.    Assessment/Plan  1. Chronic systolic heart failure (H)  Appears compensated at this time.  But blood pressure is too high currently.  She has had reported elevated blood pressures in the past.    Because of heart failure is unclear but I suspect it is hypertension related also history of rapid ventricular rate with " atrial fibrillation.    She also may have sleep apnea.    No evidence of ischemic event.    But she does have risk factors for coronary artery disease, especially with her smoking.  She will be seeing cardiology soon.  Could consider further ischemic workup, especially if she continues to have shortness of breath on exertion.    Recheck TSH to rule out hypothyroidism.  Recheck BMP to compare to April.  - Thyroid Stimulating Hormone (TSH)  - BNP(B-type Natriuretic Peptide)    I would anticipate her getting a repeat echocardiogram later this year to reevaluate her heart when she is not having a respiratory exacerbation.    She has a narrow QRS, I would not anticipate need for biventricular pacemaker at this time.    2. Persistent atrial fibrillation (H)  Rate control appears adequate with metoprolol, now on higher dose.    Could consider changing to Toprol-XL with her heart failure indication.    On chronic Coumadin.  Recheck INR.    Sees cardiology on August 6.  - INR    3. Essential hypertension  Not controlled today.  Follow-up with me in 1 month.  Could consider increase of furosemide or metoprolol, or possibly adding amlodipine.    4. History of colonic polyps  History of colon cancer and 3 polyps last year.  Recheck colonoscopy August 2020, for 3 recheck    5. Chronic obstructive pulmonary disease, unspecified COPD type (H)  Relatively mild symptoms.  Recently stopped smoking in February.    Albuterol as needed.    Sore throat and cough developing today, possibly early viral type URI.  Patient was warned about risk of exacerbation and she was told to seek medical attention if worsening shortness of breath.    May need to consider steroids and a Z-Kain if she significantly worsens.  Patient was warned of risks with those medications, and I would recommend evaluation prior to starting them.    Continue omeprazole    6. Medication monitoring encounter    - Comprehensive Metabolic Panel    7. Fatigue, unspecified  type  Multifactorial.  Possibility for sleep apnea.  - Thyroid Stimulating Hormone (TSH)  - Ambulatory referral to Sleep Medicine  - [facility](CBC w/o Differential)    8. Restless legs syndrome  Has not been sleeping well.  Possible sleep apnea.  I recommended walking 20 minutes every evening.    Recheck hemoglobin and iron labs.  - Ambulatory referral to Sleep Medicine  - [facility](CBC w/o Differential)  - Ferritin  - Iron and Transferrin Iron Binding Capacity    9. Hypercholesterolemia  Lipitor 40 mg a day.  - Lipid Cascade    History of chronic anxiety, some worsening exacerbation with her 's cancer treatment.  On Paxil.    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  Getting shots for ophthalmology.    Unclear if patient wishes to continue mammogram screening.    Return in 5 weeks (on 9/4/2018) for Recheck.   Patient Instructions   Seek medical attention if worsening shortness of breath, with your possible cold starting now.    Follow-up with cardiology on August 6.    Follow-up with me in early September.    Start checking your blood pressure, and bring your blood pressure cuff with you to your next appointment.    INR today.    Lab work today.  Results be mailed to you.    See  for referral to the sleep clinic for further evaluation on restless legs and evaluation for possible sleep apnea.    Walk 20 minutes every day, which will help restless legs.        Thor Santamaria MD  I spent a total time with patient of over 40 minutes and over 50% coord care.  Time all face to face.      Current Outpatient Prescriptions   Medication Sig Dispense Refill     atorvastatin (LIPITOR) 40 MG tablet Take 20 mg by mouth at bedtime.       furosemide (LASIX) 40 MG tablet Take 40 mg by mouth daily.       ipratropium-albuterol (DUO-NEB) 0.5-2.5 mg/3 mL nebulizer U 3 ML VIA NEB QID FOR 15 DAYS  2     losartan (COZAAR) 100 MG tablet Take 1 tablet (100 mg total) by mouth daily. 90  tablet 3     magnesium oxide (MAG-OX) 400 mg tablet Take 1 tablet (400 mg total) by mouth every morning. And 800 mg at bedtime 180 tablet 3     metoprolol tartrate (LOPRESSOR) 50 MG tablet Take 1.5 tablets (75 mg total) by mouth 2 (two) times a day. 270 tablet 3     omeprazole (PRILOSEC) 20 MG capsule Take 20 mg by mouth daily before breakfast.       PARoxetine (PAXIL) 20 MG tablet TAKE 1 TABLET(20 MG) BY MOUTH EVERY MORNING 30 tablet 0     prednisoLONE acetate (PRED-FORTE) 1 % ophthalmic suspension Administer 1 drop to both eyes 3 (three) times a day.       spironolactone (ALDACTONE) 25 MG tablet TAKE 1 TABLET(25 MG) BY MOUTH DAILY 90 tablet 3     warfarin (COUMADIN) 5 MG tablet Take 1 tablet (5 mg) daily as directed. Adjust dose per INR results. 90 tablet 1     ipratropium-albuterol (DUO-NEB) 0.5-2.5 mg/3 mL nebulizer Take 3 mL by nebulization 4 (four) times a day for 15 days. 200 vial 2     No current facility-administered medications for this visit.      No Known Allergies  Social History   Substance Use Topics     Smoking status: Former Smoker     Packs/day: 0.50     Years: 60.00     Quit date: 2/16/2018     Smokeless tobacco: Never Used     Alcohol use No

## 2021-06-19 NOTE — PROGRESS NOTES
Patient seen in clinic for continued HF education.  Patient viewed 'What is heart Failure' video which covers risk factors, symptoms, medications, Na and fluid guidelines, balancing activity and rest, and daily monitoring of symptoms.    Addressed patients questions/concerns- will continue to reinforce HF education with future patient interactions.

## 2021-06-19 NOTE — LETTER
Letter by Thor Santamaria MD at      Author: Thor Santamaria MD Service: -- Author Type: --    Filed:  Encounter Date: 8/19/2019 Status: (Other)         Christina REDDY Lyndsay  [address] [place] [id number]             August 19, 2019         Dear Ms. Umana,    According to our records, it is the time of year for your annual exam.  Pleas use my chart or call the clinic at [phone] and schedule an appointment with your provider.    Please call with questions or contact us using [employer].    Sincerely,        Electronically signed by Thor Santamaria MD

## 2021-06-20 NOTE — PROGRESS NOTES
Patient was identified as a potential candidate for the Clinic Care Coordination program and has declined participating.

## 2021-06-20 NOTE — PROGRESS NOTES
[facility] clinic Follow Up Note    Christina Umana   78 y.o. female    Date of Visit: 9/4/2018    Chief Complaint   Patient presents with     Follow-up     1 mo follow up, INR, possible allergies/cold     Subjective  Christina is here for follow-up on her congestive heart failure, chronic atrial fibrillation chronic COPD, and multiple other medical issues.    She does not have a diagnosis of vascular disease.  She has not had chest pain or cardiac event.    She does have chronic systolic congestive heart failure with exacerbation 1 month ago.  August 2018 cardiac echo with ejection fraction 40% and mild mitral regurgitation.  April 2018 ejection fraction 35% with moderate mitral regurgitation.    Last month I increased the furosemide to 40 mg twice a day.  She tolerated that well without lightheaded dizzy spells.  She has not checked her blood pressure on her own.    She still on losartan 100 mg a day, spironolactone 25 mg a day and metoprolol 75 mg twice daily.    No lower extremity edema.  Weight stable.  No increasing shortness of breath or orthopnea.    She also has COPD and continues to smoke 3-4 cigarettes a day.  Her  is dying of cancer and continues to smoke.  She is not yet ready to set a quit date.    She has had some slight minimally productive cough.  Still using her DuoNeb 3 times a day.  She does have her oxygen at home but not currently using it.  He did have a viral type URI exacerbation a month ago.  He has had some slight increase cough and sniffles for the past 5 days but does not think it is escalating into a bronchitis.  No fever.    Tolerating Lipitor 20 mg without generalized myalgias and no abdominal pain complaints.  Her graph no history of diabetes but had a borderline hemoglobin A1c of 6.3% last month.  Para graph past history of stage I colon cancer in 2003.  August 2017 colonoscopy with 3 polyps and 3 year follow-up plan.    Status post hysterectomy with BSO, cholecystectomy  and abdominal wall hernia with mesh repair in the past.    Moderate suspicion for sleep apnea but has not had a sleep evaluation yet.  She has an appointment on September 19.  Her graph restless legs without exacerbation.  Lab work for iron levels was okay last month with ferritin level 21 and iron saturation 17%.  Normal TSH last month.    She does walk her dog occasionally but has not been walking regularly.  No worsening dyspnea on exertion or claudication.  No orthostasis.    No history of TIA or bleeding issues on Coumadin for atrial fibrillation.    PMHx:    Past Medical History:   Diagnosis Date     Arthritis      Cancer (H) 2005     Clotting disorder (H) 4/2017     Degenerative disc disease, lumbar      Emphysema lung (H)      Hyperlipidemia      Hypertension      Kidney stone 4/2017     Scoliosis      Ventral hernia      PSHx:    Past Surgical History:   Procedure Laterality Date     BREAST BIOPSY Right 2012     BREAST LUMPECTOMY Right 04/03/2009     BUNIONECTOMY Bilateral 1988     COLECTOMY N/A 03/31/2003     COLON SURGERY       CORRECTION HAMMER TOE Bilateral 1988     CYST REMOVAL Left 02/01/2007     HYSTERECTOMY  2004     OOPHORECTOMY  2004     STOMACH SURGERY      hyernia repair     VENTRAL HERNIA REPAIR  02/25/2005     Immunizations:   Immunization History   Administered Date(s) Administered     Influenza [id number], 02/01/2010     Influenza high dose, seasonal 09/14/2016     Influenza, Seasonal, Inj PF IIV3 09/30/2010, 09/13/2011     Influenza, inj, historic,unspecified 10/22/2007, 10/27/2008, 09/28/2009, 09/17/2013, 10/09/2015, 09/25/2017     Influenza, seasonal,quad inj 6-35 mos 09/27/2012     Pneumo Conj 13-V (2010&after) 03/19/2015     Pneumo Polysac 23-V 11/15/2002, 11/18/2008     Td,adult,historic,unspecified 01/08/2004     Tdap 10/28/2016     ZOSTER, LIVE 10/08/2015       ROS A comprehensive review of systems was performed and was otherwise negative    Medications, allergies, and problem list were  "reviewed and updated    Exam  /78  Pulse (!) 52  Ht 5' 7.5\" (1.715 m)  Wt 164 lb (74.4 kg)  SpO2 98%  BMI 25.31 kg/m2  Alert and oriented ×3.  No jaundice.  No significant pharyngitis or thrush.  No cervical or supraclavicular adenopathy.  No leukoplakia.  No JVD.  Lungs with some mild decreased breath sounds throughout but no expiratory wheezing today.  No crackles.  Slightly reduced respiratory excursion.  Not using accessory muscle use.  O2 sat normal on room air.  Color good.  Heart is irregularly irregular but rate controlled.  He did not hear murmur.  No ankle edema.  Abdomen is nontender.    Assessment/Plan  1. Chronic systolic congestive heart failure (H)  Currently compensated.  Could consider adding low-dose amlodipine if blood pressure levels higher.    Continue current diuretic.  I discussed risk of dehydration and electrolyte effects of diuretics.    I strongly encouraged her to keep her appointment with the sleep clinic on September 19 to evaluate for sleep apnea.    I reminded her to make her follow-up appointment with cardiology which she is overdue for.    2. Visit for screening mammogram  Patient will schedule her routine screening mammogram.  August 2017 mammogram negative.    - Mammo Screening Bilateral; Future    3. Chronic atrial fibrillation (H)  Rate controlled with metoprolol.  Long-term plan to change to Toprol-XL.  Coumadin.  - INR    4. Chronic obstructive pulmonary disease, unspecified COPD type (H)  No significant exacerbation at this time.  Possible mild viral URI versus vasomotor type rhinitis with smoking.  Follow-up if significant worsening cough or shortness of breath.    Continue DuoNeb.  Has home oxygen as needed.    Flu shot this fall    5. History of colonic polyps  Plan for 3 year colonoscopy August 2020    6. Medication monitoring encounter  After diuretic dose change  - Basic Metabolic Panel    Chronic anxiety, stable.  Paxil 20 mg a day.   dying of " cancer.    Needs follow-up plan for ophthalmology.  History of hypertensive retinopathy and history of left branch vein occlusion.    Return in about 2 months (around 11/4/2018) for Recheck.   Patient Instructions   No change in medication at this time.    Stop smoking entirely.    Walk 20 minutes on a daily basis.    INR and kidney lab work today.    Make appointment with cardiology for follow-up on her heart failure.    Follow-up with me in 2 months.    Call to schedule your mammogram.    Keep your appointment on September 19 with the sleep clinic.    MD Christina Ospina      Current Outpatient Prescriptions   Medication Sig Dispense Refill     ascorbic acid, vitamin C, (ASCORBIC ACID WITH JAKE HIPS) 500 MG tablet Take 500 mg by mouth daily.       atorvastatin (LIPITOR) 40 MG tablet Take 20 mg by mouth at bedtime.       calcium, as carbonate, (OS-GILL) 500 mg calcium (1,250 mg) tablet Take 1 tablet by mouth 2 (two) times a day.       furosemide (LASIX) 20 MG tablet Take 2 tablets (40 mg total) by mouth 2 (two) times a day. 120 tablet 0     losartan (COZAAR) 100 MG tablet Take 1 tablet (100 mg total) by mouth daily. 90 tablet 3     magnesium oxide (MAG-OX) 400 mg tablet Take 1 tablet (400 mg total) by mouth every morning. And 800 mg at bedtime (Patient taking differently: Take 400-800 mg by mouth see administration instructions. 400mg in the morning and 800 mg at bedtime) 180 tablet 3     metoprolol tartrate (LOPRESSOR) 50 MG tablet Take 1.5 tablets (75 mg total) by mouth 2 (two) times a day. 270 tablet 3     omeprazole (PRILOSEC) 20 MG capsule Take 20 mg by mouth daily before breakfast.       PARoxetine (PAXIL) 20 MG tablet TAKE 1 TABLET(20 MG) BY MOUTH EVERY MORNING (Patient taking differently: TAKE 1 TABLET(20 MG) BY MOUTH at bedtime) 30 tablet 0     prednisoLONE acetate (PRED-FORTE) 1 % ophthalmic suspension Administer 1 drop to both eyes 3 (three) times a day.       spironolactone (ALDACTONE) 25 MG tablet  TAKE 1 TABLET(25 MG) BY MOUTH DAILY 90 tablet 3     vitamin E 200 UNIT capsule Take 200 Units by mouth daily.       warfarin (COUMADIN) 5 MG tablet Take 1 tablet (5 mg) daily as directed. Adjust dose per INR results. (Patient taking differently: Take by mouth See Admin Instructions. 2.5mg on Mondays and Fridays; 5mg all other days of week.) 90 tablet 1     ipratropium-albuterol (DUO-NEB) 0.5-2.5 mg/3 mL nebulizer Take 3 mL by nebulization 3 (three) times a day for 4 days. Then use up to 4 times daily as needed for shortness of breath. 60 vial 2     No current facility-administered medications for this visit.      No Known Allergies  Social History   Substance Use Topics     Smoking status: Former Smoker     Packs/day: 0.50     Years: 60.00     Quit date: 2/16/2018     Smokeless tobacco: Never Used     Alcohol use No

## 2021-06-21 NOTE — PROGRESS NOTES
[facility] clinic Follow Up Note    Christina Umana   78 y.o. female    Date of Visit: 2018    Chief Complaint   Patient presents with     Hypertension     INR Check     Subjective  Christina is here to reevaluate blood pressure and her systolic congestive heart failure.    No history of vascular disease and no chest pain.    She does have chronic atrial fibrillation on Coumadin.  No history of TIA or stroke.    2018 ejection fraction 35% with moderate mitral regurgitation.    2018 cardiac echo with ejection fraction 40% with mild mitral regurgitation.    Last CHF exacerbation was in August.  She had Lasix increased to 40 mg twice daily at that time.    She has not had any recurrence of lower extremity edema or increasing shortness of breath since then.    She remains fairly active with regular walking at home.    But she is been under some stress with her   of cancer.    Patient is also on losartan 100 mg a day, spironolactone 25 mg a day and metoprolol 75 mg twice daily.    She does check her blood pressure at least weekly.  She states her blood pressures been running in the 130s over 70s.    Patient was seen by cardiology  and blood pressure was 122/62.    She denies orthostasis or lightheaded dizzy spells.    Chronic atrial fibrillation but no palpitations.    She is on Lipitor 20 mg a day.  No chest pain or chest pressure.    COPD moderately severe but stable.  DuoNeb 3 times a day, does have home oxygen but does not generally use it.    She still smokes 1-4 cigarettes a day.  She is been trying to cut down but not yet ready to quit.    Her chronic anxiety is stable and I reviewed the PHQ 9 with patient today just a score of 3.    Patient did have some loose stools once a day but she been eating a lot of grapes.  Stage I colon cancer .  2017 colonoscopy did show 3 polyps in 3-year follow-up plan.    Status post hysterectomy and BSO.  Status post  cholecystectomy.  Status post abdominal wall hernia repair with mesh.  Her graph moderate suspicion for sleep apnea but does not want to do a sleep study, was referred in August for sleep study but did not keep her appointment.  She has some restless leg symptoms.  Normal TSH and iron labs in August.    She did get her mammogram done last month that was negative.    She did see her ophthalmologist recently, getting shots for macular degeneration now.      Patient was under quite a bit of stress today, with the snowstorm, her  fell as she was coming to the appointment today.  PMHx:    Past Medical History:   Diagnosis Date     Arthritis      Cancer (H) 2005     Clotting disorder (H) 4/2017     Degenerative disc disease, lumbar      Emphysema lung (H)      Hyperlipidemia      Hypertension      Kidney stone 4/2017     Scoliosis      Ventral hernia      PSHx:    Past Surgical History:   Procedure Laterality Date     BREAST BIOPSY Right 2012     BREAST LUMPECTOMY Right 04/03/2009     BUNIONECTOMY Bilateral 1988     COLECTOMY N/A 03/31/2003     COLON SURGERY       CORRECTION HAMMER TOE Bilateral 1988     CYST REMOVAL Left 02/01/2007     HYSTERECTOMY  2004     OOPHORECTOMY  2004     STOMACH SURGERY      hyernia repair     VENTRAL HERNIA REPAIR  02/25/2005     Immunizations:   Immunization History   Administered Date(s) Administered     Influenza [id number], 02/01/2010     Influenza high dose, seasonal 09/14/2016, 09/24/2018     Influenza, Seasonal, Inj PF IIV3 09/30/2010, 09/13/2011     Influenza, inj, historic,unspecified 10/22/2007, 10/27/2008, 09/28/2009, 09/17/2013, 10/09/2015, 09/25/2017     Influenza, seasonal,quad inj 6-35 mos 09/27/2012     Pneumo Conj 13-V (2010&after) 03/19/2015     Pneumo Polysac 23-V 11/15/2002, 11/18/2008     Td,adult,historic,unspecified 01/08/2004     Tdap 10/28/2016     ZOSTER, LIVE 10/08/2015       ROS A comprehensive review of systems was performed and was otherwise  negative    Medications, allergies, and problem list were reviewed and updated    Exam  /74 (Patient Site: Right Arm, Patient Position: Sitting, Cuff Size: Adult Regular)  Pulse 84  Wt 166 lb (75.3 kg)  BMI 25.62 kg/m2  Mildly anxious today.  No jaundice.  Lungs show decreased breath sounds throughout but no wheezing or crackles.  Heart is irregularly irregular but rate controlled and no murmur or gallop.  No ankle edema.  Abdomen is nontender.    Assessment/Plan  1. Chronic systolic heart failure (H)  Compensated.  Borderline high blood pressure today but under some stress with events.    Blood pressure recently normal at cardiology appointment last month.    Patient will see cardiology again on  for another evaluation of her blood pressure and heart failure medications.    She could consider addition of amlodipine, or changing metoprolol to high-dose Toprol-XL    Follow up with me in 3 months    I have a moderate suspicion for sleep apnea but she did not follow through on her sleep study this past summer.  Consider referral back to the sleep clinic next year.    2. Essential hypertension  As above.  Continue current medications at this time.    3. Chronic atrial fibrillation (H)  Rate controlled with metoprolol.  Coumadin.  INR today.  - INR    4. Chronic obstructive pulmonary disease, unspecified COPD type (H)  No flare currently.  DuoNeb.  I did  patient to quit, but she has not yet ready.    5. Depression With Anxiety  Controlled with Paxil 20 mg a day.    Situational stress with her   of cancer.    6. Medication monitoring encounter    - Comprehensive Metabolic Panel    She did have the flu shot already in September    Loose stools consistent with her overeating of grapes, she states she stopped doing that now and today she had a normal bowel movement.    At the degeneration, follow-up with ophthalmology, getting injections.    History of colon polyps and colon cancer.   3-year follow-up colonoscopy August 2020      Return in about 3 months (around 2/9/2019) for Recheck.   Patient Instructions   INR today.  Lab work today to check kidney and liver tests.    Continue on current medications.    Continue to follow your blood pressure closely.  If your blood pressure does average above 140/80, contact me and I would consider additional medication.    You will also be seeing the cardiologist on December 7.    Otherwise, see me in 3 months for a follow-up.    Quit smoking.        Thor Santamaria MD        Current Outpatient Prescriptions   Medication Sig Dispense Refill     ascorbic acid, vitamin C, (ASCORBIC ACID WITH JAKE HIPS) 500 MG tablet Take 500 mg by mouth daily.       atorvastatin (LIPITOR) 40 MG tablet Take 20 mg by mouth at bedtime.       calcium, as carbonate, (OS-GILL) 500 mg calcium (1,250 mg) tablet Take 1 tablet by mouth 2 (two) times a day.       losartan (COZAAR) 100 MG tablet Take 1 tablet (100 mg total) by mouth daily. 90 tablet 3     magnesium oxide (MAG-OX) 400 mg tablet Take 1 tablet (400 mg total) by mouth every morning. And 800 mg at bedtime (Patient taking differently: Take 400-800 mg by mouth see administration instructions. 400mg in the morning and 800 mg at bedtime) 180 tablet 3     metoprolol tartrate (LOPRESSOR) 50 MG tablet Take 1.5 tablets (75 mg total) by mouth 2 (two) times a day. 270 tablet 3     omeprazole (PRILOSEC) 20 MG capsule Take 20 mg by mouth daily before breakfast.       PARoxetine (PAXIL) 20 MG tablet Take 1 tablet (20 mg total) by mouth daily. 90 tablet 3     prednisoLONE acetate (PRED-FORTE) 1 % ophthalmic suspension Administer 1 drop to both eyes 3 (three) times a day.       spironolactone (ALDACTONE) 25 MG tablet TAKE 1 TABLET(25 MG) BY MOUTH DAILY 90 tablet 3     vitamin E 200 UNIT capsule Take 200 Units by mouth daily.       warfarin (COUMADIN) 5 MG tablet Take 1 tablet (5 mg) daily as directed. Adjust dose per INR results. (Patient  taking differently: Take by mouth See Admin Instructions. 2.5mg on Mondays and Fridays; 5mg all other days of week.) 90 tablet 1     furosemide (LASIX) 20 MG tablet Take 2 tablets (40 mg total) by mouth 2 (two) times a day. 120 tablet 0     ipratropium-albuterol (DUO-NEB) 0.5-2.5 mg/3 mL nebulizer Take 3 mL by nebulization 3 (three) times a day for 4 days. Then use up to 4 times daily as needed for shortness of breath. 60 vial 2     No current facility-administered medications for this visit.      No Known Allergies  Social History   Substance Use Topics     Smoking status: Current Some Day Smoker     Packs/day: 0.50     Years: 60.00     Last attempt to quit: 2/16/2018     Smokeless tobacco: Never Used     Alcohol use No

## 2021-06-21 NOTE — PROGRESS NOTES
[facility] clinic Follow Up Note    Christina Umana   78 y.o. female    Date of Visit: 11/19/2018    Chief Complaint   Patient presents with     Follow-up     Medications and Labs     Subjective  Christina is returning because of an elevated creatinine of 1.5 on November 9, her last appointment.  She was having some loose stools at that time, possibly associated with eating a lot of grapes.  Stopped eating the grapes, and her stools have thickened up somewhat.  Just 1 stool a day, on the loose side still.  No abdominal pain, just some mild cramping.  She is otherwise eating normally, staying well-hydrated.    She has a past history of systolic congestive heart failure and atrial fibrillation.  No history of ocular disease.    August 2018 CHF exacerbation.  Echo at that time shows some improvement of ejection fraction of 40%.  Mild mitral regurgitation.  April 2018 ejection fraction was 35% with moderate mitral regurgitation.    In August of this year she had her furosemide increased to 40 mg twice a day, which she is still on.  She has not had a number of different doses of furosemide and we spent some time clarifying her dose of furosemide, but she was fairly certain she has 40 mg pills and she takes them twice a day.    Her weight has been remained stable at about her dry weight since discharge from the hospital in August.  Her home scales is 155 -158 pounds.  Here weight is running 164-165.    No lower extremity edema or increasing shortness of breath.    She also has COPD, wears oxygen at night and uses a DuoNeb inhaler.  She does not feel her COPD is exacerbating.  Still smoking.  Refuses to quit.  Smokes 1-4 cigarettes a day.    No chest pain or chest pressure.    Still on Lipitor 20 mg a day.    No bleeding issues, on Coumadin for chronic atrial fibrillation.  No history of TIA or stroke.  No blood in stool or melena.  A    She has a new complaint of increased urinary frequency and urgency over the past  week.  Not severe.  No dysuria or fevers.  No hematuria.    Chronic anxiety on Paxil.  She feels that is stable.    PMHx:    Past Medical History:   Diagnosis Date     Arthritis      Cancer (H) 2005     Clotting disorder (H) 4/2017     Degenerative disc disease, lumbar      Emphysema lung (H)      Hyperlipidemia      Hypertension      Kidney stone 4/2017     Scoliosis      Ventral hernia      PSHx:    Past Surgical History:   Procedure Laterality Date     BREAST BIOPSY Right 2012     BREAST LUMPECTOMY Right 04/03/2009     BUNIONECTOMY Bilateral 1988     COLECTOMY N/A 03/31/2003     COLON SURGERY       CORRECTION HAMMER TOE Bilateral 1988     CYST REMOVAL Left 02/01/2007     HYSTERECTOMY  2004     OOPHORECTOMY  2004     STOMACH SURGERY      hyernia repair     VENTRAL HERNIA REPAIR  02/25/2005     Immunizations:   Immunization History   Administered Date(s) Administered     Influenza X5x9-24, 02/01/2010     Influenza high dose, seasonal 09/14/2016, 09/24/2018     Influenza, Seasonal, Inj PF IIV3 09/30/2010, 09/13/2011     Influenza, inj, historic,unspecified 10/22/2007, 10/27/2008, 09/28/2009, 09/17/2013, 10/09/2015, 09/25/2017     Influenza, seasonal,quad inj 6-35 mos 09/27/2012     Pneumo Conj 13-V (2010&after) 03/19/2015     Pneumo Polysac 23-V 11/15/2002, 11/18/2008     Td,adult,historic,unspecified 01/08/2004     Tdap 10/28/2016     ZOSTER, LIVE 10/08/2015       ROS A comprehensive review of systems was performed and was otherwise negative    Medications, allergies, and problem list were reviewed and updated    Exam  /70 (Patient Site: Right Arm, Patient Position: Sitting, Cuff Size: Adult Large)   Pulse 60   Wt 163 lb 14.4 oz (74.3 kg)   BMI 25.29 kg/m    Lungs show decreased breath sounds throughout.  No wheezing.  No crackles.  Moderately severe kyphosis with reduced respiratory excursion.  Heart is irregularly irregular, did not hear murmur or gallop.  No lower extremity edema.  Abdomen soft and  nontender.    Assessment/Plan  1. Chronic systolic heart failure (H)  Well compensated.  Weight is stable.  Continue on current medications.  Furosemide dose clarified as above.  She will continue on the 40 mg twice a day dose of furosemide, assuming that is the dose she is taking now.    If her creatinine is significantly elevated, may have her consider a slightly lower dose of furosemide, such as 40 mg in the morning and 20 mg in the afternoon.    She sees cardiology for follow-up on December 7    Continue losartan 100 mg a day, spironolactone 25 mg a day and metoprolol 75 mg twice daily.  Could consider changing to Toprol-XL in the future.    Moderate suspicion for sleep apnea.  She did not want to do a sleep study and canceled her appointment with the sleep clinic number.  She has some restless legs.  Normal iron levels in August.  Normal TSH.  Consider referral back to sleep clinic in the future.    2. Chronic atrial fibrillation (H)  Rate controlled with metoprolol.  INR rechecked today.  - INR  - magnesium oxide (MAG-OX) 400 mg (241.3 mg magnesium) tablet; 400mg in the morning and 800 mg at bedtime.  Dispense: 270 tablet; Refill: 3    3. Chronic obstructive pulmonary disease, unspecified COPD type (H)  DuoNeb.  Oxygen at night at home.  No current exacerbation.  Needs to quit smoking.    4. Depression With Anxiety,  dying of cancer.  Patient states stable on Paxil    5. Medication monitoring encounter  Recheck creatinine.  He may have been volume depleted with an elevated creatinine earlier.  She may be getting too dry with her CHF medications after the increase of furosemide.  If her creatinine is significantly further elevated, I will plan to reduce her furosemide down to 40 mg in the morning and 20 mg in the afternoon.  She can recheck her kidney labs and she returns to see cardiology on December 7.    Follow-up with me in January.  - Basic Metabolic Panel    6. Gastroesophageal reflux disease  without esophagitis  Controlled  - omeprazole (PRILOSEC) 20 MG capsule; Take 1 capsule (20 mg total) by mouth daily before breakfast.  Dispense: 90 capsule; Refill: 3    7. Chronic systolic congestive heart failure (H)  As above  - furosemide (LASIX) 20 MG tablet; Take 2 tablets (40 mg total) by mouth 2 (two) times a day Or use as directed.  Dispense: 120 tablet; Refill: 4    8. Urinary frequency  I suspect irritable bladder chronic urinary frequency, but she has expressed worsening symptoms over the past week.  If positive urinalysis, I would plan to treat with amoxicillin 3 times daily for 7 days pending urine culture.  She was warned that that will affect her Coumadin and should recheck her Coumadin within 5 days of starting antibiotic.    If she does not start on an antibiotic, she will contact clinic if she has worsening symptoms.  - Urinalysis-UC if Indicated    History of colon cancer, stage I in 2003, with polyps times 3 August 2017, 3-year colonoscopy will be due August 2020    Status post hysterectomy with BSO.    Cholecystectomy.    Abdominal wall hernia with mesh repair    Increase regular walking, she can walk her dog.    She did see her ophthalmologist recently.  Getting shots for macular degeneration.  History of hypertensive retinopathy and branch vein occlusion.    Mammogram negative October 2018, recheck in 1 year.    Return in about 8 weeks (around 1/14/2019) for Recheck.   Patient Instructions   INR and recheck of your kidney labs today.  Also check a urine sample.    If urinalysis comes back positive, I will contact you to start an antibiotic.  You will need to recheck an INR within 5 days of starting an antibiotic.    If an antibiotic is not started, contact clinic if you have worsening urinary symptoms.    Continue furosemide at your current dose.  I refilled the prescription with 20 mg tablets.  Continue at the current dose of furosemide you are currently taking, possibly 40 mg twice a  day.    You may be contacted by me to adjust your heart failure medications, if your creatinine is worse.    You will be seeing cardiology on December 7, you can clarify your medication further at that time as well.    See me in January for follow-up on your heart failure and medications.    Thor Santamaria MD  I spent a total time with patient of over 25 minutes and over 50% coord care.  Time all face to face.      Current Outpatient Medications   Medication Sig Dispense Refill     atorvastatin (LIPITOR) 40 MG tablet Take 20 mg by mouth at bedtime.       calcium, as carbonate, (OS-GILL) 500 mg calcium (1,250 mg) tablet Take 1 tablet by mouth 2 (two) times a day.       losartan (COZAAR) 100 MG tablet Take 1 tablet (100 mg total) by mouth daily. 90 tablet 3     magnesium oxide (MAG-OX) 400 mg (241.3 mg magnesium) tablet 400mg in the morning and 800 mg at bedtime. 270 tablet 3     metoprolol tartrate (LOPRESSOR) 50 MG tablet Take 1.5 tablets (75 mg total) by mouth 2 (two) times a day. 270 tablet 3     PARoxetine (PAXIL) 20 MG tablet Take 1 tablet (20 mg total) by mouth daily. 90 tablet 3     prednisoLONE acetate (PRED-FORTE) 1 % ophthalmic suspension Administer 1 drop to both eyes 3 (three) times a day.       spironolactone (ALDACTONE) 25 MG tablet TAKE 1 TABLET(25 MG) BY MOUTH DAILY 90 tablet 3     vitamin E 200 UNIT capsule Take 200 Units by mouth daily.       warfarin (COUMADIN) 5 MG tablet Take 1 tablet (5 mg) daily as directed. Adjust dose per INR results. (Patient taking differently: Take by mouth See Admin Instructions. 2.5mg on Mondays and Fridays; 5mg all other days of week.) 90 tablet 1     furosemide (LASIX) 20 MG tablet Take 2 tablets (40 mg total) by mouth 2 (two) times a day Or use as directed. 120 tablet 4     ipratropium-albuterol (DUO-NEB) 0.5-2.5 mg/3 mL nebulizer Take 3 mL by nebulization 3 (three) times a day for 4 days. Then use up to 4 times daily as needed for shortness of breath. 60 vial 2      omeprazole (PRILOSEC) 20 MG capsule Take 1 capsule (20 mg total) by mouth daily before breakfast. 90 capsule 3     No current facility-administered medications for this visit.      No Known Allergies  Social History     Tobacco Use     Smoking status: Current Some Day Smoker     Packs/day: 0.50     Years: 60.00     Pack years: 30.00     Last attempt to quit: 2018     Years since quittin.7     Smokeless tobacco: Never Used   Substance Use Topics     Alcohol use: No     Drug use: No

## 2021-06-22 NOTE — PATIENT INSTRUCTIONS - HE
Add amlodipine 2.5 mg once a day, in addition to your other blood pressure medication.    Continue on current furosemide with 40 mg in the morning and 20 mg in the afternoon.    Follow-up with me in 1 month to reevaluate blood pressure.    INR checked today.  Kidney lab check today.    Recheck your INR in 2 weeks, as amlodipine can affect warfarin levels.    Take Citrucel 1 scoop with glass of water every day.  If you are still having problems with your bowels after 1-2 weeks, increase the Citrucel to 2 scoops once a day.

## 2021-06-22 NOTE — PROGRESS NOTES
[facility] clinic Follow Up Note    Christina Umana   78 y.o. female    Date of Visit: 1/4/2019    Chief Complaint   Patient presents with     Cardiac Follow-up     Evaluate heart failure and medication management     Subjective  Christina is here for follow-up on her chronic systolic congestive heart failure.  She also has chronic renal insufficiency and had a slightly higher creatinine 1.5 in November.    Patient had a heart failure exacerbation in August of this year.  She had her Lasix increased to 40 mg twice a day at that time.  She has not had a relapse of her heart failure.  There is been no edema no significant increasing shortness of breath.  Just her moderate dyspnea on exertion which is baseline.    I saw her in November with the elevated creatinine and had her reduce the furosemide down to 40 mg in the morning and 20 mg in the afternoon.  Her blood pressure was 136/70 at that time.    She has not had any increased weight or increased edema or shortness of breath since reducing her furosemide.    Her blood pressure is still running borderline high in the 130s-140s over 70s.    She did see cardiology on December 11, and her blood pressure was 160/90 on that day.  Cardiology did not do any changes and gave her a 6-month follow-up.    She is on losartan 100 mg a day, metoprolol 75 mg twice daily and spironolactone 25 mg a day.    She has her quite a bit of stress currently with her  becoming more more ill, being more forgetful.  He is dying of cancer.  She is going to ask for some more home care help.    She is still walking the dog and getting some regular activity.    She does have COPD and still smokes 1-4 cigarettes a day.  Not yet ready to quit.  No flare of her COPD.  On DuoNeb and uses oxygen at night.    Suspected sleep apnea but she still refuses to undergo a sleep study.  She had been referred earlier but had canceled her appointment.    Chronic atrial fibrillation, no symptomatic  palpitations.  On Coumadin.  No bleeding issues no stroke or TIA history.  No falls.  INR was therapeutic on December 18 with an INR of 2.8.    She continues to get intermittent loose stools alternating with constipation.  She will have a loose stool every other day, with no bowel movement on the day in between.  She has just restarted her Citrucel at 1 scoop a day recently.  No blood in stool.  No abdominal pain.    History of colon cancer in 2003.  Last colonoscopy August 2017 did show 3 polyps with 3-year follow-up plan.    Chronic irritable bladder, no worsening symptoms currently.  She does not feel she has a urinary tract infection.  Urine culture was negative in November.    Macular degeneration, still seen the ophthalmologist regularly for eye injections.    Chronic anxiety and dysthymia, stable on Paxil.    No chest pain or chest pressure.  Still on Lipitor.    PMHx:    Past Medical History:   Diagnosis Date     Arthritis      Cancer (H) 2005     Clotting disorder (H) 4/2017     Degenerative disc disease, lumbar      Emphysema lung (H)      Hyperlipidemia      Hypertension      Kidney stone 4/2017     Scoliosis      Ventral hernia      PSHx:    Past Surgical History:   Procedure Laterality Date     BREAST BIOPSY Right 2012     BREAST LUMPECTOMY Right 04/03/2009     BUNIONECTOMY Bilateral 1988     COLECTOMY N/A 03/31/2003     COLON SURGERY       CORRECTION HAMMER TOE Bilateral 1988     CYST REMOVAL Left 02/01/2007     HYSTERECTOMY  2004     OOPHORECTOMY  2004     STOMACH SURGERY      hyernia repair     VENTRAL HERNIA REPAIR  02/25/2005     Immunizations:   Immunization History   Administered Date(s) Administered     Influenza Q0p8-69, 02/01/2010     Influenza high dose, seasonal 09/14/2016, 09/24/2018     Influenza, Seasonal, Inj PF IIV3 09/30/2010, 09/13/2011     Influenza, inj, historic,unspecified 10/22/2007, 10/27/2008, 09/28/2009, 09/17/2013, 10/09/2015, 09/25/2017     Influenza, seasonal,quad inj 6-35  mos 09/27/2012     Pneumo Conj 13-V (2010&after) 03/19/2015     Pneumo Polysac 23-V 11/15/2002, 11/18/2008     Td,adult,historic,unspecified 01/08/2004     Tdap 10/28/2016     ZOSTER, LIVE 10/08/2015       ROS A comprehensive review of systems was performed and was otherwise negative    Medications, allergies, and problem list were reviewed and updated    Exam  /84 (Patient Site: Right Arm, Patient Position: Sitting, Cuff Size: Adult Regular)   Pulse 60   Wt 163 lb (73.9 kg)   BMI 25.15 kg/m    Alert and oriented x3.  Just mildly flat affect, at baseline today.  Her lungs are clear.  Still good respiratory excursion.  No wheezing or crackles.  There is no ankle edema.  Abdomen is nontender.  Her heart is irregularly irregular but rate controlled.  Moderately severe kyphosis unchanged.    Assessment/Plan  1. Chronic systolic congestive heart failure (H)  Still compensated.  Blood pressure still borderline high.  Add amlodipine, low-dose.  Continue other medications the same.  - amLODIPine (NORVASC) 2.5 MG tablet; Take 1 tablet (2.5 mg total) by mouth daily.  Dispense: 30 tablet; Refill: 11    2. Essential hypertension  As above.  Patient warned about risk of fatigue, affecting her warfarin levels, lower extremity edema risk  - amLODIPine (NORVASC) 2.5 MG tablet; Take 1 tablet (2.5 mg total) by mouth daily.  Dispense: 30 tablet; Refill: 11    3. Mild renal insufficiency  Recheck labs today.  Continue on current furosemide and losartan and spironolactone.   - Basic Metabolic Panel    4. Chronic atrial fibrillation (H)  Rate controlled on metoprolol.  Warfarin.    I am adding amlodipine as above, patient was told to follow-up within 2 weeks for an INR recheck.  - INR    5. Chronic obstructive pulmonary disease, unspecified COPD type (H)  No flare.  She was again counseled to quit smoking.  DuoNeb.  Oxygen at night.    Suspected sleep apnea, she continues to refuse to do a sleep study.  Reconsider at a later  time.    Anxiety and dysthymia, exacerbated by her 's progressive illness.  Still on Paxil.  PHQ 9 score of 3 today.    History of colon cancer with intermittent loose stools.  Restart the Citrucel 1 scoop a day increasing to 2 scoops a day if needed.  3 your colonoscopy will be due August 2020.    Macular degeneration, continue good shots through her ophthalmologist.    Chronic irritable bladder stable, denies worsening symptoms    Return in about 4 weeks (around 2/1/2019) for Recheck.   Patient Instructions   Add amlodipine 2.5 mg once a day, in addition to your other blood pressure medication.    Continue on current furosemide with 40 mg in the morning and 20 mg in the afternoon.    Follow-up with me in 1 month to reevaluate blood pressure.    INR checked today.  Kidney lab check today.    Recheck your INR in 2 weeks, as amlodipine can affect warfarin levels.    Take Citrucel 1 scoop with glass of water every day.  If you are still having problems with your bowels after 1-2 weeks, increase the Citrucel to 2 scoops once a day.        Thor Santamaria MD  I spent a total time with patient of over 25 minutes and over 50% coord care.  Time all face to face.      Current Outpatient Medications   Medication Sig Dispense Refill     atorvastatin (LIPITOR) 40 MG tablet Take 20 mg by mouth at bedtime.       calcium, as carbonate, (OS-GILL) 500 mg calcium (1,250 mg) tablet Take 1 tablet by mouth 2 (two) times a day.       furosemide (LASIX) 20 MG tablet Take 2 tablets in the morning and 1 tablet in the afternoon or early evening.. 120 tablet 4     losartan (COZAAR) 100 MG tablet Take 1 tablet (100 mg total) by mouth daily. 90 tablet 3     magnesium oxide (MAG-OX) 400 mg (241.3 mg magnesium) tablet 400mg in the morning and 800 mg at bedtime. 270 tablet 3     metoprolol tartrate (LOPRESSOR) 50 MG tablet Take 1.5 tablets (75 mg total) by mouth 2 (two) times a day. 270 tablet 3     omeprazole (PRILOSEC) 20 MG capsule Take 1  capsule (20 mg total) by mouth daily before breakfast. 90 capsule 3     PARoxetine (PAXIL) 20 MG tablet Take 1 tablet (20 mg total) by mouth daily. 90 tablet 3     prednisoLONE acetate (PRED-FORTE) 1 % ophthalmic suspension Administer 1 drop to both eyes 3 (three) times a day.       spironolactone (ALDACTONE) 25 MG tablet TAKE 1 TABLET(25 MG) BY MOUTH DAILY 90 tablet 3     vitamin E 200 UNIT capsule Take 200 Units by mouth daily.       warfarin (COUMADIN/JANTOVEN) 5 MG tablet Take 1/2-1 tablet (2.5-5 mg) daily as directed. Adjust dose per INR results. 90 tablet 1     amLODIPine (NORVASC) 2.5 MG tablet Take 1 tablet (2.5 mg total) by mouth daily. 30 tablet 11     ipratropium-albuterol (DUO-NEB) 0.5-2.5 mg/3 mL nebulizer Take 3 mL by nebulization 3 (three) times a day for 4 days. Then use up to 4 times daily as needed for shortness of breath. 60 vial 2     No current facility-administered medications for this visit.      No Known Allergies  Social History     Tobacco Use     Smoking status: Current Some Day Smoker     Packs/day: 0.50     Years: 60.00     Pack years: 30.00     Last attempt to quit: 2018     Years since quittin.8     Smokeless tobacco: Never Used   Substance Use Topics     Alcohol use: No     Drug use: No

## 2021-06-22 NOTE — TELEPHONE ENCOUNTER
ANTICOAGULATION  MANAGEMENT    Assessment     Today's INR result of 3.0 is Therapeutic (goal INR of 2.0-3.0)        Warfarin taken as previously instructed    No new diet changes affecting INR    No interaction expected between amlodipine and warfarin per micromedex    Continues to tolerate warfarin with no reported s/s of bleeding or thromboembolism     Previous INR was Therapeutic    Plan:     Spoke with Christina regarding INR result and instructed:     Warfarin Dosing Instructions:  Continue current warfarin dose 2.5 mg daily on Tue/Sat; and 5 mg daily rest of week      Instructed patient to follow up no later than: Per OV note PCP has recommended a 2 week recheck d/t starting amlodipine. Scheduled.     Education provided: importance of therapeutic range and target INR goal and significance of current INR result    Christina verbalizes understanding and agrees to warfarin dosing plan.    Instructed to call the [facility] for any changes, questions or concerns. (#[phone])   ?   Valerie Sutton RN    Subjective/Objective:      Christina Umana, a 78 y.o. female is on warfarin.     Christina reports:     Home warfarin dose: as updated on anticoagulation calendar per template     Missed doses: No     Medication changes:  Yes: starting amlodipine     S/S of bleeding or thromboembolism:  No     New Injury or illness:  No     Changes in diet or alcohol consumption:  No     Upcoming surgery, procedure or cardioversion:  No      Anticoagulation Episode Summary     Current INR goal:   2.0-3.0   TTR:   64.4 % (1.7 y)   Next INR check:   1/18/2019   INR from last check:   3.00 (1/4/2019)   Weekly max warfarin dose:      Target end date:   Indefinite   INR check location:      Preferred lab:      Send INR reminders to:   ANTICOAGULATION POOL A ([facility])    Indications    Chronic atrial fibrillation (H) [I48.2]           Comments:            Anticoagulation Care Providers     Provider Role Specialty Phone number     Adan Elizabeth MD [facility] Internal Medicine [phone]

## 2021-06-22 NOTE — PROGRESS NOTES
[facility] Progress Note    Assessment:    1.  Moderate nonischemic cardiomyopathy currently compensated  2.  Chronic atrial fibrillation with good rate control  3.  Essential hypertension    Plan:    Continue patient's current medical regiment.  Would like to see Christina in follow-up in 6 months with no interim cardiac testing ordered    An After Visit Summary was printed and given to the patient.    Subjective:    78-year-old female with known nonischemic cardiomyopathy.  Patient was hospitalized in early August of this year for shortness of breath felt to be due to congestive heart failure as well as a COPD exacerbation.  Echocardiogram done during that admission on August 5 showed ejection fraction of 40% with mild mitral regurgitation.  Over the last 4 months the patient's weight is been stable with no pedal edema, orthopnea or increased dyspnea on exertion.  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)     Esophageal Reflux     Osteopenia     Asymmetrical Polyarticular Inflammation     Hyperlipemia     Essential hypertension     Hyperglycemia     Depression With Anxiety     Chronic atrial fibrillation (H)     Heart failure with reduced ejection fraction (H)     History of colonic polyps     Dilated cardiomyopathy (H)     CKD (chronic kidney disease) stage 3, GFR 30-59 ml/min (H)     Chronic obstructive pulmonary disease, unspecified COPD type (H)     Medication monitoring encounter         Current Outpatient Medications   Medication Sig Dispense Refill     atorvastatin (LIPITOR) 40 MG tablet Take 20 mg by mouth at bedtime.       calcium, as carbonate, (OS-GILL) 500 mg calcium (1,250 mg) tablet Take 1 tablet by mouth 2 (two) times a day.       furosemide (LASIX) 20 MG tablet Take 2 tablets in the morning and 1 tablet in the afternoon or  early evening.. 120 tablet 4     losartan (COZAAR) 100 MG tablet Take 1 tablet (100 mg total) by mouth daily. 90 tablet 3     magnesium oxide (MAG-OX) 400 mg (241.3 mg magnesium) tablet 400mg in the morning and 800 mg at bedtime. 270 tablet 3     metoprolol tartrate (LOPRESSOR) 50 MG tablet Take 1.5 tablets (75 mg total) by mouth 2 (two) times a day. 270 tablet 3     omeprazole (PRILOSEC) 20 MG capsule Take 1 capsule (20 mg total) by mouth daily before breakfast. 90 capsule 3     PARoxetine (PAXIL) 20 MG tablet Take 1 tablet (20 mg total) by mouth daily. 90 tablet 3     prednisoLONE acetate (PRED-FORTE) 1 % ophthalmic suspension Administer 1 drop to both eyes 3 (three) times a day.       spironolactone (ALDACTONE) 25 MG tablet TAKE 1 TABLET(25 MG) BY MOUTH DAILY 90 tablet 3     vitamin E 200 UNIT capsule Take 200 Units by mouth daily.       warfarin (COUMADIN) 5 MG tablet Take 1 tablet (5 mg) daily as directed. Adjust dose per INR results. (Patient taking differently: Take by mouth See Admin Instructions. 2.5mg on Mondays and Fridays; 5mg all other days of week.) 90 tablet 1     ipratropium-albuterol (DUO-NEB) 0.5-2.5 mg/3 mL nebulizer Take 3 mL by nebulization 3 (three) times a day for 4 days. Then use up to 4 times daily as needed for shortness of breath. 60 vial 2     No current facility-administered medications for this visit.        .  Past Surgical History:   Procedure Laterality Date     BREAST BIOPSY Right 2012     BREAST LUMPECTOMY Right 04/03/2009     BUNIONECTOMY Bilateral 1988     COLECTOMY N/A 03/31/2003     COLON SURGERY       CORRECTION HAMMER TOE Bilateral 1988     CYST REMOVAL Left 02/01/2007     HYSTERECTOMY  2004     OOPHORECTOMY  2004     STOMACH SURGERY      hyernia repair     VENTRAL HERNIA REPAIR  02/25/2005       .  Social History     Socioeconomic History     Marital status:      Spouse name: Not on file     Number of children: Not on file     Years of education: Not on file      "Highest education level: Not on file   Social Needs     Financial resource strain: Not on file     Food insecurity - worry: Not on file     Food insecurity - inability: Not on file     Transportation needs - medical: Not on file     Transportation needs - non-medical: Not on file   Occupational History     Not on file   Tobacco Use     Smoking status: Current Some Day Smoker     Packs/day: 0.50     Years: 60.00     Pack years: 30.00     Last attempt to quit: 2018     Years since quittin.8     Smokeless tobacco: Never Used   Substance and Sexual Activity     Alcohol use: No     Drug use: No     Sexual activity: No   Other Topics Concern     Not on file   Social History Narrative     Not on file       .  Family History   Problem Relation Age of Onset     Brain cancer Son 29     Dementia Mother      Stroke Mother      Prostate cancer Father      Aortic aneurysm Father         Abdominal     Hypertension Sister      No Medical Problems Daughter      No Medical Problems Maternal Grandmother      No Medical Problems Maternal Grandfather      No Medical Problems Paternal Grandmother      No Medical Problems Paternal Grandfather      Breast cancer Maternal Aunt 70     No Medical Problems Paternal Aunt      Breast cancer Maternal Aunt      Other Brother         Polio     Other Brother         polio     BRCA 1/2 Neg Hx      Colon cancer Neg Hx      Endometrial cancer Neg Hx      Ovarian cancer Neg Hx      Review of Systems:  General: WNL  Eyes: WNL  Ears/Nose/Throat: WNL  Lungs: Cough  Heart: Irregular Heartbeat  Stomach: Diarrhea  Bladder: WNL  Muscle/Joints: WNL  Skin: WNL  Nervous System: Daytime Sleepiness  Mental Health: Depression     Blood: WNL    Objective:     /90 (Patient Site: Right Arm, Patient Position: Sitting, Cuff Size: Adult Regular)   Pulse 68   Resp 16   Ht 5' 7.5\" (1.715 m)   Wt 164 lb (74.4 kg)   BMI 25.31 kg/m    164 lb (74.4 kg)   [unfilled]    Physical Exam:    GENERAL " APPEARANCE: alert, no apparent distress  HEENT: no scleral icterus or xanthelasma  NECK: jugular venous pressure normal  CHEST: symmetric, the lungs were clear to auscultation  CARDIOVASCULAR: irregular rhythm without murmur, click, or gallop; no carotid bruits  ABDOMEN: nondistended, nontender, bowel sounds present  EXTREMITIES: no cyanosis, clubbing or edema.    Cardiac Testing:    echocardiogram from August 5, 2018 results reviewed as above      Lab Results:    LIPIDS:  Lab Results   Component Value Date    CHOL 108 08/03/2018    CHOL 118 03/19/2015    CHOL 114 09/17/2013     Lab Results   Component Value Date    HDL 37 (L) 08/03/2018    HDL 35 (L) 03/19/2015    HDL 39 (L) 09/17/2013     Lab Results   Component Value Date    LDLCALC 54 08/03/2018    LDLCALC 41 03/19/2015    LDLCALC 47 09/17/2013     Lab Results   Component Value Date    TRIG 83 08/03/2018    TRIG 208 (H) 03/19/2015    TRIG 141 09/17/2013     No components found for: CHOLHDL    BMP:  Lab Results   Component Value Date    CREATININE 1.48 (H) 11/19/2018    BUN 39 (H) 11/19/2018     11/19/2018    K 3.7 11/19/2018    CL 98 11/19/2018    CO2 26 11/19/2018         Felix Beckett MD,F.A.C.C.  [facility]  [phone]

## 2021-06-22 NOTE — TELEPHONE ENCOUNTER
Pt given amlodipine for htn 01 04 19  Besylate on the bottle says for pain and it was a mistake and was supposed to say po not pain  Called pharmacy and it is ok to take, by pain was a mistake.     The pill is also pinkish peach in color but pt has not had before.    She will take med as ordered.    Anna Hutchinson, RN [facility] RN Triage          Reason for Disposition    Caller has medication question about med not prescribed by PCP and triager unable to answer question (e.g., compatibility with other med, storage)    Protocols used: MEDICATION QUESTION CALL-A-

## 2021-06-23 NOTE — TELEPHONE ENCOUNTER
ANTICOAGULATION  MANAGEMENT    Assessment     Today's INR result of 2.4 is Therapeutic (goal INR of 2.0-3.0)        Less warfarin taken than instructed which may be affecting INR    Decreased greens/vitamin K intake may be affecting INR    taking Oxycodone and extra strength Tyelenol.    Continues to tolerate warfarin with no reported s/s of bleeding or thromboembolism     Previous INR was Supratherapeutic-3.4 at hospital discharge    Plan:     Spoke with Christina regarding INR result and instructed:     Warfarin Dosing Instructions:  Alternate 5 mg today and 2.5 mg until next INR.    Instructed patient to follow up no later than: one week.    Education provided: importance of therapeutic range, importance of following up for INR monitoring at instructed interval and importance of taking warfarin as instructed    Christina verbalizes understanding and agrees to warfarin dosing plan.    Instructed to call the [facility] for any changes, questions or concerns. (#[phone])   ?   Graciela Conde RN    Subjective/Objective:      Christina Umana, a 78 y.o. female is on warfarin.     Christina reports:     Home warfarin dose: verbally confirmed home dose with Christina and updated on anticoagulation calendar     Missed doses: Yes: past three days holding for high INR after fall.     Medication changes:  Yes: taking oxycodone and extra strength tylenol.     S/S of bleeding or thromboembolism:  No     New Injury or illness:  Yes: fell with fractured ribs and pneumothorax.      Changes in diet or alcohol consumption:  Yes:  just  and wake today. So not eating well.     Upcoming surgery, procedure or cardioversion:  No    Anticoagulation Episode Summary     Current INR goal:   2.0-3.0   TTR:   66.2 % (1.8 y)   Next INR check:   2019   INR from last check:   2.40 (2019)   Weekly max warfarin dose:      Target end date:   Indefinite   INR check location:      Preferred lab:      Send INR reminders to:    ANTICOAGULATION POOL A (WBY,WBE,MID,RSC)    Indications    Chronic atrial fibrillation (H) [I48.2]           Comments:            Anticoagulation Care Providers     Provider Role Specialty Phone number    Adan Elizabeth MD Referring Internal Medicine [phone]

## 2021-06-23 NOTE — PATIENT INSTRUCTIONS - HE
Christina Umana,    It was a pleasure to see you today at the [facility].     My recommendations after this visit include:  - No changes to your medications.    - You are due to see Dr. Beckett in June.  - Follow up with me in August.   - Continue to monitor for signs of retaining fluid (increasing weights, shortness of breath, swelling) and call with any concerns. [phone]      Betsey Rizvi, CNP

## 2021-06-23 NOTE — TELEPHONE ENCOUNTER
RN cannot approve Refill Request    RN can NOT refill this medication historical medication requested. Last office visit: 5/14/2018 Adan Elizabeth MD Last Physical: 10/10/2017 Last MTM visit: Visit date not found Last visit same specialty: 1/4/2019 Thor Santamaria MD.  Next visit within 3 mo: Visit date not found  Next physical within 3 mo: Visit date not found  Last OV 11/19/2018  Next OV 2/5/2019    Marnie Cardenas, [facility] Triage/Med Refill 1/24/2019    Requested Prescriptions   Pending Prescriptions Disp Refills     atorvastatin (LIPITOR) 40 MG tablet [Pharmacy Med Name: ATORVASTATIN 40MG TABLETS] 45 tablet 0     Sig: TAKE 1/2 TABLET(20 MG) BY MOUTH DAILY    Statins Refill Protocol (Hmg CoA Reductase Inhibitors) Passed - 1/21/2019 12:58 PM       Passed - PCP or prescribing provider visit in past 12 months     Last office visit with prescriber/PCP: 5/14/2018 Adan Elizabeth MD OR same dept: 1/4/2019 Thor Santamaria MD OR same specialty: 1/4/2019 Thor Santamaria MD  Last physical: 10/10/2017 Last MTM visit: Visit date not found   Next visit within 3 mo: Visit date not found  Next physical within 3 mo: Visit date not found  Prescriber OR PCP: Adan Elizabeth MD  Last diagnosis associated with med order: 1. Hyperlipidemia  - atorvastatin (LIPITOR) 40 MG tablet [Pharmacy Med Name: ATORVASTATIN 40MG TABLETS]; TAKE 1/2 TABLET(20 MG) BY MOUTH DAILY  Dispense: 45 tablet; Refill: 0    If protocol passes may refill for 12 months if within 3 months of last provider visit (or a total of 15 months).

## 2021-06-23 NOTE — TELEPHONE ENCOUNTER
ANTICOAGULATION  MANAGEMENT    Assessment     Today's INR result of 2.8 is Therapeutic (goal INR of 2.0-3.0)        Warfarin taken as previously instructed    No new diet changes affecting INR    No new medication/supplements affecting INR    Continues to tolerate warfarin with no reported s/s of bleeding or thromboembolism     Previous INR was Therapeutic    Plan:     Spoke with Christina regarding INR result and instructed: Her  is at home on [facility]. She has a lot of family support and doing as good as expected.     Warfarin Dosing Instructions:  Continue current warfarin dose 2.5 mg daily on Tuesdays and Saturdays; and 5 mg daily rest of week  (0 % change)    Instructed patient to follow up no later than: one month. Has office visit in about 3 weeks will do at that time.    Education provided: importance of therapeutic range, importance of following up for INR monitoring at instructed interval and importance of taking warfarin as instructed    Christina verbalizes understanding and agrees to warfarin dosing plan.    Instructed to call the [facility] for any changes, questions or concerns. (#[phone])   ?   Graciela Conde RN    Subjective/Objective:      Christina Umana, a 78 y.o. female is on warfarin.     Christina reports:     Home warfarin dose: verbally confirmed home dose with Christina and updated on anticoagulation calendar     Missed doses: No     Medication changes:  No     S/S of bleeding or thromboembolism:  No     New Injury or illness:  No     Changes in diet or alcohol consumption:  No     Upcoming surgery, procedure or cardioversion:  No    Anticoagulation Episode Summary     Current INR goal:   2.0-3.0   TTR:   65.2 % (1.8 y)   Next INR check:   2/15/2019   INR from last check:   2.80 (1/18/2019)   Weekly max warfarin dose:      Target end date:   Indefinite   INR check location:      Preferred lab:      Send INR reminders to:   ANTICOAGULATION POOL A (WBY,WBE,MID,RSC)    Indications    Chronic  atrial fibrillation (H) [I48.2]           Comments:            Anticoagulation Care Providers     Provider Role Specialty Phone number    Adan Elizabeth MD Referring Internal Medicine [phone]

## 2021-06-24 NOTE — TELEPHONE ENCOUNTER
ANTICOAGULATION  MANAGEMENT    Assessment     Today's INR result of 2.7 is Therapeutic (goal INR of 2.0-3.0)        Warfarin taken as previously instructed    No new diet changes affecting INR    No new medication/supplements affecting INR    Continues to tolerate warfarin with no reported s/s of bleeding or thromboembolism     Previous INR was Therapeutic    Plan:     Spoke with Christina regarding INR result and instructed:     Warfarin Dosing Instructions:  Continue current warfarin dose 5 mg daily on Sundays and Wednesdays; and 2.5 mg daily rest of week  (0 % change)    Instructed patient to follow up no later than: two weeks    Education provided: importance of therapeutic range, importance of following up for INR monitoring at instructed interval and importance of taking warfarin as instructed    Christina verbalizes understanding and agrees to warfarin dosing plan.    Instructed to call the [facility] for any changes, questions or concerns. (#[phone])   ?   Graciela Conde RN    Subjective/Objective:      Christina Umana, a 78 y.o. female is on warfarin.     Christina reports:     Home warfarin dose: verbally confirmed home dose with Christina and updated on anticoagulation calendar     Missed doses: No     Medication changes:  No     S/S of bleeding or thromboembolism:  No     New Injury or illness:  No-ribs still hurt a little but getting better.     Changes in diet or alcohol consumption:  No     Upcoming surgery, procedure or cardioversion:  No    Anticoagulation Episode Summary     Current INR goal:   2.0-3.0   TTR:   66.7 % (1.9 y)   Next INR check:   3/19/2019   INR from last check:   2.70 (3/5/2019)   Weekly max warfarin dose:      Target end date:   Indefinite   INR check location:      Preferred lab:      Send INR reminders to:   ANTICOAGULATION POOL A (WBY,WBE,MID,RSC)    Indications    Chronic atrial fibrillation (H) [I48.2]           Comments:            Anticoagulation Care Providers     Provider Role  Specialty Phone number    Adan Elizabeth MD Referring Internal Medicine [phone]

## 2021-06-24 NOTE — PROGRESS NOTES
[facility] clinic Follow Up Note    Christina Umana   78 y.o. female    Date of Visit: 2/15/2019    Chief Complaint   Patient presents with     ED Follow-up     Subjective  Christina is here for hospital follow-up after falling on the ice and falling against a recycling bin and fracturing her left fifth, sixth, seventh ribs with a small pneumothorax.  This occurred on February 5.  She did go to [facility].  Full body CT scan did not show any pelvic fracture or other evidence of injury.    Lab work at that time showed a hemoglobin of 15.9.  Platelets borderline at 107.  Creatinine 1.3 and potassium 4.4.    She did not hit her head or lose consciousness.  They did not do brain imaging.    Her INR was high at the time, Coumadin was adjusted and INR on February 7 was 2.4.    Patient has underlying COPD, still smokes 1-4 cigarettes a day.  She has home oxygen at night which she is using.  Using her DuoNeb 3 times a day.    Suspected sleep apnea and moderate severe kyphosis.  But she is refused a sleep study and does not use CPAP.    She has chronic systolic congestive heart failure with August 2018 echo showing 40% ejection fraction with mild mitral regurgitation.  Still on the Lasix 40 mg in the morning and 20 mg in the afternoon.    She had moderate dyspnea on exertion at baseline, she states it is about 25% worse now.    Also 2 days ago she developed a cough, she thinks she is coming down with a cold.  No fever or purulent sputum or sinusitis symptoms.    She is using Tylenol 2 tablets 3 times a day and one tramadol at night for pain control.  Pain is fairly well controlled, just described as moderate when she takes a deep breath or coughs.  No hemoptysis.    She did not have a chest tube put in the hospital.    She has irritable bowel, but that is fairly well controlled with 2 scoops of Citrucel daily.    No worsening of her irritable bladder and no dysuria or UTI symptoms.    Chronic anxiety is stable on  Paxil.  She is dealing with the death of her  on February 3.  He had been ill for some time.    Past history of macular degeneration.    Past history of colon polyps and previous colon cancer.  August 2017 colonoscopy with polyps x3 and 3-year follow-up plan.    Patient had been given amlodipine 1 month ago because of elevated blood pressure of 154/84 in clinic.  When she saw cardiology on January 23 her blood pressure was 110/72 and tolerating the amlodipine.    She continues on losartan 100 mg a day and metoprolol 75 mg twice daily and spinal lactone 25 mg a day.    Apparently with some confusion at her pharmacy and she was not able to get a refill earlier this month for her amlodipine and is not on it currently.    PMHx:    Past Medical History:   Diagnosis Date     Arthritis      Cancer (H) 2005     Clotting disorder (H) 4/2017     Degenerative disc disease, lumbar      Emphysema lung (H)      Hyperlipidemia      Hypertension      Kidney stone 4/2017     Scoliosis      Ventral hernia      PSHx:    Past Surgical History:   Procedure Laterality Date     BREAST BIOPSY Right 2012     BREAST LUMPECTOMY Right 04/03/2009     BUNIONECTOMY Bilateral 1988     COLECTOMY N/A 03/31/2003     COLON SURGERY       CORRECTION HAMMER TOE Bilateral 1988     CYST REMOVAL Left 02/01/2007     HYSTERECTOMY  2004     OOPHORECTOMY  2004     STOMACH SURGERY      hyernia repair     VENTRAL HERNIA REPAIR  02/25/2005     Immunizations:   Immunization History   Administered Date(s) Administered     Influenza M6s4-14, 02/01/2010     Influenza high dose, seasonal 09/14/2016, 09/24/2018     Influenza, Seasonal, Inj PF IIV3 09/30/2010, 09/13/2011     Influenza, inj, historic,unspecified 10/22/2007, 10/27/2008, 09/28/2009, 09/17/2013, 10/09/2015, 09/25/2017     Influenza, seasonal,quad inj 6-35 mos 09/27/2012     Pneumo Conj 13-V (2010&after) 03/19/2015     Pneumo Polysac 23-V 11/15/2002, 11/18/2008     Td,adult,historic,unspecified  01/08/2004     Tdap 10/28/2016     ZOSTER, LIVE 10/08/2015       ROS A comprehensive review of systems was performed and was otherwise negative    Medications, allergies, and problem list were reviewed and updated    Exam  /86 (Patient Site: Right Arm, Patient Position: Sitting, Cuff Size: Adult Large)   Pulse 76   Resp 20   Wt 165 lb (74.8 kg)   SpO2 96%   BMI 25.46 kg/m    Moderate kyphosis.  Stable chest wall, no crepitus.  Just mild to moderate tenderness along the left chest wall and the fifth the seventh rib area.  No ecchymosis.  Decreased breath sounds, but similar to previous.  No wheezing or crackles.  She does have reduced respiratory excursion, moderately worse than previous.  Heart is irregularly irregular consistent with her chronic atrial fibrillation.  No murmur or rub.  No ankle edema.  Abdomen nontender.  She is still alert and oriented x3, no acute confusion or delirium.    Assessment/Plan  1. Closed fracture of multiple ribs of left side with routine healing, subsequent encounter  Moderate pain.  Continue Tylenol 3 times a day and tramadol at night.    Use oxygen at night.    2. Chronic systolic congestive heart failure (H)  Compensated.  Continue current medications and follow-up next month.  Continue current furosemide.  - amLODIPine (NORVASC) 2.5 MG tablet; Take 1 tablet (2.5 mg total) by mouth daily.  Dispense: 30 tablet; Refill: 11    3. Essential hypertension  Blood pressure too high.  Restart amlodipine.  Follow-up next month  - amLODIPine (NORVASC) 2.5 MG tablet; Take 1 tablet (2.5 mg total) by mouth daily.  Dispense: 30 tablet; Refill: 11    Mild to moderate chronic renal insufficiency with creatinine 1.3-1.5.  Plan to recheck labs next visit.    4. Chronic obstructive pulmonary disease, unspecified COPD type (H)  Just developing a mild viral URI currently.  The patient was warned she may have worsening symptoms and bronchitis.  If she does have a fever or worsening purulent  sputum suggestive of bronchitis, she was told to contact me and I would send in a Z-Kain antibiotic for her which she is tolerated in the past.    I did discuss risks of antibiotics including heart arrhythmia risk, diarrhea risk, allergic risk, interaction with her warfarin risk    5. Chronic atrial fibrillation (H)  Rate controlled with metoprolol.  Chronic warfarin.  INR rechecked today.  - INR    6. Depression With Anxiety  Recent death of .  Continue Paxil.  Patient reports she is functioning adequately and does not need mental health referral currently.    Constipation with irritable bowel.  Continue omeprazole.  Continue 2 scoops of Citrucel daily.    Return in about 4 weeks (around 3/15/2019) for Recheck.   Patient Instructions   Restart amlodipine 2.5 mg a day.    Continue your oxygen at night and you can use during the day if you are at all short of breath.    Stop smoking now.    You can increase your nebulizer to 4 times a day, if you need to.    Continue current pain medication.    If your cough significant Alberto worsens or you develop a fever above 101 degrees, contact me and you could consider starting azithromycin antibiotic.    INR check today.    See me in 1 month to reevaluate blood pressure and other medical issues.    Thor Santamaria MD  I spent a total time with patient of over 25 minutes and over 50% coord care.  Time all face to face.      Current Outpatient Medications   Medication Sig Dispense Refill     atorvastatin (LIPITOR) 40 MG tablet TAKE 1/2 TABLET(20 MG) BY MOUTH DAILY 45 tablet 2     calcium, as carbonate, (OS-GILL) 500 mg calcium (1,250 mg) tablet Take 1 tablet by mouth 2 (two) times a day.       furosemide (LASIX) 20 MG tablet Take 2 tablets in the morning and 1 tablet in the afternoon or early evening.. 120 tablet 4     losartan (COZAAR) 100 MG tablet Take 1 tablet (100 mg total) by mouth daily. 90 tablet 3     magnesium oxide (MAG-OX) 400 mg (241.3 mg magnesium) tablet 400mg  in the morning and 800 mg at bedtime. 270 tablet 3     metoprolol tartrate (LOPRESSOR) 50 MG tablet Take 1.5 tablets (75 mg total) by mouth 2 (two) times a day. 270 tablet 3     omeprazole (PRILOSEC) 20 MG capsule Take 1 capsule (20 mg total) by mouth daily before breakfast. 90 capsule 3     PARoxetine (PAXIL) 20 MG tablet Take 1 tablet (20 mg total) by mouth daily. 90 tablet 3     prednisoLONE acetate (PRED-FORTE) 1 % ophthalmic suspension Administer 1 drop to both eyes 3 (three) times a day.       spironolactone (ALDACTONE) 25 MG tablet TAKE 1 TABLET(25 MG) BY MOUTH DAILY 90 tablet 3     vitamin E 200 UNIT capsule Take 200 Units by mouth daily.       warfarin (COUMADIN/JANTOVEN) 5 MG tablet Take 1/2-1 tablet (2.5-5 mg) daily as directed. Adjust dose per INR results. 90 tablet 1     amLODIPine (NORVASC) 2.5 MG tablet Take 1 tablet (2.5 mg total) by mouth daily. 30 tablet 11     ipratropium-albuterol (DUO-NEB) 0.5-2.5 mg/3 mL nebulizer Take 3 mL by nebulization 3 (three) times a day for 4 days. Then use up to 4 times daily as needed for shortness of breath. 60 vial 2     No current facility-administered medications for this visit.      No Known Allergies  Social History     Tobacco Use     Smoking status: Current Some Day Smoker     Packs/day: 0.50     Years: 60.00     Pack years: 30.00     Last attempt to quit: 2018     Years since quittin.9     Smokeless tobacco: Never Used   Substance Use Topics     Alcohol use: No     Drug use: No

## 2021-06-24 NOTE — TELEPHONE ENCOUNTER
Patient notified, expressed understanding.       Margaret Santos, [facility]  4:33 PM  2/19/2019

## 2021-06-24 NOTE — TELEPHONE ENCOUNTER
ANTICOAGULATION  MANAGEMENT    Assessment     Today's INR result of 2.6 is Therapeutic (goal INR of 2.0-3.0)        Warfarin taken as previously instructed    Increased greens/vitamin K intake may be affecting INR    Potential interaction between z pack and warfarin which may affect subsequent INRs    Continues to tolerate warfarin with no reported s/s of bleeding or thromboembolism     Previous INR was Supratherapeutic    Plan:     Spoke with Christina regarding INR result and instructed:     Warfarin Dosing Instructions:  Continue current warfarin dose 5 mg daily on Sundays and Wednesdays; and 2.5 mg daily rest of week  (0 % change)    Instructed patient to follow up no later than: 1-2 weeks.    Education provided: importance of therapeutic range, importance of following up for INR monitoring at instructed interval and importance of taking warfarin as instructed    Christina verbalizes understanding and agrees to warfarin dosing plan.    Instructed to call the [facility] for any changes, questions or concerns. (#[phone])   ?   Graciela Conde RN    Subjective/Objective:      Christina Umana, a 78 y.o. female is on warfarin.     Christina reports:     Home warfarin dose: verbally confirmed home dose with Christina and updated on anticoagulation calendar     Missed doses: No     Medication changes:  No-she has one day left of z pack     S/S of bleeding or thromboembolism:  No     New Injury or illness:  No     Changes in diet or alcohol consumption:  Yes: she did eat a big salad yesteday     Upcoming surgery, procedure or cardioversion:  No    Anticoagulation Episode Summary     Current INR goal:   2.0-3.0   TTR:   66.1 % (1.8 y)   Next INR check:   3/8/2019   INR from last check:   2.60 (2/22/2019)   Weekly max warfarin dose:      Target end date:   Indefinite   INR check location:      Preferred lab:      Send INR reminders to:   ANTICOAGULATION POOL A (WBY,WBE,MID,RSC)    Indications    Chronic atrial fibrillation (H)  [I48.2]           Comments:            Anticoagulation Care Providers     Provider Role Specialty Phone number    Adan Elizabeth MD Referring Internal Medicine [phone]

## 2021-06-24 NOTE — TELEPHONE ENCOUNTER
"Medication Request  Medication name:azithromycin  Pharmacy Name and Location: [facility]   Reason for request: Cough has worsened since OV 2/15/19. Patient has broken ribs and \"it really hurts\".   When did you use medication last?:  n/a  Okay to leave a detailed message: yes      If your cough significant Alberto worsens or you develop a fever above 101 degrees, contact me and you could consider starting azithromycin antibiotic.  "

## 2021-06-24 NOTE — PATIENT INSTRUCTIONS - HE
Restart amlodipine 2.5 mg a day.    Continue your oxygen at night and you can use during the day if you are at all short of breath.    Stop smoking now.    You can increase your nebulizer to 4 times a day, if you need to.    Continue current pain medication.    If your cough significantly worsens or you develop a fever above 101 degrees, contact me and you could consider starting azithromycin antibiotic.    INR check today.    See me in 1 month to reevaluate blood pressure and other medical issues.

## 2021-06-24 NOTE — TELEPHONE ENCOUNTER
Patient will address at her appointment on March 18, 2019 with Dr. Santamaria.  Danii Bowie [facility] ............... 2:23 PM, 02/20/19

## 2021-06-24 NOTE — TELEPHONE ENCOUNTER
Tell patient I did send in the Z-Kain that she can start now.  If she does develop a fever above 101, or significantly worsening shortness of breath, she should get evaluated in clinic or emergency room.

## 2021-06-24 NOTE — TELEPHONE ENCOUNTER
ANTICOAGULATION  MANAGEMENT    Assessment     Today's INR result of 3.3 is Supratherapeutic (goal INR of 2.0-3.0)        Less warfarin taken than instructed which may be affecting INR    No new diet changes affecting INR    Interaction between Tylenol (taking 2 tabs 3x a day) and warfarin may be affecting INR    Continues to tolerate warfarin with no reported s/s of bleeding or thromboembolism     Previous INR was Therapeutic (on two holds and one missed dose)    Plan:     Spoke with Christina regarding INR result and instructed:     Warfarin Dosing Instructions:  Change warfarin dose to 5 mg daily on Sun/Wed; and 2.5 mg daily rest of week  (10 % change from amount taken in the last week)    Instructed patient to follow up no later than: 7-10 days    Education provided: importance of therapeutic range, target INR goal and significance of current INR result and potential interaction between warfarin and tylenol    Christina verbalizes understanding and agrees to warfarin dosing plan.    Instructed to call the [facility] for any changes, questions or concerns. (#[phone])   ?   Mulu Ralph RN    Subjective/Objective:      Christina Umana, a 78 y.o. female is on warfarin.     Christina reports:     Home warfarin dose: verbally confirmed home dose with Christina and updated on anticoagulation calendar     Missed doses: No     Medication changes:  Yes: Tylenol 2 tabs 3x a day     S/S of bleeding or thromboembolism:  No     New Injury or illness:  No     Changes in diet or alcohol consumption:  No     Upcoming surgery, procedure or cardioversion:  No    Anticoagulation Episode Summary     Current INR goal:   2.0-3.0   TTR:   66.2 % (1.8 y)   Next INR check:   2/25/2019   INR from last check:   3.30! (2/15/2019)   Weekly max warfarin dose:      Target end date:   Indefinite   INR check location:      Preferred lab:      Send INR reminders to:   ANTICOAGULATION POOL A (WBY,WBE,MID,RSC)    Indications    Chronic atrial  fibrillation (H) [I48.2]           Comments:            Anticoagulation Care Providers     Provider Role Specialty Phone number    Adan Elizabeth MD Referring Internal Medicine [phone]

## 2021-06-24 NOTE — TELEPHONE ENCOUNTER
Contact patient.  Tell her that there is a certificate of medical necessity for oxygen that needs to be filled out.    I will need to have her oxygen tested, unless she knows of a place where she has had her oxygen tested within the last year?  I could not find any oxygen testing that would qualify her for oxygen on the chart.    Have her make an appointment to have her oxygen levels tested at rest and with ambulation to see if she still qualifies for home oxygen.    I will keep the paperwork until she comes in to have her oxygen evaluated

## 2021-06-24 NOTE — TELEPHONE ENCOUNTER
ANTICOAGULATION  MANAGEMENT - BPA Interacting Medication Review    Interacting medication(s): Azithromycin with warfarin.    Duration: 5 days, 2/19 to 2/23    New medication?:  Yes, interaction per Micromedex, may increase INR and risk of bleeding.    Plan:    Spoke with Christina regarding potential interaction.     Warfarin instructions: continue current warfarin dose    Follow up INR recommended in:  2/22     Anticoagulation calendar updated    Mulu Ralph RN

## 2021-06-24 NOTE — TELEPHONE ENCOUNTER
Who is calling:  patient  Reason for Call:   Calling to check on the medication requested  Date of last appointment with primary care:   2/15/2019  Has the patient been recently seen:  Yes  Okay to leave a detailed message: Yes

## 2021-06-25 NOTE — TELEPHONE ENCOUNTER
Evaluation for nocturnal hypoxia.  Needs documentation in order to continue her nocturnal oxygen.    Likely needs an overnight oximetry study.    Refer to sleep clinic to get that set up.

## 2021-06-25 NOTE — PATIENT INSTRUCTIONS - HE
Continue with current medications at this time.    Follow-up in early May to reevaluate blood pressure.    Lab work to check potassium and kidney labs today.  INR check today.    Make an appointment with your ophthalmologist for your follow-up appointment.    Quit smoking before April 13.

## 2021-06-25 NOTE — PROGRESS NOTES
[facility] clinic Follow Up Note    Christina Umana   78 y.o. female    Date of Visit: 3/18/2019    Chief Complaint   Patient presents with     4 week follow-up     Recheck of hypertension, cough, and other medical issues. (Last visit: 02/15/2019.)     INR Check     Subjective  Christina is here for follow-up of multiple medical problems.    She did develop URI in February and was treated with a Z-Kain.  Her cough is essentially resolved now.  Back to baseline dyspnea on exertion.    She had a fall on  with 3 rib fractures on the left side, that is largely better now, minimal pain.  Just Tylenol as needed.    Full body CT scan otherwise negative.  No lung mass or nodule.  No pelvic fracture.    Still using nebulizer 3-4 times a day.  Using oxygen at night.  Moderate dyspnea on exertion is baseline.    Suspected sleep apnea with moderate kyphosis but she has refused sleep study or consideration of CPAP.    She does have chronic systolic congestive heart failure.  2018 echo with ejection fraction 40% with mild mitral regurgitation.    She also has mild renal insufficiency with creatinine last checked at 1.3 last month.    She has no lower extremity edema currently.  No orthopnea.    She had a worsening creatinine on 40 mg twice daily Lasix in the past.  Currently on 40 mill grams in the morning and 20 mill grams at night.    Also on losartan 100 mg a day and spinal lactone 25 mg a day.  Metoprolol 75 mg twice daily.    Last month I had her restart amlodipine 2.5 mg a day which she was not taking at the time.    She did not notice any side effects when restarting amlodipine.  No lightheaded dizzy spells or new fatigue.    Chronic anxiety stable.  Moving in with her son.  Her   last month.    Chronic atrial fibrillation without palpitations.  No falls or bleeding issues on the warfarin.    She has chronic macular degeneration.  She is overdue for ophthalmology follow-up.  No visual  changes.    August 2017 colonoscopy positive for 3 polyps.  She was given a 3-year follow-up plan.  Irritable bowel, currently controlled with Citrucel 2 scoops twice a day.    PMHx:    Past Medical History:   Diagnosis Date     Arthritis      Cancer (H) 2005     Clotting disorder (H) 4/2017     Degenerative disc disease, lumbar      Emphysema lung (H)      Hyperlipidemia      Hypertension      Kidney stone 4/2017     Scoliosis      Ventral hernia      PSHx:    Past Surgical History:   Procedure Laterality Date     BREAST BIOPSY Right 2012     BREAST LUMPECTOMY Right 04/03/2009     BUNIONECTOMY Bilateral 1988     COLECTOMY N/A 03/31/2003     COLON SURGERY       CORRECTION HAMMER TOE Bilateral 1988     CYST REMOVAL Left 02/01/2007     HYSTERECTOMY  2004     OOPHORECTOMY  2004     STOMACH SURGERY      hyernia repair     VENTRAL HERNIA REPAIR  02/25/2005     Immunizations:   Immunization History   Administered Date(s) Administered     Influenza [id number], 02/01/2010     Influenza high dose, seasonal 09/14/2016, 09/24/2018     Influenza, Seasonal, Inj PF IIV3 09/30/2010, 09/13/2011     Influenza, inj, historic,unspecified 10/22/2007, 10/27/2008, 09/28/2009, 09/17/2013, 10/09/2015, 09/25/2017     Influenza, seasonal,quad inj 6-35 mos 09/27/2012     Pneumo Conj 13-V (2010&after) 03/19/2015     Pneumo Polysac 23-V 11/15/2002, 11/18/2008     Td,adult,historic,unspecified 01/08/2004     Tdap 10/28/2016     ZOSTER, LIVE 10/08/2015       ROS A comprehensive review of systems was performed and was otherwise negative    Medications, allergies, and problem list were reviewed and updated    Exam  /80 (Patient Site: Right Arm, Patient Position: Sitting, Cuff Size: Adult Regular)   Pulse (!) 52   Resp 16   Wt 164 lb 14.4 oz (74.8 kg)   SpO2 98%   BMI 25.45 kg/m    No jaundice.  Lungs with decreased breath sounds throughout but no wheezing or crackles.  Moderate kyphosis.  No crepitus, chest wall stable.  Heart is  irregularly irregular but good heart rate control.  No murmur or gallop.  No ankle edema.  Abdomen is nontender.  Able to clamp on the exam table.  Alert and oriented x3 with good mood and affect today.    Oxygen saturation on room air at rest was 97%.  Oxygen saturation on room air with walking was 94%.  On 2 L of oxygen at rest was O2 sat was 100% and with walking was 100%.    Assessment/Plan  1. Chronic systolic congestive heart failure (H)  Appears compensated.  Continue on current diuretic and other medications.    Follow-up in May after she has moved in with her son-in-law.    Her blood pressure is still borderline high today and I would plan to increase amlodipine further up to 5 mg a day.    She refuses sleep apnea evaluation despite suspicion.    2. Closed fracture of multiple ribs of left side with routine healing, subsequent encounter  Ribs are largely healed.  Pain no longer an issue.    3. Essential hypertension  As above    Chronic renal insufficiency, checking labs today.    4. Chronic atrial fibrillation (H)  Rate controlled with metoprolol.  Coumadin.  - INR    5. Chronic obstructive pulmonary disease, unspecified COPD type (H)  Back to baseline.  Continue nebulizers.    She is moving in with her son-in-law's who does not smoke, she is planning to quit before April 13 moved 8.  - ipratropium-albuterol (DUO-NEB) 0.5-2.5 mg/3 mL nebulizer; Take 3 mL by nebulization 3 (three) times a day. Then use up to 4 times daily as needed for shortness of breath.  Dispense: 60 vial; Refill: 6    URI from last month resolved after Z-Kain.    She does wear chronic oxygen.  She has had symptom medic benefit from wearing chronic oxygen and I continue to recommend it.  She mainly uses it at night at this time.    Oxygen saturation was okay in clinic today.  Suspected sleep apnea with nocturnal hypoxia.    A overnight oximetry study was ordered, patient was told to talk to the  to see if they could get that set  up.  May need referral to the pulmonary clinic if we cannot set that up through our clinic.    6. Medication monitoring encounter    - Basic Metabolic Panel    Chronic anxiety, stable on Paxil.  Is getting good family support after the death of her  earlier this year.    Previous colon polyps, multiple.  3-year follow-up colonoscopy August 2020, will plan reevaluation on her clinical state at that time to determine plan.    Return in about 7 weeks (around 5/6/2019) for Recheck.   Patient Instructions   Continue with current medications at this time.    Follow-up in early May to reevaluate blood pressure.    Lab work to check potassium and kidney labs today.  INR check today.    Make an appointment with your ophthalmologist for your follow-up appointment.    Quit smoking before April 13.        Thor Santamaria MD  I spent a total time with patient of over 25 minutes and over 50% coord care.  Time all face to face.      Current Outpatient Medications   Medication Sig Dispense Refill     amLODIPine (NORVASC) 2.5 MG tablet Take 1 tablet (2.5 mg total) by mouth daily. 30 tablet 11     atorvastatin (LIPITOR) 40 MG tablet TAKE 1/2 TABLET(20 MG) BY MOUTH DAILY 45 tablet 2     calcium, as carbonate, (OS-GILL) 500 mg calcium (1,250 mg) tablet Take 1 tablet by mouth 2 (two) times a day.       furosemide (LASIX) 20 MG tablet Take 2 tablets in the morning and 1 tablet in the afternoon or early evening.. 120 tablet 4     losartan (COZAAR) 100 MG tablet Take 1 tablet (100 mg total) by mouth daily. 90 tablet 3     magnesium oxide (MAG-OX) 400 mg (241.3 mg magnesium) tablet 400mg in the morning and 800 mg at bedtime. 270 tablet 3     metoprolol tartrate (LOPRESSOR) 50 MG tablet Take 1.5 tablets (75 mg total) by mouth 2 (two) times a day. 270 tablet 3     omeprazole (PRILOSEC) 20 MG capsule Take 1 capsule (20 mg total) by mouth daily before breakfast. 90 capsule 3     PARoxetine (PAXIL) 20 MG tablet Take 1 tablet (20 mg total)  by mouth daily. 90 tablet 3     prednisoLONE acetate (PRED-FORTE) 1 % ophthalmic suspension Administer 1 drop to both eyes 3 (three) times a day.       spironolactone (ALDACTONE) 25 MG tablet TAKE 1 TABLET(25 MG) BY MOUTH DAILY 90 tablet 3     vitamin E 200 UNIT capsule Take 200 Units by mouth daily.       warfarin (COUMADIN/JANTOVEN) 5 MG tablet Take 1/2-1 tablet (2.5-5 mg) daily as directed. Adjust dose per INR results. 90 tablet 1     ipratropium-albuterol (DUO-NEB) 0.5-2.5 mg/3 mL nebulizer Take 3 mL by nebulization 3 (three) times a day. Then use up to 4 times daily as needed for shortness of breath. 60 vial 6     No current facility-administered medications for this visit.      No Known Allergies  Social History     Tobacco Use     Smoking status: Current Some Day Smoker     Packs/day: 0.25     Years: 60.00     Pack years: 15.00     Last attempt to quit: 2018     Years since quittin.0     Smokeless tobacco: Never Used   Substance Use Topics     Alcohol use: No     Drug use: No

## 2021-06-25 NOTE — TELEPHONE ENCOUNTER
Dr. Santamaria,  I spoke with both the pulmonary and sleep clinic. Per the sleep clinic please place an Ambulatory Referral to Sleep. Patient will need to have a consult first.     [facility] is booking out to May. Please advise if this is urgent and SS can refer to [facility] [place] [facility] or [facility].

## 2021-06-25 NOTE — TELEPHONE ENCOUNTER
ANTICOAGULATION  MANAGEMENT    Assessment     Today's INR result of 2.5 is Therapeutic (goal INR of 2.0-3.0)        Warfarin taken as previously instructed    No new diet changes affecting INR    No new medication/supplements affecting INR    Continues to tolerate warfarin with no reported s/s of bleeding or thromboembolism     Previous INR was Therapeutic    Plan:     Spoke with Christina regarding INR result and instructed:     Warfarin Dosing Instructions:  Continue current warfarin dose 5 mg daily on Sundays and Wednesdays; and 2.5 mg daily rest of week  (0 % change)    Instructed patient to follow up no later than: one month.    Education provided: importance of therapeutic range, importance of following up for INR monitoring at instructed interval and importance of taking warfarin as instructed    Christina verbalizes understanding and agrees to warfarin dosing plan.    Instructed to call the [facility] for any changes, questions or concerns. (#[phone])   ?   Graciela Conde RN    Subjective/Objective:      Christina Umana, a 78 y.o. female is on warfarin.     Christina reports:     Home warfarin dose: verbally confirmed home dose with Christina and updated on anticoagulation calendar     Missed doses: No     Medication changes:  No     S/S of bleeding or thromboembolism:  No     New Injury or illness:  No     Changes in diet or alcohol consumption:  No     Upcoming surgery, procedure or cardioversion:  No    Anticoagulation Episode Summary     Current INR goal:   2.0-3.0   TTR:   67.3 % (1.9 y)   Next INR check:   4/15/2019   INR from last check:   2.50 (3/18/2019)   Weekly max warfarin dose:      Target end date:   Indefinite   INR check location:      Preferred lab:      Send INR reminders to:   ANTICOAGULATION POOL A (WBY,WBE,MID,RSC)    Indications    Chronic atrial fibrillation (H) [I48.2]           Comments:            Anticoagulation Care Providers     Provider Role Specialty Phone number    Clara  Adan BEARD MD [facility] Internal Medicine [phone]    Thor Santamaria MD [facility] Internal Medicine [phone]

## 2021-06-26 NOTE — PROGRESS NOTES
Progress Notes by Betsey Rizvi CNP at 10/24/2018 12:50 PM     Author: Betsey Rizvi CNP Service: -- Author Type: Nurse Practitioner    Filed: 10/24/2018  1:18 PM Encounter Date: 10/24/2018 Status: Signed    : Betsey Rizvi CNP (Nurse Practitioner)           Click to link to [facility] HEART CARE NOTE      Assessment/Recommendations   Assessment:    1.  Heart failure with reduced ejection fraction, ejection fraction 40%, NYHA class II: Christina has chronic shortness of breath with strenuous activity but denies any worsening.  She states that her symptoms have been stable since hospital discharge in August.    2.  Atrial fibrillation: Heart rate controlled.  She continues to take warfarin and has her INR checked by her primary care office.  INR was elevated last week and warfarin was adjusted.    3.  Hypertension: Initial blood pressure was 144/68 but recheck of 122/62.    Plan:  1.   Heart failure medications:  - Beta blocker therapy with metoprolol tartrate 75 mg twice daily  - ARB therapy with losartan 100 mg daily  - Aldosterone blocker therapy with spironolactone 25 mg daily.    - Diuretic therapy with furosemide 40 mg twice daily  2.  No medication changes today  3.  BMP in September was stable  4.  Continue daily weights and low-sodium diet.  5.  I recommended that she follow-up with her primary care provider if her cough worsens or she develops increased shortness of breath, fever, chills.    Christina Umana will follow up with Dr. Beckett in 1-2 months and in the heart failure clinic in 3-4 months.     History of Present Illness    Ms. Christina Umana is a 78 y.o. female seen at [facility] [place] heart failure clinic today for continued follow-up.  She has a history of hypertension, dyslipidemia, atrial fibrillation, COPD, and heart failure with reduced ejection fraction.  Echocardiogram on August 5, 2018 showed an ejection fraction of 40% and mild  mitral regurgitation.  She was hospitalized August 4 - August 7 with heart failure and COPD exacerbation most likely related to a recent viral infection.    Since being home from the hospital for 2.5 months, she feels that her symptoms are stable.  She has chronic shortness of breath with walking upstairs or doing yard work but denies any worsening.  She no longer has lower extremity edema.  She continues to wear compression stockings daily.  She denies fatigue, lightheadedness, orthopnea and chest pain.  She started having more of a congested cough today but denies any fevers, chills or worsening breathing.    Her home weight has been stable between 155-158 pounds since hospital discharge.  She continues to follow a low-sodium diet.    ECHO 8/5/18:     When compared to the previous study dated 4/17/2018, the degree of MR is less    Normal left ventricular size.    Left ventricle ejection fraction is moderately decreased. The estimated left ventricular ejection fraction is 40%.    Normal right ventricular size and systolic function.    Mild mitral regurgitation     Physical Examination Review of Systems   Vitals:    10/24/18 1257   BP: 122/62   Pulse:    Resp:      Body mass index is 25.77 kg/(m^2).  Wt Readings from Last 3 Encounters:   10/24/18 167 lb (75.8 kg)   09/04/18 164 lb (74.4 kg)   08/13/18 165 lb (74.8 kg)       General Appearance:     Alert, cooperative and in no acute distress.   ENT/Mouth: membranes moist, no oral lesions or bleeding gums.      EYES:  no scleral icterus, normal conjunctivae   Chest/Lungs:   lungs are clear to auscultation, no rales or wheezing, respirations unlabored, congested cough   Cardiovascular:    Irregular. Normal first and second heart sounds, no edema bilateral lower extremities, wearing compression stockings   Abdomen:  Soft, nontender, nondistended, bowel sounds present   Extremities: no cyanosis or clubbing   Skin: warm, dry.    Neurologic: mood and affect are  appropriate, alert and oriented x3      General: WNL  Eyes: WNL  Ears/Nose/Throat: WNL  Lungs: Cough, Shortness of Breath  Heart: Irregular Heartbeat  Stomach: WNL  Bladder: WNL  Muscle/Joints: WNL  Skin: WNL  Nervous System: WNL  Mental Health: Anxiety     Blood: WNL     Medical History  Surgical History Family History Social History   Past Medical History:   Diagnosis Date   ? Arthritis    ? Cancer (H) 2005   ? Clotting disorder (H) 4/2017   ? Degenerative disc disease, lumbar    ? Emphysema lung (H)    ? Hyperlipidemia    ? Hypertension    ? Kidney stone 4/2017   ? Scoliosis    ? Ventral hernia     Past Surgical History:   Procedure Laterality Date   ? BREAST BIOPSY Right 2012   ? BREAST LUMPECTOMY Right 04/03/2009   ? BUNIONECTOMY Bilateral 1988   ? COLECTOMY N/A 03/31/2003   ? COLON SURGERY     ? CORRECTION HAMMER TOE Bilateral 1988   ? CYST REMOVAL Left 02/01/2007   ? HYSTERECTOMY  2004   ? OOPHORECTOMY  2004   ? STOMACH SURGERY      hyernia repair   ? VENTRAL HERNIA REPAIR  02/25/2005    Family History   Problem Relation Age of Onset   ? Brain cancer Son 29   ? Dementia Mother    ? Stroke Mother    ? Prostate cancer Father    ? Aortic aneurysm Father      Abdominal   ? Hypertension Sister    ? No Medical Problems Daughter    ? No Medical Problems Maternal Grandmother    ? No Medical Problems Maternal Grandfather    ? No Medical Problems Paternal Grandmother    ? No Medical Problems Paternal Grandfather    ? Breast cancer Maternal Aunt 70   ? No Medical Problems Paternal Aunt    ? Breast cancer Maternal Aunt    ? Other Brother      Polio   ? Other Brother      polio   ? BRCA 1/2 Neg Hx    ? Colon cancer Neg Hx    ? Endometrial cancer Neg Hx    ? Ovarian cancer Neg Hx     Social History     Social History   ? Marital status:      Spouse name: N/A   ? Number of children: N/A   ? Years of education: N/A     Occupational History   ? Not on file.     Social History Main Topics   ? Smoking status: Current  Some Day Smoker     Packs/day: 0.50     Years: 60.00     Last attempt to quit: 2/16/2018   ? Smokeless tobacco: Never Used   ? Alcohol use No   ? Drug use: No   ? Sexual activity: No     Other Topics Concern   ? Not on file     Social History Narrative          Medications  Allergies   Current Outpatient Prescriptions   Medication Sig Dispense Refill   ? ascorbic acid, vitamin C, (ASCORBIC ACID WITH JAKE HIPS) 500 MG tablet Take 500 mg by mouth daily.     ? atorvastatin (LIPITOR) 40 MG tablet Take 20 mg by mouth at bedtime.     ? calcium, as carbonate, (OS-GILL) 500 mg calcium (1,250 mg) tablet Take 1 tablet by mouth 2 (two) times a day.     ? furosemide (LASIX) 20 MG tablet Take 2 tablets (40 mg total) by mouth 2 (two) times a day. 120 tablet 0   ? ipratropium-albuterol (DUO-NEB) 0.5-2.5 mg/3 mL nebulizer Take 3 mL by nebulization 3 (three) times a day for 4 days. Then use up to 4 times daily as needed for shortness of breath. 60 vial 2   ? losartan (COZAAR) 100 MG tablet Take 1 tablet (100 mg total) by mouth daily. 90 tablet 3   ? magnesium oxide (MAG-OX) 400 mg tablet Take 1 tablet (400 mg total) by mouth every morning. And 800 mg at bedtime (Patient taking differently: Take 400-800 mg by mouth see administration instructions. 400mg in the morning and 800 mg at bedtime) 180 tablet 3   ? metoprolol tartrate (LOPRESSOR) 50 MG tablet Take 1.5 tablets (75 mg total) by mouth 2 (two) times a day. 270 tablet 3   ? omeprazole (PRILOSEC) 20 MG capsule Take 20 mg by mouth daily before breakfast.     ? PARoxetine (PAXIL) 20 MG tablet Take 1 tablet (20 mg total) by mouth daily. 90 tablet 3   ? prednisoLONE acetate (PRED-FORTE) 1 % ophthalmic suspension Administer 1 drop to both eyes 3 (three) times a day.     ? spironolactone (ALDACTONE) 25 MG tablet TAKE 1 TABLET(25 MG) BY MOUTH DAILY 90 tablet 3   ? vitamin E 200 UNIT capsule Take 200 Units by mouth daily.     ? warfarin (COUMADIN) 5 MG tablet Take 1 tablet (5 mg) daily as  directed. Adjust dose per INR results. (Patient taking differently: Take by mouth See Admin Instructions. 2.5mg on Mondays and Fridays; 5mg all other days of week.) 90 tablet 1     No current facility-administered medications for this visit.       No Known Allergies      Lab Results    Chemistry CBC BNP   Lab Results   Component Value Date    CREATININE 1.26 (H) 09/04/2018    BUN 24 09/04/2018     09/04/2018    K 4.0 09/04/2018    CL 99 09/04/2018    CO2 29 09/04/2018     Creatinine (mg/dL)   Date Value   09/04/2018 1.26 (H)   08/13/2018 1.45 (H)   08/07/2018 1.15 (H)   08/06/2018 1.23 (H)    Lab Results   Component Value Date    WBC 6.8 08/07/2018    HGB 14.0 08/07/2018    HCT 42.3 08/07/2018    MCV 82 08/07/2018    PLT 99 (L) 08/07/2018    Lab Results   Component Value Date    BNP 1578 (H) 08/04/2018     BNP (pg/mL)   Date Value   08/04/2018 1578 (H)   08/03/2018 855 (H)   04/17/2018 718 (H)              Betsey Rizvi CNP  [facility] [place]   Heart Failure Clinic

## 2021-06-26 NOTE — PROGRESS NOTES
Progress Notes by Betsey Rizvi CNP at 4/30/2018  2:50 PM     Author: Betsey Rizvi CNP Service: -- Author Type: Nurse Practitioner    Filed: 4/30/2018  3:39 PM Encounter Date: 4/30/2018 Status: Signed    : Betsey Rizvi CNP (Nurse Practitioner)           Click to link to [facility] HEART CARE NOTE      Assessment/Recommendations   Assessment:    1.  Heart failure with reduced ejection fraction, ejection fraction 35%, NYHA class II: She denies any symptoms of acute heart failure.  She has chronic shortness of breath related to COPD but is at baseline.  Her weight continues to decrease. We reviewed heart failure diagnosis, medications, treatment plan, low sodium diet, weight monitoring, and symptom monitoring.  She met with a heart failure nurse clinician to further discuss.    2.  Atrial fibrillation: Heart rate is controlled.  She continues to take warfarin and has her INR checked at her primary care office.      Plan:  1.   Heart failure medications:  - Beta blocker therapy with metoprolol tartrate 50 mg twice daily  - ARB therapy with losartan 100 mg daily  - Aldosterone blocker therapy with spironolactone 25 mg daily.    - Diuretic therapy with furosemide 40 mg twice daily  2.  BMP pending  3.  Ischemic evaluation was discussed during hospitalization.  I will discuss testing with her primary cardiologist, Dr. Beckett.    Christina Umana will follow up with Dr. Beckett on May 31 and in the heart failure clinic in 2-3 months.     History of Present Illness    Ms. Christina Umana is a 77 y.o. female seen at [facility] heart failure clinic today for hospital follow-up.  She has a history of hypertension, dyslipidemia, atrial fibrillation, COPD, and congestive heart failure.  She was hospitalized April 15 - April 19 with atrial fibrillation with rapid ventricular response, pulmonary edema, and weight gain.  She is diagnosed with acute on chronic heart  failure and COPD.  Echocardiogram on April 17, 2018 showed an ejection fraction of 35% and moderate mitral regurgitation.  Echocardiogram in February 2018 showed ejection fraction of 45%.    She was seen by her primary care provider on April 23 and was started on spironolactone.  She has chronic shortness of breath but is back to baseline.  She no longer has lower extremity edema.  Her weight has decreased about 10-15 pounds since discharge.  She denies lightheadedness, orthopnea, PND, chest pain, abdominal fullness/bloating and lower extremity edema.      Her home weight is now 145 pounds.  She is working on following a low-sodium diet.    ECHO 4/17/18:  1.  The left ventricle is normal in size. Left ventricular systolic performance is moderately reduced. The ejection fraction is estimated to be 35%.   2. There is moderate global reduction in left ventricular systolic performance.   3. There is moderate mitral insufficiency.   4. There is borderline right ventricular enlargement with mild reduction right ventricular systolic performance.  5. There is moderate biatrial enlargement.  6. Right ventricular systolic pressure relative to right atrial pressure is mildly increased.  The pulmonary artery pressure is estimated to be 40-45 mmHg plus right atrial pressure (the IVC appears mildly dilated).      When compared to the prior real-time echocardiogram dated 16 February 2018, there has been a further mild diminution in left ventricular systolic performance.  The degree of mitral insufficiency has increased somewhat and now appears moderate.     Physical Examination Review of Systems   Vitals:    04/30/18 1454   BP: 124/70   Pulse: 77   Resp: 14     Body mass index is 24.75 kg/(m^2).  Wt Readings from Last 3 Encounters:   04/30/18 158 lb (71.7 kg)   04/23/18 161 lb 14.4 oz (73.4 kg)   04/19/18 167 lb 11.2 oz (76.1 kg)       General Appearance:     Alert, cooperative and in no acute distress.   ENT/Mouth: membranes  moist, no oral lesions or bleeding gums.      EYES:  no scleral icterus, normal conjunctivae   Chest/Lungs:   lungs are clear to auscultation, no rales or wheezing, respirations unlabored, chronic congested cough   Cardiovascular:    Irregularly irregular. Normal first and second heart sounds; the radial and posterior tibial pulses are intact, Jugular venous pressure normal, no edema bilateral lower extremities    Abdomen:  Soft, nontender, nondistended, bowel sounds present   Extremities: no cyanosis or clubbing   Skin: warm, dry.    Neurologic: mood and affect are appropriate, alert and oriented x3      General: Weight Loss  Eyes: WNL  Ears/Nose/Throat: WNL  Lungs: WNL  Heart: Irregular Heartbeat  Stomach: WNL  Bladder: Frequent Urination at Night  Muscle/Joints: WNL  Skin: WNL  Nervous System: WNL  Mental Health: WNL     Blood: WNL     Medical History  Surgical History Family History Social History   Past Medical History:   Diagnosis Date   ? Arthritis    ? Cancer 2005   ? Clotting disorder 4/2017   ? Degenerative disc disease, lumbar    ? Emphysema lung    ? Hyperlipidemia    ? Hypertension    ? Kidney stone 4/2017   ? Scoliosis    ? Ventral hernia     Past Surgical History:   Procedure Laterality Date   ? BREAST BIOPSY Right 2012   ? BREAST LUMPECTOMY Right 04/03/2009   ? BUNIONECTOMY Bilateral 1988   ? COLECTOMY N/A 03/31/2003   ? COLON SURGERY     ? CORRECTION HAMMER TOE Bilateral 1988   ? CYST REMOVAL Left 02/01/2007   ? HYSTERECTOMY  2004   ? OOPHORECTOMY  2004   ? STOMACH SURGERY      hyernia repair   ? VENTRAL HERNIA REPAIR  02/25/2005    Family History   Problem Relation Age of Onset   ? Brain cancer Son 29   ? Dementia Mother    ? Stroke Mother    ? Prostate cancer Father    ? Aortic aneurysm Father      Abdominal   ? Hypertension Sister    ? No Medical Problems Daughter    ? No Medical Problems Maternal Grandmother    ? No Medical Problems Maternal Grandfather    ? No Medical Problems Paternal  Grandmother    ? No Medical Problems Paternal Grandfather    ? Breast cancer Maternal Aunt 70   ? No Medical Problems Paternal Aunt    ? Breast cancer Maternal Aunt    ? Other Brother      Polio   ? Other Brother      polio   ? BRCA 1/2 Neg Hx    ? Colon cancer Neg Hx    ? Endometrial cancer Neg Hx    ? Ovarian cancer Neg Hx     Social History     Social History   ? Marital status:      Spouse name: N/A   ? Number of children: N/A   ? Years of education: N/A     Occupational History   ? Not on file.     Social History Main Topics   ? Smoking status: Former Smoker     Packs/day: 0.50     Years: 60.00     Quit date: 2/16/2018   ? Smokeless tobacco: Never Used   ? Alcohol use No   ? Drug use: No   ? Sexual activity: No     Other Topics Concern   ? Not on file     Social History Narrative          Medications  Allergies   Current Outpatient Prescriptions   Medication Sig Dispense Refill   ? ascorbic acid, vitamin C, (ASCORBIC ACID WITH JAKE HIPS) 500 MG tablet Take 500 mg by mouth daily.     ? atorvastatin (LIPITOR) 40 MG tablet Take 20 mg by mouth at bedtime.     ? calcium-vitamin D (CALCIUM-VITAMIN D) 500 mg(1,250mg) -200 unit per tablet Take 1 tablet by mouth 3 (three) times a day with meals.     ? furosemide (LASIX) 40 MG tablet Take 1 tablet (40 mg total) by mouth 2 (two) times a day at 9am and 6pm. 60 tablet 0   ? losartan (COZAAR) 100 MG tablet Take 1 tablet (100 mg total) by mouth daily. 90 tablet 3   ? magnesium oxide (MAG-OX) 400 mg tablet Take 1 tablet (400 mg total) by mouth every morning. And 800 mg at bedtime 180 tablet 3   ? metoprolol tartrate (LOPRESSOR) 50 MG tablet Take 1 tablet (50 mg total) by mouth 2 (two) times a day. 60 tablet 0   ? omeprazole (PRILOSEC) 20 MG capsule Take 20 mg by mouth daily before breakfast.     ? PARoxetine (PAXIL) 20 MG tablet Take 20 mg by mouth at bedtime.     ? potassium chloride SA (K-DUR,KLOR-CON) 20 MEQ tablet Take 20 mEq by mouth 2 (two) times a day.     ?  predniSONE (DELTASONE) 10 mg tablet Take 3 tabs daily for 3 days, then 2 tabs daily for 3 days, then 2 tabs daily for 3 days, then stop. 30 tablet 0   ? spironolactone (ALDACTONE) 25 MG tablet Take 1 tablet (25 mg total) by mouth daily. 30 tablet 0   ? warfarin (COUMADIN) 5 MG tablet Take 2.5-5 mg by mouth See Admin Instructions. 1/2 tab (2.5mg) on Mon, Fri, and Sat and  5mg all other days     ? ipratropium-albuterol (DUO-NEB) 0.5-2.5 mg/3 mL nebulizer Take 3 mL by nebulization 4 (four) times a day for 15 days. 200 vial 2   ? prednisoLONE acetate (PRED-FORTE) 1 % ophthalmic suspension Administer 1 drop to both eyes 3 (three) times a day.       No current facility-administered medications for this visit.       No Known Allergies      Lab Results    Chemistry CBC BNP   Lab Results   Component Value Date    CREATININE 1.28 (H) 04/23/2018    BUN 34 (H) 04/23/2018     04/23/2018    K 4.4 04/23/2018    CL 96 (L) 04/23/2018    CO2 29 04/23/2018     Creatinine (mg/dL)   Date Value   04/23/2018 1.28 (H)   04/19/2018 1.02   04/18/2018 1.16 (H)   04/17/2018 1.35 (H)    Lab Results   Component Value Date    WBC 6.0 04/17/2018    HGB 11.6 (L) 04/17/2018    HCT 35.3 04/17/2018    MCV 83 04/17/2018     04/17/2018    Lab Results   Component Value Date     (H) 04/17/2018     BNP (pg/mL)   Date Value   04/17/2018 718 (H)   04/15/2018 779 (H)   04/15/2018 713 (H)            Betsey Rizvi, CNP  [facility]   Heart Failure Clinic

## 2021-06-26 NOTE — PROGRESS NOTES
Progress Notes by Betsey Rizvi CNP at 7/2/2018  9:50 AM     Author: Betsey Rizvi CNP Service: -- Author Type: Nurse Practitioner    Filed: 7/2/2018 10:17 AM Encounter Date: 7/2/2018 Status: Signed    : Betsey Rizvi CNP (Nurse Practitioner)           Click to link to [facility] HEART CARE NOTE      Assessment/Recommendations   Assessment:    1.  heart failure with reduced ejection fraction, ejection fraction 35%, NYHA class II: She has chronic shortness of breath with activity but denies any worsening.  Shortness of breath also may be related to COPD.  Her ejection fraction decreased following episode of atrial fibrillation with rapid ventricular response.    2.  Atrial fibrillation: Heart rate is well controlled.  She continues to take warfarin and has her INR checked at her primary care office.    3.  Hypertension: Blood pressure elevated today at 146/90 with a recheck of 128/60.    Plan:  1.   Heart failure medications:  - Beta blocker therapy with metoprolol tartrate increased to 75 mg twice daily.  - ARB therapy with losartan 100 mg daily  - Aldosterone blocker therapy with spironolactone 25 mg daily.    - Diuretic therapy with furosemide 40 mg daily  2.  Metoprolol tartrate increased due to decreased ejection fraction and hypertension.    3.  Continue low-sodium diet and daily weights    Christina Umana will follow up in the heart failure clinic in 1 month.     History of Present Illness    Ms. Christina Umana is a 78 y.o. female seen at [facility] heart failure clinic today for continued follow-up.  She has a history of hypertension, dyslipidemia, atrial fibrillation, COPD, and heart failure with reduced ejection fraction.  Echocardiogram on April 17, 2018 showed an ejection fraction of 35% and moderate mitral regurgitation.  Echocardiogram in February 2018 showed an ejection fraction of 45%.  She was hospitalized in April with atrial  fibrillation with rapid ventricular response which could have contributed to a decreased ejection fraction.    She has chronic shortness of breath with activity but denies any worsening.  She has no other symptoms of acute fluid retention.  She gained about 2-3 pounds after decreasing Lasix but has been stable for the past 2 months.  She denies lightheadedness, orthopnea, chest pain and lower extremity edema.         Physical Examination Review of Systems   Vitals:    07/02/18 0959   BP: 128/60   Pulse:    Resp:      Body mass index is 25.84 kg/(m^2).  Wt Readings from Last 3 Encounters:   07/02/18 165 lb (74.8 kg)   06/19/18 166 lb (75.3 kg)   05/13/18 164 lb 14.5 oz (74.8 kg)       General Appearance:     Alert, cooperative and in no acute distress.   ENT/Mouth: membranes moist, no oral lesions or bleeding gums.      EYES:  no scleral icterus, normal conjunctivae   Chest/Lungs:   lungs are clear to auscultation, no rales or wheezing, respirations unlabored   Cardiovascular:    Irregular. Normal first and second heart sounds; no edema bilateral lower extremities    Abdomen:  Soft, nontender, nondistended, bowel sounds present   Extremities: no cyanosis or clubbing   Skin: warm, dry.    Neurologic: mood and affect are appropriate, alert and oriented x3      General: WNL  Eyes: WNL  Ears/Nose/Throat: WNL  Lungs: Shortness of Breath  Heart: WNL  Stomach: WNL  Bladder: WNL  Muscle/Joints: WNL  Skin: WNL  Nervous System: WNL  Mental Health: WNL     Blood: WNL     Medical History  Surgical History Family History Social History   Past Medical History:   Diagnosis Date   ? Arthritis    ? Cancer (H) 2005   ? Clotting disorder (H) 4/2017   ? Degenerative disc disease, lumbar    ? Emphysema lung (H)    ? Hyperlipidemia    ? Hypertension    ? Kidney stone 4/2017   ? Scoliosis    ? Ventral hernia     Past Surgical History:   Procedure Laterality Date   ? BREAST BIOPSY Right 2012   ? BREAST LUMPECTOMY Right 04/03/2009   ?  BUNIONECTOMY Bilateral 1988   ? COLECTOMY N/A 03/31/2003   ? COLON SURGERY     ? CORRECTION HAMMER TOE Bilateral 1988   ? CYST REMOVAL Left 02/01/2007   ? HYSTERECTOMY  2004   ? OOPHORECTOMY  2004   ? STOMACH SURGERY      hyernia repair   ? VENTRAL HERNIA REPAIR  02/25/2005    Family History   Problem Relation Age of Onset   ? Brain cancer Son 29   ? Dementia Mother    ? Stroke Mother    ? Prostate cancer Father    ? Aortic aneurysm Father      Abdominal   ? Hypertension Sister    ? No Medical Problems Daughter    ? No Medical Problems Maternal Grandmother    ? No Medical Problems Maternal Grandfather    ? No Medical Problems Paternal Grandmother    ? No Medical Problems Paternal Grandfather    ? Breast cancer Maternal Aunt 70   ? No Medical Problems Paternal Aunt    ? Breast cancer Maternal Aunt    ? Other Brother      Polio   ? Other Brother      polio   ? BRCA 1/2 Neg Hx    ? Colon cancer Neg Hx    ? Endometrial cancer Neg Hx    ? Ovarian cancer Neg Hx     Social History     Social History   ? Marital status:      Spouse name: N/A   ? Number of children: N/A   ? Years of education: N/A     Occupational History   ? Not on file.     Social History Main Topics   ? Smoking status: Former Smoker     Packs/day: 0.50     Years: 60.00     Quit date: 2/16/2018   ? Smokeless tobacco: Never Used   ? Alcohol use No   ? Drug use: No   ? Sexual activity: No     Other Topics Concern   ? Not on file     Social History Narrative          Medications  Allergies   Current Outpatient Prescriptions   Medication Sig Dispense Refill   ? atorvastatin (LIPITOR) 40 MG tablet Take 20 mg by mouth at bedtime.     ? furosemide (LASIX) 40 MG tablet Take 40 mg by mouth daily.     ? ipratropium-albuterol (DUO-NEB) 0.5-2.5 mg/3 mL nebulizer U 3 ML VIA NEB QID FOR 15 DAYS  2   ? losartan (COZAAR) 100 MG tablet Take 1 tablet (100 mg total) by mouth daily. 90 tablet 3   ? magnesium oxide (MAG-OX) 400 mg tablet Take 1 tablet (400 mg total) by  mouth every morning. And 800 mg at bedtime 180 tablet 3   ? metoprolol tartrate (LOPRESSOR) 50 MG tablet Take 1.5 tablets (75 mg total) by mouth 2 (two) times a day. 270 tablet 3   ? omeprazole (PRILOSEC) 20 MG capsule Take 20 mg by mouth daily before breakfast.     ? PARoxetine (PAXIL) 20 MG tablet Take 20 mg by mouth at bedtime.     ? prednisoLONE acetate (PRED-FORTE) 1 % ophthalmic suspension Administer 1 drop to both eyes 3 (three) times a day.     ? spironolactone (ALDACTONE) 25 MG tablet TAKE 1 TABLET(25 MG) BY MOUTH DAILY 90 tablet 3   ? warfarin (COUMADIN) 5 MG tablet Take 2.5-5 mg by mouth See Admin Instructions. 1/2 tab (2.5mg) on Mon, Fri, and Sat and  5mg all other days     ? warfarin (COUMADIN) 5 MG tablet Take 1 tablet (5 mg) daily as directed. Adjust dose per INR results. 90 tablet 1   ? ipratropium-albuterol (DUO-NEB) 0.5-2.5 mg/3 mL nebulizer Take 3 mL by nebulization 4 (four) times a day for 15 days. 200 vial 2     No current facility-administered medications for this visit.       No Known Allergies      Lab Results    Chemistry CBC BNP   Lab Results   Component Value Date    CREATININE 1.28 (H) 05/14/2018    BUN 26 05/14/2018     05/14/2018    K 4.6 05/14/2018     05/14/2018    CO2 25 05/14/2018     Creatinine (mg/dL)   Date Value   05/14/2018 1.28 (H)   04/30/2018 1.32 (H)   04/23/2018 1.28 (H)   04/19/2018 1.02    Lab Results   Component Value Date    WBC 4.9 05/13/2018    HGB 14.1 05/13/2018    HCT 42.7 05/13/2018    MCV 83 05/13/2018    PLT 85 (L) 05/13/2018    Lab Results   Component Value Date     (H) 04/17/2018     BNP (pg/mL)   Date Value   04/17/2018 718 (H)   04/15/2018 779 (H)   04/15/2018 713 (H)              Betsey Rizvi [facility] [place]   Heart Failure Clinic

## 2021-06-27 NOTE — PROGRESS NOTES
Progress Notes by Betsey Rizvi CNP at 1/23/2019 12:50 PM     Author: Betsey Rizvi CNP Service: -- Author Type: Nurse Practitioner    Filed: 1/23/2019  1:21 PM Encounter Date: 1/23/2019 Status: Signed    : Betsey Rizvi CNP (Nurse Practitioner)           Click to link to [facility] HEART CARE NOTE      Assessment/Recommendations   Assessment:    1.  Heart failure with reduced ejection fraction, ejection fraction 40%, NYHA class II: Christina has chronic shortness of breath with activity such as walking upstairs but denies any worsening.  She has no symptoms of acute fluid retention.    2.  Atrial fibrillation: Heart rate controlled.  She had an INR checked last week at primary care office.    3.  Hypertension: Blood pressure 110/72.  His primary care provider started amlodipine 2.5 mg daily earlier in January.  She has had no side effects with this medication.    Plan:  1.   Heart failure medications:  - Beta blocker therapy with metoprolol tartrate 75 mg twice daily  - ARB therapy with losartan 100 mg daily  - Aldosterone blocker therapy with spironolactone 25 mg daily.    - Diuretic therapy with furosemide 40 mg twice daily  2.  Continue current medications  3.  Daily weights and low-sodium diet.    Christina Umana will follow up with Dr. Beckett in June and in the heart failure clinic in August.     History of Present Illness    Ms. Christina Umana is a 78 y.o. female seen at [facility] heart failure clinic today for continued follow-up.  She has a history of hypertension, dyslipidemia, atrial fibrillation, COPD, and heart failure with reduced ejection fraction.  Echocardiogram from August 2018 showed ejection fraction of 40% and mild mitral regurgitation.    She has chronic shortness of breath with walking upstairs but denies any worsening.  She denies fatigue, lightheadedness, orthopnea, chest pain, abdominal fullness/bloating and lower extremity  edema.  She wears compression stockings daily.    Her weight has remained stable around 163-165 pounds.  She continues to follow a low-sodium diet.  She is currently helping to care for her  who started hospice this week.  Her children and grandchildren are in town to help.    ECHO:   Results for orders placed during the hospital encounter of 08/04/18   Echo Complete [ECH10] 08/05/2018    Narrative   When compared to the previous study dated 4/17/2018, the degree of MR is   less    Normal left ventricular size.    Left ventricle ejection fraction is moderately decreased. The estimated   left ventricular ejection fraction is 40%.    Normal right ventricular size and systolic function.    Mild mitral regurgitation           Physical Examination Review of Systems   Vitals:    01/23/19 1246   BP: 110/72   Pulse: (!) 54   Resp: 18     Body mass index is 25.46 kg/m .  Wt Readings from Last 3 Encounters:   01/23/19 165 lb (74.8 kg)   01/04/19 163 lb (73.9 kg)   12/11/18 164 lb (74.4 kg)       General Appearance:     Alert, cooperative and in no acute distress.   ENT/Mouth: membranes moist, no oral lesions or bleeding gums.      EYES:  no scleral icterus, normal conjunctivae   Chest/Lungs:   lungs are clear to auscultation, no rales or wheezing, respirations unlabored   Cardiovascular:    Irregular. Normal first and second heart sounds, no edema bilateral lower extremities    Abdomen:  Soft, nontender, nondistended, bowel sounds present   Extremities: no cyanosis or clubbing   Skin: warm, dry.    Neurologic: mood and affect are appropriate, alert and oriented x3      General: WNL  Eyes: WNL  Ears/Nose/Throat: WNL  Lungs: Cough, Shortness of Breath  Heart: Shortness of Breath with activity  Stomach: WNL  Bladder: WNL  Muscle/Joints: WNL  Skin: WNL  Nervous System: WNL  Mental Health: WNL     Blood: WNL     Medical History  Surgical History Family History Social History   Past Medical History:   Diagnosis Date   ?  Arthritis    ? Cancer (H) 2005   ? Clotting disorder (H) 4/2017   ? Degenerative disc disease, lumbar    ? Emphysema lung (H)    ? Hyperlipidemia    ? Hypertension    ? Kidney stone 4/2017   ? Scoliosis    ? Ventral hernia     Past Surgical History:   Procedure Laterality Date   ? BREAST BIOPSY Right 2012   ? BREAST LUMPECTOMY Right 04/03/2009   ? BUNIONECTOMY Bilateral 1988   ? COLECTOMY N/A 03/31/2003   ? COLON SURGERY     ? CORRECTION HAMMER TOE Bilateral 1988   ? CYST REMOVAL Left 02/01/2007   ? HYSTERECTOMY  2004   ? OOPHORECTOMY  2004   ? STOMACH SURGERY      hyernia repair   ? VENTRAL HERNIA REPAIR  02/25/2005    Family History   Problem Relation Age of Onset   ? Brain cancer Son 29   ? Dementia Mother    ? Stroke Mother    ? Prostate cancer Father    ? Aortic aneurysm Father         Abdominal   ? Hypertension Sister    ? No Medical Problems Daughter    ? No Medical Problems Maternal Grandmother    ? No Medical Problems Maternal Grandfather    ? No Medical Problems Paternal Grandmother    ? No Medical Problems Paternal Grandfather    ? Breast cancer Maternal Aunt 70   ? No Medical Problems Paternal Aunt    ? Breast cancer Maternal Aunt    ? Other Brother         Polio   ? Other Brother         polio   ? BRCA 1/2 Neg Hx    ? Colon cancer Neg Hx    ? Endometrial cancer Neg Hx    ? Ovarian cancer Neg Hx     Social History     Socioeconomic History   ? Marital status:      Spouse name: Not on file   ? Number of children: Not on file   ? Years of education: Not on file   ? Highest education level: Not on file   Social Needs   ? Financial resource strain: Not on file   ? Food insecurity - worry: Not on file   ? Food insecurity - inability: Not on file   ? Transportation needs - medical: Not on file   ? Transportation needs - non-medical: Not on file   Occupational History   ? Not on file   Tobacco Use   ? Smoking status: Current Some Day Smoker     Packs/day: 0.50     Years: 60.00     Pack years: 30.00      Last attempt to quit: 2018     Years since quittin.9   ? Smokeless tobacco: Never Used   Substance and Sexual Activity   ? Alcohol use: No   ? Drug use: No   ? Sexual activity: No   Other Topics Concern   ? Not on file   Social History Narrative   ? Not on file          Medications  Allergies   Current Outpatient Medications   Medication Sig Dispense Refill   ? amLODIPine (NORVASC) 2.5 MG tablet Take 1 tablet (2.5 mg total) by mouth daily. 30 tablet 11   ? atorvastatin (LIPITOR) 40 MG tablet Take 20 mg by mouth at bedtime.     ? calcium, as carbonate, (OS-GILL) 500 mg calcium (1,250 mg) tablet Take 1 tablet by mouth 2 (two) times a day.     ? furosemide (LASIX) 20 MG tablet Take 2 tablets in the morning and 1 tablet in the afternoon or early evening.. 120 tablet 4   ? ipratropium-albuterol (DUO-NEB) 0.5-2.5 mg/3 mL nebulizer Take 3 mL by nebulization 3 (three) times a day for 4 days. Then use up to 4 times daily as needed for shortness of breath. 60 vial 2   ? losartan (COZAAR) 100 MG tablet Take 1 tablet (100 mg total) by mouth daily. 90 tablet 3   ? magnesium oxide (MAG-OX) 400 mg (241.3 mg magnesium) tablet 400mg in the morning and 800 mg at bedtime. 270 tablet 3   ? metoprolol tartrate (LOPRESSOR) 50 MG tablet Take 1.5 tablets (75 mg total) by mouth 2 (two) times a day. 270 tablet 3   ? omeprazole (PRILOSEC) 20 MG capsule Take 1 capsule (20 mg total) by mouth daily before breakfast. 90 capsule 3   ? PARoxetine (PAXIL) 20 MG tablet Take 1 tablet (20 mg total) by mouth daily. 90 tablet 3   ? prednisoLONE acetate (PRED-FORTE) 1 % ophthalmic suspension Administer 1 drop to both eyes 3 (three) times a day.     ? spironolactone (ALDACTONE) 25 MG tablet TAKE 1 TABLET(25 MG) BY MOUTH DAILY 90 tablet 3   ? vitamin E 200 UNIT capsule Take 200 Units by mouth daily.     ? warfarin (COUMADIN/JANTOVEN) 5 MG tablet Take 1/2-1 tablet (2.5-5 mg) daily as directed. Adjust dose per INR results. 90 tablet 1     No current  facility-administered medications for this visit.       No Known Allergies      Lab Results    Chemistry CBC BNP   Lab Results   Component Value Date    CREATININE 1.54 (H) 01/04/2019    BUN 32 (H) 01/04/2019     01/04/2019    K 4.4 01/04/2019    CL 99 01/04/2019    CO2 25 01/04/2019     Creatinine (mg/dL)   Date Value   01/04/2019 1.54 (H)   11/19/2018 1.48 (H)   11/09/2018 1.50 (H)   09/04/2018 1.26 (H)    Lab Results   Component Value Date    WBC 6.8 08/07/2018    HGB 14.0 08/07/2018    HCT 42.3 08/07/2018    MCV 82 08/07/2018    PLT 99 (L) 08/07/2018    Lab Results   Component Value Date    BNP 1,578 (H) 08/04/2018     BNP (pg/mL)   Date Value   08/04/2018 1,578 (H)   08/03/2018 855 (H)   04/17/2018 718 (H)              Betsey Rizvi, [facility]   Heart Failure Clinic

## 2021-06-28 NOTE — PROGRESS NOTES
Progress Notes by Betsey Rizvi CNP at 9/9/2019 12:50 PM     Author: Betsey Rizvi CNP Service: -- Author Type: Nurse Practitioner    Filed: 9/9/2019  1:11 PM Encounter Date: 9/9/2019 Status: Signed    : Betsey Rizvi CNP (Nurse Practitioner)           Click to link to [facility] HEART CARE NOTE      Assessment/Recommendations   Assessment:    1.  Heart failure with reduced ejection fraction, ejection fraction 40%, NYHA class II: Lissy has chronic shortness of breath with activity but denies any worsening.  She has no symptoms of acute fluid retention.  She is wearing compression stockings daily with no edema today.    2.  Atrial fibrillation: Heart rate controlled.  She continues to take warfarin and checks INR is at home.  INR last week was 2.2.    3.  Hypertension: Blood pressure controlled today at 130/70.    4.  Tobacco use: She is smoking 10 cigarettes daily.  She is going on vacation with her 2 sisters tomorrow.  Her sisters do not smoke and she plans to quit during this trip.    Plan:  1.   Heart failure medications:  - Beta blocker therapy with metoprolol tartrate 75 mg twice daily  - ARB therapy with losartan 100 mg daily  - Aldosterone blocker therapy with spironolactone 25 mg daily.    - Diuretic therapy with furosemide 40 mg daily  2.  BMP 1 month ago showed abnormal but stable renal function.  Potassium 4.5  3.  Continue current medications  4.  Daily weights and low-sodium diet    Christina Umana will follow up in the heart failure clinic in 6 months.  She has been seeing Dr. Beckett but will follow up with Dr. Vang after his longterm.     History of Present Illness    Ms. Christina Umana is a 79 y.o. female seen at [facility] heart failure clinic today for continued follow-up.  She has a history of hypertension, dyslipidemia, atrial fibrillation, COPD, and heart failure with reduced ejection fraction.  Echocardiogram from August  2018 showed ejection fraction of 40% and mild mitral regurgitation.    Her  passed away this past spring.  She moved in with her son and daughter-in-law.  She states that she is doing well with this transition.  She has chronic shortness of breath with more strenuous activity such as walking upstairs or weeding the garden.  She denies fatigue, lightheadedness, chest pain and lower extremity edema.  She wears compression stockings daily.    Her weight has decreased over the past 6 months.  Her weight is now stable around 155 pounds.  She is following a low-sodium diet.    ECHO:   Results for orders placed during the hospital encounter of 08/04/18   Echo Complete [ECH10] 08/05/2018    Narrative   When compared to the previous study dated 4/17/2018, the degree of MR is   less    Normal left ventricular size.    Left ventricle ejection fraction is moderately decreased. The estimated   left ventricular ejection fraction is 40%.    Normal right ventricular size and systolic function.    Mild mitral regurgitation           Physical Examination Review of Systems   Vitals:    09/09/19 1244   BP: 130/70   Pulse: 60   Resp: 18     Body mass index is 25.93 kg/m .  Wt Readings from Last 3 Encounters:   09/09/19 155 lb 12.8 oz (70.7 kg)   08/06/19 158 lb 9.6 oz (71.9 kg)   07/02/19 159 lb (72.1 kg)       General Appearance:     Alert, cooperative and in no acute distress.   ENT/Mouth: membranes moist, no oral lesions or bleeding gums.      EYES:  no scleral icterus, normal conjunctivae   Chest/Lungs:   lungs are clear to auscultation, no rales or wheezing, respirations unlabored   Cardiovascular:   Regular. Normal first and second heart sounds, no edema bilateral lower extremities    Abdomen:  Soft, nontender, nondistended, bowel sounds present   Extremities: no cyanosis or clubbing   Skin: warm, dry.    Neurologic: mood and affect are appropriate, alert and oriented x3      General: WNL  Eyes: WNL  Ears/Nose/Throat:  WNL  Lungs: WNL  Heart: WNL  Stomach: WNL  Bladder: WNL  Muscle/Joints: WNL  Skin: WNL  Nervous System: WNL  Mental Health: WNL     Blood: WNL     Medical History  Surgical History Family History Social History   Past Medical History:   Diagnosis Date   ? Arthritis    ? Cancer (H) 2005   ? CHF (congestive heart failure) (H)    ? Clotting disorder (H) 4/2017   ? COPD (chronic obstructive pulmonary disease) (H)    ? Degenerative disc disease, lumbar    ? Emphysema lung (H)    ? GERD (gastroesophageal reflux disease)    ? Hyperlipidemia    ? Hypertension    ? Kidney stone 4/2017   ? Osteoporosis    ? Scoliosis    ? Ventral hernia     Past Surgical History:   Procedure Laterality Date   ? BREAST BIOPSY Right 2012   ? BREAST LUMPECTOMY Right 04/03/2009   ? BUNIONECTOMY Bilateral 1988   ? COLECTOMY N/A 03/31/2003   ? COLON SURGERY     ? CORRECTION HAMMER TOE Bilateral 1988   ? CYST REMOVAL Left 02/01/2007   ? HYSTERECTOMY  2004   ? OOPHORECTOMY  2004   ? STOMACH SURGERY      hyernia repair   ? VENTRAL HERNIA REPAIR  02/25/2005    Family History   Problem Relation Age of Onset   ? Brain cancer Son 29   ? Dementia Mother    ? Stroke Mother    ? Prostate cancer Father    ? Aortic aneurysm Father         Abdominal   ? Hypertension Sister    ? No Medical Problems Daughter    ? No Medical Problems Maternal Grandmother    ? No Medical Problems Maternal Grandfather    ? No Medical Problems Paternal Grandmother    ? No Medical Problems Paternal Grandfather    ? Breast cancer Maternal Aunt 70   ? No Medical Problems Paternal Aunt    ? Breast cancer Maternal Aunt    ? Other Brother         Polio   ? Other Brother         polio   ? BRCA 1/2 Neg Hx    ? Colon cancer Neg Hx    ? Endometrial cancer Neg Hx    ? Ovarian cancer Neg Hx     Social History     Socioeconomic History   ? Marital status:      Spouse name: Not on file   ? Number of children: Not on file   ? Years of education: Not on file   ? Highest education level: Not  on file   Occupational History   ? Not on file   Social Needs   ? Financial resource strain: Not on file   ? Food insecurity:     Worry: Not on file     Inability: Not on file   ? Transportation needs:     Medical: Not on file     Non-medical: Not on file   Tobacco Use   ? Smoking status: Current Some Day Smoker     Packs/day: 0.50     Years: 60.00     Pack years: 30.00     Last attempt to quit: 2018     Years since quittin.5   ? Smokeless tobacco: Never Used   Substance and Sexual Activity   ? Alcohol use: No   ? Drug use: No   ? Sexual activity: Never   Lifestyle   ? Physical activity:     Days per week: Not on file     Minutes per session: Not on file   ? Stress: Not on file   Relationships   ? Social connections:     Talks on phone: Not on file     Gets together: Not on file     Attends Rastafari service: Not on file     Active member of club or organization: Not on file     Attends meetings of clubs or organizations: Not on file     Relationship status: Not on file   ? Intimate partner violence:     Fear of current or ex partner: Not on file     Emotionally abused: Not on file     Physically abused: Not on file     Forced sexual activity: Not on file   Other Topics Concern   ? Not on file   Social History Narrative   ? Not on file          Medications  Allergies   Current Outpatient Medications   Medication Sig Dispense Refill   ? amLODIPine (NORVASC) 5 MG tablet Take 1 tablet (5 mg total) by mouth daily. 90 tablet 1   ? ascorbic acid, vitamin C, (VITAMIN C) 1000 MG tablet Take 1,000 mg by mouth 2 (two) times a day.     ? atorvastatin (LIPITOR) 40 MG tablet TAKE 1/2 TABLET(20 MG) BY MOUTH DAILY 45 tablet 2   ? calcium, as carbonate, (OS-GILL) 500 mg calcium (1,250 mg) tablet Take 1 tablet by mouth 2 (two) times a day.     ? cholecalciferol, vitamin D3, 2,000 unit Tab Take 2,000 Units by mouth daily.     ? fluorometholone (FML) 0.1 % ophthalmic suspension SHAKE LQ AND INT 1 GTT IN OU TID  2   ?  furosemide (LASIX) 20 MG tablet Take 40 mg by mouth daily.     ? ipratropium-albuterol (DUO-NEB) 0.5-2.5 mg/3 mL nebulizer Take 3 mL by nebulization 3 (three) times a day. Then use up to 4 times daily as needed for shortness of breath. 60 vial 6   ? losartan (COZAAR) 100 MG tablet Take 1 tablet (100 mg total) by mouth daily. 90 tablet 3   ? magnesium oxide (MAG-OX) 400 mg (241.3 mg magnesium) tablet 400mg in the morning and 800 mg at bedtime 270 tablet 3   ? metoprolol tartrate (LOPRESSOR) 50 MG tablet Take 1.5 tablets (75 mg total) by mouth 2 (two) times a day. 270 tablet 3   ? omeprazole (PRILOSEC) 20 MG capsule Take 1 capsule (20 mg total) by mouth daily before breakfast. 90 capsule 3   ? PARoxetine (PAXIL) 20 MG tablet Take 1 tablet (20 mg total) by mouth daily. 90 tablet 3   ? spironolactone (ALDACTONE) 25 MG tablet TAKE 1 TABLET(25 MG) BY MOUTH DAILY 90 tablet 3   ? vitamin E 200 UNIT capsule Take 200 Units by mouth daily.     ? warfarin (COUMADIN/JANTOVEN) 5 MG tablet Take 1/2-1 tablet (2.5-5 mg) daily as directed. Adjust dose per INR results. 90 tablet 1     No current facility-administered medications for this visit.       No Known Allergies      Lab Results    Chemistry CBC BNP   Lab Results   Component Value Date    CREATININE 1.45 (H) 08/06/2019    BUN 31 (H) 08/06/2019     08/06/2019    K 4.5 08/06/2019     08/06/2019    CO2 28 08/06/2019     Creatinine (mg/dL)   Date Value   08/06/2019 1.45 (H)   03/18/2019 1.33 (H)   01/04/2019 1.54 (H)   11/19/2018 1.48 (H)    Lab Results   Component Value Date    WBC 4.5 08/06/2019    HGB 16.1 (H) 08/06/2019    HCT 48.4 (H) 08/06/2019    MCV 84 08/06/2019    PLT 79 (L) 08/06/2019    Lab Results   Component Value Date    BNP 1,578 (H) 08/04/2018     BNP (pg/mL)   Date Value   08/04/2018 1,578 (H)   08/03/2018 855 (H)   04/17/2018 718 (H)          Betsey Rizvi [facility]   Heart Failure Clinic

## 2021-06-29 NOTE — PROGRESS NOTES
"Progress Notes by Yesi Vang MD at 11/3/2020  1:50 PM     Author: Yesi Vang MD Service: -- Author Type: Physician    Filed: 11/5/2020 11:19 AM Encounter Date: 11/3/2020 Status: Signed    : Yesi Vang MD (Physician)           The patient has been notified of following:     \"This telephone visit will be conducted via a call between you and your physician/provider. We have found that certain health care needs can be provided without the need for a physical exam.  This service lets us provide the care you need with a phone conversation.  If a prescription is necessary we can send it directly to your pharmacy.  If lab work is needed we can place an order for that and you can then stop by our lab to have the test done at a later time. If during the course of the call the physician/provider feels a telephone visit is not appropriate, you will not be charged for this service.\" Verbal consent has been obtained for this service by care team member:         HEART CARE PHONE ENCOUNTER        The patient has chosen to have the visit conducted as a telephone visit, to reduce risk of exposure given the current status of Coronavirus in our community. This telephone visit is being conducted via a call between the patient and physician/provider. Health care needs are being provided without a physical exam.     Assessment/Recommendations   Assessment:    1.  Chronic heart failure with reduced ejection fraction: Left ventricular ejection fraction of 40%  2.  Hypertension:  3.  Chronic atrial fibrillation  4.  Moderate nonischemic cardiomyopathy  5.  Chronic kidney disease  6.  COPD    Plan:  1.  Continue on Coumadin for anticoagulation  2.  Follow-up with primary with concerns for sinus congestion  3.  Continue on sprionolactone 25 mg, Lasix 40 mg daily, daily weights with an extra dose as needed for weight gain      Follow Up Plan: Follow up in 6 months  I have reviewed the note as " documented.  This accurately captures the substance of my conversation with the patient.    Total time of call between patient and provider was 15 minutes   Start Time:2:00p  Stop Time:2:15p       History of Present Illness/Subjective    Christina Umana is a 80 y.o. female who is being evaluated via a billable telephone visit.    Recently she feels more congested with sinus problems.  She feels a tickling in the back of her throat.  O2 sat on room air today 93%.  She uses home oxygen at night.  Her weight has remained stable within a pound.  Blood pressure today 140/80.  She denies any chest discomfort.  She has chronic dyspnea on exertion that has remained unchanged.      Echocardiogram 8/5/2018    When compared to the previous study dated 4/17/2018, the degree of MR is less    Normal left ventricular size.    Left ventricle ejection fraction is moderately decreased. The estimated left ventricular ejection fraction is 40%.    Normal right ventricular size and systolic function.    Mild mitral regurgitation  I have reviewed and updated the patient's Past Medical History, Social History, Family History and Medication List.     Physical Examination not performed given phone encounter Review of Systems                                                Medical History  Surgical History Family History Social History   Past Medical History:   Diagnosis Date   ? Arthritis    ? Cancer (H) 2005   ? CHF (congestive heart failure) (H)    ? Clotting disorder (H) 4/2017   ? COPD (chronic obstructive pulmonary disease) (H)    ? Degenerative disc disease, lumbar    ? Emphysema lung (H)    ? GERD (gastroesophageal reflux disease)    ? Hyperlipidemia    ? Hypertension    ? Kidney stone 4/2017   ? Osteoporosis    ? Scoliosis    ? Ventral hernia     Past Surgical History:   Procedure Laterality Date   ? BREAST BIOPSY Right 2012   ? BREAST LUMPECTOMY Right 04/03/2009   ? BUNIONECTOMY Bilateral 1988   ? COLECTOMY N/A 03/31/2003   ? COLON  SURGERY     ? CORRECTION HAMMER TOE Bilateral    ? CYST REMOVAL Left 2007   ? HYSTERECTOMY  2004   ? OOPHORECTOMY     ? STOMACH SURGERY      hyernia repair   ? VENTRAL HERNIA REPAIR  2005    Family History   Problem Relation Age of Onset   ? Brain cancer Son 29   ? Dementia Mother    ? Stroke Mother    ? Prostate cancer Father    ? Aortic aneurysm Father         Abdominal   ? Hypertension Sister    ? No Medical Problems Daughter    ? No Medical Problems Maternal Grandmother    ? No Medical Problems Maternal Grandfather    ? No Medical Problems Paternal Grandmother    ? No Medical Problems Paternal Grandfather    ? Breast cancer Maternal Aunt 70   ? No Medical Problems Paternal Aunt    ? Breast cancer Maternal Aunt    ? Other Brother         Polio   ? Other Brother         polio   ? BRCA 1/2 Neg Hx    ? Colon cancer Neg Hx    ? Endometrial cancer Neg Hx    ? Ovarian cancer Neg Hx     Social History     Socioeconomic History   ? Marital status:      Spouse name: Not on file   ? Number of children: Not on file   ? Years of education: Not on file   ? Highest education level: Not on file   Occupational History   ? Not on file   Social Needs   ? Financial resource strain: Not on file   ? Food insecurity     Worry: Not on file     Inability: Not on file   ? Transportation needs     Medical: Not on file     Non-medical: Not on file   Tobacco Use   ? Smoking status: Current Some Day Smoker     Packs/day: 0.50     Years: 60.00     Pack years: 30.00     Last attempt to quit: 2018     Years since quittin.7   ? Smokeless tobacco: Never Used   Substance and Sexual Activity   ? Alcohol use: No   ? Drug use: No   ? Sexual activity: Never   Lifestyle   ? Physical activity     Days per week: Not on file     Minutes per session: Not on file   ? Stress: Not on file   Relationships   ? Social connections     Talks on phone: Not on file     Gets together: Not on file     Attends Yarsanism service: Not  on file     Active member of club or organization: Not on file     Attends meetings of clubs or organizations: Not on file     Relationship status: Not on file   ? Intimate partner violence     Fear of current or ex partner: Not on file     Emotionally abused: Not on file     Physically abused: Not on file     Forced sexual activity: Not on file   Other Topics Concern   ? Not on file   Social History Narrative   ? Not on file          Medications  Allergies   Current Outpatient Medications   Medication Sig Dispense Refill   ? amLODIPine (NORVASC) 5 MG tablet Take 1 tablet (5 mg total) by mouth daily. 90 tablet 1   ? ascorbic acid, vitamin C, (VITAMIN C) 1000 MG tablet Take 1,000 mg by mouth daily.      ? atorvastatin (LIPITOR) 40 MG tablet TAKE 1/2 TABLET(20 MG) BY MOUTH DAILY 45 tablet 2   ? calcium, as carbonate, (OS-GILL) 500 mg calcium (1,250 mg) tablet Take 1 tablet by mouth 2 (two) times a day.     ? cholecalciferol, vitamin D3, 2,000 unit Tab Take 2,000 Units by mouth daily.     ? diphenhydrAMINE (BENADRYL) 25 mg tablet Take 25 mg by mouth at bedtime as needed for sleep.     ? furosemide (LASIX) 20 MG tablet Take 40 mg by mouth daily.     ? ipratropium-albuterol (DUO-NEB) 0.5-2.5 mg/3 mL nebulizer Take 3 mL by nebulization 3 (three) times a day. Then use up to 4 times daily as needed for shortness of breath. (Patient taking differently: Take 3 mL by nebulization 2 (two) times a day. as needed for shortness of breath.) 60 vial 6   ? losartan (COZAAR) 100 MG tablet Take 1 tablet (100 mg total) by mouth daily. 90 tablet 3   ? magnesium oxide (MAG-OX) 400 mg (241.3 mg magnesium) tablet 400mg in the morning and 800 mg at bedtime 270 tablet 3   ? methylcellulose (CITRUCEL ORAL) Take by mouth.     ? metoprolol tartrate (LOPRESSOR) 50 MG tablet Take 1.5 tablets (75 mg total) by mouth 2 (two) times a day. 270 tablet 3   ? omeprazole (PRILOSEC) 40 MG capsule Take 40 mg by mouth Daily before breakfast.     ? PARoxetine  (PAXIL) 20 MG tablet Take 1 tablet (20 mg total) by mouth daily. 90 tablet 3   ? spironolactone (ALDACTONE) 25 MG tablet TAKE 1 TABLET(25 MG) BY MOUTH DAILY 90 tablet 3   ? tiotropium (SPIRIVA) 18 mcg inhalation capsule Place 18 mcg into inhaler and inhale daily.     ? vitamin E 200 UNIT capsule Take 200 Units by mouth daily.     ? warfarin (COUMADIN/JANTOVEN) 5 MG tablet Take 1/2-1 tablet (2.5-5 mg) daily as directed. Adjust dose per INR results. (Patient taking differently: Take 2.5-5 mg by mouth See Admin Instructions. Take 2.5 mg (half-tablet) on Monday, Wednesday and Friday. Take 5 mg all other days of the week. Adjust dose per INR results.) 90 tablet 1   ? polyethylene glycol (MIRALAX) 17 gram packet Take 1 packet (17 g total) by mouth daily. 24 each 0     No current facility-administered medications for this visit.     No Known Allergies      Lab Results    Chemistry/lipid CBC Cardiac Enzymes/BNP/TSH/INR   Lab Results   Component Value Date    CHOL 108 08/03/2018    HDL 37 (L) 08/03/2018    LDLCALC 54 08/03/2018    TRIG 83 08/03/2018    CREATININE 1.64 (H) 06/04/2020    BUN 42 (H) 06/04/2020    K 4.4 06/04/2020     06/04/2020     06/04/2020    CO2 25 06/04/2020    Lab Results   Component Value Date    WBC 5.8 10/05/2020    HGB 16.4 (H) 10/05/2020    HCT 49.1 (H) 10/05/2020    MCV 85 10/05/2020     (L) 10/05/2020    Lab Results   Component Value Date    CKTOTAL 70 07/13/2010    TROPONINI 0.01 08/05/2018    BNP 1,578 (H) 08/04/2018    TSH 2.08 08/06/2019    INR 2.00 (H) 05/10/2019        Yesi Vang

## 2021-06-29 NOTE — PROGRESS NOTES
"Progress Notes by Betsey Rizvi CNP at 4/17/2020  9:50 AM     Author: Betsey Rizvi CNP Service: -- Author Type: Nurse Practitioner    Filed: 4/17/2020 10:39 AM Encounter Date: 4/17/2020 Status: Signed    : Betsey Rizvi CNP (Nurse Practitioner)           The patient has been notified of following:     \"This telephone visit will be conducted via a call between you and your physician/provider. We have found that certain health care needs can be provided without the need for a physical exam.  This service lets us provide the care you need with a phone conversation.  If a prescription is necessary we can send it directly to your pharmacy.  If lab work is needed we can place an order for that and you can then stop by our lab to have the test done at a later time. If during the course of the call the physician/provider feels a telephone visit is not appropriate, you will not be charged for this service.\" Verbal consent has been obtained for this service by care team member:         HEART CARE PHONE ENCOUNTER        The patient has chosen to have the visit conducted as a telephone visit, to reduce risk of exposure given the current status of Coronavirus in our community. This telephone visit is being conducted via a call between the patient and physician/provider. Health care needs are being provided without a physical exam.     Assessment/Recommendations   Assessment:    1.  Heart failure with reduced ejection fraction, ejection fraction 40%: She has no symptoms of acute heart failure.  She has chronic shortness of breath with activity but denies any worsening.    2.  Hypertension: Blood pressure controlled today at 130/69.    3.  Atrial fibrillation: She continues to take warfarin and had INR checked this week.    Plan:  1.   Heart failure medications:  - Beta blocker therapy with metoprolol tartrate 75 mg twice daily  - ARB therapy with losartan 100 mg daily  - Aldosterone blocker " therapy with spironolactone 25 mg daily  - Diuretic therapy with furosemide 40 mg daily  2.  Continue current medications      Follow Up Plan: Follow up with Dr. Vang in July and in the heart failure clinic in 1 year.  I have reviewed the note as documented.  This accurately captures the substance of my conversation with the patient.    Total time of call between patient and provider was 5 minutes   Start Time: 9:55   Stop Time: 10:00       History of Present Illness/Subjective    Becky Umana is a 79 y.o. female who is being evaluated via a billable telephone visit.  She has a history of hypertension, dyslipidemia, atrial fibrillation, COPD, and heart failure with reduced ejection fraction.  Echocardiogram from August 2018 showed ejection fraction of 40% and mild mitral regurgitation.    Becky has chronic shortness of breath with activity such as walking upstairs.  She denies any worsening of her breathing.  She denies orthopnea or edema.  Her weight has been stable between 163 to 165 pounds.  Home blood pressure today 130/69.  She denies lightheadedness and chest pain.        ECHO:   Results for orders placed during the hospital encounter of 08/04/18   Echo Complete [ECH10] 08/05/2018    Narrative   When compared to the previous study dated 4/17/2018, the degree of MR is   less    Normal left ventricular size.    Left ventricle ejection fraction is moderately decreased. The estimated   left ventricular ejection fraction is 40%.    Normal right ventricular size and systolic function.    Mild mitral regurgitation            I have reviewed and updated the patient's Past Medical History, Social History, Family History and Medication List.     Physical Examination not performed given phone encounter Review of Systems                                                Medical History  Surgical History Family History Social History   Past Medical History:   Diagnosis Date   ? Arthritis    ? Cancer (H) 2005   ? CHF  (congestive heart failure) (H)    ? Clotting disorder (H) 4/2017   ? COPD (chronic obstructive pulmonary disease) (H)    ? Degenerative disc disease, lumbar    ? Emphysema lung (H)    ? GERD (gastroesophageal reflux disease)    ? Hyperlipidemia    ? Hypertension    ? Kidney stone 4/2017   ? Osteoporosis    ? Scoliosis    ? Ventral hernia     Past Surgical History:   Procedure Laterality Date   ? BREAST BIOPSY Right 2012   ? BREAST LUMPECTOMY Right 04/03/2009   ? BUNIONECTOMY Bilateral 1988   ? COLECTOMY N/A 03/31/2003   ? COLON SURGERY     ? CORRECTION HAMMER TOE Bilateral 1988   ? CYST REMOVAL Left 02/01/2007   ? HYSTERECTOMY  2004   ? OOPHORECTOMY  2004   ? STOMACH SURGERY      hyernia repair   ? VENTRAL HERNIA REPAIR  02/25/2005    Family History   Problem Relation Age of Onset   ? Brain cancer Son 29   ? Dementia Mother    ? Stroke Mother    ? Prostate cancer Father    ? Aortic aneurysm Father         Abdominal   ? Hypertension Sister    ? No Medical Problems Daughter    ? No Medical Problems Maternal Grandmother    ? No Medical Problems Maternal Grandfather    ? No Medical Problems Paternal Grandmother    ? No Medical Problems Paternal Grandfather    ? Breast cancer Maternal Aunt 70   ? No Medical Problems Paternal Aunt    ? Breast cancer Maternal Aunt    ? Other Brother         Polio   ? Other Brother         polio   ? BRCA 1/2 Neg Hx    ? Colon cancer Neg Hx    ? Endometrial cancer Neg Hx    ? Ovarian cancer Neg Hx     Social History     Socioeconomic History   ? Marital status:      Spouse name: Not on file   ? Number of children: Not on file   ? Years of education: Not on file   ? Highest education level: Not on file   Occupational History   ? Not on file   Social Needs   ? Financial resource strain: Not on file   ? Food insecurity     Worry: Not on file     Inability: Not on file   ? Transportation needs     Medical: Not on file     Non-medical: Not on file   Tobacco Use   ? Smoking status: Current  Some Day Smoker     Packs/day: 0.50     Years: 60.00     Pack years: 30.00     Last attempt to quit: 2018     Years since quittin.1   ? Smokeless tobacco: Never Used   Substance and Sexual Activity   ? Alcohol use: No   ? Drug use: No   ? Sexual activity: Never   Lifestyle   ? Physical activity     Days per week: Not on file     Minutes per session: Not on file   ? Stress: Not on file   Relationships   ? Social connections     Talks on phone: Not on file     Gets together: Not on file     Attends Jew service: Not on file     Active member of club or organization: Not on file     Attends meetings of clubs or organizations: Not on file     Relationship status: Not on file   ? Intimate partner violence     Fear of current or ex partner: Not on file     Emotionally abused: Not on file     Physically abused: Not on file     Forced sexual activity: Not on file   Other Topics Concern   ? Not on file   Social History Narrative   ? Not on file          Medications  Allergies   Current Outpatient Medications   Medication Sig Dispense Refill   ? amLODIPine (NORVASC) 5 MG tablet Take 1 tablet (5 mg total) by mouth daily. 90 tablet 1   ? ascorbic acid, vitamin C, (VITAMIN C) 1000 MG tablet Take 1,000 mg by mouth 2 (two) times a day.     ? atorvastatin (LIPITOR) 40 MG tablet TAKE 1/2 TABLET(20 MG) BY MOUTH DAILY 45 tablet 2   ? calcium, as carbonate, (OS-GILL) 500 mg calcium (1,250 mg) tablet Take 1 tablet by mouth 2 (two) times a day.     ? cholecalciferol, vitamin D3, 2,000 unit Tab Take 2,000 Units by mouth daily.     ? furosemide (LASIX) 20 MG tablet Take 40 mg by mouth daily.     ? ipratropium-albuterol (DUO-NEB) 0.5-2.5 mg/3 mL nebulizer Take 3 mL by nebulization 3 (three) times a day. Then use up to 4 times daily as needed for shortness of breath. (Patient taking differently: Take 3 mL by nebulization 2 (two) times a day. as needed for shortness of breath.) 60 vial 6   ? losartan (COZAAR) 100 MG tablet Take 1  tablet (100 mg total) by mouth daily. 90 tablet 3   ? magnesium oxide (MAG-OX) 400 mg (241.3 mg magnesium) tablet 400mg in the morning and 800 mg at bedtime 270 tablet 3   ? metoprolol tartrate (LOPRESSOR) 50 MG tablet Take 1.5 tablets (75 mg total) by mouth 2 (two) times a day. 270 tablet 3   ? omeprazole (PRILOSEC) 40 MG capsule Take 40 mg by mouth Daily before breakfast.     ? PARoxetine (PAXIL) 20 MG tablet Take 1 tablet (20 mg total) by mouth daily. 90 tablet 3   ? polyethylene glycol (MIRALAX) 17 gram packet Take 1 packet (17 g total) by mouth daily. 24 each 0   ? spironolactone (ALDACTONE) 25 MG tablet TAKE 1 TABLET(25 MG) BY MOUTH DAILY 90 tablet 3   ? vitamin E 200 UNIT capsule Take 200 Units by mouth daily.     ? warfarin (COUMADIN/JANTOVEN) 5 MG tablet Take 1/2-1 tablet (2.5-5 mg) daily as directed. Adjust dose per INR results. (Patient taking differently: Take 2.5-5 mg by mouth See Admin Instructions. Take 2.5 mg (half-tablet) on Monday, Wednesday and Friday. Take 5 mg all other days of the week. Adjust dose per INR results.) 90 tablet 1     No current facility-administered medications for this visit.     No Known Allergies      Lab Results    Chemistry/lipid CBC Cardiac Enzymes/BNP/TSH/INR   Lab Results   Component Value Date    CHOL 108 08/03/2018    HDL 37 (L) 08/03/2018    LDLCALC 54 08/03/2018    TRIG 83 08/03/2018    CREATININE 2.24 (H) 03/13/2020    BUN 45 (H) 03/13/2020    K 4.8 03/13/2020     (L) 03/13/2020    CL 98 03/13/2020    CO2 27 03/13/2020    Lab Results   Component Value Date    WBC 5.3 03/13/2020    HGB 18.2 (H) 03/13/2020    HCT 55.5 (H) 03/13/2020    MCV 79 (L) 03/13/2020     (L) 03/13/2020    Lab Results   Component Value Date    CKTOTAL 70 07/13/2010    TROPONINI 0.01 08/05/2018    BNP 1,578 (H) 08/04/2018    TSH 2.08 08/06/2019    INR 2.00 (H) 05/10/2019

## 2021-07-03 NOTE — ADDENDUM NOTE
Addendum Note by Gabriela Mckee at 7/17/2017  5:05 PM     Author: Gabriela Mckee Service: -- Author Type:     Filed: 7/17/2017  5:05 PM Encounter Date: 7/17/2017 Status: Signed    : Gabriela Mckee ()    Addended by: GABRIELA MCKEE on: 7/17/2017 05:05 PM        Modules accepted: Orders

## 2021-07-03 NOTE — ADDENDUM NOTE
Addendum Note by Jackie Killian MD at 8/6/2019 12:45 PM     Author: Jackie Killian MD Service: -- Author Type: Physician    Filed: 8/16/2019  8:45 PM Encounter Date: 8/6/2019 Status: Signed    : Jackie Killian MD (Physician)    Addended by: JACKIE KILLIAN on: 8/16/2019 08:45 PM        Modules accepted: Orders

## 2021-07-13 ENCOUNTER — TRANSFERRED RECORDS (OUTPATIENT)
Dept: HEALTH INFORMATION MANAGEMENT | Facility: CLINIC | Age: 81
End: 2021-07-13

## 2021-07-13 ENCOUNTER — HOSPITAL ENCOUNTER (EMERGENCY)
Facility: HOSPITAL | Age: 81
Discharge: HOME | End: 2021-07-13
Attending: EMERGENCY MEDICINE | Admitting: EMERGENCY MEDICINE
Payer: COMMERCIAL

## 2021-07-13 ENCOUNTER — RECORDS - HEALTHEAST (OUTPATIENT)
Dept: ADMINISTRATIVE | Facility: CLINIC | Age: 81
End: 2021-07-13

## 2021-07-13 ENCOUNTER — HOSPITAL ENCOUNTER (OUTPATIENT)
Dept: RADIOLOGY | Facility: HOSPITAL | Age: 81
End: 2021-07-13
Attending: INTERNAL MEDICINE
Payer: COMMERCIAL

## 2021-07-13 VITALS
TEMPERATURE: 98.5 F | OXYGEN SATURATION: 94 % | RESPIRATION RATE: 24 BRPM | WEIGHT: 165 LBS | BODY MASS INDEX: 28.32 KG/M2 | SYSTOLIC BLOOD PRESSURE: 132 MMHG | HEART RATE: 76 BPM | DIASTOLIC BLOOD PRESSURE: 79 MMHG

## 2021-07-13 DIAGNOSIS — J44.1 COPD EXACERBATION (H): ICD-10-CM

## 2021-07-13 DIAGNOSIS — J44.1 OBSTRUCTIVE CHRONIC BRONCHITIS WITH EXACERBATION (H): ICD-10-CM

## 2021-07-13 LAB
ANION GAP SERPL CALCULATED.3IONS-SCNC: 12 MMOL/L (ref 5–18)
BASE EXCESS BLDV CALC-SCNC: 1.8 MMOL/L
BUN SERPL-MCNC: 42 MG/DL (ref 8–28)
CALCIUM SERPL-MCNC: 10 MG/DL (ref 8.5–10.5)
CHLORIDE BLD-SCNC: 101 MMOL/L (ref 98–107)
CO2 SERPL-SCNC: 25 MMOL/L (ref 22–31)
CREAT SERPL-MCNC: 1.71 MG/DL (ref 0.6–1.1)
ERYTHROCYTE [DISTWIDTH] IN BLOOD BY AUTOMATED COUNT: 14.3 % (ref 10–15)
GFR SERPL CREATININE-BSD FRML MDRD: 28 ML/MIN/1.73M2
GLUCOSE BLD-MCNC: 175 MG/DL (ref 70–125)
HCO3 BLDV-SCNC: 25 MMOL/L (ref 24–30)
HCT VFR BLD AUTO: 45.4 % (ref 35–47)
HGB BLD-MCNC: 14.7 G/DL (ref 11.7–15.7)
LACTATE SERPL-SCNC: 2.4 MMOL/L (ref 0.7–2)
MCH RBC QN AUTO: 27.4 PG (ref 26.5–33)
MCHC RBC AUTO-ENTMCNC: 32.4 G/DL (ref 31.5–36.5)
MCV RBC AUTO: 85 FL (ref 78–100)
OXYHGB MFR BLDV: 69.7 % (ref 70–75)
PCO2 BLDV: 42 MM HG (ref 35–50)
PH BLDV: 7.41 [PH] (ref 7.35–7.45)
PLATELET # BLD AUTO: 66 10E3/UL (ref 150–450)
PO2 BLDV: 38 MM HG (ref 25–47)
POTASSIUM BLD-SCNC: 4.9 MMOL/L (ref 3.5–5)
RBC # BLD AUTO: 5.36 10E6/UL (ref 3.8–5.2)
SAO2 % BLDV: 71.1 % (ref 70–75)
SODIUM SERPL-SCNC: 138 MMOL/L (ref 136–145)
TROPONIN I SERPL-MCNC: 0.01 NG/ML (ref 0–0.29)
WBC # BLD AUTO: 4.6 10E3/UL (ref 4–11)

## 2021-07-13 PROCEDURE — 80048 BASIC METABOLIC PNL TOTAL CA: CPT | Performed by: EMERGENCY MEDICINE

## 2021-07-13 PROCEDURE — 71046 X-RAY EXAM CHEST 2 VIEWS: CPT

## 2021-07-13 PROCEDURE — 85027 COMPLETE CBC AUTOMATED: CPT | Performed by: EMERGENCY MEDICINE

## 2021-07-13 PROCEDURE — 36592 COLLECT BLOOD FROM PICC: CPT | Performed by: EMERGENCY MEDICINE

## 2021-07-13 PROCEDURE — 83605 ASSAY OF LACTIC ACID: CPT | Performed by: EMERGENCY MEDICINE

## 2021-07-13 PROCEDURE — 99285 EMERGENCY DEPT VISIT HI MDM: CPT | Mod: 25

## 2021-07-13 PROCEDURE — 82805 BLOOD GASES W/O2 SATURATION: CPT | Performed by: EMERGENCY MEDICINE

## 2021-07-13 PROCEDURE — 250N000011 HC RX IP 250 OP 636: Performed by: EMERGENCY MEDICINE

## 2021-07-13 PROCEDURE — 84484 ASSAY OF TROPONIN QUANT: CPT | Performed by: EMERGENCY MEDICINE

## 2021-07-13 PROCEDURE — 94640 AIRWAY INHALATION TREATMENT: CPT

## 2021-07-13 PROCEDURE — 36415 COLL VENOUS BLD VENIPUNCTURE: CPT | Performed by: EMERGENCY MEDICINE

## 2021-07-13 PROCEDURE — 93005 ELECTROCARDIOGRAM TRACING: CPT | Performed by: EMERGENCY MEDICINE

## 2021-07-13 PROCEDURE — 96375 TX/PRO/DX INJ NEW DRUG ADDON: CPT

## 2021-07-13 PROCEDURE — 250N000009 HC RX 250: Performed by: EMERGENCY MEDICINE

## 2021-07-13 PROCEDURE — 96365 THER/PROPH/DIAG IV INF INIT: CPT

## 2021-07-13 RX ORDER — IPRATROPIUM BROMIDE AND ALBUTEROL SULFATE 2.5; .5 MG/3ML; MG/3ML
3 SOLUTION RESPIRATORY (INHALATION) ONCE
Status: COMPLETED | OUTPATIENT
Start: 2021-07-13 | End: 2021-07-13

## 2021-07-13 RX ORDER — CEFTRIAXONE 1 G/1
1 INJECTION, POWDER, FOR SOLUTION INTRAMUSCULAR; INTRAVENOUS ONCE
Status: COMPLETED | OUTPATIENT
Start: 2021-07-13 | End: 2021-07-13

## 2021-07-13 RX ORDER — METHYLPREDNISOLONE SODIUM SUCCINATE 125 MG/2ML
125 INJECTION, POWDER, LYOPHILIZED, FOR SOLUTION INTRAMUSCULAR; INTRAVENOUS ONCE
Status: COMPLETED | OUTPATIENT
Start: 2021-07-13 | End: 2021-07-13

## 2021-07-13 RX ORDER — DOXYCYCLINE 100 MG/1
100 CAPSULE ORAL 2 TIMES DAILY
Qty: 28 CAPSULE | Refills: 0 | Status: SHIPPED | OUTPATIENT
Start: 2021-07-13 | End: 2021-07-27

## 2021-07-13 RX ADMIN — CEFTRIAXONE SODIUM 1 G: 1 INJECTION, POWDER, FOR SOLUTION INTRAMUSCULAR; INTRAVENOUS at 15:23

## 2021-07-13 RX ADMIN — IPRATROPIUM BROMIDE AND ALBUTEROL SULFATE 3 ML: .5; 3 SOLUTION RESPIRATORY (INHALATION) at 16:02

## 2021-07-13 RX ADMIN — METHYLPREDNISOLONE SODIUM SUCCINATE 125 MG: 125 INJECTION, POWDER, FOR SOLUTION INTRAMUSCULAR; INTRAVENOUS at 15:15

## 2021-07-13 ASSESSMENT — ENCOUNTER SYMPTOMS
SORE THROAT: 0
DIARRHEA: 0
CHILLS: 1
RHINORRHEA: 1
VOMITING: 0
FEVER: 1
NAUSEA: 0
SHORTNESS OF BREATH: 1

## 2021-07-13 NOTE — ED TRIAGE NOTES
Patient arrives by private car for evaluation of fever.  Patient reports that she was given a z-pack and prednisone for URI that she started yesterday.  Reports woke with fever today- home temp was 100.9.  History of COPD

## 2021-07-13 NOTE — DISCHARGE INSTRUCTIONS
Use albuterol inhaler or nebulizer every 4 hours for next 2 weeks or until symptoms resolve.    Continue steroid taper as prescribed    Take antibiotics as prescribed    If you experience worsening shortness of breath, dizziness or lightheadedness, or any new or concerning symptoms, return to the emergency department

## 2021-07-13 NOTE — ED PROVIDER NOTES
EMERGENCY DEPARTMENT ENCOUNTER      NAME: Christina Umana  AGE: 81 year old female  YOB: 1940  MRN: [id number]  EVALUATION DATE & TIME: 7/13/2021  2:28 PM    PCP: Adan Elizabeth    ED PROVIDER: Figueroa Araya M.D.      Chief Complaint   Patient presents with     Fever     Shortness of Breath         FINAL IMPRESSION:  1. Obstructive chronic bronchitis with exacerbation (H)    2. COPD exacerbation (H)          ED COURSE & MEDICAL DECISION MAKING:    Pertinent Labs & Imaging studies reviewed. (See chart for details)  81 year old female presents to the Emergency Department for evaluation of sob. History, exam, and course c/w acute exacerbation of chronic bronchitis. Symptoms improved in ED with treatment. Patient discharged with antibiotics and steroid taper ongoing.    ED Course as of Jul 13 1811   Tue Jul 13, 2021   1447 81-year-old female with history of COPD, atrial fibrillation, CHF, and congenital heart disease.  Patient presents with fever, cough, shortness of breath.  She reports receiving the mother in a series of Covid vaccinations several months ago.  Patient reports that her fever, chills, and dyspnea on exertion began 4 to 5 days ago and got progressively worse despite the use of her home nebulizer and azithromycin.      1448 On examination, the patient is in no distress.  She does not appear volume overloaded.  She does have prolonged expiratory phase with expiratory wheeze.  Will treat with nebulized beta agonist, IV steroid.  Will place patient on antibiotics given her history of COPD in the setting of acute exacerbation of chronic bronchitis.  Atrial fibrillation noted on      1448 EKG.  This is chronic for the patient and she is anticoagulated with warfarin      1552 X-ray without pneumonia.  Worsening of chronic thrombocytopenia noted.  This may represent viral suppression in the setting of acute illness.  Patient counseled to follow-up with her primary care for recheck within 1  week.  She has no new since bleeding.  She has no melena, hematemesis, or hemoptysis.  She has no joint swelling to suggest maintain his hemarthrosis      1602 Patient updated and reevaluated.  Notified her of her testing results.  RT @ Bedside to administer DuoNeb      1708 Patient feels improved.  She will occasionally have oxygen saturations down to 89% while speaking.  However, she not has any shortness of breath.  Believe this patient with a long history of COPD about of course is safe to discharge with a pulse oximetry between 8892%.         5:15 PM I rechecked and updated the patient on lab and imaging results.  At the conclusion of the encounter I discussed the results of all of the tests and the disposition. The questions were answered. The patient or family acknowledged understanding and was agreeable with the care plan.       0 minutes of critical care time     MEDICATIONS GIVEN IN THE EMERGENCY:  Medications   methylPREDNISolone sodium succinate (solu-MEDROL) injection 125 mg (125 mg Intravenous Given 7/13/21 1515)   ipratropium - albuterol 0.5 mg/2.5 mg/3 mL (DUONEB) neb solution 3 mL (3 mLs Nebulization Given 7/13/21 1602)   cefTRIAXone (ROCEPHIN) 1 g vial to attach to  mL bag for ADULTS or NS 50 mL bag for PEDS (0 g Intravenous Stopped 7/13/21 1600)       NEW PRESCRIPTIONS STARTED AT TODAY'S ER VISIT  Discharge Medication List as of 7/13/2021  5:12 PM      START taking these medications    Details   doxycycline hyclate (VIBRAMYCIN) 100 MG capsule Take 1 capsule (100 mg) by mouth 2 times daily for 14 days, Disp-28 capsule, R-0, Local Print                =================================================================    HPI    Patient information was obtained from: patient    Use of Intrepreter: N/A        Christina Umana is a 81 year old female with a pertinent history of hyperlipidemia, pneumothorax on left (2019), hypertension, COPD, atrial fibrillation, congenital heart disease who presents  to this ED by walk in for evaluation of fever, shortness of breath. Patient reports that she began to experience fever, chills, and dyspnea on exertion 4-5 days ago. She states he symptoms got progressively worse despite the use of her home nebulizer and azithromycin.    Patient reports that she received the Moderna COVID-19 vaccination several months ago.      REVIEW OF SYSTEMS   Review of Systems   Constitutional: Positive for chills and fever.   HENT: Positive for congestion and rhinorrhea. Negative for sore throat.    Respiratory: Positive for shortness of breath.         Negative for orthopnea.   Cardiovascular: Negative for chest pain and leg swelling.   Gastrointestinal: Negative for diarrhea, nausea and vomiting.   All other systems reviewed and are negative.       PAST MEDICAL HISTORY:  Past Medical History:   Diagnosis Date     Arthritis      Cancer (H) 2005     CHF (congestive heart failure) (H)      Clotting disorder (H) 4/2017     COPD (chronic obstructive pulmonary disease) (H)      Degenerative disc disease, lumbar      Emphysema lung (H)      GERD (gastroesophageal reflux disease)      Hyperlipidemia      Hypertension      Kidney stone 4/2017     Osteoporosis      Scoliosis      Ventral hernia        PAST SURGICAL HISTORY:  Past Surgical History:   Procedure Laterality Date     BIOPSY BREAST Right 2012     BUNIONECTOMY Bilateral 1988     COLECTOMY N/A 03/31/2003     COLON SURGERY       CYST REMOVAL Left 02/01/2007     HERNIORRHAPHY VENTRAL  02/25/2005     HYSTERECTOMY  2004     IR LUMBAR EPIDURAL STEROID INJECTION  8/8/2011     IR LUMBAR EPIDURAL STEROID INJECTION  10/10/2011     IR LUMBAR EPIDURAL STEROID INJECTION  3/8/2012     IR LUMBAR EPIDURAL STEROID INJECTION  4/10/2012     LUMPECTOMY BREAST Right 04/03/2009     OOPHORECTOMY  2004     REPAIR HAMMER TOE Bilateral 1988     STOMACH SURGERY      hyernia repair           CURRENT MEDICATIONS:    No current facility-administered medications for this  encounter.     Current Outpatient Medications   Medication     doxycycline hyclate (VIBRAMYCIN) 100 MG capsule     amLODIPine (NORVASC) 5 MG tablet     ascorbic acid, vitamin C, (VITAMIN C) 1000 MG tablet     atorvastatin (LIPITOR) 40 MG tablet     calcium, as carbonate, (OS-GILL) 500 mg calcium (1,250 mg) tablet     cholecalciferol, vitamin D3, 2,000 unit Tab     diphenhydrAMINE (BENADRYL) 25 mg tablet     furosemide (LASIX) 20 MG tablet     ipratropium-albuterol (DUO-NEB) 0.5-2.5 mg/3 mL nebulizer     losartan (COZAAR) 100 MG tablet     magnesium oxide (MAG-OX) 400 mg (241.3 mg magnesium) tablet     methylcellulose (CITRUCEL ORAL)     metoprolol tartrate (LOPRESSOR) 50 MG tablet     omeprazole (PRILOSEC) 40 MG capsule     PARoxetine (PAXIL) 20 MG tablet     polyethylene glycol (MIRALAX) 17 gram packet     spironolactone (ALDACTONE) 25 MG tablet     tiotropium (SPIRIVA) 18 mcg inhalation capsule     vitamin E 200 UNIT capsule     warfarin (COUMADIN/JANTOVEN) 5 MG tablet         ALLERGIES:  No Known Allergies    FAMILY HISTORY:  Family History   Problem Relation Age of Onset     Brain Cancer Son 29.00     Dementia Mother      Cerebrovascular Disease Mother      Prostate Cancer Father      Aortic aneurysm Father         Abdominal     Hypertension Sister      No Known Problems Daughter      No Known Problems Maternal Grandmother      No Known Problems Maternal Grandfather      No Known Problems Paternal Grandmother      No Known Problems Paternal Grandfather      Breast Cancer Maternal Aunt 70.00     No Known Problems Paternal Aunt      Breast Cancer Maternal Aunt      Other - See Comments Brother         Polio     Other - See Comments Brother         polio     Hereditary Breast and Ovarian Cancer Syndrome No family hx of      Colon Cancer No family hx of      Endometrial Cancer No family hx of      Ovarian Cancer No family hx of        SOCIAL HISTORY:   Social History     Socioeconomic History     Marital status:       Spouse name: None     Number of children: None     Years of education: None     Highest education level: None   Occupational History     None   Tobacco Use     Smoking status: Current Some Day Smoker     Packs/day: 0.50     Years: 60.00     Pack years: 30.00     Last attempt to quit: 2/16/2018     Years since quitting: 3.4     Smokeless tobacco: Never Used   Substance and Sexual Activity     Alcohol use: No     Drug use: No     Sexual activity: Never   Other Topics Concern     None   Social History Narrative     None     Social Determinants of Health     Financial Resource Strain:      Difficulty of Paying Living Expenses:    Food Insecurity:      Worried About Running Out of Food in the Last Year:      Ran Out of Food in the Last Year:    Transportation Needs:      Lack of Transportation (Medical):      Lack of Transportation (Non-Medical):    Physical Activity:      Days of Exercise per Week:      Minutes of Exercise per Session:    Stress:      Feeling of Stress :    Social Connections:      Frequency of Communication with Friends and Family:      Frequency of Social Gatherings with Friends and Family:      Attends [employer] Services:      Active Member of Clubs or Organizations:      Attends Club or Organization Meetings:      Marital Status:    Intimate Partner Violence:      Fear of Current or Ex-Partner:      Emotionally Abused:      Physically Abused:      Sexually Abused:        VITALS:  Patient Vitals for the past 24 hrs:   BP Temp Temp src Pulse Resp SpO2 Weight   07/13/21 1700 132/79 -- -- 76 -- -- --   07/13/21 1630 126/60 -- -- 74 24 94 % --   07/13/21 1628 -- -- -- -- -- 94 % --   07/13/21 1602 -- -- -- -- -- 94 % --   07/13/21 1251 134/63 98.5  F (36.9  C) Oral 71 26 90 % 74.8 kg (165 lb)       PHYSICAL EXAM    General: A&O x 3 no apparent distress  HEENT: PERRL, EOMI, moist mucous membranes  Neck: Supple, no rigidity  Cardiovascular: Normal rate, irregular rhythm. 2+ distal pulses, cap  refill less than 2 seconds.  No m/r/g  Pulmonary: Expiratory wheeze with prolonged expiratory phase. Chest nontender, symmetrical rise, normal effort.  Abdomen: Nontender, no distention. No rebound, guarding, or rigidity.  Extremities: No clubbing, cyanosis, or edema  Musculoskeletal: Gait normal; extremities atraumatic x4  Neuro: Cranial nerves II through XII intact, GCS 15; intact, symmetric strength and sensation x4 extremities  Skin: No rash, jaundice, pallor.  Warm dry and intact  Psych: Normal mood and affect         LAB:  All pertinent labs reviewed and interpreted.  Results for orders placed or performed during the hospital encounter of 07/13/21   XR Chest 2 Views    Impression    IMPRESSION: Hyperinflation with minimal scattered fibrosis. Tortuous thoracic aorta. Lungs otherwise clear with no acute new findings.   CBC (+ platelets, no diff)   Result Value Ref Range    WBC Count 4.6 4.0 - 11.0 10e3/uL    RBC Count 5.36 (H) 3.80 - 5.20 10e6/uL    Hemoglobin 14.7 11.7 - 15.7 g/dL    Hematocrit 45.4 35.0 - 47.0 %    MCV 85 78 - 100 fL    MCH 27.4 26.5 - 33.0 pg    MCHC 32.4 31.5 - 36.5 g/dL    RDW 14.3 10.0 - 15.0 %    Platelet Count 66 (L) 150 - 450 10e3/uL   Basic metabolic panel   Result Value Ref Range    Sodium 138 136 - 145 mmol/L    Potassium 4.9 3.5 - 5.0 mmol/L    Chloride 101 98 - 107 mmol/L    Carbon Dioxide (CO2) 25 22 - 31 mmol/L    Anion Gap 12 5 - 18 mmol/L    Urea Nitrogen 42 (H) 8 - 28 mg/dL    Creatinine 1.71 (H) 0.60 - 1.10 mg/dL    Calcium 10.0 8.5 - 10.5 mg/dL    Glucose 175 (H) 70 - 125 mg/dL    GFR Estimate 28 (L) >60 mL/min/1.73m2   Troponin I (now)   Result Value Ref Range    Troponin I 0.01 0.00 - 0.29 ng/mL   Lactic acid whole blood   Result Value Ref Range    Lactic Acid 2.4 (H) 0.7 - 2.0 mmol/L   Blood gas venous   Result Value Ref Range    pH Venous 7.41 7.35 - 7.45    pCO2 Venous 42 35 - 50 mm Hg    pO2 Venous 38 25 - 47 mm Hg    Bicarbonate Venous 25 24 - 30 mmol/L    Base  Excess/Deficit (+/-) 1.8   mmol/L    Oxyhemoglobin Venous 69.7 (L) 70.0 - 75.0 %    O2 Sat, Venous 71.1 70.0 - 75.0 %       RADIOLOGY:  Reviewed all pertinent imaging. Please see official radiology report.  [unfilled]    EKG:    Performed at: 14:47    Impression:  Abnormal ECG.    Rate: 74 BPM  Rhythm: Atrial fibrillation  Axis: no axis deviation  ME Interval: * ms  QRS Interval: 78 ms  QTc Interval: 410 ms  ST Changes: Nonspecific ST abnormality  Comparison: 04-AUG-2018 - Atrial fibrillation with premature ventricular or aberrantly conducted complexes. ST & T wave abnormality, consider inferolateral ischemia. Abnormal ECG.    I have independently reviewed and interpreted the EKG(s) documented above.    PROCEDURES:   none      I, Leland Isidro, am serving as a scribe to document services personally performed by Dr. Araya based on my observation and the provider's statements to me. I, Figueroa Araya MD attest that Leland Isidro is acting in a scribe capacity, has observed my performance of the services and has documented them in accordance with my direction.  Any spelling or grammatic inconsistencies or inaccuracies are typographical or dictation errors.    Figueroa Araya M.D.  Emergency Medicine  [facility] EMERGENCY DEPARTMENT  [facility] [facility] [id number] [phone]  Dept: [phone]     Figueroa Araya MD  07/13/21 1249

## 2021-07-14 PROBLEM — I50.9 ACUTE ON CHRONIC CONGESTIVE HEART FAILURE (H): Status: RESOLVED | Noted: 2018-02-16 | Resolved: 2018-02-22

## 2021-07-14 PROBLEM — I50.9 ACUTE EXACERBATION OF CHF (CONGESTIVE HEART FAILURE) (H): Status: RESOLVED | Noted: 2018-04-15 | Resolved: 2018-04-23

## 2021-07-14 LAB
ATRIAL RATE - MUSE: 340 BPM
DIASTOLIC BLOOD PRESSURE - MUSE: 69 MMHG
INTERPRETATION ECG - MUSE: NORMAL
P AXIS - MUSE: NORMAL DEGREES
PR INTERVAL - MUSE: NORMAL MS
QRS DURATION - MUSE: 78 MS
QT - MUSE: 370 MS
QTC - MUSE: 410 MS
R AXIS - MUSE: 35 DEGREES
SYSTOLIC BLOOD PRESSURE - MUSE: 158 MMHG
T AXIS - MUSE: 51 DEGREES
VENTRICULAR RATE- MUSE: 74 BPM

## 2021-07-21 ENCOUNTER — RECORDS - HEALTHEAST (OUTPATIENT)
Dept: ADMINISTRATIVE | Facility: CLINIC | Age: 81
End: 2021-07-21

## 2021-07-27 ENCOUNTER — ANCILLARY PROCEDURE (OUTPATIENT)
Dept: BONE DENSITY | Facility: CLINIC | Age: 81
End: 2021-07-27
Attending: INTERNAL MEDICINE
Payer: COMMERCIAL

## 2021-07-27 DIAGNOSIS — Z78.0 POST-MENOPAUSAL: ICD-10-CM

## 2021-07-27 DIAGNOSIS — R29.890 HEIGHT LOSS: ICD-10-CM

## 2021-07-27 PROCEDURE — 77080 DXA BONE DENSITY AXIAL: CPT | Mod: TC | Performed by: RADIOLOGY

## 2021-08-14 ENCOUNTER — HEALTH MAINTENANCE LETTER (OUTPATIENT)
Age: 81
End: 2021-08-14

## 2021-10-10 ENCOUNTER — HEALTH MAINTENANCE LETTER (OUTPATIENT)
Age: 81
End: 2021-10-10

## 2022-09-18 ENCOUNTER — HEALTH MAINTENANCE LETTER (OUTPATIENT)
Age: 82
End: 2022-09-18

## 2022-12-13 ENCOUNTER — HOSPITAL ENCOUNTER (OUTPATIENT)
Dept: RADIOLOGY | Facility: HOSPITAL | Age: 82
Discharge: HOME | End: 2022-12-13
Attending: INTERNAL MEDICINE | Admitting: INTERNAL MEDICINE
Payer: COMMERCIAL

## 2022-12-13 DIAGNOSIS — J44.1 COPD EXACERBATION (H): ICD-10-CM

## 2022-12-13 PROCEDURE — 71046 X-RAY EXAM CHEST 2 VIEWS: CPT

## 2022-12-18 ENCOUNTER — APPOINTMENT (OUTPATIENT)
Dept: CARDIOLOGY | Facility: HOSPITAL | Age: 82
DRG: 193 | End: 2022-12-18
Attending: INTERNAL MEDICINE
Payer: COMMERCIAL

## 2022-12-18 ENCOUNTER — HOSPITAL ENCOUNTER (INPATIENT)
Facility: HOSPITAL | Age: 82
LOS: 3 days | Discharge: HOME | DRG: 193 | End: 2022-12-21
Attending: EMERGENCY MEDICINE | Admitting: INTERNAL MEDICINE
Payer: COMMERCIAL

## 2022-12-18 ENCOUNTER — APPOINTMENT (OUTPATIENT)
Dept: RADIOLOGY | Facility: HOSPITAL | Age: 82
DRG: 193 | End: 2022-12-18
Attending: EMERGENCY MEDICINE
Payer: COMMERCIAL

## 2022-12-18 DIAGNOSIS — I48.20 CHRONIC ATRIAL FIBRILLATION (H): Primary | ICD-10-CM

## 2022-12-18 DIAGNOSIS — J44.1 COPD EXACERBATION (H): ICD-10-CM

## 2022-12-18 DIAGNOSIS — B37.0 THRUSH: ICD-10-CM

## 2022-12-18 DIAGNOSIS — I10 ESSENTIAL HYPERTENSION: ICD-10-CM

## 2022-12-18 DIAGNOSIS — J96.01 ACUTE RESPIRATORY FAILURE WITH HYPOXIA (H): ICD-10-CM

## 2022-12-18 DIAGNOSIS — I50.20 HEART FAILURE WITH REDUCED EJECTION FRACTION (H): ICD-10-CM

## 2022-12-18 LAB
ALBUMIN SERPL BCG-MCNC: 4 G/DL (ref 3.5–5.2)
ALP SERPL-CCNC: 66 U/L (ref 35–104)
ALT SERPL W P-5'-P-CCNC: 14 U/L (ref 10–35)
ANION GAP SERPL CALCULATED.3IONS-SCNC: 12 MMOL/L (ref 7–15)
APTT PPP: 40 SECONDS (ref 22–38)
AST SERPL W P-5'-P-CCNC: 18 U/L (ref 10–35)
BASE EXCESS BLDV CALC-SCNC: 5.3 MMOL/L
BASOPHILS # BLD AUTO: 0 10E3/UL (ref 0–0.2)
BASOPHILS NFR BLD AUTO: 0 %
BILIRUB SERPL-MCNC: 0.9 MG/DL
BUN SERPL-MCNC: 34 MG/DL (ref 8–23)
CALCIUM SERPL-MCNC: 9.6 MG/DL (ref 8.8–10.2)
CHLORIDE SERPL-SCNC: 99 MMOL/L (ref 98–107)
CREAT SERPL-MCNC: 1.38 MG/DL (ref 0.51–0.95)
DEPRECATED HCO3 PLAS-SCNC: 25 MMOL/L (ref 22–29)
EOSINOPHIL # BLD AUTO: 0 10E3/UL (ref 0–0.7)
EOSINOPHIL NFR BLD AUTO: 0 %
ERYTHROCYTE [DISTWIDTH] IN BLOOD BY AUTOMATED COUNT: 14.4 % (ref 10–15)
FLUAV RNA SPEC QL NAA+PROBE: NEGATIVE
FLUBV RNA RESP QL NAA+PROBE: NEGATIVE
GFR SERPL CREATININE-BSD FRML MDRD: 38 ML/MIN/1.73M2
GLUCOSE SERPL-MCNC: 132 MG/DL (ref 70–99)
HCO3 BLDV-SCNC: 30 MMOL/L (ref 24–30)
HCT VFR BLD AUTO: 45.6 % (ref 35–47)
HGB BLD-MCNC: 14.9 G/DL (ref 11.7–15.7)
IMM GRANULOCYTES # BLD: 0.1 10E3/UL
IMM GRANULOCYTES NFR BLD: 1 %
INR PPP: 3.81 (ref 0.85–1.15)
LACTATE SERPL-SCNC: 1.7 MMOL/L (ref 0.7–2)
LVEF ECHO: NORMAL
LYMPHOCYTES # BLD AUTO: 1.2 10E3/UL (ref 0.8–5.3)
LYMPHOCYTES NFR BLD AUTO: 15 %
MAGNESIUM SERPL-MCNC: 1.9 MG/DL (ref 1.7–2.3)
MCH RBC QN AUTO: 27.2 PG (ref 26.5–33)
MCHC RBC AUTO-ENTMCNC: 32.7 G/DL (ref 31.5–36.5)
MCV RBC AUTO: 83 FL (ref 78–100)
MONOCYTES # BLD AUTO: 0.7 10E3/UL (ref 0–1.3)
MONOCYTES NFR BLD AUTO: 9 %
NEUTROPHILS # BLD AUTO: 6 10E3/UL (ref 1.6–8.3)
NEUTROPHILS NFR BLD AUTO: 75 %
NRBC # BLD AUTO: 0 10E3/UL
NRBC BLD AUTO-RTO: 0 /100
NT-PROBNP SERPL-MCNC: 6531 PG/ML (ref 0–1800)
OXYHGB MFR BLDV: 20.9 % (ref 70–75)
PCO2 BLDV: 49 MM HG (ref 35–50)
PH BLDV: 7.4 [PH] (ref 7.35–7.45)
PLATELET # BLD AUTO: 111 10E3/UL (ref 150–450)
PO2 BLDV: 17 MM HG (ref 25–47)
POTASSIUM SERPL-SCNC: 4.1 MMOL/L (ref 3.4–5.3)
PROT SERPL-MCNC: 6.6 G/DL (ref 6.4–8.3)
RBC # BLD AUTO: 5.48 10E6/UL (ref 3.8–5.2)
RSV RNA SPEC NAA+PROBE: NEGATIVE
SAO2 % BLDV: 21.4 % (ref 70–75)
SARS-COV-2 RNA RESP QL NAA+PROBE: NEGATIVE
SARS-COV-2 RNA RESP QL NAA+PROBE: NEGATIVE
SODIUM SERPL-SCNC: 136 MMOL/L (ref 136–145)
TROPONIN T SERPL HS-MCNC: 17 NG/L
TROPONIN T SERPL HS-MCNC: 18 NG/L
WBC # BLD AUTO: 7.8 10E3/UL (ref 4–11)

## 2022-12-18 PROCEDURE — 250N000011 HC RX IP 250 OP 636: Performed by: INTERNAL MEDICINE

## 2022-12-18 PROCEDURE — 36415 COLL VENOUS BLD VENIPUNCTURE: CPT | Performed by: EMERGENCY MEDICINE

## 2022-12-18 PROCEDURE — 258N000003 HC RX IP 258 OP 636: Performed by: EMERGENCY MEDICINE

## 2022-12-18 PROCEDURE — 84484 ASSAY OF TROPONIN QUANT: CPT | Performed by: EMERGENCY MEDICINE

## 2022-12-18 PROCEDURE — 96368 THER/DIAG CONCURRENT INF: CPT

## 2022-12-18 PROCEDURE — 94640 AIRWAY INHALATION TREATMENT: CPT

## 2022-12-18 PROCEDURE — 250N000009 HC RX 250: Performed by: EMERGENCY MEDICINE

## 2022-12-18 PROCEDURE — 99223 1ST HOSP IP/OBS HIGH 75: CPT | Mod: AI | Performed by: INTERNAL MEDICINE

## 2022-12-18 PROCEDURE — 999N000157 HC STATISTIC RCP TIME EA 10 MIN

## 2022-12-18 PROCEDURE — 94660 CPAP INITIATION&MGMT: CPT

## 2022-12-18 PROCEDURE — 94640 AIRWAY INHALATION TREATMENT: CPT | Mod: 76

## 2022-12-18 PROCEDURE — 250N000013 HC RX MED GY IP 250 OP 250 PS 637: Performed by: INTERNAL MEDICINE

## 2022-12-18 PROCEDURE — 210N000001 HC R&B IMCU HEART CARE

## 2022-12-18 PROCEDURE — 250N000009 HC RX 250: Performed by: INTERNAL MEDICINE

## 2022-12-18 PROCEDURE — 96365 THER/PROPH/DIAG IV INF INIT: CPT

## 2022-12-18 PROCEDURE — 99223 1ST HOSP IP/OBS HIGH 75: CPT | Performed by: INTERNAL MEDICINE

## 2022-12-18 PROCEDURE — 93306 TTE W/DOPPLER COMPLETE: CPT

## 2022-12-18 PROCEDURE — 93306 TTE W/DOPPLER COMPLETE: CPT | Mod: 26 | Performed by: INTERNAL MEDICINE

## 2022-12-18 PROCEDURE — 85610 PROTHROMBIN TIME: CPT | Performed by: EMERGENCY MEDICINE

## 2022-12-18 PROCEDURE — 93005 ELECTROCARDIOGRAM TRACING: CPT | Performed by: EMERGENCY MEDICINE

## 2022-12-18 PROCEDURE — C9803 HOPD COVID-19 SPEC COLLECT: HCPCS

## 2022-12-18 PROCEDURE — 82805 BLOOD GASES W/O2 SATURATION: CPT | Performed by: EMERGENCY MEDICINE

## 2022-12-18 PROCEDURE — 272N000202 HC AEROBIKA WITH MANOMETER

## 2022-12-18 PROCEDURE — 96375 TX/PRO/DX INJ NEW DRUG ADDON: CPT

## 2022-12-18 PROCEDURE — 80053 COMPREHEN METABOLIC PANEL: CPT | Performed by: EMERGENCY MEDICINE

## 2022-12-18 PROCEDURE — 99285 EMERGENCY DEPT VISIT HI MDM: CPT | Mod: 25

## 2022-12-18 PROCEDURE — 83880 ASSAY OF NATRIURETIC PEPTIDE: CPT | Performed by: EMERGENCY MEDICINE

## 2022-12-18 PROCEDURE — 99221 1ST HOSP IP/OBS SF/LOW 40: CPT | Performed by: INTERNAL MEDICINE

## 2022-12-18 PROCEDURE — 250N000011 HC RX IP 250 OP 636: Performed by: EMERGENCY MEDICINE

## 2022-12-18 PROCEDURE — 83605 ASSAY OF LACTIC ACID: CPT | Performed by: EMERGENCY MEDICINE

## 2022-12-18 PROCEDURE — 85730 THROMBOPLASTIN TIME PARTIAL: CPT | Performed by: EMERGENCY MEDICINE

## 2022-12-18 PROCEDURE — 83735 ASSAY OF MAGNESIUM: CPT | Performed by: INTERNAL MEDICINE

## 2022-12-18 PROCEDURE — 85025 COMPLETE CBC W/AUTO DIFF WBC: CPT | Performed by: EMERGENCY MEDICINE

## 2022-12-18 PROCEDURE — 71045 X-RAY EXAM CHEST 1 VIEW: CPT

## 2022-12-18 PROCEDURE — U0003 INFECTIOUS AGENT DETECTION BY NUCLEIC ACID (DNA OR RNA); SEVERE ACUTE RESPIRATORY SYNDROME CORONAVIRUS 2 (SARS-COV-2) (CORONAVIRUS DISEASE [COVID-19]), AMPLIFIED PROBE TECHNIQUE, MAKING USE OF HIGH THROUGHPUT TECHNOLOGIES AS DESCRIBED BY CMS-2020-01-R: HCPCS | Performed by: EMERGENCY MEDICINE

## 2022-12-18 PROCEDURE — 87637 SARSCOV2&INF A&B&RSV AMP PRB: CPT | Performed by: EMERGENCY MEDICINE

## 2022-12-18 RX ORDER — BUMETANIDE 1 MG/1
1 TABLET ORAL EVERY OTHER DAY
Status: ON HOLD | COMMUNITY
End: 2022-12-21

## 2022-12-18 RX ORDER — PREDNISONE 20 MG/1
20 TABLET ORAL DAILY
Status: ON HOLD | COMMUNITY
End: 2022-12-21

## 2022-12-18 RX ORDER — AMLODIPINE BESYLATE 5 MG/1
5 TABLET ORAL DAILY
Status: DISCONTINUED | OUTPATIENT
Start: 2022-12-18 | End: 2022-12-19

## 2022-12-18 RX ORDER — METOPROLOL TARTRATE 25 MG/1
75 TABLET, FILM COATED ORAL 2 TIMES DAILY
Status: DISCONTINUED | OUTPATIENT
Start: 2022-12-18 | End: 2022-12-19

## 2022-12-18 RX ORDER — METHYLPREDNISOLONE SODIUM SUCCINATE 40 MG/ML
40 INJECTION, POWDER, LYOPHILIZED, FOR SOLUTION INTRAMUSCULAR; INTRAVENOUS EVERY 6 HOURS
Status: DISCONTINUED | OUTPATIENT
Start: 2022-12-18 | End: 2022-12-19

## 2022-12-18 RX ORDER — IPRATROPIUM BROMIDE AND ALBUTEROL SULFATE 2.5; .5 MG/3ML; MG/3ML
3 SOLUTION RESPIRATORY (INHALATION)
Status: DISCONTINUED | OUTPATIENT
Start: 2022-12-18 | End: 2022-12-19

## 2022-12-18 RX ORDER — BUMETANIDE 1 MG/1
2 TABLET ORAL EVERY OTHER DAY
Status: ON HOLD | COMMUNITY
End: 2022-12-21

## 2022-12-18 RX ORDER — ATORVASTATIN CALCIUM 20 MG/1
20 TABLET, FILM COATED ORAL DAILY
COMMUNITY

## 2022-12-18 RX ORDER — MAGNESIUM HYDROXIDE/ALUMINUM HYDROXICE/SIMETHICONE 120; 1200; 1200 MG/30ML; MG/30ML; MG/30ML
30 SUSPENSION ORAL EVERY 4 HOURS PRN
Status: DISCONTINUED | OUTPATIENT
Start: 2022-12-18 | End: 2022-12-21 | Stop reason: HOSPADM

## 2022-12-18 RX ORDER — FUROSEMIDE 10 MG/ML
60 INJECTION INTRAMUSCULAR; INTRAVENOUS EVERY 8 HOURS
Status: DISCONTINUED | OUTPATIENT
Start: 2022-12-18 | End: 2022-12-19

## 2022-12-18 RX ORDER — WARFARIN SODIUM 5 MG/1
2.5-5 TABLET ORAL DAILY
COMMUNITY

## 2022-12-18 RX ORDER — ONDANSETRON 2 MG/ML
4 INJECTION INTRAMUSCULAR; INTRAVENOUS EVERY 6 HOURS PRN
Status: DISCONTINUED | OUTPATIENT
Start: 2022-12-18 | End: 2022-12-21 | Stop reason: HOSPADM

## 2022-12-18 RX ORDER — BISACODYL 5 MG
5 TABLET, DELAYED RELEASE (ENTERIC COATED) ORAL DAILY PRN
Status: DISCONTINUED | OUTPATIENT
Start: 2022-12-18 | End: 2022-12-21 | Stop reason: HOSPADM

## 2022-12-18 RX ORDER — AZELASTINE 1 MG/ML
1 SPRAY, METERED NASAL 2 TIMES DAILY
COMMUNITY

## 2022-12-18 RX ORDER — BUDESONIDE 0.5 MG/2ML
1 INHALANT ORAL 2 TIMES DAILY
Status: DISCONTINUED | OUTPATIENT
Start: 2022-12-18 | End: 2022-12-21 | Stop reason: HOSPADM

## 2022-12-18 RX ORDER — CEFTRIAXONE 1 G/1
1 INJECTION, POWDER, FOR SOLUTION INTRAMUSCULAR; INTRAVENOUS EVERY 24 HOURS
Status: DISCONTINUED | OUTPATIENT
Start: 2022-12-19 | End: 2022-12-20

## 2022-12-18 RX ORDER — NYSTATIN 100000/ML
500000 SUSPENSION, ORAL (FINAL DOSE FORM) ORAL 4 TIMES DAILY
Status: DISCONTINUED | OUTPATIENT
Start: 2022-12-18 | End: 2022-12-21 | Stop reason: HOSPADM

## 2022-12-18 RX ORDER — PAROXETINE 20 MG/1
20 TABLET, FILM COATED ORAL DAILY
Status: DISCONTINUED | OUTPATIENT
Start: 2022-12-18 | End: 2022-12-21 | Stop reason: HOSPADM

## 2022-12-18 RX ORDER — METHYLPREDNISOLONE SODIUM SUCCINATE 125 MG/2ML
125 INJECTION, POWDER, LYOPHILIZED, FOR SOLUTION INTRAMUSCULAR; INTRAVENOUS ONCE
Status: COMPLETED | OUTPATIENT
Start: 2022-12-18 | End: 2022-12-18

## 2022-12-18 RX ORDER — ALBUTEROL SULFATE 90 UG/1
2 AEROSOL, METERED RESPIRATORY (INHALATION) EVERY 6 HOURS PRN
COMMUNITY

## 2022-12-18 RX ORDER — CEFTRIAXONE 2 G/1
2 INJECTION, POWDER, FOR SOLUTION INTRAMUSCULAR; INTRAVENOUS ONCE
Status: COMPLETED | OUTPATIENT
Start: 2022-12-18 | End: 2022-12-18

## 2022-12-18 RX ORDER — GUAIFENESIN 600 MG/1
1200 TABLET, EXTENDED RELEASE ORAL 2 TIMES DAILY
Status: DISCONTINUED | OUTPATIENT
Start: 2022-12-18 | End: 2022-12-21 | Stop reason: HOSPADM

## 2022-12-18 RX ORDER — ATORVASTATIN CALCIUM 10 MG/1
20 TABLET, FILM COATED ORAL DAILY
Status: DISCONTINUED | OUTPATIENT
Start: 2022-12-18 | End: 2022-12-21 | Stop reason: HOSPADM

## 2022-12-18 RX ORDER — PANTOPRAZOLE SODIUM 40 MG/1
40 TABLET, DELAYED RELEASE ORAL
Status: DISCONTINUED | OUTPATIENT
Start: 2022-12-19 | End: 2022-12-21 | Stop reason: HOSPADM

## 2022-12-18 RX ORDER — ACETAMINOPHEN 325 MG/1
650 TABLET ORAL EVERY 4 HOURS PRN
Status: DISCONTINUED | OUTPATIENT
Start: 2022-12-18 | End: 2022-12-21 | Stop reason: HOSPADM

## 2022-12-18 RX ORDER — LOSARTAN POTASSIUM 50 MG/1
100 TABLET ORAL DAILY
Status: DISCONTINUED | OUTPATIENT
Start: 2022-12-18 | End: 2022-12-21 | Stop reason: HOSPADM

## 2022-12-18 RX ORDER — BUDESONIDE 1 MG/2ML
1 INHALANT ORAL 2 TIMES DAILY
COMMUNITY

## 2022-12-18 RX ORDER — IPRATROPIUM BROMIDE AND ALBUTEROL SULFATE 2.5; .5 MG/3ML; MG/3ML
3 SOLUTION RESPIRATORY (INHALATION) ONCE
Status: COMPLETED | OUTPATIENT
Start: 2022-12-18 | End: 2022-12-18

## 2022-12-18 RX ORDER — BISACODYL 10 MG
10 SUPPOSITORY, RECTAL RECTAL DAILY PRN
Status: DISCONTINUED | OUTPATIENT
Start: 2022-12-18 | End: 2022-12-21 | Stop reason: HOSPADM

## 2022-12-18 RX ORDER — LANOLIN ALCOHOL/MO/W.PET/CERES
3 CREAM (GRAM) TOPICAL
Status: DISCONTINUED | OUTPATIENT
Start: 2022-12-18 | End: 2022-12-21 | Stop reason: HOSPADM

## 2022-12-18 RX ORDER — ALBUTEROL SULFATE 90 UG/1
2 AEROSOL, METERED RESPIRATORY (INHALATION) EVERY 4 HOURS PRN
Status: DISCONTINUED | OUTPATIENT
Start: 2022-12-18 | End: 2022-12-21 | Stop reason: HOSPADM

## 2022-12-18 RX ORDER — GUAIFENESIN 200 MG/10ML
10 LIQUID ORAL EVERY 4 HOURS PRN
Status: DISCONTINUED | OUTPATIENT
Start: 2022-12-18 | End: 2022-12-21 | Stop reason: HOSPADM

## 2022-12-18 RX ORDER — FUROSEMIDE 10 MG/ML
40 INJECTION INTRAMUSCULAR; INTRAVENOUS ONCE
Status: COMPLETED | OUTPATIENT
Start: 2022-12-18 | End: 2022-12-18

## 2022-12-18 RX ORDER — AMOXICILLIN 250 MG
1 CAPSULE ORAL 2 TIMES DAILY PRN
Status: DISCONTINUED | OUTPATIENT
Start: 2022-12-18 | End: 2022-12-21 | Stop reason: HOSPADM

## 2022-12-18 RX ADMIN — FUROSEMIDE 60 MG: 10 INJECTION, SOLUTION INTRAMUSCULAR; INTRAVENOUS at 23:17

## 2022-12-18 RX ADMIN — IPRATROPIUM BROMIDE AND ALBUTEROL SULFATE 3 ML: .5; 3 SOLUTION RESPIRATORY (INHALATION) at 07:59

## 2022-12-18 RX ADMIN — ATORVASTATIN CALCIUM 20 MG: 10 TABLET, FILM COATED ORAL at 14:30

## 2022-12-18 RX ADMIN — NYSTATIN 500000 UNITS: 100000 SUSPENSION ORAL at 19:30

## 2022-12-18 RX ADMIN — GUAIFENESIN 1200 MG: 600 TABLET ORAL at 23:16

## 2022-12-18 RX ADMIN — AZITHROMYCIN 500 MG: 500 INJECTION, POWDER, LYOPHILIZED, FOR SOLUTION INTRAVENOUS at 10:14

## 2022-12-18 RX ADMIN — FUROSEMIDE 40 MG: 10 INJECTION, SOLUTION INTRAMUSCULAR; INTRAVENOUS at 10:49

## 2022-12-18 RX ADMIN — BUDESONIDE 1 MG: 0.5 SUSPENSION RESPIRATORY (INHALATION) at 21:01

## 2022-12-18 RX ADMIN — METHYLPREDNISOLONE SODIUM SUCCINATE 40 MG: 40 INJECTION, POWDER, FOR SOLUTION INTRAMUSCULAR; INTRAVENOUS at 14:33

## 2022-12-18 RX ADMIN — AMLODIPINE BESYLATE 5 MG: 5 TABLET ORAL at 14:30

## 2022-12-18 RX ADMIN — PAROXETINE HYDROCHLORIDE 20 MG: 20 TABLET, FILM COATED ORAL at 14:37

## 2022-12-18 RX ADMIN — METHYLPREDNISOLONE SODIUM SUCCINATE 40 MG: 40 INJECTION, POWDER, FOR SOLUTION INTRAMUSCULAR; INTRAVENOUS at 23:17

## 2022-12-18 RX ADMIN — CEFTRIAXONE SODIUM 2 G: 2 INJECTION, POWDER, FOR SOLUTION INTRAMUSCULAR; INTRAVENOUS at 08:58

## 2022-12-18 RX ADMIN — METHYLPREDNISOLONE SODIUM SUCCINATE 125 MG: 125 INJECTION, POWDER, FOR SOLUTION INTRAMUSCULAR; INTRAVENOUS at 08:59

## 2022-12-18 RX ADMIN — LOSARTAN POTASSIUM 100 MG: 50 TABLET, FILM COATED ORAL at 14:30

## 2022-12-18 RX ADMIN — FUROSEMIDE 60 MG: 10 INJECTION, SOLUTION INTRAMUSCULAR; INTRAVENOUS at 14:31

## 2022-12-18 RX ADMIN — METOPROLOL TARTRATE 75 MG: 25 TABLET, FILM COATED ORAL at 23:16

## 2022-12-18 RX ADMIN — IPRATROPIUM BROMIDE AND ALBUTEROL SULFATE 3 ML: .5; 3 SOLUTION RESPIRATORY (INHALATION) at 14:08

## 2022-12-18 RX ADMIN — IPRATROPIUM BROMIDE AND ALBUTEROL SULFATE 3 ML: .5; 3 SOLUTION RESPIRATORY (INHALATION) at 19:02

## 2022-12-18 ASSESSMENT — ENCOUNTER SYMPTOMS
SHORTNESS OF BREATH: 1
COUGH: 1
ABDOMINAL PAIN: 0

## 2022-12-18 ASSESSMENT — ACTIVITIES OF DAILY LIVING (ADL)
WALKING_OR_CLIMBING_STAIRS_DIFFICULTY: NO
ADLS_ACUITY_SCORE: 35
WEAR_GLASSES_OR_BLIND: YES
ADLS_ACUITY_SCORE: 35
VISION_MANAGEMENT: GLASSES
TOILETING_ISSUES: NO
ADLS_ACUITY_SCORE: 35
DOING_ERRANDS_INDEPENDENTLY_DIFFICULTY: NO
DRESSING/BATHING_DIFFICULTY: NO
DIFFICULTY_EATING/SWALLOWING: NO
ADLS_ACUITY_SCORE: 35
ADLS_ACUITY_SCORE: 35
FALL_HISTORY_WITHIN_LAST_SIX_MONTHS: NO
CHANGE_IN_FUNCTIONAL_STATUS_SINCE_ONSET_OF_CURRENT_ILLNESS/INJURY: NO
ADLS_ACUITY_SCORE: 20
CONCENTRATING,_REMEMBERING_OR_MAKING_DECISIONS_DIFFICULTY: NO
ADLS_ACUITY_SCORE: 35

## 2022-12-18 NOTE — PROGRESS NOTES
BIPAP/CPAP NOTE    UNIT TYPE:  V 60   MASK TYPE:  Medium Mask     SETTINGS:        BIPAP - 10/5, 12    FI02 - 25%         PATIENT'S TOLERANCE OF DEVICE -     Pt came in via EMS on Cpap Machine and appeared in respiratory distress with obvious accessory muscle use and tachypneic. Accordingly, pt placed on Bipap Machine for better oxygenations and ventilations. Presently pt seems to be stable on Bipap and saturating adequately. RT will continue to monitor closely and assess as needed.     Tammy Navas RRT

## 2022-12-18 NOTE — PHARMACY-ANTICOAGULATION SERVICE
Clinical Pharmacy - Warfarin Dosing Consult     Pharmacy has been consulted to manage this patient s warfarin therapy.  Indication: Atrial Fibrillation  Therapy Goal: INR 2-3  Warfarin Prior to Admission: Yes  Warfarin PTA Regimen: Take 5mg on Monday, Wednesday and Friday and 2.5mg the rest of the days  Significant drug interactions: On prednisone, omeprazole and paroxetine at home. Currently on methylprednisolone.  Recent documented change in oral intake/nutrition: Unknown    INR   Date Value Ref Range Status   12/18/2022 3.81 (H) 0.85 - 1.15 Final   05/10/2019 2.00 (H) 0.90 - 1.10 Final       Recommend warfarin No dose mg today.  Pharmacy will monitor Christina Umana daily and order warfarin doses to achieve specified goal.      Please contact pharmacy as soon as possible if the warfarin needs to be held for a procedure or if the warfarin goals change.

## 2022-12-18 NOTE — ED TRIAGE NOTES
Pt brought in by [facility]. Pt states she has been short of breath for past several weeks, but this morning at 0300 it became much worse. Pt took a Duoneb with no relief at home. When police arrived on scene, pt's oxygen sats were 80% on room air. They applied oxygen 10 LPM via NRB and sats only improved to 84%. When EMS arrived on scene, they applied CPAP and pt's sats improved to 97%. Pt's BP was 217/89, was given one NTG en route, and pt's BP decreased to 170/97. Pt only c/o chest pain when coughing. History of COPD and CHF.     Triage Assessment     Row Name 12/18/22 1110       Triage Assessment (Adult)    Airway WDL WDL       Respiratory WDL    Respiratory WDL X;cough  Shortness of breath       Skin Circulation/Temperature WDL    Skin Circulation/Temperature WDL WDL       Cardiac WDL    Cardiac WDL WDL       Peripheral/Neurovascular WDL    Peripheral Neurovascular WDL WDL       Cognitive/Neuro/Behavioral WDL    Cognitive/Neuro/Behavioral WDL WDL

## 2022-12-18 NOTE — PROGRESS NOTES
RESPIRATORY CARE NOTE     Patient Name: Christina Umana  Today's Date: 12/18/2022       Pt is currently on 1LNC and stable respiratory wise. Pt shows no sign of any respiratory distress at this time. Hence, Bipap order has been discontinued for the above mentioned reason per RT recommendation/discretion. Pt is currently saturating 95-96% on 1L, RR 20 bpm, and stable Hr 83 bpm. RT will continue on current plan of care.     Tammy Navas RRT

## 2022-12-18 NOTE — CONSULTS
Pulmonary initial Consultation  December 18, 2022      Christina Umana MRN# [id number]   Age: 82 year old YOB: 1940     Date of Admission:  12/18/2022  Reason for Consultation: Exacerbation of COPD  Requesting Physician: Dr. Yulisa Noguera    Primary care provider: Adan Elizabeth     Assessment and Plan:  Christina Umana IS a 82 year old female with past medical history significant for reported COPD, HFrEF with last ejection fraction 40% in 2018, mild mitral regurgitation, permanent atrial fibrillation, hypertension, and chronic thrombocytopenia, admitted on 12/18/2022 for acute exacerbation of COPD with acute hypoxic respiratory failure, and question of possible community-acquired pneumonia.    1.  Reported COPD, current exacerbation  2.  Systolic congestive heart failure exacerbation  3.  CKD 3  4.  Possible community-acquired pneumonia  5.  Oropharyngeal Candidiasis    Patient initially presented with respiratory distress, requiring 10 L oxygen mask, subsequently transitioning to BiPAP.  Patient now off of BiPAP and doing better.  Possibly as a result of starting diuresis.  There was a reported left upper lobe opacity, not well appreciated on my review, but given her current respiratory needs I think that empiric antibiotics is reasonable.  Agree with current plan of supplemental oxygen, nebulizers, steroids (can be likely decreased starting tomorrow), and empiric antibiotics.  Aggressive diuresis is also appropriate given that I think that there is likely a small right pleural effusion as well as elevated proBNP.    Also noted to have some oral plaques and has not been rinsing her mouth and likely has oral thrush. Start nystatin.    Also would likely benefit from starting stiolto at discharge.    Recommendations:  -Agree with IV steroids, can likely de-escalate to prednisone or at least 40 mg daily starting tomorrow pending hospital course  -Coverage with empiric antibiotics  -Nebulizers as  needed  -Supplemental oxygen to maintain SPO2 89 to 94%  -Diuresis as ordered  -Nystatin QID  -consider starting stiolto at discharge  -Also highly encouraged to complete tobacco cessation  -CODE STATUS DNR, but would allow vasopressors and medications            Chief Complaint:     History obtained from medical record and patient.    History of Present Illness: Christina Umana is an 82-year-old female who presented to the emergency department via EMS for acute hypoxia.  Patient had called EMS for worsening shortness of breath.  Noted hypoxia when evaluated by EMS, being placed on supplemental oxygen.  It does appear that she had an x-ray done on 12/13/2022 for COPD exacerbation.  She was being treated as an outpatient prednisone.  She does report an ongoing cough for the last 2 weeks.  Evaluation in the emergency department demonstrated some respiratory distress and so she was placed on BiPAP.  She is currently been taken off of BiPAP with improvement in her oxygenation.  She was also noted to have an elevated troponin high-sensitivity, as well as NT proBNP.  She was started on diuresis.  There was some question of a left upper lobe opacity as well as so she was given empiric antibiotics.     On review patient's mucus has been more thick and tan for several weeks.  No hemoptysis.  No other changes in her mucus.  She does have a chronic cough which she attributes to smoking.  It is usually though nonproductive.  No fevers or chills at home.  We did review what her primary care physician had been doing, and the main treatment has been Spiriva daily.  Budesonide nebs were added approximately 2 weeks ago.  She has not been rinsing her mouth.  She denies any oropharyngeal discomfort.  On physical exam though she did have white plaques consistent with oral thrush.            Of note patient states that she did not tolerate BiPAP at all.  She would not want to reuse this.         Past Medical History:     Past Medical  History:   Diagnosis Date     Arthritis      Cancer (H)      CHF (congestive heart failure) (H)      Clotting disorder (H) 2017     COPD (chronic obstructive pulmonary disease) (H)      Degenerative disc disease, lumbar      Emphysema lung (H)      GERD (gastroesophageal reflux disease)      Hyperlipidemia      Hypertension      Kidney stone 2017     Osteoporosis      Scoliosis      Ventral hernia                 Past Surgical History:     Past Surgical History:   Procedure Laterality Date     BIOPSY BREAST Right      BUNIONECTOMY Bilateral 1988     COLECTOMY N/A 2003     COLON SURGERY       CYST REMOVAL Left 2007     HERNIORRHAPHY VENTRAL  2005     HYSTERECTOMY  2004     IR LUMBAR EPIDURAL STEROID INJECTION  2011     IR LUMBAR EPIDURAL STEROID INJECTION  10/10/2011     IR LUMBAR EPIDURAL STEROID INJECTION  3/8/2012     IR LUMBAR EPIDURAL STEROID INJECTION  4/10/2012     LUMPECTOMY BREAST Right 2009     OOPHORECTOMY  2004     REPAIR HAMMER TOE Bilateral      STOMACH SURGERY      hyernia repair            Social History:     Social History     Socioeconomic History     Marital status:      Spouse name: Not on file     Number of children: Not on file     Years of education: Not on file     Highest education level: Not on file   Occupational History     Not on file   Tobacco Use     Smoking status: Some Days     Packs/day: 0.50     Years: 60.00     Pack years: 30.00     Types: Cigarettes     Last attempt to quit: 2018     Years since quittin.8     Smokeless tobacco: Never   Substance and Sexual Activity     Alcohol use: No     Drug use: No     Sexual activity: Never   Other Topics Concern     Not on file   Social History Narrative     Not on file     Social Determinants of Health     Financial Resource Strain: Not on file   Food Insecurity: Not on file   Transportation Needs: Not on file   Physical Activity: Not on file   Stress: Not on file   Social  Connections: Not on file   Intimate Partner Violence: Not on file   Housing Stability: Not on file       ETOH: Not applicable  Tobacco: 1/2 pack/day, recently going to 2 to 3 cigarettes/day.  Significant inhalational exposures: None         Family History:     Family History   Problem Relation Age of Onset     Brain Cancer Son 29.00     Dementia Mother      Cerebrovascular Disease Mother      Prostate Cancer Father      Aortic aneurysm Father         Abdominal     Hypertension Sister      No Known Problems Daughter      No Known Problems Maternal Grandmother      No Known Problems Maternal Grandfather      No Known Problems Paternal Grandmother      No Known Problems Paternal Grandfather      Breast Cancer Maternal Aunt 70.00     No Known Problems Paternal Aunt      Breast Cancer Maternal Aunt      Other - See Comments Brother         Polilyssa     Other - See Comments Brother         polilyssa     Hereditary Breast and Ovarian Cancer Syndrome No family hx of      Colon Cancer No family hx of      Endometrial Cancer No family hx of      Ovarian Cancer No family hx of             Allergies:   Please see allergy list which was reviewed this admission.         Medications:       amLODIPine  5 mg Oral Daily     atorvastatin  20 mg Oral Daily     [START ON 12/19/2022] azithromycin  500 mg Intravenous Q24H     budesonide  1 mg Nebulization BID     [START ON 12/19/2022] cefTRIAXone  1 g Intravenous Q24H     furosemide  60 mg Intravenous Q8H     guaiFENesin  1,200 mg Oral BID     ipratropium - albuterol 0.5 mg/2.5 mg/3 mL  3 mL Nebulization Q6H     losartan  100 mg Oral Daily     methylPREDNISolone  40 mg Intravenous Q6H     metoprolol tartrate  75 mg Oral BID     [START ON 12/19/2022] pantoprazole  40 mg Oral QAM AC     PARoxetine  20 mg Oral Daily     Warfarin Therapy Reminder  1 each Oral See Admin Instructions     warfarin-No DOSE today  1 each Does not apply no dose today (warfarin)     acetaminophen, albuterol, alum & mag  hydroxide-simethicone, bisacodyl, bisacodyl, guaiFENesin, magnesium hydroxide, melatonin, ondansetron, senna-docusate, sodium phosphate         Review of Systems:   Complete review of systems is obtained and is negative other than was mentioned in HPI.         Physical Exam:   Temp:  [98.8  F (37.1  C)] 98.8  F (37.1  C)  Pulse:  [63-93] 91  Resp:  [20-30] 26  BP: (139-167)/(75-97) 146/85  FiO2 (%):  [24 %-25 %] 24 %  SpO2:  [91 %-100 %] 95 %    Intake/Output Summary (Last 24 hours) at 12/18/2022 1519  Last data filed at 12/18/2022 1018  Gross per 24 hour   Intake 50 ml   Output --   Net 50 ml       Constitutional:   Awake, alert and in no apparent distress.  On nasal cannula.     Eyes:   Nonicteric, MARTINE     ENT:    oral mucosa moist, with multiple small white plaques on the hard and soft palate.     Neck:   Supple without supraclavicular or cervical lymphadenopathy.  No significant JVD.     Lungs:   Good air flow.  No crackles. No rhonchi.  Scant expiratory wheeze throughout.     Cardiovascular:   Normal S1 and S2.  RRR.  Soft systolic murmur.  Radial, DP and PT pulses normal and symmetric     Abdomen:   NABS, soft, nontender, nondistended.      Musculoskeletal:   Trace pedal edema. Strength 5/5 and symmetric     Neurologic:   Alert and conversant. Cranial nerves II-XII intact.       Skin:   Warm, dry.  No rash on limited exam.           Data:   All laboratory and imaging data reviewed.    CMP  Recent Labs   Lab 12/18/22  0741      POTASSIUM 4.1   CHLORIDE 99   CO2 25   ANIONGAP 12   *   BUN 34.0*   CR 1.38*   GFRESTIMATED 38*   GILL 9.6   PROTTOTAL 6.6   ALBUMIN 4.0   BILITOTAL 0.9   ALKPHOS 66   AST 18   ALT 14     CBC  Recent Labs   Lab 12/18/22  0741   WBC 7.8   RBC 5.48*   HGB 14.9   HCT 45.6   MCV 83   MCH 27.2   MCHC 32.7   RDW 14.4   *     INR  Recent Labs   Lab 12/18/22  0741   INR 3.81*     Arterial Blood GasNo lab results found in last 7 days.  Urine Studies    Recent Labs   Lab Test  11/19/18  1647 08/05/18  0010 04/15/18  2356 03/20/18  1337 02/17/18  1008   URINEPH 7.0 6.0 6.0 7.0 6.0   NITRITE Negative Negative Negative Positive* Negative   LEUKEST Small* Moderate* Small* Small* Moderate*   WBCU 0-5 0-5 0-5 5-10* 25-50*           Chest x-ray is personally reviewed.  This is compared to chest x-ray from 12/13/2022.  Possible left upper lobe opacity.  Possible right lower lobe opacity.  Likely small right pleural effusion.         Kd Pagan, DO  Pulmonary Medicine

## 2022-12-18 NOTE — CONSULTS
[facility]   [phone]      Assessment/Recommendations   Patient with known history of mild reduction left ventricular systolic functions and episodes of heart failure.  She also has obstructive pulmonary disease a 2-week history of cough and worsening shortness of breath significant paroxysmal nocturnal dyspnea last night.  B nitric peptide is significantly elevated.  There is a question of pneumonia on chest x-ray and she is being treated with antibiotics and I think status is probably contributing more to her shortness of breath but there is also an element of heart failure.    Agree with intravenous diuretics as ordered.  We will follow electrolytes BUN and creatinine very carefully.  She appears to have put out a fair amount of urine based on the container in the emergency room but I do not see that any of its been charted.  We will asked that as it has been charted going forward.    I do not see that she has had an evaluation for myocardial ischemia in the past.  It has been said that she has a nonischemic cardiomyopathy, she does not recall ever having an angiogram or stress test.  Once she has recovered, it would be quite reasonable to do a pharmacologic nuclear study or CT coronary angiogram if her creatinine is not elevated.    Beta-blocker is at a reasonable he does and heart rate is a little high permanent atrial fibrillation.  I suspect that she has improvement in her pulmonary and cardiac status, her heart rate will come down nicely so I have resisted the temptation to increase her beta-blocker at this time.    We will continue to follow.       History of Present Illness/Subjective    Ms. Christina Umana is a 82 year old female with known history of cardiomyopathy with ejection fraction of around 40 to 45% in the past.  The echo showing global reduction left ventricular systolic previous notes indicates a probable nonischemic cardiomyopathy but I do not see an evaluation for ischemia  she does have some elevation on her blood sugar, continues to smoke cigarettes but is down to 3 cigarettes a day, is treated for hyperlipidemia and hypertension.    About 2 weeks ago she started having trouble with cough and she just thought it was a mild cold.  She did see her primary care physician who prescribed some antibiotics and ultimately did a chest x-ray which did not show any abnormalities and her symptoms continue to worsen.  Before coming into the emergency room she woke up in the night in the early morning hours today with dramatic shortness of breath and came into the [facility] emergency room.  She was noted on chest x-ray to have a suspicious area for pneumonia and also had a B nitric peptide of 6531 with 1800 being upper limit of normal.  2 troponins did not show any significant shift and were barely above normal speaking against acute coronary syndrome.    She does have hypertension, hyperlipidemia, and abnormal blood sugars.  She also smokes and is down to 3 cigarettes a day.  Prior to recent symptoms she did not have orthopnea or paroxysmal nocturnal dyspnea but does get some peripheral edema in her left leg which is worse in the last week or 2.  She tries to avoid sodium in her diet has been followed in the heart failure clinic in the past but has not been in for some time.    ECG: Personally reviewed.  Atrial fibrillation with a ventricular response in the 60s and no acute ST-T wave changes.   Patient grew up in [facility].  EXAM: XR CHEST PORT 1 VIEW  LOCATION: [facility]  DATE/TIME: 12/18/2022 8:06 AM     INDICATION: SOB  COMPARISON: 12/13/2022                                                                      IMPRESSION: Redemonstrated left upper lobe nodular opacity. Increased right basilar airspace opacities which may be infectious or inflammatory. Normal heart size. No pleural effusions.       Physical Examination Review of Systems   BP (!) 146/85    Pulse 91   Temp 98.8  F (37.1  C) (Oral)   Resp 26   Wt 70.3 kg (155 lb)   SpO2 95%   BMI 26.61 kg/m    Body mass index is 26.61 kg/m .  Wt Readings from Last 3 Encounters:   12/18/22 70.3 kg (155 lb)   07/13/21 74.8 kg (165 lb)   10/05/20 74 kg (163 lb 3.2 oz)     General Appearance:   Alert, cooperative and in no acute distress.   ENT/Mouth: Patient wearing a mask.      EYES:  no scleral icterus, normal conjunctivae   Neck: JVP normal. No Hepatojugular reflux but difficult to evaluate. Thyroid not visualized.   Chest/Lungs:   Lungs diffuse inspiratory and expiratory rhonchi and diminished breath sounds at the bases., equal chest wall expansion.   Cardiovascular:   S1, S2 without murmur ,clicks or rubs. Brachial, radial and posterior tibial pulses are intact and symetric. No carotid bruits noted   Abdomen:  Nontender.    Extremities: No cyanosis, clubbing and mild bilateral pretibial edema which is more prominent on the left.   Skin: no xanthelasma, warm.    Neurologic: normal arm movement bilateral, no tremors     Psychiatric: Appropriate affect.      Enc Vitals  BP: (!) 146/85  Pulse: 91  Resp: 26  Temp: 98.8  F (37.1  C)  Temp src: Oral  SpO2: 95 %  Weight: 70.3 kg (155 lb)                                           Medical History  Surgical History Family History Social History   Past Medical History:   Diagnosis Date     Arthritis      Cancer (H) 2005     CHF (congestive heart failure) (H)      Clotting disorder (H) 4/2017     COPD (chronic obstructive pulmonary disease) (H)      Degenerative disc disease, lumbar      Emphysema lung (H)      GERD (gastroesophageal reflux disease)      Hyperlipidemia      Hypertension      Kidney stone 4/2017     Osteoporosis      Scoliosis      Ventral hernia     Past Surgical History:   Procedure Laterality Date     BIOPSY BREAST Right 2012     BUNIONECTOMY Bilateral 1988     COLECTOMY N/A 03/31/2003     COLON SURGERY       CYST REMOVAL Left 02/01/2007     HERNIORRHAPHY  VENTRAL  2005     HYSTERECTOMY  2004     IR LUMBAR EPIDURAL STEROID INJECTION  2011     IR LUMBAR EPIDURAL STEROID INJECTION  10/10/2011     IR LUMBAR EPIDURAL STEROID INJECTION  3/8/2012     IR LUMBAR EPIDURAL STEROID INJECTION  4/10/2012     LUMPECTOMY BREAST Right 2009     OOPHORECTOMY  2004     REPAIR HAMMER TOE Bilateral 1988     STOMACH SURGERY      hyernia repair    Family History   Problem Relation Age of Onset     Brain Cancer Son 29.00     Dementia Mother      Cerebrovascular Disease Mother      Prostate Cancer Father      Aortic aneurysm Father         Abdominal     Hypertension Sister      No Known Problems Daughter      No Known Problems Maternal Grandmother      No Known Problems Maternal Grandfather      No Known Problems Paternal Grandmother      No Known Problems Paternal Grandfather      Breast Cancer Maternal Aunt 70.00     No Known Problems Paternal Aunt      Breast Cancer Maternal Aunt      Other - See Comments Brother         Polio     Other - See Comments Brother         polio     Hereditary Breast and Ovarian Cancer Syndrome No family hx of      Colon Cancer No family hx of      Endometrial Cancer No family hx of      Ovarian Cancer No family hx of     Social History     Socioeconomic History     Marital status:      Spouse name: Not on file     Number of children: Not on file     Years of education: Not on file     Highest education level: Not on file   Occupational History     Not on file   Tobacco Use     Smoking status: Some Days     Packs/day: 0.50     Years: 60.00     Pack years: 30.00     Types: Cigarettes     Last attempt to quit: 2018     Years since quittin.8     Smokeless tobacco: Never   Substance and Sexual Activity     Alcohol use: No     Drug use: No     Sexual activity: Never   Other Topics Concern     Not on file   Social History Narrative     Not on file     Social Determinants of Health     Financial Resource Strain: Not on file   Food  Insecurity: Not on file   Transportation Needs: Not on file   Physical Activity: Not on file   Stress: Not on file   Social Connections: Not on file   Intimate Partner Violence: Not on file   Housing Stability: Not on file          Medications  Allergies   Current Outpatient Medications   Medication Sig Dispense Refill     albuterol (PROAIR HFA/PROVENTIL HFA/VENTOLIN HFA) 108 (90 Base) MCG/ACT inhaler Inhale 2 puffs into the lungs every 6 hours as needed for shortness of breath, wheezing or cough       amLODIPine (NORVASC) 5 MG tablet [AMLODIPINE (NORVASC) 5 MG TABLET] Take 1 tablet (5 mg total) by mouth daily. 90 tablet 1     ascorbic acid, vitamin C, (VITAMIN C) 1000 MG tablet [ASCORBIC ACID, VITAMIN C, (VITAMIN C) 1000 MG TABLET] Take 1,000 mg by mouth daily.        atorvastatin (LIPITOR) 20 MG tablet Take 20 mg by mouth daily       azelastine (ASTELIN) 0.1 % nasal spray Spray 1 spray into both nostrils 2 times daily       budesonide (PULMICORT) 1 MG/2ML neb solution Take 1 mg by nebulization 2 times daily       bumetanide (BUMEX) 1 MG tablet Take 1 mg by mouth every other day On even days of the week (alternated with 2mg doses)       bumetanide (BUMEX) 1 MG tablet Take 2 mg by mouth every other day On odd days of the week (alternate with 1mg doses)       calcium, as carbonate, (OS-GILL) 500 mg calcium (1,250 mg) tablet [CALCIUM, AS CARBONATE, (OS-GILL) 500 MG CALCIUM (1,250 MG) TABLET] Take 1 tablet by mouth 2 (two) times a day.       cholecalciferol, vitamin D3, 2,000 unit Tab [CHOLECALCIFEROL, VITAMIN D3, 2,000 UNIT TAB] Take 2,000 Units by mouth daily.       ipratropium-albuterol (DUO-NEB) 0.5-2.5 mg/3 mL nebulizer [IPRATROPIUM-ALBUTEROL (DUO-NEB) 0.5-2.5 MG/3 ML NEBULIZER] Take 3 mL by nebulization 3 (three) times a day. Then use up to 4 times daily as needed for shortness of breath. 60 vial 6     losartan (COZAAR) 100 MG tablet [LOSARTAN (COZAAR) 100 MG TABLET] Take 1 tablet (100 mg total) by mouth daily. 90  tablet 3     magnesium oxide (MAG-OX) 400 mg (241.3 mg magnesium) tablet [MAGNESIUM OXIDE (MAG-OX) 400 MG (241.3 MG MAGNESIUM) TABLET] 400mg in the morning and 800 mg at bedtime 270 tablet 3     methylcellulose (CITRUCEL ORAL) Take 1 Dose by mouth daily       metoprolol tartrate (LOPRESSOR) 50 MG tablet [METOPROLOL TARTRATE (LOPRESSOR) 50 MG TABLET] Take 1.5 tablets (75 mg total) by mouth 2 (two) times a day. 270 tablet 3     omeprazole (PRILOSEC) 20 MG DR capsule Take 20 mg by mouth every morning (before breakfast)       PARoxetine (PAXIL) 20 MG tablet [PAROXETINE (PAXIL) 20 MG TABLET] Take 1 tablet (20 mg total) by mouth daily. 90 tablet 3     predniSONE (DELTASONE) 20 MG tablet Take 20 mg by mouth daily       tiotropium (SPIRIVA) 18 mcg inhalation capsule [TIOTROPIUM (SPIRIVA) 18 MCG INHALATION CAPSULE] Place 18 mcg into inhaler and inhale daily.       vitamin E 200 UNIT capsule [VITAMIN E 200 UNIT CAPSULE] Take 200 Units by mouth daily.       warfarin ANTICOAGULANT (COUMADIN) 5 MG tablet Take 2.5-5 mg by mouth daily Take 5mg on Monday, Wednesday and Friday and 2.5mg the rest of the days      No Known Allergies      Lab Results    Chemistry/lipid CBC Cardiac Enzymes/BNP/TSH/INR   Lab Results   Component Value Date    CHOL 108 08/03/2018    HDL 37 (L) 08/03/2018    TRIG 83 08/03/2018    BUN 34.0 (H) 12/18/2022     12/18/2022    CO2 25 12/18/2022    Lab Results   Component Value Date    WBC 7.8 12/18/2022    HGB 14.9 12/18/2022    HCT 45.6 12/18/2022    MCV 83 12/18/2022     (L) 12/18/2022    Lab Results   Component Value Date    TROPONINI 0.01 07/13/2021    BNP 1,578 (H) 08/04/2018    TSH 2.08 08/06/2019    INR 3.81 (H) 12/18/2022

## 2022-12-18 NOTE — PHARMACY-ADMISSION MEDICATION HISTORY
Pharmacy Note - Admission Medication History    Pertinent Provider Information: patient is in charge of her own medications at home     ______________________________________________________________________    Prior To Admission (PTA) med list completed and updated in EMR.       PTA Med List   Medication Sig Note Last Dose     albuterol (PROAIR HFA/PROVENTIL HFA/VENTOLIN HFA) 108 (90 Base) MCG/ACT inhaler Inhale 2 puffs into the lungs every 6 hours as needed for shortness of breath, wheezing or cough  12/17/2022     amLODIPine (NORVASC) 5 MG tablet [AMLODIPINE (NORVASC) 5 MG TABLET] Take 1 tablet (5 mg total) by mouth daily.  12/17/2022     ascorbic acid, vitamin C, (VITAMIN C) 1000 MG tablet [ASCORBIC ACID, VITAMIN C, (VITAMIN C) 1000 MG TABLET] Take 1,000 mg by mouth daily.   12/17/2022     atorvastatin (LIPITOR) 20 MG tablet Take 20 mg by mouth daily  12/17/2022     azelastine (ASTELIN) 0.1 % nasal spray Spray 1 spray into both nostrils 2 times daily 12/18/2022: Has not started using yet      budesonide (PULMICORT) 1 MG/2ML neb solution Take 1 mg by nebulization 2 times daily  12/17/2022     bumetanide (BUMEX) 1 MG tablet Take 1 mg by mouth every other day On even days of the week (alternated with 2mg doses)  12/16/2022     bumetanide (BUMEX) 1 MG tablet Take 2 mg by mouth every other day On odd days of the week (alternate with 1mg doses)  12/17/2022     calcium, as carbonate, (OS-GILL) 500 mg calcium (1,250 mg) tablet [CALCIUM, AS CARBONATE, (OS-GILL) 500 MG CALCIUM (1,250 MG) TABLET] Take 1 tablet by mouth 2 (two) times a day.  12/17/2022     cholecalciferol, vitamin D3, 2,000 unit Tab [CHOLECALCIFEROL, VITAMIN D3, 2,000 UNIT TAB] Take 2,000 Units by mouth daily.  12/17/2022     ipratropium-albuterol (DUO-NEB) 0.5-2.5 mg/3 mL nebulizer [IPRATROPIUM-ALBUTEROL (DUO-NEB) 0.5-2.5 MG/3 ML NEBULIZER] Take 3 mL by nebulization 3 (three) times a day. Then use up to 4 times daily as needed for shortness of breath.   12/17/2022     losartan (COZAAR) 100 MG tablet [LOSARTAN (COZAAR) 100 MG TABLET] Take 1 tablet (100 mg total) by mouth daily.  12/17/2022     magnesium oxide (MAG-OX) 400 mg (241.3 mg magnesium) tablet [MAGNESIUM OXIDE (MAG-OX) 400 MG (241.3 MG MAGNESIUM) TABLET] 400mg in the morning and 800 mg at bedtime  12/17/2022     methylcellulose (CITRUCEL ORAL) Take 1 Dose by mouth daily  12/17/2022     metoprolol tartrate (LOPRESSOR) 50 MG tablet [METOPROLOL TARTRATE (LOPRESSOR) 50 MG TABLET] Take 1.5 tablets (75 mg total) by mouth 2 (two) times a day.  12/17/2022     omeprazole (PRILOSEC) 20 MG DR capsule Take 20 mg by mouth every morning (before breakfast)  12/17/2022     PARoxetine (PAXIL) 20 MG tablet [PAROXETINE (PAXIL) 20 MG TABLET] Take 1 tablet (20 mg total) by mouth daily.  12/17/2022     predniSONE (DELTASONE) 20 MG tablet Take 20 mg by mouth daily 12/18/2022: 7 days course- from 12/12-12/19 12/17/2022     tiotropium (SPIRIVA) 18 mcg inhalation capsule [TIOTROPIUM (SPIRIVA) 18 MCG INHALATION CAPSULE] Place 18 mcg into inhaler and inhale daily.  12/17/2022     vitamin E 200 UNIT capsule [VITAMIN E 200 UNIT CAPSULE] Take 200 Units by mouth daily.  12/17/2022     warfarin ANTICOAGULANT (COUMADIN) 5 MG tablet Take 2.5-5 mg by mouth daily Take 5mg on Monday, Wednesday and Friday and 2.5mg the rest of the days  12/17/2022       Information source(s): Patient and CareEverywhere/SureScripts  Method of interview communication: in-person    Summary of Changes to PTA Med List  New: prednisone, azelastine, albuterol, budesonide, bumetanide  Discontinued: furosemide, diphenhydramine, miralax, spironolactone  Changed: none    Patient was asked about OTC/herbal products specifically.  PTA med list reflects this.    In the past week, patient estimated taking medication this percent of the time:  greater than 90%.    Allergies were reviewed, assessed, and updated with the patient.      Patient did not bring any medications to the  hospital and can't retrieve from home. No multi-dose medications are available for use during hospital stay.     The information provided in this note is only as accurate as the sources available at the time of the update(s).    Thank you for the opportunity to participate in the care of this patient.    Sylvia Nicole  12/18/2022 8:59 AM

## 2022-12-18 NOTE — PROVIDER NOTIFICATION
RCAT Treatment Plan    Patient Score: 7    Patient Acuity: 5    Clinical Indication for Therapy: COPD     Therapy Ordered: Flutter Valve QID     Assessment Summary:     Pt has just been taken off Bipap Machine and placed on 1LNC, pt is able to maintain adequate saturations of 93-95%. BS: fainted wheezes auscultated upper airways and decreased @ bases. Pt does have strong slightly congested cough and occasionally productive. Pt could benefit Flutter valve for mucus clearance purpose. Bipap Machine will remain on standby and will be used as needed. RT will continue on current plan care.       Tammy Navas, RT  12/18/2022

## 2022-12-18 NOTE — ED PROVIDER NOTES
EMERGENCY DEPARTMENT ENCOUNTER      NAME: Christina Umana  AGE: 82 year old female  YOB: 1940  MRN: [id number]  EVALUATION DATE & TIME: 12/18/2022  6:44 AM    PCP: Adan Elizabeth    ED PROVIDER: Yesi Bolden DO      Chief Complaint   Patient presents with     Shortness of Breath         FINAL IMPRESSION:  1. Acute respiratory failure with hypoxia (H)          ED COURSE & MEDICAL DECISION MAKING:    Pertinent Labs & Imaging studies reviewed. (See chart for details)  6:52 AM I met the patient and performed my initial interview and exam.  8:02 AM I reassessed patient. She is improved on bipap.  8:30 AM I spoke to Dr Peck, patient PCP. Patient having the exacerbation over past month. Predinsone taper and antibiotics this weeks. Patient told to increase her nebs.  8:32 AM I reassessed patient. She is feeling improved on bipap.  8:54 AM Putting in order to transfer in system.  10:12 AM No beds in system.  10:13 AM I reassessed patient. Patient feels good.  10:24 AM I spoke to the hospitalist Dr Loredo regarding patient.      82 year old female presents to the Emergency Department for evaluation of shortness of breath.  She has a history of COPD, atrial fibrillation on Coumadin, CHF.  She notes a couple weeks of cough.  Saw her provider last week.  Increased inhalers and put her on prednisone.  Awoke suddenly this morning with significant tachypnea and hypoxia.  80% on room air when EMS arrived.  Placed on 10 L nonrebreather mask and improved to 84%.  Placed on CPAP with significant improvement.  Blood pressure was quite elevated, given 1 nitroglycerin.  She denies any chest pain outside of coughing.  She is on Coumadin and takes this as prescribed.  On arrival, she is tachypneic but maintaining her saturations.  She has diffuse rhonchi.  We will plan for DuoNeb, we can start with his CPAP and down titrate as she tolerates.  Plan to evaluate for pneumonia, pneumothorax, ACS, less likely  pulmonary embolism and not consistent with dissection.  EKG shows atrial fibrillation without evidence of room ischemia.  Heart rate is controlled.  Initial troponin 17, plan to repeat in 2 hours.  proBNP is quite elevated here.  Chest x-ray shows bibasilar opacities consistent with inflammation versus infection.  Her primary care provider did call me.  He reiterates her worsening symptoms over the past several days, sounds like he has been trying to treat this exacerbation for up to a month now.  She had been on a steroid taper and antibiotics at the start of this.  Patient much improved after DuoNeb and BiPAP.  We will plan for admission.  Will cover for pneumonia with ceftriaxone, azithromycin.  Given methylprednisolone for COPD exacerbation.  Admitted to the hospitalist.    At the conclusion of the encounter I discussed the results of all of the tests and the disposition. The questions were answered. The patient or family acknowledged understanding and was agreeable with the care plan.     Medical Decision Making    History:    Supplemental history from: Documented in HPI, if applicable    External Record(s) reviewed: Documented in [facility], if applicable.    Work Up:    Chart documentation includes differential considered and any EKGs or imaging interpreted by provider.    In additional to work up documented, I considered the following work up: See chart documentation, if applicable.    External consultation:    Discussion of management with another provider: See chart documentation, if applicable    Complicating factors:    Care impacted by chronic illness: Chronic Lung Disease    Care affected by social determinants of health: N/A    Disposition considerations: Admit.      Critical Care     Performed by: Dr Consuelo Bolden  Authorized by: Dr Consuelo Bolden  Total critical care time: 45 minutes  Critical care was necessary to treat or prevent imminent or life-threatening deterioration of the following conditions: Acute  respiratory failure  Critical care was time spent personally by me on the following activities: development of treatment plan with patient or surrogate, discussions with consultants, examination of patient, evaluation of patient's response to treatment, obtaining history from patient or surrogate, ordering and performing treatments and interventions, ordering and review of laboratory studies, ordering and review of radiographic studies, re-evaluation of patient's condition and monitoring for potential decompensation.  Critical care time was exclusive of separately billable procedures and treating other patients.    MEDICATIONS GIVEN IN THE EMERGENCY:  Medications   ipratropium - albuterol 0.5 mg/2.5 mg/3 mL (DUONEB) neb solution 3 mL (3 mLs Nebulization Given 12/18/22 1408)   methylPREDNISolone sodium succinate (solu-MEDROL) injection 40 mg (40 mg Intravenous Given 12/18/22 1433)   albuterol (PROVENTIL HFA/VENTOLIN HFA) inhaler (has no administration in time range)   guaiFENesin (MUCINEX) 12 hr tablet 1,200 mg (has no administration in time range)   amLODIPine (NORVASC) tablet 5 mg (5 mg Oral Given 12/18/22 1430)   atorvastatin (LIPITOR) tablet 20 mg (20 mg Oral Given 12/18/22 1430)   budesonide (PULMICORT) neb solution 1 mg (has no administration in time range)   losartan (COZAAR) tablet 100 mg (100 mg Oral Given 12/18/22 1430)   metoprolol tartrate (LOPRESSOR) tablet 75 mg (has no administration in time range)   PARoxetine (PAXIL) tablet 20 mg (20 mg Oral Given 12/18/22 1437)   Warfarin Dose Required Daily - Pharmacist Managed (has no administration in time range)   acetaminophen (TYLENOL) tablet 650 mg (has no administration in time range)   bisacodyl (DULCOLAX) suppository 10 mg (has no administration in time range)   bisacodyl (DULCOLAX) EC tablet 5 mg (has no administration in time range)   alum & mag hydroxide-simethicone (MAALOX) suspension 30 mL (has no administration in time range)   guaiFENesin  (ROBITUSSIN) 20 mg/mL solution 10 mL (has no administration in time range)   sodium phosphate (FLEET ENEMA) 1 enema (has no administration in time range)   melatonin tablet 3 mg (has no administration in time range)   magnesium hydroxide (MILK OF MAGNESIA) suspension 30 mL (has no administration in time range)   ondansetron (ZOFRAN) injection 4 mg (has no administration in time range)   senna-docusate (SENOKOT-S/PERICOLACE) 8.6-50 MG per tablet 1 tablet (has no administration in time range)   warfarin-No DOSE today (has no administration in time range)   furosemide (LASIX) injection 60 mg (60 mg Intravenous Given 12/18/22 1431)   cefTRIAXone (ROCEPHIN) 1 g vial to attach to  mL bag for ADULTS or NS 50 mL bag for PEDS (has no administration in time range)   azithromycin (ZITHROMAX) 500 mg in sodium chloride 0.9 % 250 mL intermittent infusion (has no administration in time range)   pantoprazole (PROTONIX) EC tablet 40 mg (has no administration in time range)   ipratropium - albuterol 0.5 mg/2.5 mg/3 mL (DUONEB) neb solution 3 mL (3 mLs Nebulization Given 12/18/22 0759)   cefTRIAXone (ROCEPHIN) 2 g vial to attach to  ml bag for ADULTS or NS 50 ml bag for PEDS (0 g Intravenous Stopped 12/18/22 1018)   azithromycin (ZITHROMAX) 500 mg in sodium chloride 0.9 % 250 mL intermittent infusion (0 mg Intravenous Stopped 12/18/22 1124)   methylPREDNISolone sodium succinate (solu-MEDROL) injection 125 mg (125 mg Intravenous Given 12/18/22 0859)   furosemide (LASIX) injection 40 mg (40 mg Intravenous Given 12/18/22 1049)       NEW PRESCRIPTIONS STARTED AT TODAY'S ER VISIT  New Prescriptions    No medications on file          =================================================================    HPI    Patient information was obtained from: Patient, EMS    Use of : N/A        Christina Umana is a 82 year old female with a pertinent history of COPD, emphysema, afib on coumadin, CKD3, HTN, HLD, who presents to this  "ED via EMS for evaluation of shortness of breath.    Patient endorses ongoing shortness of breath since she woke up today, with EMS saying patient was 80% on room air upon arrival. Patient went up to 84% after being placed on 10L O2. Patient states she used a duoneb at home with no relief. Patient notes that she was not short of breath yesterday, but has had a cough for 2 weeks. Patient has not taken any recent antibiotics. No recent falls. No sick contacts.    Patient notes that she was recently at her PCP, who told her that she had some \"fingers in her lungs.\" Patient was placed on prednisone and her duoneb was upped.    Patient denies chest pain, abdominal pain and all other relevant symptoms.      REVIEW OF SYSTEMS   Review of Systems   Respiratory: Positive for cough and shortness of breath.    Cardiovascular: Negative for chest pain.   Gastrointestinal: Negative for abdominal pain.   All other systems reviewed and are negative.      PAST MEDICAL HISTORY:  Past Medical History:   Diagnosis Date     Arthritis      Cancer (H) 2005     CHF (congestive heart failure) (H)      Clotting disorder (H) 4/2017     COPD (chronic obstructive pulmonary disease) (H)      Degenerative disc disease, lumbar      Emphysema lung (H)      GERD (gastroesophageal reflux disease)      Hyperlipidemia      Hypertension      Kidney stone 4/2017     Osteoporosis      Scoliosis      Ventral hernia        PAST SURGICAL HISTORY:  Past Surgical History:   Procedure Laterality Date     BIOPSY BREAST Right 2012     BUNIONECTOMY Bilateral 1988     COLECTOMY N/A 03/31/2003     COLON SURGERY       CYST REMOVAL Left 02/01/2007     HERNIORRHAPHY VENTRAL  02/25/2005     HYSTERECTOMY  2004     IR LUMBAR EPIDURAL STEROID INJECTION  8/8/2011     IR LUMBAR EPIDURAL STEROID INJECTION  10/10/2011     IR LUMBAR EPIDURAL STEROID INJECTION  3/8/2012     IR LUMBAR EPIDURAL STEROID INJECTION  4/10/2012     LUMPECTOMY BREAST Right 04/03/2009     OOPHORECTOMY  " 2004     REPAIR HAMMER TOE Bilateral 1988     STOMACH SURGERY      hyernia repair           CURRENT MEDICATIONS:    albuterol (PROAIR HFA/PROVENTIL HFA/VENTOLIN HFA) 108 (90 Base) MCG/ACT inhaler  amLODIPine (NORVASC) 5 MG tablet  ascorbic acid, vitamin C, (VITAMIN C) 1000 MG tablet  atorvastatin (LIPITOR) 20 MG tablet  azelastine (ASTELIN) 0.1 % nasal spray  budesonide (PULMICORT) 1 MG/2ML neb solution  bumetanide (BUMEX) 1 MG tablet  bumetanide (BUMEX) 1 MG tablet  calcium, as carbonate, (OS-GILL) 500 mg calcium (1,250 mg) tablet  cholecalciferol, vitamin D3, 2,000 unit Tab  ipratropium-albuterol (DUO-NEB) 0.5-2.5 mg/3 mL nebulizer  losartan (COZAAR) 100 MG tablet  magnesium oxide (MAG-OX) 400 mg (241.3 mg magnesium) tablet  methylcellulose (CITRUCEL ORAL)  metoprolol tartrate (LOPRESSOR) 50 MG tablet  omeprazole (PRILOSEC) 20 MG DR capsule  PARoxetine (PAXIL) 20 MG tablet  predniSONE (DELTASONE) 20 MG tablet  tiotropium (SPIRIVA) 18 mcg inhalation capsule  vitamin E 200 UNIT capsule  warfarin ANTICOAGULANT (COUMADIN) 5 MG tablet         ALLERGIES:  No Known Allergies    FAMILY HISTORY:  Family History   Problem Relation Age of Onset     Brain Cancer Son 29.00     Dementia Mother      Cerebrovascular Disease Mother      Prostate Cancer Father      Aortic aneurysm Father         Abdominal     Hypertension Sister      No Known Problems Daughter      No Known Problems Maternal Grandmother      No Known Problems Maternal Grandfather      No Known Problems Paternal Grandmother      No Known Problems Paternal Grandfather      Breast Cancer Maternal Aunt 70.00     No Known Problems Paternal Aunt      Breast Cancer Maternal Aunt      Other - See Comments Brother         Polio     Other - See Comments Brother         polio     Hereditary Breast and Ovarian Cancer Syndrome No family hx of      Colon Cancer No family hx of      Endometrial Cancer No family hx of      Ovarian Cancer No family hx of        SOCIAL HISTORY:    Social History     Socioeconomic History     Marital status:      Spouse name: None     Number of children: None     Years of education: None     Highest education level: None   Tobacco Use     Smoking status: Some Days     Packs/day: 0.50     Years: 60.00     Pack years: 30.00     Types: Cigarettes     Last attempt to quit: 2018     Years since quittin.8     Smokeless tobacco: Never   Substance and Sexual Activity     Alcohol use: No     Drug use: No     Sexual activity: Never       VITALS:  BP (!) 146/85   Pulse 91   Temp 98.8  F (37.1  C) (Oral)   Resp 26   Wt 70.3 kg (155 lb)   SpO2 95%   BMI 26.61 kg/m      PHYSICAL EXAM    Physical Exam  Constitutional:       General: She is not in acute distress.  HENT:      Head: Normocephalic and atraumatic.      Mouth/Throat:      Pharynx: Oropharynx is clear.   Eyes:      Pupils: Pupils are equal, round, and reactive to light.   Cardiovascular:      Rate and Rhythm: Normal rate and regular rhythm.      Pulses: Normal pulses.      Heart sounds: Normal heart sounds.      Comments: Trace pedal edema bilaterally.  Pulmonary:      Effort: Pulmonary effort is normal. Tachypnea present.      Breath sounds: Rhonchi present.   Abdominal:      General: Abdomen is flat. Bowel sounds are normal.      Palpations: Abdomen is soft.      Tenderness: There is no abdominal tenderness.   Musculoskeletal:         General: Normal range of motion.      Right lower leg: Edema present.      Left lower leg: Edema present.   Skin:     General: Skin is warm and dry.      Capillary Refill: Capillary refill takes less than 2 seconds.   Neurological:      General: No focal deficit present.      Mental Status: She is alert and oriented to person, place, and time.         LAB:  All pertinent labs reviewed and interpreted.  Labs Ordered and Resulted from Time of ED Arrival to Time of ED Departure   INR - Abnormal       Result Value    INR 3.81 (*)    PARTIAL THROMBOPLASTIN TIME -  Abnormal    aPTT 40 (*)    COMPREHENSIVE METABOLIC PANEL - Abnormal    Sodium 136      Potassium 4.1      Chloride 99      Carbon Dioxide (CO2) 25      Anion Gap 12      Urea Nitrogen 34.0 (*)     Creatinine 1.38 (*)     Calcium 9.6      Glucose 132 (*)     Alkaline Phosphatase 66      AST 18      ALT 14      Protein Total 6.6      Albumin 4.0      Bilirubin Total 0.9      GFR Estimate 38 (*)    TROPONIN T, HIGH SENSITIVITY - Abnormal    Troponin T, High Sensitivity 17 (*)    NT PROBNP INPATIENT - Abnormal    N terminal Pro BNP Inpatient 6,531 (*)    BLOOD GAS VENOUS - Abnormal    pH Venous 7.40      pCO2 Venous 49      pO2 Venous 17 (*)     Bicarbonate Venous 30      Base Excess/Deficit (+/-) 5.3      Oxyhemoglobin Venous 20.9 (*)     O2 Sat, Venous 21.4 (*)    CBC WITH PLATELETS AND DIFFERENTIAL - Abnormal    WBC Count 7.8      RBC Count 5.48 (*)     Hemoglobin 14.9      Hematocrit 45.6      MCV 83      MCH 27.2      MCHC 32.7      RDW 14.4      Platelet Count 111 (*)     % Neutrophils 75      % Lymphocytes 15      % Monocytes 9      % Eosinophils 0      % Basophils 0      % Immature Granulocytes 1      NRBCs per 100 WBC 0      Absolute Neutrophils 6.0      Absolute Lymphocytes 1.2      Absolute Monocytes 0.7      Absolute Eosinophils 0.0      Absolute Basophils 0.0      Absolute Immature Granulocytes 0.1      Absolute NRBCs 0.0     TROPONIN T, HIGH SENSITIVITY - Abnormal    Troponin T, High Sensitivity 18 (*)    LACTIC ACID WHOLE BLOOD - Normal    Lactic Acid 1.7     COVID-19 VIRUS (CORONAVIRUS) BY PCR - Normal    SARS CoV2 PCR Negative     INFLUENZA A/B & SARS-COV2 PCR MULTIPLEX - Normal    Influenza A PCR Negative      Influenza B PCR Negative      RSV PCR Negative      SARS CoV2 PCR Negative     MAGNESIUM       RADIOLOGY:  Reviewed all pertinent imaging. Please see official radiology report.  XR Chest Port 1 View   Final Result   IMPRESSION: Redemonstrated left upper lobe nodular opacity. Increased right  basilar airspace opacities which may be infectious or inflammatory. Normal heart size. No pleural effusions.      Echocardiogram Complete    (Results Pending)       EKG:    Performed at: 12/18/22 3856    Impression: Atrial fibrillation, nonspecific ST abnormality    Rate: 63 bpm  Rhythm: Atrial fibrillation  Axis: 43  SD Interval: *  QRS Interval: 98 ms  QTc Interval: 386 ms  ST Changes: Nonspecific ST abnormality  Comparison: When compared with ECG of 7/13/21, no significant change was found.    I have independently reviewed and interpreted the EKG(s) documented above.      I, Chung Vyas, am serving as a scribe to document services personally performed by Dr. Yesi Bolden based on my observation and the provider's statements to me. I, Yesi Bolden, DO attest that Chung Vyas is acting in a scribe capacity, has observed my performance of the services and has documented them in accordance with my direction.    Yesi Bolden DO  Emergency Medicine  [facility] EMERGENCY DEPARTMENT  [facility] [facility] [id number] [phone]  Dept: [phone]      Yesi Bolden DO  12/18/22 1447

## 2022-12-18 NOTE — H&P
Admission History and Physical   Christina Umana, 1940, [id number]  [facility]  Acute respiratory failure with hypoxia (H) [J96.01]  PCP:Adan Elizabeth, [phone]   Admitting provider: Kellie Loredo MD.    Code status:  Special Code          Extended Emergency Contact Information  Primary Emergency Contact: Bladimir Umana   [facility]  Mobile Phone: [phone]  Relation: Son  Secondary Emergency Contact: Michelle Umana   [place]  Mobile Phone: [phone]  Relation: Other       Assessment and Plan  Principal Problem:    Acute respiratory failure with hypoxia (H)  Active Problems:    Essential hypertension    Chronic atrial fibrillation (H)    Heart failure with reduced ejection fraction (H)    Dilated cardiomyopathy (H)    CKD (chronic kidney disease) stage 3, GFR 30-59 ml/min (H)    Thrombocytopenia (H)    COPD exacerbation (H)    Christina Umana is a 82 year old female presenting with SOB    Acute respiratory failure admitted in the past for similar problem with CHF and COPD as cause   - was needing 10 liters when EMS arrived  - currently on Bipap 10/5, 12, FIO2 25% and comfortable  - RCAT- discussed with RT will assess if can have breaks and be on NC/or face mask to eat and if ABGs needed.   - treating COPD and CHF (HFrEF known Ef 40%)  - IV abx   - therapeutic on warfarin so PE unlikely   - per chart concern for possible RADHA but patient declined sleep study     COPD exacerbation infiltrate on imaging   - per patient no formal PFTs or pulmonologist in the past.   - IV solumedrol 40mg Q6 hrs for now  - Bipap, RCAT ABG if needed  - she is a smoker 1/2ppd and recently decreased to 2-3per day. Stressed cessation   - continue IV ceftriaxone and azithromycin (kept at 500mg daily for now) pulm to see and reassess   - continue home Pulmicort nebs for now and duonebs Q6hrs and RT to adjust   - once off bipap consider pulm hygiene     H/o HFrEF likely CHF adding to this as well with  recent steroids as outpatient and per patient she does not always eat low sodium diet.   - elevated BNP 6,531   - CXR does not really show vascular congestion but does have leg edema, difficult to assess JVD with bipap   - CHF protocol  - IV lasix 60mg Q 8 hrs until cards assess   - Echo ordered  - continue losartan and metoprolol   - afib is rate controlled   - on statin     Atrial fib, chronic   - currently rate controlled   - continue telemetry  - on metoprolol  - INR therapeutic pharmacy to dose warfairn     HTN  - diuresing as above  - continue home BP meds, metoprolol and amlodipine     Thrombocytopenia chronic  - is followed by [facility] from oncology currently stable   - trend labs    CKD 3 currently stable  - trend labs while diuresing     COVID-19 PCR Results    COVID-19 PCR Results 12/18/22 12/18/22    0745 1124   SARS CoV2 PCR Negative Negative      Comments are available for some flowsheets but are not being displayed.         COVID-19 Antibody Results, Testing for Immunity    COVID-19 Antibody Results, Testing for Immunity   No data to display.           VTE prophylaxis:  Warfarin  DIET: Orders Placed This Encounter      2 Gram Sodium Diet Other - please comment    Drains/Lines: none  Weight bearing status: WBAT  Disposition/Barriers to discharge: pending improvement, decreased O2 needs and specilast recs   Code Status:Special Code discussed with patient and she confirms shes DNRDNI but does want medications if needed    HPI: Christina Umana is a 82 year old old female with h/o COPD, afib on coumadin, CKD3, HTN, HLD, who presents to this ED via EMS for evaluation of shortness of breath.     Patient endorses ongoing shortness of breath since she woke up today, with EMS saying patient was 80% on room air upon arrival. Patient went up to 84% after being placed on 10L O2. Patient states she used a duoneb at home with no relief. Patient notes that she was not short of breath yesterday, but has had a cough  "for 2 weeks. Patient has not taken any recent antibiotics. No recent falls. No sick contacts.     Patient notes that she was recently at her PCP, who told her that she had some \"fingers in her lungs.\" Patient was placed on prednisone and her duoneb was upped.    Past Medical History:   Diagnosis Date     Arthritis      Cancer (H) 2005     CHF (congestive heart failure) (H)      Clotting disorder (H) 4/2017     COPD (chronic obstructive pulmonary disease) (H)      Degenerative disc disease, lumbar      Emphysema lung (H)      GERD (gastroesophageal reflux disease)      Hyperlipidemia      Hypertension      Kidney stone 4/2017     Osteoporosis      Scoliosis      Ventral hernia      Past Surgical History:   Procedure Laterality Date     BIOPSY BREAST Right 2012     BUNIONECTOMY Bilateral 1988     COLECTOMY N/A 03/31/2003     COLON SURGERY       CYST REMOVAL Left 02/01/2007     HERNIORRHAPHY VENTRAL  02/25/2005     HYSTERECTOMY  2004     IR LUMBAR EPIDURAL STEROID INJECTION  8/8/2011     IR LUMBAR EPIDURAL STEROID INJECTION  10/10/2011     IR LUMBAR EPIDURAL STEROID INJECTION  3/8/2012     IR LUMBAR EPIDURAL STEROID INJECTION  4/10/2012     LUMPECTOMY BREAST Right 04/03/2009     OOPHORECTOMY  2004     REPAIR HAMMER TOE Bilateral 1988     STOMACH SURGERY      hyernia repair     Family History   Problem Relation Age of Onset     Brain Cancer Son 29.00     Dementia Mother      Cerebrovascular Disease Mother      Prostate Cancer Father      Aortic aneurysm Father         Abdominal     Hypertension Sister      No Known Problems Daughter      No Known Problems Maternal Grandmother      No Known Problems Maternal Grandfather      No Known Problems Paternal Grandmother      No Known Problems Paternal Grandfather      Breast Cancer Maternal Aunt 70.00     No Known Problems Paternal Aunt      Breast Cancer Maternal Aunt      Other - See Comments Brother         Polio     Other - See Comments Brother         polio     Hereditary " Breast and Ovarian Cancer Syndrome No family hx of      Colon Cancer No family hx of      Endometrial Cancer No family hx of      Ovarian Cancer No family hx of      Social History     Socioeconomic History     Marital status:      Spouse name: Not on file     Number of children: Not on file     Years of education: Not on file     Highest education level: Not on file   Occupational History     Not on file   Tobacco Use     Smoking status: Some Days     Packs/day: 0.50     Years: 60.00     Pack years: 30.00     Types: Cigarettes     Last attempt to quit: 2018     Years since quittin.8     Smokeless tobacco: Never   Substance and Sexual Activity     Alcohol use: No     Drug use: No     Sexual activity: Never   Other Topics Concern     Not on file   Social History Narrative     Not on file     Social Determinants of Health     Financial Resource Strain: Not on file   Food Insecurity: Not on file   Transportation Needs: Not on file   Physical Activity: Not on file   Stress: Not on file   Social Connections: Not on file   Intimate Partner Violence: Not on file   Housing Stability: Not on file     No Known Allergies    PRIOR TO ADMISSION MEDICATIONS   Prior to Admission medications    Medication Sig Last Dose Taking? Auth Provider Long Term End Date   albuterol (PROAIR HFA/PROVENTIL HFA/VENTOLIN HFA) 108 (90 Base) MCG/ACT inhaler Inhale 2 puffs into the lungs every 6 hours as needed for shortness of breath, wheezing or cough 2022 Yes Unknown, Entered By History Yes    amLODIPine (NORVASC) 5 MG tablet [AMLODIPINE (NORVASC) 5 MG TABLET] Take 1 tablet (5 mg total) by mouth daily. 2022 Yes Thor Santamaria MD Yes    ascorbic acid, vitamin C, (VITAMIN C) 1000 MG tablet [ASCORBIC ACID, VITAMIN C, (VITAMIN C) 1000 MG TABLET] Take 1,000 mg by mouth daily.  2022 Yes Provider, Historical     atorvastatin (LIPITOR) 20 MG tablet Take 20 mg by mouth daily 2022 Yes Unknown, Entered By History No     azelastine (ASTELIN) 0.1 % nasal spray Spray 1 spray into both nostrils 2 times daily  Yes Unknown, Entered By History     budesonide (PULMICORT) 1 MG/2ML neb solution Take 1 mg by nebulization 2 times daily 12/17/2022 Yes Unknown, Entered By History Yes    bumetanide (BUMEX) 1 MG tablet Take 1 mg by mouth every other day On even days of the week (alternated with 2mg doses) 12/16/2022 Yes Unknown, Entered By History Yes    bumetanide (BUMEX) 1 MG tablet Take 2 mg by mouth every other day On odd days of the week (alternate with 1mg doses) 12/17/2022 Yes Unknown, Entered By History Yes    calcium, as carbonate, (OS-GILL) 500 mg calcium (1,250 mg) tablet [CALCIUM, AS CARBONATE, (OS-GILL) 500 MG CALCIUM (1,250 MG) TABLET] Take 1 tablet by mouth 2 (two) times a day. 12/17/2022 Yes Provider, Historical     cholecalciferol, vitamin D3, 2,000 unit Tab [CHOLECALCIFEROL, VITAMIN D3, 2,000 UNIT TAB] Take 2,000 Units by mouth daily. 12/17/2022 Yes Provider, Historical     ipratropium-albuterol (DUO-NEB) 0.5-2.5 mg/3 mL nebulizer [IPRATROPIUM-ALBUTEROL (DUO-NEB) 0.5-2.5 MG/3 ML NEBULIZER] Take 3 mL by nebulization 3 (three) times a day. Then use up to 4 times daily as needed for shortness of breath. 12/17/2022 Yes Thor Santamaria MD Yes    losartan (COZAAR) 100 MG tablet [LOSARTAN (COZAAR) 100 MG TABLET] Take 1 tablet (100 mg total) by mouth daily. 12/17/2022 Yes Adan Elizabeth Yes    magnesium oxide (MAG-OX) 400 mg (241.3 mg magnesium) tablet [MAGNESIUM OXIDE (MAG-OX) 400 MG (241.3 MG MAGNESIUM) TABLET] 400mg in the morning and 800 mg at bedtime 12/17/2022 Yes Thor Santmaaria MD Yes    methylcellulose (CITRUCEL ORAL) Take 1 Dose by mouth daily 12/17/2022 Yes Provider, Historical     metoprolol tartrate (LOPRESSOR) 50 MG tablet [METOPROLOL TARTRATE (LOPRESSOR) 50 MG TABLET] Take 1.5 tablets (75 mg total) by mouth 2 (two) times a day. 12/17/2022 Yes Betsey Lyon, NP Yes    omeprazole (PRILOSEC) 20 MG DR capsule Take 20  mg by mouth every morning (before breakfast) 12/17/2022 Yes Provider, Historical Yes    PARoxetine (PAXIL) 20 MG tablet [PAROXETINE (PAXIL) 20 MG TABLET] Take 1 tablet (20 mg total) by mouth daily. 12/17/2022 Yes Thor Santamaria MD Yes    predniSONE (DELTASONE) 20 MG tablet Take 20 mg by mouth daily 12/17/2022 Yes Unknown, Entered By History     tiotropium (SPIRIVA) 18 mcg inhalation capsule [TIOTROPIUM (SPIRIVA) 18 MCG INHALATION CAPSULE] Place 18 mcg into inhaler and inhale daily. 12/17/2022 Yes Provider, Historical Yes    vitamin E 200 UNIT capsule [VITAMIN E 200 UNIT CAPSULE] Take 200 Units by mouth daily. 12/17/2022 Yes Provider, Historical     warfarin ANTICOAGULANT (COUMADIN) 5 MG tablet Take 2.5-5 mg by mouth daily Take 5mg on Monday, Wednesday and Friday and 2.5mg the rest of the days 12/17/2022 Yes Unknown, Entered By History No         REVIEW OF SYSTEMS:  12 point reviewed pertinent negatives and positives in HPI all others negative     PHYSICAL EXAM  Temp:  [98.8  F (37.1  C)] 98.8  F (37.1  C)  Pulse:  [63-88] 88  Resp:  [20-30] 28  BP: (139-167)/(75-97) 156/91  FiO2 (%):  [25 %] 25 %  SpO2:  [91 %-98 %] 94 %  Wt Readings from Last 1 Encounters:   12/18/22 70.3 kg (155 lb)     Body mass index is 26.61 kg/m .  Physical Exam  Constitutional:       Appearance: She is ill-appearing.      Comments: NAd, comfortable on Bipap    HENT:      Head: Normocephalic and atraumatic.      Mouth/Throat:      Comments: Unable to assess.  Eyes:      Extraocular Movements: Extraocular movements intact.      Conjunctiva/sclera: Conjunctivae normal.      Pupils: Pupils are equal, round, and reactive to light.   Neck:      Comments: Trachea midline, unable to determine JVD or carotids with bipap  Cardiovascular:      Rate and Rhythm: Normal rate. Rhythm irregular.      Pulses: Normal pulses.   Pulmonary:      Comments: Poor air movement, but comfortable on bipap, able to speak sentences,   Abdominal:      General: Bowel  sounds are normal. There is no distension.   Musculoskeletal:      Cervical back: Normal range of motion.      Comments: BLE ankle edema L.R   Skin:     General: Skin is warm and dry.      Capillary Refill: Capillary refill takes less than 2 seconds.   Neurological:      General: No focal deficit present.      Mental Status: She is alert and oriented to person, place, and time. Mental status is at baseline.         PERTINENT LABS and RADIOLOGY   Results for orders placed or performed during the hospital encounter of 12/18/22   XR Chest Port 1 View    Impression    IMPRESSION: Redemonstrated left upper lobe nodular opacity. Increased right basilar airspace opacities which may be infectious or inflammatory. Normal heart size. No pleural effusions.       Recent Labs   Lab 12/18/22  0741   WBC 7.8   HGB 14.9   MCV 83   *   INR 3.81*      POTASSIUM 4.1   CHLORIDE 99   CO2 25   BUN 34.0*   CR 1.38*   ANIONGAP 12   GILL 9.6   *   ALBUMIN 4.0   PROTTOTAL 6.6   BILITOTAL 0.9   ALKPHOS 66   ALT 14   AST 18     EKG:poor quality but afib rate controlled      Kellie Loredo MD  [facility] Medicine Service  [phone]

## 2022-12-19 ENCOUNTER — APPOINTMENT (OUTPATIENT)
Dept: OCCUPATIONAL THERAPY | Facility: HOSPITAL | Age: 82
DRG: 193 | End: 2022-12-19
Attending: INTERNAL MEDICINE
Payer: COMMERCIAL

## 2022-12-19 DIAGNOSIS — J44.9 CHRONIC OBSTRUCTIVE PULMONARY DISEASE, UNSPECIFIED COPD TYPE (H): Primary | ICD-10-CM

## 2022-12-19 DIAGNOSIS — F17.200 TOBACCO DEPENDENCE SYNDROME: ICD-10-CM

## 2022-12-19 LAB
ANION GAP SERPL CALCULATED.3IONS-SCNC: 15 MMOL/L (ref 7–15)
BUN SERPL-MCNC: 39.7 MG/DL (ref 8–23)
CALCIUM SERPL-MCNC: 9.3 MG/DL (ref 8.8–10.2)
CHLORIDE SERPL-SCNC: 97 MMOL/L (ref 98–107)
CREAT SERPL-MCNC: 1.39 MG/DL (ref 0.51–0.95)
DEPRECATED HCO3 PLAS-SCNC: 24 MMOL/L (ref 22–29)
GFR SERPL CREATININE-BSD FRML MDRD: 38 ML/MIN/1.73M2
GLUCOSE SERPL-MCNC: 183 MG/DL (ref 70–99)
INR PPP: 3.35 (ref 0.85–1.15)
MAGNESIUM SERPL-MCNC: 2 MG/DL (ref 1.7–2.3)
POTASSIUM SERPL-SCNC: 4.3 MMOL/L (ref 3.4–5.3)
PROCALCITONIN SERPL IA-MCNC: 0.07 NG/ML
SODIUM SERPL-SCNC: 136 MMOL/L (ref 136–145)

## 2022-12-19 PROCEDURE — 36415 COLL VENOUS BLD VENIPUNCTURE: CPT | Performed by: INTERNAL MEDICINE

## 2022-12-19 PROCEDURE — 250N000011 HC RX IP 250 OP 636: Performed by: INTERNAL MEDICINE

## 2022-12-19 PROCEDURE — 84145 PROCALCITONIN (PCT): CPT | Performed by: INTERNAL MEDICINE

## 2022-12-19 PROCEDURE — 250N000013 HC RX MED GY IP 250 OP 250 PS 637: Performed by: INTERNAL MEDICINE

## 2022-12-19 PROCEDURE — 83735 ASSAY OF MAGNESIUM: CPT | Performed by: INTERNAL MEDICINE

## 2022-12-19 PROCEDURE — 97535 SELF CARE MNGMENT TRAINING: CPT | Mod: GO

## 2022-12-19 PROCEDURE — 99233 SBSQ HOSP IP/OBS HIGH 50: CPT | Performed by: INTERNAL MEDICINE

## 2022-12-19 PROCEDURE — 99232 SBSQ HOSP IP/OBS MODERATE 35: CPT | Performed by: INTERNAL MEDICINE

## 2022-12-19 PROCEDURE — G0463 HOSPITAL OUTPT CLINIC VISIT: HCPCS

## 2022-12-19 PROCEDURE — 210N000001 HC R&B IMCU HEART CARE

## 2022-12-19 PROCEDURE — 97110 THERAPEUTIC EXERCISES: CPT | Mod: GO

## 2022-12-19 PROCEDURE — 97165 OT EVAL LOW COMPLEX 30 MIN: CPT | Mod: GO

## 2022-12-19 PROCEDURE — 85610 PROTHROMBIN TIME: CPT | Performed by: INTERNAL MEDICINE

## 2022-12-19 PROCEDURE — 82374 ASSAY BLOOD CARBON DIOXIDE: CPT | Performed by: INTERNAL MEDICINE

## 2022-12-19 PROCEDURE — 99407 BEHAV CHNG SMOKING > 10 MIN: CPT

## 2022-12-19 PROCEDURE — 999N000032 HC STATISTIC CHRONIC DISEASE SPECIALIST RT CONSULT

## 2022-12-19 PROCEDURE — 94640 AIRWAY INHALATION TREATMENT: CPT

## 2022-12-19 PROCEDURE — 999N000157 HC STATISTIC RCP TIME EA 10 MIN

## 2022-12-19 PROCEDURE — 999N000033 HC STATISTIC CHRONIC PULMONARY DISEASE SPECIALIST

## 2022-12-19 PROCEDURE — 999N000156 HC STATISTIC RCP CONSULT EA 30 MIN

## 2022-12-19 PROCEDURE — 94799 UNLISTED PULMONARY SVC/PX: CPT

## 2022-12-19 PROCEDURE — 258N000003 HC RX IP 258 OP 636: Performed by: INTERNAL MEDICINE

## 2022-12-19 PROCEDURE — 250N000009 HC RX 250: Performed by: INTERNAL MEDICINE

## 2022-12-19 PROCEDURE — 94640 AIRWAY INHALATION TREATMENT: CPT | Mod: 76

## 2022-12-19 RX ORDER — PREDNISONE 20 MG/1
40 TABLET ORAL DAILY
Status: DISCONTINUED | OUTPATIENT
Start: 2022-12-20 | End: 2022-12-21 | Stop reason: HOSPADM

## 2022-12-19 RX ORDER — FUROSEMIDE 10 MG/ML
20 INJECTION INTRAMUSCULAR; INTRAVENOUS ONCE
Status: COMPLETED | OUTPATIENT
Start: 2022-12-19 | End: 2022-12-19

## 2022-12-19 RX ORDER — WARFARIN SODIUM 1 MG/1
1 TABLET ORAL
Status: COMPLETED | OUTPATIENT
Start: 2022-12-19 | End: 2022-12-19

## 2022-12-19 RX ORDER — MAGNESIUM OXIDE 400 MG/1
400 TABLET ORAL EVERY 4 HOURS
Status: COMPLETED | OUTPATIENT
Start: 2022-12-19 | End: 2022-12-19

## 2022-12-19 RX ORDER — PREDNISONE 5 MG/1
10 TABLET ORAL DAILY
Status: DISCONTINUED | OUTPATIENT
Start: 2022-12-29 | End: 2022-12-21 | Stop reason: HOSPADM

## 2022-12-19 RX ORDER — FUROSEMIDE 10 MG/ML
60 INJECTION INTRAMUSCULAR; INTRAVENOUS
Status: DISCONTINUED | OUTPATIENT
Start: 2022-12-20 | End: 2022-12-20

## 2022-12-19 RX ORDER — IPRATROPIUM BROMIDE AND ALBUTEROL SULFATE 2.5; .5 MG/3ML; MG/3ML
3 SOLUTION RESPIRATORY (INHALATION) 3 TIMES DAILY
Status: DISCONTINUED | OUTPATIENT
Start: 2022-12-19 | End: 2022-12-21 | Stop reason: HOSPADM

## 2022-12-19 RX ORDER — PREDNISONE 20 MG/1
20 TABLET ORAL DAILY
Status: DISCONTINUED | OUTPATIENT
Start: 2022-12-26 | End: 2022-12-21 | Stop reason: HOSPADM

## 2022-12-19 RX ADMIN — IPRATROPIUM BROMIDE AND ALBUTEROL SULFATE 3 ML: 2.5; .5 SOLUTION RESPIRATORY (INHALATION) at 07:36

## 2022-12-19 RX ADMIN — GUAIFENESIN 1200 MG: 600 TABLET ORAL at 20:36

## 2022-12-19 RX ADMIN — NYSTATIN 500000 UNITS: 100000 SUSPENSION ORAL at 18:18

## 2022-12-19 RX ADMIN — ATORVASTATIN CALCIUM 20 MG: 10 TABLET, FILM COATED ORAL at 09:25

## 2022-12-19 RX ADMIN — BUDESONIDE 1 MG: 0.5 SUSPENSION RESPIRATORY (INHALATION) at 07:36

## 2022-12-19 RX ADMIN — Medication 400 MG: at 05:59

## 2022-12-19 RX ADMIN — FUROSEMIDE 20 MG: 10 INJECTION, SOLUTION INTRAMUSCULAR; INTRAVENOUS at 20:37

## 2022-12-19 RX ADMIN — LOSARTAN POTASSIUM 100 MG: 50 TABLET, FILM COATED ORAL at 09:25

## 2022-12-19 RX ADMIN — WARFARIN SODIUM 1 MG: 1 TABLET ORAL at 18:18

## 2022-12-19 RX ADMIN — BUDESONIDE 1 MG: 0.5 SUSPENSION RESPIRATORY (INHALATION) at 19:50

## 2022-12-19 RX ADMIN — IPRATROPIUM BROMIDE AND ALBUTEROL SULFATE 3 ML: 2.5; .5 SOLUTION RESPIRATORY (INHALATION) at 13:30

## 2022-12-19 RX ADMIN — NYSTATIN 500000 UNITS: 100000 SUSPENSION ORAL at 09:28

## 2022-12-19 RX ADMIN — METOPROLOL TARTRATE 75 MG: 25 TABLET, FILM COATED ORAL at 09:25

## 2022-12-19 RX ADMIN — PANTOPRAZOLE SODIUM 40 MG: 40 TABLET, DELAYED RELEASE ORAL at 06:13

## 2022-12-19 RX ADMIN — CEFTRIAXONE SODIUM 1 G: 1 INJECTION, POWDER, FOR SOLUTION INTRAMUSCULAR; INTRAVENOUS at 10:26

## 2022-12-19 RX ADMIN — GUAIFENESIN 1200 MG: 600 TABLET ORAL at 09:25

## 2022-12-19 RX ADMIN — PAROXETINE HYDROCHLORIDE 20 MG: 20 TABLET, FILM COATED ORAL at 09:26

## 2022-12-19 RX ADMIN — NYSTATIN 500000 UNITS: 100000 SUSPENSION ORAL at 20:36

## 2022-12-19 RX ADMIN — NYSTATIN 500000 UNITS: 100000 SUSPENSION ORAL at 13:59

## 2022-12-19 RX ADMIN — METOPROLOL SUCCINATE 75 MG: 25 TABLET, EXTENDED RELEASE ORAL at 18:18

## 2022-12-19 RX ADMIN — IPRATROPIUM BROMIDE AND ALBUTEROL SULFATE 3 ML: 2.5; .5 SOLUTION RESPIRATORY (INHALATION) at 19:50

## 2022-12-19 RX ADMIN — FUROSEMIDE 60 MG: 10 INJECTION, SOLUTION INTRAMUSCULAR; INTRAVENOUS at 05:58

## 2022-12-19 RX ADMIN — METHYLPREDNISOLONE SODIUM SUCCINATE 40 MG: 40 INJECTION, POWDER, FOR SOLUTION INTRAMUSCULAR; INTRAVENOUS at 05:59

## 2022-12-19 RX ADMIN — Medication 400 MG: at 02:15

## 2022-12-19 RX ADMIN — METHYLPREDNISOLONE SODIUM SUCCINATE 40 MG: 40 INJECTION, POWDER, FOR SOLUTION INTRAMUSCULAR; INTRAVENOUS at 11:31

## 2022-12-19 RX ADMIN — AZITHROMYCIN DIHYDRATE 500 MG: 500 INJECTION, POWDER, LYOPHILIZED, FOR SOLUTION INTRAVENOUS at 11:31

## 2022-12-19 RX ADMIN — IPRATROPIUM BROMIDE AND ALBUTEROL SULFATE 3 ML: .5; 3 SOLUTION RESPIRATORY (INHALATION) at 02:39

## 2022-12-19 RX ADMIN — FUROSEMIDE 60 MG: 10 INJECTION, SOLUTION INTRAMUSCULAR; INTRAVENOUS at 13:59

## 2022-12-19 ASSESSMENT — ACTIVITIES OF DAILY LIVING (ADL)
ADLS_ACUITY_SCORE: 20

## 2022-12-19 NOTE — CONSULTS
NUTRITION EDUCATION      REASON FOR ASSESSMENT:  To educate on 2 gram Na diet    NUTRITION HISTORY:  Information obtained from pt    Pt states her daughter in law does the cooking and shopping.  She states her daughter in law does not use any salt in the cooking and pt does not add salt at the table    CURRENT DIET:  2 gram Na    NUTRITION DIAGNOSIS:  Food- and nutrition-related knowledge deficit R/t systolic congestive hear failure as evidenced by need for therapeutic diet    INTERVENTIONS:    Nutrition Prescription:  2 gram Na    Implementation:      *  Nutrition Education (Content):   A)  Provided handout low sodium nutrition therapy, sodium free seasonings sheet and low sodium shopping guidelines   B)  Discussed label reading, foods high and low in Na and low sodium shopping guidelines      *  Nutrition Education (Application):   A)  Discussed current eating habits and recommended alternative food choices      *  Anticipate good compliance      *  Diet Education - refer to Education Flowsheet    Goals:      *  Patient will verbalize understanding of diet yes      *  All of the above goals met during the education session    Follow Up/Monitoring:      *  Provided RD contact information for future questions      *  Recommended Out-Patient Nutrition Referral, if further diet instructions are needed

## 2022-12-19 NOTE — PROGRESS NOTES
[facility]    Medicine Progress Note - Hospitalist Service    Date of Admission:  12/18/2022    Assessment & Plan     Christina Umana is a 82 year old old female with past medical history of COPD, afib on coumadin, CKD3, HTN, HLD, who presented to ED for evaluation of progressive shortness of breath presents to this ED via EMS for evaluation of shortness of breath.     Acute respiratory failure with hypoxia and hypercarbia; likely multifactorial, COPD exacerbation, acute on chronic heart failure, possible community-acquired pneumonia.   --On admission, CXR reported left upper lobe nodular opacity, increased right basilar airspace opacities which may be infectious or inflammatory  --Patient briefly needed BiPAP in ED then transition to nasal cannula.  Currently at 1 L nasal cannula, titrate O2 as able  --Treat underlying issues, refer below    COPD with acute exacerbation;  Probable community-acquired pneumonia;  --IV steroid transition to prednisone today  --Continue IV Rocephin and azithromycin while inpatient.  Add on procalcitonin  --Continue DuoNeb while inpatient  --Incentive spirometry, flutter valve  --Appreciated pulmonary input and reported they will arrange outpatient follow-up    Acute on chronic systolic heart failure;  -- On admission, echo reported EF 45 to 50%.  Previous echo on 8/2018 reported EF 40%  -- Patient responding to IV diuretics, given significant improvement on breathing status and blood pressure running on the low normal range, decreased IV Lasix to every 12 hours.  -- Monitor intake/output, weight, labs closely  -- Appreciate cardiology input    Chronic A. Fib;  -- Heart rate controlled.  Continue PTA metoprolol  --INR supratherapeutic, holding Coumadin.  Daily INR.  Appreciated pharmacy input    Essential hypertension;  -- Blood pressure low normal range.  Hold home losartan and amlodipine for now as patient is getting aggressive diuretics for heart  failure    Thrombocytopenia chronic  --Fairly stable.  Monitor intermittently     CKD 3;  --Fairly stable.  Monitor daily BMP while on IV diuretics.       Diet: 2 Gram Sodium Diet Other - please comment    DVT Prophylaxis: Warfarin  Rodriguez Catheter: Not present  Central Lines: None  Cardiac Monitoring: ACTIVE order. Indication: Acute decompensated heart failure (48 hours)  Code Status:  Special code, refer to CODE STATUS    Disposition Plan      Expected Discharge Date: 12/20/2022                The patient's care was discussed with the Bedside Nurse, Care Coordinator/, Patient and Patient's primary provider.    Carlitos MCDONOUGH MD  Hospitalist Service  [facility]  Securely message with the Vocera Web Console (learn more here)  Text page via [employer] Paging/Directory         Clinically Significant Risk Factors                # Thrombocytopenia: Lowest platelets = 111 in last 2 days, will monitor for bleeding                 ______________________________________________________________________    Interval History   Patient seen and examined.  Notes, labs, imaging report personally reviewed.  Patient reported breathing significantly better.  Reporting intermittent cough but not able to bring up anything.  Denied chest pain.  Denied abdomen pain, nausea, vomiting.  Denied fever or chills.  Discussed with nursing staffs.  Discussed with    Data reviewed today: I reviewed all medications, new labs and imaging results over the last 24 hours. I personally reviewed .    Physical Exam   Vital Signs: Temp: 98  F (36.7  C) Temp src: Oral BP: 129/65 Pulse: 72   Resp: 18 SpO2: 100 % O2 Device: Nasal cannula Oxygen Delivery: 1 LPM  Weight: 152 lbs 11.2 oz    General: Not in obvious distress.  HEENT: Normocephalic, supple neck  Chest: Decreased breath sound bilateral anteriorly, occasional bibasilar crackles, occasional expiratory wheezing  Heart: S1S2 normal, irregular  Abdomen: Soft. NT, ND. Bowel  sounds- active.  Extremities: No legs swelling  Neuro: alert and awake, grossly non-focal      Data   Recent Labs   Lab 12/19/22  0441 12/18/22  0741   WBC  --  7.8   HGB  --  14.9   MCV  --  83   PLT  --  111*   INR 3.35* 3.81*    136   POTASSIUM 4.3 4.1   CHLORIDE 97* 99   CO2 24 25   BUN 39.7* 34.0*   CR 1.39* 1.38*   ANIONGAP 15 12   GILL 9.3 9.6   * 132*   ALBUMIN  --  4.0   PROTTOTAL  --  6.6   BILITOTAL  --  0.9   ALKPHOS  --  66   ALT  --  14   AST  --  18

## 2022-12-19 NOTE — PROGRESS NOTES
12/19/22 0910   Appointment Info   Signing Clinician's Name / Credentials (OT) Natalee Angeles, OTR/L   Living Environment   People in Home child(arlyn), adult   Current Living Arrangements house   Home Accessibility stairs to enter home   Number of Stairs, Main Entrance 1   Self-Care   Regular Exercise No   Equipment Currently Used at Home none   Fall history within last six months no   Activity/Exercise/Self-Care Comment ind for BADLs   Instrumental Activities of Daily Living (IADL)   IADL Comments ind for IADLs - daughter in law does most cooking, pt does cleaning, ind for driving and meds   General Information   Onset of Illness/Injury or Date of Surgery 12/18/22   Referring Physician Kellie Loredo MD   Patient/Family Therapy Goal Statement (OT) go home   Additional Occupational Profile Info/Pertinent History of Current Problem admitted with acute respiratory failure and CHF   Existing Precautions/Restrictions oxygen therapy device and L/min  (1L)   Cognitive Status Examination   Orientation Status orientation to person, place and time   Affect/Mental Status (Cognitive) WFL   Follows Commands WFL   Pain Assessment   Patient Currently in Pain No   Range of Motion Comprehensive   General Range of Motion no range of motion deficits identified   Strength Comprehensive (MMT)   Comment, General Manual Muscle Testing (MMT) Assessment WFL for ADLs   Bed Mobility   Bed Mobility supine-sit;sit-supine   Supine-Sit [place] (Bed Mobility) supervision   Sit-Supine [place] (Bed Mobility) supervision   Transfers   Transfers sit-stand transfer   Sit-Stand Transfer   Sit-Stand [place] (Transfers) supervision   Balance   Balance Comments SBA for yu walk, fatigued following ~200ft mobility   Activities of Daily Living   BADL Assessment/Intervention no deficits identified   Clinical Impression   Criteria for Skilled Therapeutic Interventions Met (OT) Yes, treatment indicated   OT Diagnosis dec functional  mobility   OT Problem List-Impairments impacting ADL problems related to;activity tolerance impaired   Assessment of Occupational Performance 1-3 Performance Deficits   Identified Performance Deficits community mobility, household management   Planned Therapy Interventions (OT) IADL retraining;home program guidelines;progressive activity/exercise;risk factor education   Clinical Decision Making Complexity (OT) low complexity   Risk & Benefits of therapy have been explained evaluation/treatment results reviewed;participants included;patient   OT Total Evaluation Time   OT Eval, Low Complexity Minutes (28989) 10   OT Goals   Therapy Frequency (OT) Daily   OT Predicted Duration/Target Date for Goal Attainment 12/26/22   OT Goals Cardiac Phase 1   OT: Understanding of cardiac education to maximize quality of life, condition management, and health outcomes Patient;Verbalize;Demonstrate   OT: Perform aerobic activity with stable cardiovascular response intermittent;10 minutes;ambulation   OT: Functional/aerobic ambulation tolerance with stable cardiovascular response in order to return to home and community environment Independent;Greater than 300 feet   OT: Navigation of stairs simulating home set up with stable cardiovascular response in order to return to home and community environment Independent;1 stair   OT Discharge Planning   OT Plan 1 more session? progress amb distance, 1 step   OT Discharge Recommendation (DC Rec) home with assist   OT Rationale for DC Rec ind for BADLs. children able to assist with IADLs.   OT Brief overview of current status SBA yu walk, cardiac educ   Total Session Time   Total Session Time (sum of timed and untimed services) 10

## 2022-12-19 NOTE — PROGRESS NOTES
Respiratory distress not noted. Duoneb given. BS diminished pre; clear/diminshed post treatment. On 1 lpm oxygen, pt sats low 90s. Thick/tan phlegm noted. Deep breath and coughing encouraged. RT following.    Chico Delgado, RT

## 2022-12-19 NOTE — PLAN OF CARE
Problem: Plan of Care - These are the overarching goals to be used throughout the patient stay.    Goal: Absence of Hospital-Acquired Illness or Injury  Intervention: Identify and Manage Fall Risk  Recent Flowsheet Documentation  Taken 12/18/2022 1620 by Min Contreras RN  Safety Promotion/Fall Prevention: nonskid shoes/slippers when out of bed  Intervention: Prevent Skin Injury  Recent Flowsheet Documentation  Taken 12/18/2022 1620 by Min Contreras RN  Body Position: position changed independently   Pt is alert and oriented x4. Denies pain. Off Bipap and on o2 nc. Vss and afebrile.

## 2022-12-19 NOTE — CONSULTS
Tobacco Treatment Consult  12/19/2022, 4:10 PM    Patient Admitted for: Acute respiratory failure with hypoxia (H) [J96.01] on 12/18/2022    History of Tobacco Use:   Tobacco Product(s):cigarette  Amount used: down to 3 cigarettes/day from previous 1/2 pack/day  Number of quit attempts in past: 3  Longest period of without use: 3 weeks  Pharmacotherapy tried in the past: patch and Varenicline (Chantix)  Pharmacotherapy tried that has been beneficial: Patch and Chantix  Pharmacotherapy tried that has not been beneficial or was not tolerated: none identified  Fagerstrom Score: 0 Level of dependence 0-3 low  Medical co-morbidities impacting tobacco use: none identified    Assessment:   Importance of quitting to patient: 9  Current confidence in ability to quit: 8  Readiness to quit/planning to quit: ready  Reasons for wanting to quit: health, money, family, self pride  Emotional Triggers:anger and nervous or stressed  Physical and behavioral triggers: drinking coffee and eating a meal  Nicotine withdrawal symptoms experiencing as an inpatient: irritability  Barriers to quitting: none identified  -Christina feels that now would be an opportune time to make a quit attempt due to her hospitalization and the fact that it is winter and she does not smoke in her house. She has a strong support system that includes her son and daughter in-law who live with her. Neither of them smoke and would love it if she would quit.    Education done during visit:  -Discussed triggers and response to them  -Discussed benefits to quitting tobacco use  -Educated on physical benefits to health of tobacco cessation  -Education on use of pharmacotherapy products and discussed options to determine what may work best for patient.  -Discussed barriers to quitting and how patient feels they may overcome them  -Educated on benefits of having a support system and remembering their reasons for quitting  -Issued tobacco cessation materials and resource  information.  -Identified healthy alternatives    Recommendations:  Smoking Cessation  -In the hospital recommend the patient have the following pharmacotherapy: Patient is not feeling the need for any at this time  -Upon discharge recommend the patient have the following pharmacotherapy: Christina would like to quit with out the use of pharmacotherapy. Given her low dependence level at this time, I feel this would be appropriate. If she does find she needs assistance, we discussed 2 mg lozenges and/or 7 mg patch.   -Continued cessation therapy by this service via phone  -Continued encouragement from health care providers to quit and stay tobacco free.      Deena will participate in follow-up calls post-discharge. Will continue to be available to patient throughout hospital stay.    Total 25 minutes spent in smoking cessation, and 25 minutes spent in chart review,care coordination, and documentation.    Shahnaz Longo RT, Chronic Pulmonary Disease Specialist, Certified Tobacco Treatment Specialist  Phone [phone]

## 2022-12-19 NOTE — CONSULTS
COPD Initial Interview, Education, and Consult  12/19/2022, 4:33 PM    Reason for Consult: COPD Education  Patient Admitted for: Acute respiratory failure with hypoxia (H) [J96.01] on 12/18/2022     History of Present Illness: Christina is a 81 yo female with hx of COPD unconfirmed by PFTs, HFrEF, Chronic Afib on Coumadin HTN, CKD3, Kyphosis, and current smoker. She presented to ED after 2 weeks of cough for which she was prescribed oral prednisone taper dose and instructed to increase her DuoNeb frequency. Her symptoms worsened and she presented to ED with increased shortness of breath, work of breathing, hypoxia, elevated BNP, and leg edema. Chest x-ray shouwed possible ARTIS opacity, RLL opacity and likely small right pleural effusion. She was admitted and has received treatment with Bipap, supplemental oxygen, steroids, antibiotics, diuresis, and flutter valve. She had an inpatient pulmonary consult with Dr. Pagan.    Last PFT:  Date: None on record    Home Respiratory Medications:  Bronchodilators:   -albuterol inhaler PRN   -DuoNebs PRN    -Spiriva Handihaler Daily        Assessment: Patient currently is on room air. She is able to speak in full sentences and appears comfortable at rest. Patient is alert and oriented and converses appropriately.    Education:  -Medication use and instruction done for Spiriva Handihaler, Symbicort, and Albuterol rescue inhaler.  Patient is able to demonstrate appropriate use of all. Achieved an adequate inhalation flow of 120 on the in-check device at free flow,  MDI spacer also was issued for use with their Albuterol inhaler and pt was instructed on its purpose and use.  -COPD Action Plan, discussed and copies made  -Information on COPD and tools to help cope  -Disease process and irritants; discussed, questions answered  -Issued patient a COPD folder  -Discussed anxiety and it's relationship with the dyspnea cycle  -Purpose and importance of getting PFT and establishing care with  Pulmonologist in the interest of optimizing care.    Recommendations:  -Continue current inpatient therapy, per [facility] protocols  -Outpatient follow up appointment with pulmonology along with PFT/Spirometry testing. Patient wishes to schedule these appointments but declined my offer to set up outpatient appointments prior to discharge. Pulmonary has already notified the [facility] to facilitate scheduling these post discharge.  -Smoking cessation       Christina will participate in our call back program. Will continue to follow patient throughout hospital stay along with educating patient and family.    Total time spend with patient 30 minutes and 45 minutes spent in chart review, care coordination, and documentation.    Shahnaz Longo RT, Chronic Pulmonary Disease Specialist  Phone [phone]

## 2022-12-19 NOTE — PROGRESS NOTES
PULMONARY MEDICINE PROGRESS NOTE  12/19/2022    Admit Date: 12/18/2022  CODE: Special Code    Reason for Consult: acute respiratory failure    Assessment/Plan:   82 year old female with a history of active tobacco dependence, HFrEF, kyphosis, reported COPD, permanent atrial fibrillation, HTN, chronic thrombocytopenia, admitted on 12/18 with acute hypoxic respiratory failure.    Acute hypoxic respiratory failure: Acute decompensated heart failure, possible component of AECOPD. Diuresing and on CHF meds per cardiology. Clinically improving. On oxygen 1 L/min, normal work of breathing, no evidence of active airflow obstruction. On COPD meds outpatient; cannot find pulmonary function tests. Patient smokes 3 cigarettes daily. ARTIS opacity is subtle, appears to have been present for years. Could consider outpatient chest CT.    Plan:  - advised complete smoking cessation; she would like to quit  - outpatient PFT with pulmonary clinic follow-up; I notified the pulmonary clinic to help schedule this  - titrate oxygen as able; if qualifies can prescribe supplemental oxygen on discharge  - can continue nebulized bronchodilators inpatient and resume home COPD meds on discharge pending formal outpatient evaluation  - transition from methylprednisolone to prednisone taper  - can discuss possible outpatient chest CT for ARTIS rounded opacity, fairly subtle  - will sign off but please call if needed    Luke Medellin MD  Pulmonary and Critical Care Medicine  [facility]  [place]  Office [phone]  Pager [phone]  he/him/his                                                                                                                                                        SUBJECTIVE/INTERVAL HISTORY   Breathing improved compared to admission. Denies pain. Wants to quit smoking.                                                                                                                                         "              Exam/Data:     Vitals  BP 98/55 (BP Location: Left arm)   Pulse 67   Temp 98.1  F (36.7  C) (Oral)   Resp 18   Ht 1.715 m (5' 7.5\")   Wt 69.3 kg (152 lb 11.2 oz)   SpO2 94%   BMI 23.56 kg/m    BP - Mean:  [108] 108  I/O last 3 completed shifts:  In: 170 [P.O.:120; IV Piggyback:50]  Out: 1850 [Urine:1850]  Weight change:   [unfilled]  EXAM:  BP 98/55 (BP Location: Left arm)   Pulse 67   Temp 98.1  F (36.7  C) (Oral)   Resp 18   Ht 1.715 m (5' 7.5\")   Wt 69.3 kg (152 lb 11.2 oz)   SpO2 94%   BMI 23.56 kg/m      Intake/Output last 3 shifts:  I/O last 3 completed shifts:  In: 170 [P.O.:120; IV Piggyback:50]  Out: 1850 [Urine:1850]  Intake/Output this shift:  I/O this shift:  In: -   Out: 600 [Urine:600]    Physical Exam  Gen: alert, oriented, no distress  HEENT: NT, no DARREL  CV: RRR, no m/g/r  Resp: diminished bilaterally, no focal crackles or wheezes  Abd: soft, nontender, BS+  Skin: no rashes or lesions  Ext: no edema  Neuro: PERRL, nonfocal exam    ROS: A complete 10-system review of systems was obtained and is negative with the exception of what is noted in the history of present illness.    Medications:       atorvastatin  20 mg Oral Daily     azithromycin  500 mg Intravenous Q24H     budesonide  1 mg Nebulization BID     cefTRIAXone  1 g Intravenous Q24H     furosemide  60 mg Intravenous Q8H     guaiFENesin  1,200 mg Oral BID     ipratropium - albuterol 0.5 mg/2.5 mg/3 mL  3 mL Nebulization TID     losartan  100 mg Oral Daily     metoprolol succinate ER  75 mg Oral Daily     nystatin  500,000 Units Oral 4x Daily     pantoprazole  40 mg Oral QAM AC     PARoxetine  20 mg Oral Daily     [START ON 12/20/2022] predniSONE  40 mg Oral Daily    Followed by     [START ON 12/23/2022] predniSONE  30 mg Oral Daily    Followed by     [START ON 12/26/2022] predniSONE  20 mg Oral Daily    Followed by     [START ON 12/29/2022] predniSONE  10 mg Oral Daily     warfarin ANTICOAGULANT  1 mg Oral ONCE at " 18:00     Warfarin Therapy Reminder  1 each Oral See Admin Instructions         DATA  All laboratory and radiology has been personally reviewed by myself today.  Recent Labs   Lab 12/18/22  0741   WBC 7.8   HGB 14.9   HCT 45.6   *     Recent Labs   Lab 12/19/22  0441 12/18/22  0741    136   CO2 24 25   BUN 39.7* 34.0*   ALKPHOS  --  66   ALT  --  14   AST  --  18       PFT DATA:  None on record    IMAGING:   CXR )

## 2022-12-19 NOTE — PROGRESS NOTES
"Sepsis protocol triggered at 0000. Text paged Dr. Israel (Lactic this AM 1.7, VSS, and pt. On IV ABX) and he said \"patient looks stable. Hold off on Lactic for now.\" Will continue to monitor.  "

## 2022-12-19 NOTE — PROGRESS NOTES
RESPIRATORY CARE NOTE  Patient is on 1 LPM NC, BS diminished, gave Duoneb treatment x2 and Pulmicort x1, BS post treatment no change, patient perceives mild improvement, patient tolerated treatment well. Patient did well with flutter valve and has fair non-productive cough.          Casey Juan, RT

## 2022-12-19 NOTE — PROGRESS NOTES
"[facility]AT Treatment Plan    Patient Score: 7  Patient Acuity: 4    Clinical Indication for Therapy:  COPD    Therapy Ordered: Duoneb TID,  Pulmicort BID, Flutter-valve TID     Assessment Summary: Duoneb given X1 on schedule and [facility]AT evaluation completed per protocol.  Pt's breath sounds were diminished pre and post.  Pt was awake and alert on 1L nasal cannula; SpO2 95%.  Pt had a strong, congested, non-productive cough.  Pt's acuity level indicates prn treatments, however pt takes nebs at home TID;  will change Duoneb from Q6 to TID and flutter-valve from QID to TID to coincide with nebs.  Will order Albuterol neb Q2 prn fore wheezing and shortness of breath.  RT will continue to follow and reassess    Blood pressure 137/85, pulse 69, temperature 98  F (36.7  C), temperature source Oral, resp. rate 20, height 1.715 m (5' 7.5\"), weight 70.4 kg (155 lb 4.8 oz), SpO2 95 %.  on 1L nasal cannula.    Shay Hammond, RT  12/19/2022    "

## 2022-12-19 NOTE — PROGRESS NOTES
HEART CARE NOTE          Assessment/Recommendations     1. HFmrEF  Assessment / Plan    Diuresing well on current regimen - no changes at this time    GDMT as detailed below; mainstay of treatment for HFmrEF includes diuretics (class I) and SGLT2-I (class 2a); additional medical therapy demonstrated with less robust evidence for indication but should be considered per guideline recommendations (2b)    Current Pharmacotherapy AHA Guideline-Directed Medical Therapy   Losartan 100 mg daily Lisinopril 20 mg twice daily   Metoprolol succinate 75 mg daily Carvedilol 25 mg twice daily   Spironolactone - not started Spironolactone 25 mg once daily   Hydralazine NA Hydralazine 100 mg three times daily   Isosorbide dinitrate NA Isosorbide dinitrate 40 mg three times daily   SGLT2 inhibitor not started Dapagliflozin or Empagliflozin 10 mg daily       2. Afib  Assessment / Plan    Currently rate controlled    Continue metoprolol    Warfarin management per pharmacy    3. CKD stage 3  Assessment / Plan    Stable; continue to monitor closely    4. HLP  Assessment / Plan    Continue atorvastatin    5. HTN  Assessment / Plan    Adequately controlled on current regimen - no changes at this time    History of Present Illness/Subjective    Ms. Christina Umana is a 82 year old female with a PMHx significant for (per Dr. Loredo's note) COPD, afib on coumadin, CKD3, HTN, HLD, who presents to this ED via EMS for evaluation of shortness of breath.    Today, Mrs. Umana denies any acute cardiac events or complaints; Management plan as detailed above     ECG: Personally reviewed. atrial fibrillation, rate controlled.    ECHO (personnaly Reviewed):   The left ventricle is normal in size.  The visual ejection fraction is 45-50%.  Possible small area of inferobasilar hypokinesis that looks similar in 2018  echo.  Suspect mild reduction in RVEF  There is mild to moderate (1-2+) mitral regurgitation.  The right ventricular systolic pressure  "is approximated at 29mmHg plus the  right atrial pressure.  The rhythm was atrial fibrillation with controlled ventricular rate at rest.  Findings similar to echo from 2018.        Physical Examination Review of Systems   BP (!) 142/72 (BP Location: Left arm, Patient Position: Right side, Cuff Size: Adult Regular)   Pulse 67   Temp 98.3  F (36.8  C) (Oral)   Resp 18   Ht 1.715 m (5' 7.5\")   Wt 70.4 kg (155 lb 4.8 oz)   SpO2 96%   BMI 23.96 kg/m    Body mass index is 23.96 kg/m .  Wt Readings from Last 3 Encounters:   12/18/22 70.4 kg (155 lb 4.8 oz)   07/13/21 74.8 kg (165 lb)   10/05/20 74 kg (163 lb 3.2 oz)     General Appearance:   no distress, normal body habitus   ENT/Mouth: membranes moist, no oral lesions or bleeding gums.      EYES:  no scleral icterus, normal conjunctivae   Neck: no carotid bruits or thyromegaly   Chest/Lungs:   lungs are clear to auscultation, no rales or wheezing, equal chest wall expansion    Cardiovascular:   Irregular. Normal first and second heart sounds with no murmurs, rubs, or gallops; the carotid, radial and posterior tibial pulses are intact, + JVD and mild LE edema bilaterally    Abdomen:  no organomegaly, masses, bruits, or tenderness; bowel sounds are present   Extremities: no cyanosis or clubbing   Skin: no xanthelasma, warm.    Neurologic: alert and oriented x3, calm     Psychiatric: alert and oriented x3, calm     A complete 10 systems ROS was reviewed  And is negative except what is listed in the HPI.          Medical History  Surgical History Family History Social History   Past Medical History:   Diagnosis Date     Arthritis      Cancer (H) 2005     CHF (congestive heart failure) (H)      Clotting disorder (H) 4/2017     COPD (chronic obstructive pulmonary disease) (H)      Degenerative disc disease, lumbar      Emphysema lung (H)      GERD (gastroesophageal reflux disease)      Hyperlipidemia      Hypertension      Kidney stone 4/2017     Osteoporosis      " Scoliosis      Ventral hernia     Past Surgical History:   Procedure Laterality Date     BIOPSY BREAST Right      BUNIONECTOMY Bilateral 1988     COLECTOMY N/A 2003     COLON SURGERY       CYST REMOVAL Left 2007     HERNIORRHAPHY VENTRAL  2005     HYSTERECTOMY  2004     IR LUMBAR EPIDURAL STEROID INJECTION  2011     IR LUMBAR EPIDURAL STEROID INJECTION  10/10/2011     IR LUMBAR EPIDURAL STEROID INJECTION  3/8/2012     IR LUMBAR EPIDURAL STEROID INJECTION  4/10/2012     LUMPECTOMY BREAST Right 2009     OOPHORECTOMY  2004     REPAIR HAMMER TOE Bilateral 1988     STOMACH SURGERY      hyernia repair    no family history of premature coronary artery disease Social History     Socioeconomic History     Marital status:      Spouse name: Not on file     Number of children: Not on file     Years of education: Not on file     Highest education level: Not on file   Occupational History     Not on file   Tobacco Use     Smoking status: Some Days     Packs/day: 0.50     Years: 60.00     Pack years: 30.00     Types: Cigarettes     Last attempt to quit: 2018     Years since quittin.8     Smokeless tobacco: Never   Substance and Sexual Activity     Alcohol use: No     Drug use: No     Sexual activity: Never   Other Topics Concern     Not on file   Social History Narrative     Not on file     Social Determinants of Health     Financial Resource Strain: Not on file   Food Insecurity: Not on file   Transportation Needs: Not on file   Physical Activity: Not on file   Stress: Not on file   Social Connections: Not on file   Intimate Partner Violence: Not on file   Housing Stability: Not on file           Lab Results    Chemistry/lipid CBC Cardiac Enzymes/BNP/TSH/INR   Lab Results   Component Value Date    CHOL 108 2018    HDL 37 (L) 2018    TRIG 83 2018    BUN 34.0 (H) 2022     2022    CO2 25 2022    Lab Results   Component Value Date    WBC 7.8  12/18/2022    HGB 14.9 12/18/2022    HCT 45.6 12/18/2022    MCV 83 12/18/2022     (L) 12/18/2022    Lab Results   Component Value Date    TROPONINI 0.01 07/13/2021    BNP 1,578 (H) 08/04/2018    TSH 2.08 08/06/2019    INR 3.81 (H) 12/18/2022     Lab Results   Component Value Date    TROPONINI 0.01 07/13/2021          Weight:    Wt Readings from Last 3 Encounters:   12/18/22 70.4 kg (155 lb 4.8 oz)   07/13/21 74.8 kg (165 lb)   10/05/20 74 kg (163 lb 3.2 oz)       Allergies  No Known Allergies      Surgical History  Past Surgical History:   Procedure Laterality Date     BIOPSY BREAST Right 2012     BUNIONECTOMY Bilateral 1988     COLECTOMY N/A 03/31/2003     COLON SURGERY       CYST REMOVAL Left 02/01/2007     HERNIORRHAPHY VENTRAL  02/25/2005     HYSTERECTOMY  2004     IR LUMBAR EPIDURAL STEROID INJECTION  8/8/2011     IR LUMBAR EPIDURAL STEROID INJECTION  10/10/2011     IR LUMBAR EPIDURAL STEROID INJECTION  3/8/2012     IR LUMBAR EPIDURAL STEROID INJECTION  4/10/2012     LUMPECTOMY BREAST Right 04/03/2009     OOPHORECTOMY  2004     REPAIR HAMMER TOE Bilateral 1988     STOMACH SURGERY      hyernia repair       Social History  Tobacco:   History   Smoking Status     Some Days     Packs/day: 0.50     Years: 60.00     Last attempt to quit: 2/16/2018   Smokeless Tobacco     Never    Alcohol:   Social History    Substance and Sexual Activity      Alcohol use: No   Illicit Drugs:   History   Drug Use No       Family History  Family History   Problem Relation Age of Onset     Brain Cancer Son 29.00     Dementia Mother      Cerebrovascular Disease Mother      Prostate Cancer Father      Aortic aneurysm Father         Abdominal     Hypertension Sister      No Known Problems Daughter      No Known Problems Maternal Grandmother      No Known Problems Maternal Grandfather      No Known Problems Paternal Grandmother      No Known Problems Paternal Grandfather      Breast Cancer Maternal Aunt 70.00     No Known Problems  Paternal Aunt      Breast Cancer Maternal Aunt      Other - See Comments Brother         Polio     Other - See Comments Brother         polio     Hereditary Breast and Ovarian Cancer Syndrome No family hx of      Colon Cancer No family hx of      Endometrial Cancer No family hx of      Ovarian Cancer No family hx of           Kerry-Linsey Acosta MD on 12/19/2022      cc: Adan Elizabeth

## 2022-12-19 NOTE — PLAN OF CARE
Problem: Plan of Care - These are the overarching goals to be used throughout the patient stay.    Goal: Optimal Comfort and Wellbeing  Outcome: Progressing     Problem: Risk for Delirium  Goal: Improved Sleep  Outcome: Progressing     Problem: Risk for Delirium  Goal: Improved Attention and Thought Clarity  Outcome: Progressing     Patient is A&Ox 4. She has no c/o numbness and tingling in any extremity. She is IND for all cares and transfers. She is continent of bowel and bladder. LBM 12/18/22. VSS. Patient denied all c/o pain this shift. She remains in A-Fib throughout the shift. LS have expiratory wheezes and are diminished bilaterally. She remains on 1 LPM of continuous Oxygen via n/c. Pt. denies all shortness of breath, chest pain, dizziness, and nausea. BS active in A4Q. Skin intact except + 1 edema in BLE. Positive pedal/radial pulses bilaterally. Left arm and RFA PIV's were flushed, capped, and saline locked. Patient is lying in bed with call light in reach. Slept well this shift. Staff will continue to monitor.

## 2022-12-19 NOTE — PROGRESS NOTES
Met with patient  to review role of care management, progression of care and possible need for services at discharge, including OP services, home care, or skilled nursing care. Patient alert, oriented and engaged in the conversation.     Assessed, lives w/son Steven, no svcs and independent at baseline, son to transport, no CM needs.

## 2022-12-20 ENCOUNTER — APPOINTMENT (OUTPATIENT)
Dept: OCCUPATIONAL THERAPY | Facility: HOSPITAL | Age: 82
DRG: 193 | End: 2022-12-20
Payer: COMMERCIAL

## 2022-12-20 LAB
ANION GAP SERPL CALCULATED.3IONS-SCNC: 11 MMOL/L (ref 7–15)
BUN SERPL-MCNC: 53.4 MG/DL (ref 8–23)
CALCIUM SERPL-MCNC: 9.6 MG/DL (ref 8.8–10.2)
CHLORIDE SERPL-SCNC: 98 MMOL/L (ref 98–107)
CREAT SERPL-MCNC: 1.55 MG/DL (ref 0.51–0.95)
DEPRECATED HCO3 PLAS-SCNC: 26 MMOL/L (ref 22–29)
GFR SERPL CREATININE-BSD FRML MDRD: 33 ML/MIN/1.73M2
GLUCOSE SERPL-MCNC: 147 MG/DL (ref 70–99)
HOLD SPECIMEN: NORMAL
INR PPP: 2.36 (ref 0.85–1.15)
MAGNESIUM SERPL-MCNC: 2 MG/DL (ref 1.7–2.3)
POTASSIUM SERPL-SCNC: 4.2 MMOL/L (ref 3.4–5.3)
SODIUM SERPL-SCNC: 135 MMOL/L (ref 136–145)

## 2022-12-20 PROCEDURE — 999N000157 HC STATISTIC RCP TIME EA 10 MIN

## 2022-12-20 PROCEDURE — 250N000013 HC RX MED GY IP 250 OP 250 PS 637: Performed by: INTERNAL MEDICINE

## 2022-12-20 PROCEDURE — 94640 AIRWAY INHALATION TREATMENT: CPT | Mod: 76

## 2022-12-20 PROCEDURE — 36415 COLL VENOUS BLD VENIPUNCTURE: CPT | Performed by: INTERNAL MEDICINE

## 2022-12-20 PROCEDURE — 210N000001 HC R&B IMCU HEART CARE

## 2022-12-20 PROCEDURE — 250N000011 HC RX IP 250 OP 636: Performed by: INTERNAL MEDICINE

## 2022-12-20 PROCEDURE — 83735 ASSAY OF MAGNESIUM: CPT | Performed by: INTERNAL MEDICINE

## 2022-12-20 PROCEDURE — 80048 BASIC METABOLIC PNL TOTAL CA: CPT | Performed by: INTERNAL MEDICINE

## 2022-12-20 PROCEDURE — 97110 THERAPEUTIC EXERCISES: CPT | Mod: GO

## 2022-12-20 PROCEDURE — 94799 UNLISTED PULMONARY SVC/PX: CPT

## 2022-12-20 PROCEDURE — 99232 SBSQ HOSP IP/OBS MODERATE 35: CPT | Performed by: INTERNAL MEDICINE

## 2022-12-20 PROCEDURE — 85610 PROTHROMBIN TIME: CPT | Performed by: INTERNAL MEDICINE

## 2022-12-20 PROCEDURE — 258N000003 HC RX IP 258 OP 636: Performed by: INTERNAL MEDICINE

## 2022-12-20 PROCEDURE — 250N000009 HC RX 250: Performed by: INTERNAL MEDICINE

## 2022-12-20 PROCEDURE — 94640 AIRWAY INHALATION TREATMENT: CPT

## 2022-12-20 PROCEDURE — 97535 SELF CARE MNGMENT TRAINING: CPT | Mod: GO

## 2022-12-20 PROCEDURE — 250N000012 HC RX MED GY IP 250 OP 636 PS 637: Performed by: INTERNAL MEDICINE

## 2022-12-20 RX ORDER — WARFARIN SODIUM 5 MG/1
5 TABLET ORAL
Status: COMPLETED | OUTPATIENT
Start: 2022-12-20 | End: 2022-12-20

## 2022-12-20 RX ORDER — CEFTRIAXONE 1 G/1
1 INJECTION, POWDER, FOR SOLUTION INTRAMUSCULAR; INTRAVENOUS EVERY 24 HOURS
Status: DISCONTINUED | OUTPATIENT
Start: 2022-12-21 | End: 2022-12-21 | Stop reason: HOSPADM

## 2022-12-20 RX ORDER — CEFTRIAXONE 1 G/1
1 INJECTION, POWDER, FOR SOLUTION INTRAMUSCULAR; INTRAVENOUS EVERY 24 HOURS
Status: DISCONTINUED | OUTPATIENT
Start: 2022-12-21 | End: 2022-12-20

## 2022-12-20 RX ORDER — MAGNESIUM OXIDE 400 MG/1
400 TABLET ORAL EVERY 4 HOURS
Status: COMPLETED | OUTPATIENT
Start: 2022-12-20 | End: 2022-12-20

## 2022-12-20 RX ORDER — BUMETANIDE 2 MG/1
2 TABLET ORAL DAILY
Status: DISCONTINUED | OUTPATIENT
Start: 2022-12-20 | End: 2022-12-21 | Stop reason: HOSPADM

## 2022-12-20 RX ORDER — FUROSEMIDE 10 MG/ML
40 INJECTION INTRAMUSCULAR; INTRAVENOUS
Status: DISCONTINUED | OUTPATIENT
Start: 2022-12-20 | End: 2022-12-20

## 2022-12-20 RX ORDER — AMLODIPINE BESYLATE 5 MG/1
5 TABLET ORAL DAILY
Status: DISCONTINUED | OUTPATIENT
Start: 2022-12-20 | End: 2022-12-21

## 2022-12-20 RX ADMIN — CEFTRIAXONE SODIUM 1 G: 1 INJECTION, POWDER, FOR SOLUTION INTRAMUSCULAR; INTRAVENOUS at 08:38

## 2022-12-20 RX ADMIN — PANTOPRAZOLE SODIUM 40 MG: 40 TABLET, DELAYED RELEASE ORAL at 06:31

## 2022-12-20 RX ADMIN — IPRATROPIUM BROMIDE AND ALBUTEROL SULFATE 3 ML: 2.5; .5 SOLUTION RESPIRATORY (INHALATION) at 14:04

## 2022-12-20 RX ADMIN — METOPROLOL SUCCINATE 75 MG: 25 TABLET, EXTENDED RELEASE ORAL at 08:45

## 2022-12-20 RX ADMIN — WARFARIN SODIUM 5 MG: 5 TABLET ORAL at 18:45

## 2022-12-20 RX ADMIN — GUAIFENESIN 10 ML: 200 SOLUTION ORAL at 19:25

## 2022-12-20 RX ADMIN — ATORVASTATIN CALCIUM 20 MG: 10 TABLET, FILM COATED ORAL at 08:42

## 2022-12-20 RX ADMIN — AZITHROMYCIN DIHYDRATE 500 MG: 500 INJECTION, POWDER, LYOPHILIZED, FOR SOLUTION INTRAVENOUS at 09:57

## 2022-12-20 RX ADMIN — BUMETANIDE 2 MG: 2 TABLET ORAL at 13:04

## 2022-12-20 RX ADMIN — MAGNESIUM OXIDE TAB 400 MG (241.3 MG ELEMENTAL MG) 400 MG: 400 (241.3 MG) TAB at 08:43

## 2022-12-20 RX ADMIN — NYSTATIN 500000 UNITS: 100000 SUSPENSION ORAL at 13:04

## 2022-12-20 RX ADMIN — BUDESONIDE 1 MG: 0.5 SUSPENSION RESPIRATORY (INHALATION) at 07:40

## 2022-12-20 RX ADMIN — IPRATROPIUM BROMIDE AND ALBUTEROL SULFATE 3 ML: 2.5; .5 SOLUTION RESPIRATORY (INHALATION) at 20:18

## 2022-12-20 RX ADMIN — PREDNISONE 40 MG: 20 TABLET ORAL at 08:46

## 2022-12-20 RX ADMIN — GUAIFENESIN 1200 MG: 600 TABLET ORAL at 19:25

## 2022-12-20 RX ADMIN — IPRATROPIUM BROMIDE AND ALBUTEROL SULFATE 3 ML: 2.5; .5 SOLUTION RESPIRATORY (INHALATION) at 07:40

## 2022-12-20 RX ADMIN — MAGNESIUM OXIDE TAB 400 MG (241.3 MG ELEMENTAL MG) 400 MG: 400 (241.3 MG) TAB at 13:05

## 2022-12-20 RX ADMIN — AMLODIPINE BESYLATE 5 MG: 5 TABLET ORAL at 08:43

## 2022-12-20 RX ADMIN — NYSTATIN 500000 UNITS: 100000 SUSPENSION ORAL at 08:47

## 2022-12-20 RX ADMIN — NYSTATIN 500000 UNITS: 100000 SUSPENSION ORAL at 19:25

## 2022-12-20 RX ADMIN — FUROSEMIDE 40 MG: 10 INJECTION, SOLUTION INTRAMUSCULAR; INTRAVENOUS at 08:34

## 2022-12-20 RX ADMIN — PAROXETINE HYDROCHLORIDE 20 MG: 20 TABLET, FILM COATED ORAL at 08:45

## 2022-12-20 RX ADMIN — GUAIFENESIN 1200 MG: 600 TABLET ORAL at 08:41

## 2022-12-20 RX ADMIN — NYSTATIN 500000 UNITS: 100000 SUSPENSION ORAL at 16:08

## 2022-12-20 RX ADMIN — BUDESONIDE 1 MG: 0.5 SUSPENSION RESPIRATORY (INHALATION) at 20:18

## 2022-12-20 ASSESSMENT — ACTIVITIES OF DAILY LIVING (ADL)
ADLS_ACUITY_SCORE: 20

## 2022-12-20 NOTE — PROGRESS NOTES
"Room air SpO2 95 % after a walk, BS clear bilat, Duoneb tx given with flutter valve PEP 3, pressure 10 cm,  tolerated well, BS clear and equal bilat, BS after unchanged, Loose productive cough, swallowed. ECG monitor appears to be atrial fib/flutter with irregular rate.     /66 (BP Location: Right arm)   Pulse 80   Temp 98.2  F (36.8  C) (Oral)   Resp 20   Ht 1.715 m (5' 7.5\")   Wt 69.3 kg (152 lb 11.2 oz)   SpO2 95%   BMI 23.56 kg/m      RT to monitor  "

## 2022-12-20 NOTE — PROGRESS NOTES
Pt is not appropriate for skilled PT services at this time due to performing ambulation and stairs with OT .  Will defer to OT for discharge .  Plan was discussed with OT.  Will D/C current PT orders.  Please reorder if status changes. Thank you.    12/20/2022 by Yesi Wagner,PT

## 2022-12-20 NOTE — PLAN OF CARE
Problem: Heart Failure  Goal: Optimal Cardiac Output  Outcome: Progressing     Problem: Heart Failure  Goal: Stable Heart Rate and Rhythm  Outcome: Progressing     Problem: Heart Failure  Goal: Fluid and Electrolyte Balance  Outcome: Progressing     Problem: Heart Failure  Goal: Effective Oxygenation and Ventilation  Outcome: Progressing  Intervention: Promote Airway Secretion Clearance  Recent Flowsheet Documentation  Taken 12/20/2022 0400 by Ailyn Trotter RN  Cough And Deep Breathing: done independently per patient  Activity Management: activity adjusted per tolerance  Taken 12/20/2022 0000 by Ailyn Trotter RN  Cough And Deep Breathing: done independently per patient  Activity Management: activity adjusted per tolerance  Taken 12/19/2022 2000 by Ailyn Trotter RN  Cough And Deep Breathing: done independently per patient  Activity Management: activity adjusted per tolerance  Intervention: Optimize Oxygenation and Ventilation  Recent Flowsheet Documentation  Taken 12/20/2022 0400 by Ailyn Trotter RN  Head of Bed (HOB) Positioning: HOB at 20 degrees  Taken 12/20/2022 0000 by Ailyn Trotter RN  Head of Bed (HOB) Positioning: HOB at 20 degrees  Taken 12/19/2022 2000 by Ailyn Trotter RN  Head of Bed (HOB) Positioning: HOB at 20 degrees     Goal Outcome Evaluation:  Pt in a. Flutter with HR in 60s-70s overnight. IV lasix given x1 as scheduled. Pt having good urine output. 1800 FR maintained. K and mag protocols with mag needing replaced and K recheck tomorrow AM. Pt on 1L NC with O2 sats in the mid 90s overnight. LS diminished posteriorally. A/O. VSS. Will continue to monitor.

## 2022-12-20 NOTE — PROGRESS NOTES
HEART CARE NOTE          Assessment/Recommendations     1. HFmrEF  Assessment / Plan    Nearing euvoelmia; will transition to oral diuretic regimen and continue to monitor    GDMT as detailed below; mainstay of treatment for HFmrEF includes diuretics (class I) and SGLT2-I (class 2a); additional medical therapy demonstrated with less robust evidence for indication but should be considered per guideline recommendations (2b)     Current Pharmacotherapy AHA Guideline-Directed Medical Therapy   Losartan 100 mg daily - on hold Lisinopril 20 mg twice daily   Metoprolol succinate 75 mg daily Carvedilol 25 mg twice daily   Spironolactone - not started Spironolactone 25 mg once daily   Hydralazine NA Hydralazine 100 mg three times daily   Isosorbide dinitrate NA Isosorbide dinitrate 40 mg three times daily   SGLT2 inhibitor not started Dapagliflozin or Empagliflozin 10 mg daily       2. Afib  Assessment / Plan    Currently rate controlled    Continue metoprolol    Warfarin management per pharmacy     3. CKD stage 3  Assessment / Plan    Continue to monitor closely     4. HLP  Assessment / Plan    Continue atorvastatin     5. HTN  Assessment / Plan    Adequately controlled on current regimen - no changes at this time    History of Present Illness/Subjective      Ms. Christina Umana is a 82 year old female with a PMHx significant for (per Dr. Loredo's note) COPD, afib on coumadin, CKD3, HTN, HLD, who presents to this ED via EMS for evaluation of shortness of breath.     Today, Mrs. Umana denies any acute cardiac events or complaints; Management plan as detailed above      ECG: Personally reviewed. atrial fibrillation, rate controlled.     ECHO (personnaly Reviewed):   The left ventricle is normal in size.  The visual ejection fraction is 45-50%.  Possible small area of inferobasilar hypokinesis that looks similar in 2018  echo.  Suspect mild reduction in RVEF  There is mild to moderate (1-2+) mitral regurgitation.  The  "right ventricular systolic pressure is approximated at 29mmHg plus the  right atrial pressure.  The rhythm was atrial fibrillation with controlled ventricular rate at rest.  Findings similar to echo from 2018.          Physical Examination Review of Systems   BP (!) 158/77 (BP Location: Right arm)   Pulse 65   Temp 98.8  F (37.1  C) (Oral)   Resp 20   Ht 1.715 m (5' 7.5\")   Wt 69.3 kg (152 lb 11.2 oz)   SpO2 93%   BMI 23.56 kg/m    Body mass index is 23.56 kg/m .  Wt Readings from Last 3 Encounters:   12/20/22 69.3 kg (152 lb 11.2 oz)   07/13/21 74.8 kg (165 lb)   10/05/20 74 kg (163 lb 3.2 oz)     General Appearance:   no distress, normal body habitus   ENT/Mouth: membranes moist, no oral lesions or bleeding gums.      EYES:  no scleral icterus, normal conjunctivae   Neck: no carotid bruits or thyromegaly   Chest/Lungs:   lungs are clear to auscultation, no rales or wheezing, equal chest wall expansion    Cardiovascular:   Regular. Normal first and second heart sounds with no murmurs, rubs, or gallops; the carotid, radial and posterior tibial pulses are intact, no JVD or LE edema bilaterally    Abdomen:  no organomegaly, masses, bruits, or tenderness; bowel sounds are present   Extremities: no cyanosis or clubbing   Skin: no xanthelasma, warm.    Neurologic: alert and oriented x3, calm     Psychiatric: alert and oriented x3, calm     A complete 10 systems ROS was reviewed  And is negative except what is listed in the HPI.          Medical History  Surgical History Family History Social History   Past Medical History:   Diagnosis Date     Arthritis      Cancer (H) 2005     CHF (congestive heart failure) (H)      Clotting disorder (H) 4/2017     COPD (chronic obstructive pulmonary disease) (H)      Degenerative disc disease, lumbar      Emphysema lung (H)      GERD (gastroesophageal reflux disease)      Hyperlipidemia      Hypertension      Kidney stone 4/2017     Osteoporosis      Scoliosis      Ventral " hernia     Past Surgical History:   Procedure Laterality Date     BIOPSY BREAST Right      BUNIONECTOMY Bilateral 1988     COLECTOMY N/A 2003     COLON SURGERY       CYST REMOVAL Left 2007     HERNIORRHAPHY VENTRAL  2005     HYSTERECTOMY  2004     IR LUMBAR EPIDURAL STEROID INJECTION  2011     IR LUMBAR EPIDURAL STEROID INJECTION  10/10/2011     IR LUMBAR EPIDURAL STEROID INJECTION  3/8/2012     IR LUMBAR EPIDURAL STEROID INJECTION  4/10/2012     LUMPECTOMY BREAST Right 2009     OOPHORECTOMY  2004     REPAIR HAMMER TOE Bilateral      STOMACH SURGERY      hyernia repair    no family history of premature coronary artery disease Social History     Socioeconomic History     Marital status:      Spouse name: Not on file     Number of children: Not on file     Years of education: Not on file     Highest education level: Not on file   Occupational History     Not on file   Tobacco Use     Smoking status: Every Day     Packs/day: 0.50     Years: 60.00     Pack years: 30.00     Types: Cigarettes     Last attempt to quit: 2018     Years since quittin.8     Smokeless tobacco: Never     Tobacco comments:     Seen IP by CTTS on 22. Down to 3 cigarettes/Day. Ready for quit attempt.   Vaping Use     Vaping Use: Never used   Substance and Sexual Activity     Alcohol use: No     Drug use: No     Sexual activity: Never   Other Topics Concern     Not on file   Social History Narrative     Not on file     Social Determinants of Health     Financial Resource Strain: Not on file   Food Insecurity: Not on file   Transportation Needs: Not on file   Physical Activity: Not on file   Stress: Not on file   Social Connections: Not on file   Intimate Partner Violence: Not on file   Housing Stability: Not on file           Lab Results    Chemistry/lipid CBC Cardiac Enzymes/BNP/TSH/INR   Lab Results   Component Value Date    CHOL 108 2018    HDL 37 (L) 2018    TRIG 83 2018     BUN 39.7 (H) 12/19/2022     12/19/2022    CO2 24 12/19/2022    Lab Results   Component Value Date    WBC 7.8 12/18/2022    HGB 14.9 12/18/2022    HCT 45.6 12/18/2022    MCV 83 12/18/2022     (L) 12/18/2022    Lab Results   Component Value Date    TROPONINI 0.01 07/13/2021    BNP 1,578 (H) 08/04/2018    TSH 2.08 08/06/2019    INR 3.35 (H) 12/19/2022     Lab Results   Component Value Date    TROPONINI 0.01 07/13/2021          Weight:    Wt Readings from Last 3 Encounters:   12/20/22 69.3 kg (152 lb 11.2 oz)   07/13/21 74.8 kg (165 lb)   10/05/20 74 kg (163 lb 3.2 oz)       Allergies  No Known Allergies      Surgical History  Past Surgical History:   Procedure Laterality Date     BIOPSY BREAST Right 2012     BUNIONECTOMY Bilateral 1988     COLECTOMY N/A 03/31/2003     COLON SURGERY       CYST REMOVAL Left 02/01/2007     HERNIORRHAPHY VENTRAL  02/25/2005     HYSTERECTOMY  2004     IR LUMBAR EPIDURAL STEROID INJECTION  8/8/2011     IR LUMBAR EPIDURAL STEROID INJECTION  10/10/2011     IR LUMBAR EPIDURAL STEROID INJECTION  3/8/2012     IR LUMBAR EPIDURAL STEROID INJECTION  4/10/2012     LUMPECTOMY BREAST Right 04/03/2009     OOPHORECTOMY  2004     REPAIR HAMMER TOE Bilateral 1988     STOMACH SURGERY      hyernia repair       Social History  Tobacco:   History   Smoking Status     Every Day     Packs/day: 0.50     Years: 60.00     Types: Cigarettes     Last attempt to quit: 2/16/2018   Smokeless Tobacco     Never    Alcohol:   Social History    Substance and Sexual Activity      Alcohol use: No   Illicit Drugs:   History   Drug Use No       Family History  Family History   Problem Relation Age of Onset     Brain Cancer Son 29.00     Dementia Mother      Cerebrovascular Disease Mother      Prostate Cancer Father      Aortic aneurysm Father         Abdominal     Hypertension Sister      No Known Problems Daughter      No Known Problems Maternal Grandmother      No Known Problems Maternal Grandfather      No  Known Problems Paternal Grandmother      No Known Problems Paternal Grandfather      Breast Cancer Maternal Aunt 70.00     No Known Problems Paternal Aunt      Breast Cancer Maternal Aunt      Other - See Comments Brother         Polio     Other - See Comments Brother         polio     Hereditary Breast and Ovarian Cancer Syndrome No family hx of      Colon Cancer No family hx of      Endometrial Cancer No family hx of      Ovarian Cancer No family hx of           Kerry-Linsey Acosta MD on 12/20/2022      cc: Adan Elizabeth

## 2022-12-20 NOTE — PROGRESS NOTES
[facility]    Medicine Progress Note - Hospitalist Service    Date of Admission:  12/18/2022    Assessment & Plan   Christina Umana is a 82 year old old female with past medical history of COPD, afib on coumadin, CKD3, HTN, HLD, who presented to ED for evaluation of progressive shortness of breath presents to this ED via EMS for evaluation of shortness of breath.     Acute respiratory failure with hypoxia and hypercarbia; likely multifactorial, COPD exacerbation, acute on chronic heart failure, possible community-acquired pneumonia.   --On admission, CXR reported left upper lobe nodular opacity, increased right basilar airspace opacities which may be infectious or inflammatory  --Patient briefly needed BiPAP in ED then transition to nasal cannula.  Currently at 1-2 L nasal cannula, titrate O2 as able.  Will consider home oxygen evaluation if unable to wean off oxygen  --Treat underlying issues, refer below    COPD with acute exacerbation;  Probable community-acquired pneumonia;  --IV steroid transition to prednisone on 12/19, tapering course as ordered by pulmonary team  --On admission normal WBC count, procalcitonin 0.07.  --On admission initiated on IV Rocephin and IV azithromycin, complete course as ordered.  --Continue DuoNeb while inpatient per pulmonary  --Incentive spirometry, flutter valve  --Appreciated pulmonary input and reported they will arrange outpatient follow-up    Acute on chronic systolic heart failure;  -- On admission, echo reported EF 45 to 50%.  Previous echo on 8/2018 reported EF 40%  -- Patient responding to IV diuretics, given significant improvement on breathing status and creatinine trending up, decrease Lasix dose from 60 mg every 8 hours to 40 mg twice daily, consider transitioning to oral if okay with cardiology team.  -- Monitor intake/output, weight, labs closely  -- Appreciate cardiology input    Chronic A. Fib;  -- Heart rate controlled.  Continue PTA  metoprolol, cardiologist changed to Toprol-XL  --INR supratherapeutic initially, now therapeutic today, adjust Coumadin dose.  Daily INR.  Appreciated pharmacy input    Essential hypertension;  --Blood pressure low normal range yesterday but not trending up, resumed home amlodipine.  Continue to hold home losartan given creatinine trending up.  -- Continue metoprolol.  Monitor vitals closely.    Thrombocytopenia, chronic;  --Fairly stable.  Monitor intermittently     CKD 3;  --Fairly stable.  Monitor daily BMP while on IV diuretics.    Physical deconditioning due to medical illness; PT OT     Diet: 2 Gram Sodium Diet Other - please comment    DVT Prophylaxis: Warfarin  Rodriguez Catheter: Not present  Central Lines: None  Cardiac Monitoring: ACTIVE order. Indication: Acute decompensated heart failure (48 hours)  Code Status:  Special code, refer to code documentation    Disposition Plan      Expected Discharge Date: 12/20/2022        Discharge Comments: Home.        The patient's care was discussed with the Bedside Nurse, Care Coordinator/ and Patient.    Carlitos MCDONOUGH MD  Hospitalist Service  [facility] [place]  Securely message with the Vocera Web Console (learn more here)  Text page via [employer] Paging/Directory         Clinically Significant Risk Factors         # Hyponatremia: Lowest Na = 135 mmol/L in last 2 days, will monitor as appropriate        # Thrombocytopenia: Lowest platelets = 111 in last 2 days, will monitor for bleeding                 ______________________________________________________________________    Interval History   Patient seen and examined.  Notes, labs, imaging report personally reviewed.  No acute issues reported overnight.  Patient reported breathing continued to improve.  Denied chest pain.  Reported intermittent cough but is stable to improving.  Denied dizziness.  Denied abdomen pain, nausea, vomiting.  Patient declined me to call any family members.    Data  reviewed today: I reviewed all medications, new labs and imaging results over the last 24 hours. I personally reviewed.    Physical Exam   Vital Signs: Temp: 98.1  F (36.7  C) Temp src: Oral BP: (!) 158/78 Pulse: 64   Resp: 20 SpO2: 92 % O2 Device: Nasal cannula Oxygen Delivery: 1.5 LPM  Weight: 152 lbs 11.2 oz    General: Not in obvious distress.  HEENT: Normocephalic, supple neck  Chest: Decreased breath sound bilaterally, occasional crackles, occasional expiratory wheezings, unlabored breathing  Heart: S1S2 normal, no pedal edema  Abdomen: Soft. NT, ND. Bowel sounds- active.  Neuro: alert and awake, grossly non-focal      Data   Recent Labs   Lab 12/20/22  0430 12/19/22  0441 12/18/22  0741   WBC  --   --  7.8   HGB  --   --  14.9   MCV  --   --  83   PLT  --   --  111*   INR 2.36* 3.35* 3.81*   * 136 136   POTASSIUM 4.2 4.3 4.1   CHLORIDE 98 97* 99   CO2 26 24 25   BUN 53.4* 39.7* 34.0*   CR 1.55* 1.39* 1.38*   ANIONGAP 11 15 12   GILL 9.6 9.3 9.6   * 183* 132*   ALBUMIN  --   --  4.0   PROTTOTAL  --   --  6.6   BILITOTAL  --   --  0.9   ALKPHOS  --   --  66   ALT  --   --  14   AST  --   --  18

## 2022-12-20 NOTE — PROGRESS NOTES
"O2 1.5 lpm/NC, SpO2 94 %, BS diminished bases, Duoneb/Pulmicort 1 mg given with flutter valve, good technique. Pt encouraged to continue with flutter valve Q 1 hour w/a on her own. Tolerated tx well. 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 BS after tx increased aeration, mild bilat rhonchi    BP (!) 158/78 (BP Location: Left arm)   Pulse 62   Temp 98.1  F (36.7  C) (Oral)   Resp 20   Ht 1.715 m (5' 7.5\")   Wt 69.3 kg (152 lb 11.2 oz)   SpO2 94%   BMI 23.56 kg/m      RT to monitor  "

## 2022-12-20 NOTE — PROGRESS NOTES
Care Management Follow Up    Length of Stay (days): 2    Expected Discharge Date: 12/21/2022     Concerns to be Addressed:       Patient plan of care discussed at interdisciplinary rounds: Yes    Anticipated Discharge Disposition: Home     Anticipated Discharge Services:    Anticipated Discharge DME:      Patient/family educated on Medicare website which has current facility and service quality ratings:    Education Provided on the Discharge Plan:    Patient/Family in Agreement with the Plan:      Referrals Placed by CM/SW:    Private pay costs discussed: Not applicable    Additional Information:  CM following for needs at discharge. OT rec home with assist. Patient lives with son. PT tomeka ordered today       Cassandra Bazan RN

## 2022-12-20 NOTE — PLAN OF CARE
Problem: Plan of Care - These are the overarching goals to be used throughout the patient stay.    Goal: Plan of Care Review  Description: The Plan of Care Review/Shift note should be completed every shift.  The Outcome Evaluation is a brief statement about your assessment that the patient is improving, declining, or no change.  This information will be displayed automatically on your shift note.  Outcome: Progressing  Flowsheets (Taken 12/19/2022 1914)  Plan of Care Reviewed With: patient     Problem: Heart Failure  Goal: Effective Oxygenation and Ventilation  Outcome: Progressing  Intervention: Promote Airway Secretion Clearance  Recent Flowsheet Documentation  Taken 12/19/2022 1600 by Maegan Araujo RN  Activity Management: activity adjusted per tolerance  Taken 12/19/2022 1200 by Maegan Araujo RN  Activity Management: activity adjusted per tolerance  Taken 12/19/2022 0800 by Maegan Araujo RN  Activity Management: activity adjusted per tolerance  Intervention: Optimize Oxygenation and Ventilation  Recent Flowsheet Documentation  Taken 12/19/2022 1600 by Maegan Araujo RN  Head of Bed (HOB) Positioning: HOB at 20-30 degrees  Taken 12/19/2022 1200 by Maegan Araujo RN  Head of Bed (HOB) Positioning: HOB at 20-30 degrees  Taken 12/19/2022 0800 by Maegan Araujo RN  Head of Bed (HOB) Positioning: HOB at 20-30 degrees     Problem: Pneumonia  Goal: Fluid Balance  Outcome: Progressing     Problem: Heart Failure  Goal: Effective Oxygenation and Ventilation  Intervention: Optimize Oxygenation and Ventilation  Recent Flowsheet Documentation  Taken 12/19/2022 1600 by Maegan Araujo RN  Head of Bed (HOB) Positioning: HOB at 20-30 degrees  Taken 12/19/2022 1200 by Maegan Araujo RN  Head of Bed (HOB) Positioning: HOB at 20-30 degrees  Taken 12/19/2022 0800 by Maegan Araujo RN  Head of Bed (HOB) Positioning: HOB at 20-30 degrees     Problem: Pneumonia  Goal: Fluid Balance  Outcome:  Progressing   Goal Outcome Evaluation:      Heart Failure Care Map  GOALS TO BE MET BEFORE DISCHARGE:    1. Decrease congestion and/or edema with diuretic therapy to achieve near optimal volume status.     Dyspnea improved: Yes, satisfactory for discharge.   Edema improved: No, further care required to meet this goal. Please explain Pt still receiving IV lasix.  BLE edema improved but still has 1+ edema        Last 24 hour I/O:   Intake/Output Summary (Last 24 hours) at 12/19/2022 1915  Last data filed at 12/19/2022 1825  Gross per 24 hour   Intake 1080 ml   Output 1850 ml   Net -770 ml           Net I/O and Weights since admission:   11/19 2300 - 12/19 2259  In: 1130 [P.O.:1080]  Out: 2800 [Urine:2800]  Net: -1670     Vitals:    12/18/22 0702 12/18/22 2239 12/19/22 0557   Weight: 70.3 kg (155 lb) 70.4 kg (155 lb 4.8 oz) 69.3 kg (152 lb 11.2 oz)       2.  O2 sats > 90% on room air, or at prior home O2 therapy level.      Able to wean O2 this shift to keep sats above 90%?: Yes, satisfactory for discharge.   Does patient use Home O2? No          Current oxygenation status:   SpO2: 98 %     O2 Device: None (Room air), Oxygen Delivery: 1 LPM    3.  Tolerates ambulation and mobility near baseline.     Ambulation: Yes, satisfactory for discharge.   Times patient ambulated with staff this shift: 1    Please review the Heart Failure Care Map for additional HF goal outcomes.    Maegan Araujo RN  12/19/2022

## 2022-12-20 NOTE — PLAN OF CARE
Problem: Plan of Care - These are the overarching goals to be used throughout the patient stay.    Goal: Optimal Comfort and Wellbeing  Outcome: Progressing     Problem: Risk for Delirium  Goal: Improved Attention and Thought Clarity  Outcome: Progressing   Goal Outcome Evaluation:      Plan of Care Reviewed With: patient    Overall Patient Progress: improvingOverall Patient Progress: improving       Pt is AXOX4, on RA, a-fib and independent in the room. K and Mg protocol, recheck in the AM. Denies pain. Continue to monitor.

## 2022-12-21 ENCOUNTER — TELEPHONE (OUTPATIENT)
Dept: CARDIOLOGY | Facility: CLINIC | Age: 82
End: 2022-12-21

## 2022-12-21 VITALS
SYSTOLIC BLOOD PRESSURE: 168 MMHG | HEART RATE: 70 BPM | DIASTOLIC BLOOD PRESSURE: 86 MMHG | OXYGEN SATURATION: 98 % | WEIGHT: 149.8 LBS | HEIGHT: 68 IN | BODY MASS INDEX: 22.7 KG/M2 | TEMPERATURE: 97.7 F | RESPIRATION RATE: 18 BRPM

## 2022-12-21 DIAGNOSIS — I50.9 ACUTE DECOMPENSATED HEART FAILURE (H): Primary | ICD-10-CM

## 2022-12-21 LAB
ANION GAP SERPL CALCULATED.3IONS-SCNC: 12 MMOL/L (ref 7–15)
BUN SERPL-MCNC: 59.8 MG/DL (ref 8–23)
CALCIUM SERPL-MCNC: 9.5 MG/DL (ref 8.8–10.2)
CHLORIDE SERPL-SCNC: 102 MMOL/L (ref 98–107)
CREAT SERPL-MCNC: 1.72 MG/DL (ref 0.51–0.95)
DEPRECATED HCO3 PLAS-SCNC: 29 MMOL/L (ref 22–29)
GFR SERPL CREATININE-BSD FRML MDRD: 29 ML/MIN/1.73M2
GLUCOSE SERPL-MCNC: 124 MG/DL (ref 70–99)
INR PPP: 1.77 (ref 0.85–1.15)
MAGNESIUM SERPL-MCNC: 2.1 MG/DL (ref 1.7–2.3)
PLATELET # BLD AUTO: 125 10E3/UL (ref 150–450)
POTASSIUM SERPL-SCNC: 3.7 MMOL/L (ref 3.4–5.3)
SODIUM SERPL-SCNC: 143 MMOL/L (ref 136–145)

## 2022-12-21 PROCEDURE — 94640 AIRWAY INHALATION TREATMENT: CPT

## 2022-12-21 PROCEDURE — P9047 ALBUMIN (HUMAN), 25%, 50ML: HCPCS | Performed by: INTERNAL MEDICINE

## 2022-12-21 PROCEDURE — 250N000013 HC RX MED GY IP 250 OP 250 PS 637: Performed by: INTERNAL MEDICINE

## 2022-12-21 PROCEDURE — 99232 SBSQ HOSP IP/OBS MODERATE 35: CPT | Performed by: INTERNAL MEDICINE

## 2022-12-21 PROCEDURE — 999N000157 HC STATISTIC RCP TIME EA 10 MIN

## 2022-12-21 PROCEDURE — 250N000011 HC RX IP 250 OP 636: Performed by: INTERNAL MEDICINE

## 2022-12-21 PROCEDURE — 99239 HOSP IP/OBS DSCHRG MGMT >30: CPT | Performed by: HOSPITALIST

## 2022-12-21 PROCEDURE — 80048 BASIC METABOLIC PNL TOTAL CA: CPT | Performed by: INTERNAL MEDICINE

## 2022-12-21 PROCEDURE — 83735 ASSAY OF MAGNESIUM: CPT | Performed by: INTERNAL MEDICINE

## 2022-12-21 PROCEDURE — 85610 PROTHROMBIN TIME: CPT | Performed by: INTERNAL MEDICINE

## 2022-12-21 PROCEDURE — 250N000009 HC RX 250: Performed by: INTERNAL MEDICINE

## 2022-12-21 PROCEDURE — 250N000012 HC RX MED GY IP 250 OP 636 PS 637: Performed by: INTERNAL MEDICINE

## 2022-12-21 PROCEDURE — 94799 UNLISTED PULMONARY SVC/PX: CPT

## 2022-12-21 PROCEDURE — 85049 AUTOMATED PLATELET COUNT: CPT | Performed by: INTERNAL MEDICINE

## 2022-12-21 PROCEDURE — 36415 COLL VENOUS BLD VENIPUNCTURE: CPT | Performed by: INTERNAL MEDICINE

## 2022-12-21 RX ORDER — BUMETANIDE 1 MG/1
2 TABLET ORAL DAILY
Qty: 30 TABLET | Refills: 3 | Status: SHIPPED | OUTPATIENT
Start: 2022-12-22 | End: 2022-12-21

## 2022-12-21 RX ORDER — POTASSIUM CHLORIDE 750 MG/1
10 TABLET, EXTENDED RELEASE ORAL ONCE
Status: COMPLETED | OUTPATIENT
Start: 2022-12-21 | End: 2022-12-21

## 2022-12-21 RX ORDER — NYSTATIN 100000/ML
500000 SUSPENSION, ORAL (FINAL DOSE FORM) ORAL 4 TIMES DAILY
Qty: 100 ML | Refills: 0 | Status: SHIPPED | OUTPATIENT
Start: 2022-12-21 | End: 2022-12-26

## 2022-12-21 RX ORDER — HYDRALAZINE HYDROCHLORIDE 50 MG/1
50 TABLET, FILM COATED ORAL 2 TIMES DAILY
Qty: 60 TABLET | Refills: 2 | Status: SHIPPED | OUTPATIENT
Start: 2022-12-21 | End: 2023-01-26

## 2022-12-21 RX ORDER — CEFDINIR 300 MG/1
300 CAPSULE ORAL DAILY
Qty: 1 CAPSULE | Refills: 0 | Status: SHIPPED | OUTPATIENT
Start: 2022-12-22 | End: 2023-02-14

## 2022-12-21 RX ORDER — METOPROLOL SUCCINATE 25 MG/1
75 TABLET, EXTENDED RELEASE ORAL DAILY
Qty: 30 TABLET | Refills: 3 | Status: SHIPPED | OUTPATIENT
Start: 2022-12-22

## 2022-12-21 RX ORDER — ALBUMIN (HUMAN) 12.5 G/50ML
50 SOLUTION INTRAVENOUS ONCE
Status: COMPLETED | OUTPATIENT
Start: 2022-12-21 | End: 2022-12-21

## 2022-12-21 RX ORDER — PREDNISONE 10 MG/1
TABLET ORAL
Qty: 20 TABLET | Refills: 0 | Status: SHIPPED | OUTPATIENT
Start: 2022-12-21 | End: 2023-02-14

## 2022-12-21 RX ORDER — WARFARIN SODIUM 5 MG/1
5 TABLET ORAL
Status: DISCONTINUED | OUTPATIENT
Start: 2022-12-21 | End: 2022-12-21 | Stop reason: HOSPADM

## 2022-12-21 RX ORDER — HYDRALAZINE HYDROCHLORIDE 50 MG/1
50 TABLET, FILM COATED ORAL 2 TIMES DAILY
Status: DISCONTINUED | OUTPATIENT
Start: 2022-12-21 | End: 2022-12-21 | Stop reason: HOSPADM

## 2022-12-21 RX ORDER — BUMETANIDE 1 MG/1
2 TABLET ORAL DAILY
Qty: 60 TABLET | Refills: 3 | Status: SHIPPED | OUTPATIENT
Start: 2022-12-22

## 2022-12-21 RX ORDER — CEFDINIR 300 MG/1
300 CAPSULE ORAL 2 TIMES DAILY
Qty: 2 CAPSULE | Refills: 0 | Status: SHIPPED | OUTPATIENT
Start: 2022-12-22 | End: 2022-12-21

## 2022-12-21 RX ADMIN — CEFTRIAXONE SODIUM 1 G: 1 INJECTION, POWDER, FOR SOLUTION INTRAMUSCULAR; INTRAVENOUS at 09:18

## 2022-12-21 RX ADMIN — IPRATROPIUM BROMIDE AND ALBUTEROL SULFATE 3 ML: 2.5; .5 SOLUTION RESPIRATORY (INHALATION) at 08:27

## 2022-12-21 RX ADMIN — PAROXETINE HYDROCHLORIDE 20 MG: 20 TABLET, FILM COATED ORAL at 08:15

## 2022-12-21 RX ADMIN — PREDNISONE 40 MG: 20 TABLET ORAL at 08:13

## 2022-12-21 RX ADMIN — POTASSIUM CHLORIDE 10 MEQ: 750 TABLET, EXTENDED RELEASE ORAL at 08:14

## 2022-12-21 RX ADMIN — HYDRALAZINE HYDROCHLORIDE 50 MG: 50 TABLET, FILM COATED ORAL at 08:14

## 2022-12-21 RX ADMIN — METOPROLOL SUCCINATE 75 MG: 50 TABLET, EXTENDED RELEASE ORAL at 08:14

## 2022-12-21 RX ADMIN — ATORVASTATIN CALCIUM 20 MG: 10 TABLET, FILM COATED ORAL at 08:15

## 2022-12-21 RX ADMIN — GUAIFENESIN 1200 MG: 600 TABLET ORAL at 08:15

## 2022-12-21 RX ADMIN — PANTOPRAZOLE SODIUM 40 MG: 40 TABLET, DELAYED RELEASE ORAL at 06:57

## 2022-12-21 RX ADMIN — ALBUMIN HUMAN 50 G: 0.25 SOLUTION INTRAVENOUS at 08:05

## 2022-12-21 RX ADMIN — NYSTATIN 500000 UNITS: 100000 SUSPENSION ORAL at 08:13

## 2022-12-21 ASSESSMENT — ACTIVITIES OF DAILY LIVING (ADL)
ADLS_ACUITY_SCORE: 20

## 2022-12-21 NOTE — PROGRESS NOTES
Care Management Discharge Note    Discharge Date: 12/21/2022       Discharge Disposition: Home    Discharge Services:  None    Discharge DME:  None    Discharge Transportation:  Son, Steven    Private pay costs discussed: Not applicable    Education Provided on the Discharge Plan:    Persons Notified of Discharge Plans: MD, RN, SW, patient  Patient/Family in Agreement with the Plan:      Handoff Referral Completed: Yes    Additional Information:  Patient live with son and is independent at baseline. Patient to discharge home with no services, Son to transport.         Khadijah Waters

## 2022-12-21 NOTE — PROGRESS NOTES
HEART CARE NOTE          Assessment/Recommendations     1. HFmrEF  Assessment / Plan    KATERIN notable on am labs - will hold diuretic; given albumin and continue to monitor; repeat lab week next week at outpatient visit; resume oral diuretic on discharge    GDMT as detailed below; mainstay of treatment for HFmrEF includes diuretics (class I) and SGLT2-I (class 2a); additional medical therapy demonstrated with less robust evidence for indication but should be considered per guideline recommendations (2b)     Current Pharmacotherapy AHA Guideline-Directed Medical Therapy   Losartan 100 mg daily - on hold Lisinopril 20 mg twice daily   Metoprolol succinate 75 mg daily Carvedilol 25 mg twice daily   Spironolactone - not started Spironolactone 25 mg once daily   Hydralazine 50 mg BID Hydralazine 100 mg three times daily   Isosorbide dinitrate NA Isosorbide dinitrate 40 mg three times daily   SGLT2 inhibitor not started Dapagliflozin or Empagliflozin 10 mg daily       2. Afib  Assessment / Plan    Currently rate controlled    Continue metoprolol    Warfarin management per pharmacy     3. CKD stage 3  Assessment / Plan    Continue to monitor closely     4. HLP  Assessment / Plan    Continue atorvastatin     5. HTN  Assessment / Plan    Adequately controlled on current regimen - no changes at this time    Ok for discharge from a cardiology standpoint. Cardiology team will sign-off for now. Please do not hesitate to consult us again if new questions or concerns arise. Follow-up appointment will be arranged by [facility] clinic.       History of Present Illness/Subjective      Ms. Christina Umana is a 82 year old female with a PMHx significant for (per Dr. Loredo's note) COPD, afib on coumadin, CKD3, HTN, HLD, who presents to this ED via EMS for evaluation of shortness of breath.     Today, Mrs. Umana denies any acute cardiac events or complaints; Management plan as detailed above      ECG: Personally reviewed. atrial  "fibrillation, rate controlled.     ECHO (personnaly Reviewed):   The left ventricle is normal in size.  The visual ejection fraction is 45-50%.  Possible small area of inferobasilar hypokinesis that looks similar in 2018  echo.  Suspect mild reduction in RVEF  There is mild to moderate (1-2+) mitral regurgitation.  The right ventricular systolic pressure is approximated at 29mmHg plus the  right atrial pressure.  The rhythm was atrial fibrillation with controlled ventricular rate at rest.  Findings similar to echo from 2018.          Physical Examination Review of Systems   BP (!) 158/54 (BP Location: Right arm)   Pulse 67   Temp 98.1  F (36.7  C) (Oral)   Resp 20   Ht 1.715 m (5' 7.5\")   Wt 69.3 kg (152 lb 11.2 oz)   SpO2 98%   BMI 23.56 kg/m    Body mass index is 23.56 kg/m .  Wt Readings from Last 3 Encounters:   12/20/22 69.3 kg (152 lb 11.2 oz)   07/13/21 74.8 kg (165 lb)   10/05/20 74 kg (163 lb 3.2 oz)     General Appearance:   no distress, normal body habitus   ENT/Mouth: membranes moist, no oral lesions or bleeding gums.      EYES:  no scleral icterus, normal conjunctivae   Neck: no carotid bruits or thyromegaly   Chest/Lungs:   lungs are clear to auscultation, no rales or wheezing, equal chest wall expansion    Cardiovascular:   Irregular. Normal first and second heart sounds with no murmurs, rubs, or gallops; the carotid, radial and posterior tibial pulses are intact, no JVD or LE edema bilaterally    Abdomen:  no organomegaly, masses, bruits, or tenderness; bowel sounds are present   Extremities: no cyanosis or clubbing   Skin: no xanthelasma, warm.    Neurologic: normal gait, normal  bilateral, no tremors     Psychiatric: alert and oriented x3, calm     A complete 10 systems ROS was reviewed  And is negative except what is listed in the HPI.          Medical History  Surgical History Family History Social History   Past Medical History:   Diagnosis Date     Arthritis      Cancer (H) 2005     " CHF (congestive heart failure) (H)      Clotting disorder (H) 2017     COPD (chronic obstructive pulmonary disease) (H)      Degenerative disc disease, lumbar      Emphysema lung (H)      GERD (gastroesophageal reflux disease)      Hyperlipidemia      Hypertension      Kidney stone 2017     Osteoporosis      Scoliosis      Ventral hernia     Past Surgical History:   Procedure Laterality Date     BIOPSY BREAST Right      BUNIONECTOMY Bilateral 1988     COLECTOMY N/A 2003     COLON SURGERY       CYST REMOVAL Left 2007     HERNIORRHAPHY VENTRAL  2005     HYSTERECTOMY  2004     IR LUMBAR EPIDURAL STEROID INJECTION  2011     IR LUMBAR EPIDURAL STEROID INJECTION  10/10/2011     IR LUMBAR EPIDURAL STEROID INJECTION  3/8/2012     IR LUMBAR EPIDURAL STEROID INJECTION  4/10/2012     LUMPECTOMY BREAST Right 2009     OOPHORECTOMY  2004     REPAIR HAMMER TOE Bilateral      STOMACH SURGERY      hyernia repair    no family history of premature coronary artery disease Social History     Socioeconomic History     Marital status:      Spouse name: Not on file     Number of children: Not on file     Years of education: Not on file     Highest education level: Not on file   Occupational History     Not on file   Tobacco Use     Smoking status: Every Day     Packs/day: 0.50     Years: 60.00     Pack years: 30.00     Types: Cigarettes     Last attempt to quit: 2018     Years since quittin.8     Smokeless tobacco: Never     Tobacco comments:     Seen IP by CTT[facility] on 22. Down to 3 cigarettes/Day. Ready for quit attempt.   Vaping Use     Vaping Use: Never used   Substance and Sexual Activity     Alcohol use: No     Drug use: No     Sexual activity: Never   Other Topics Concern     Not on file   Social History Narrative     Not on file     Social Determinants of Health     Financial Resource Strain: Not on file   Food Insecurity: Not on file   Transportation Needs: Not on file    Physical Activity: Not on file   Stress: Not on file   Social Connections: Not on file   Intimate Partner Violence: Not on file   Housing Stability: Not on file           Lab Results    Chemistry/lipid CBC Cardiac Enzymes/BNP/TSH/INR   Lab Results   Component Value Date    CHOL 108 08/03/2018    HDL 37 (L) 08/03/2018    TRIG 83 08/03/2018    BUN 53.4 (H) 12/20/2022     (L) 12/20/2022    CO2 26 12/20/2022    Lab Results   Component Value Date    WBC 7.8 12/18/2022    HGB 14.9 12/18/2022    HCT 45.6 12/18/2022    MCV 83 12/18/2022     (L) 12/18/2022    Lab Results   Component Value Date    TROPONINI 0.01 07/13/2021    BNP 1,578 (H) 08/04/2018    TSH 2.08 08/06/2019    INR 2.36 (H) 12/20/2022     Lab Results   Component Value Date    TROPONINI 0.01 07/13/2021          Weight:    Wt Readings from Last 3 Encounters:   12/20/22 69.3 kg (152 lb 11.2 oz)   07/13/21 74.8 kg (165 lb)   10/05/20 74 kg (163 lb 3.2 oz)       Allergies  No Known Allergies      Surgical History  Past Surgical History:   Procedure Laterality Date     BIOPSY BREAST Right 2012     BUNIONECTOMY Bilateral 1988     COLECTOMY N/A 03/31/2003     COLON SURGERY       CYST REMOVAL Left 02/01/2007     HERNIORRHAPHY VENTRAL  02/25/2005     HYSTERECTOMY  2004     IR LUMBAR EPIDURAL STEROID INJECTION  8/8/2011     IR LUMBAR EPIDURAL STEROID INJECTION  10/10/2011     IR LUMBAR EPIDURAL STEROID INJECTION  3/8/2012     IR LUMBAR EPIDURAL STEROID INJECTION  4/10/2012     LUMPECTOMY BREAST Right 04/03/2009     OOPHORECTOMY  2004     REPAIR HAMMER TOE Bilateral 1988     STOMACH SURGERY      hyernia repair       Social History  Tobacco:   History   Smoking Status     Every Day     Packs/day: 0.50     Years: 60.00     Types: Cigarettes     Last attempt to quit: 2/16/2018   Smokeless Tobacco     Never    Alcohol:   Social History    Substance and Sexual Activity      Alcohol use: No   Illicit Drugs:   History   Drug Use No       Family History  Family  History   Problem Relation Age of Onset     Brain Cancer Son 29.00     Dementia Mother      Cerebrovascular Disease Mother      Prostate Cancer Father      Aortic aneurysm Father         Abdominal     Hypertension Sister      No Known Problems Daughter      No Known Problems Maternal Grandmother      No Known Problems Maternal Grandfather      No Known Problems Paternal Grandmother      No Known Problems Paternal Grandfather      Breast Cancer Maternal Aunt 70.00     No Known Problems Paternal Aunt      Breast Cancer Maternal Aunt      Other - See Comments Brother         Polio     Other - See Comments Brother         polio     Hereditary Breast and Ovarian Cancer Syndrome No family hx of      Colon Cancer No family hx of      Endometrial Cancer No family hx of      Ovarian Cancer No family hx of           Yvette Acosta MD on 12/21/2022      cc: Adan Elizabeth

## 2022-12-21 NOTE — DISCHARGE SUMMARY
[facility] MEDICINE  DISCHARGE SUMMARY     Primary Care Physician: Ashish Elizabeth  Admission Date: 12/18/2022   Discharge Provider: Yumiko Mishra MD Discharge Date: 12/21/2022   Diet:   Active Diet and Nourishment Order   Procedures     2 Gram Sodium Diet Other - please comment     Diet       Code Status: Special Code   Activity: DCACTIVITY: Activity as tolerated        Condition at Discharge: Stable     REASON FOR PRESENTATION(See Admission Note for Details)     SOB    PRINCIPAL & ACTIVE DISCHARGE DIAGNOSES     Principal Problem:    Acute respiratory failure with hypoxia (H)  Active Problems:    Essential hypertension    Chronic atrial fibrillation (H)    Heart failure with reduced ejection fraction (H)    Dilated cardiomyopathy (H)    CKD (chronic kidney disease) stage 3, GFR 30-59 ml/min (H)    Thrombocytopenia (H)    COPD exacerbation (H)      PENDING LABS     Unresulted Labs Ordered in the Past 30 Days of this Admission     No orders found from 11/18/2022 to 12/19/2022.            PROCEDURES ( this hospitalization only)          RECOMMENDATIONS TO OUTPATIENT PROVIDER FOR F/U VISIT     Follow-up Appointments     Add follow up information to the AVS prior to printing      Follow-up and recommended labs and tests      Follow up with primary care provider, ASHISH ELIZABETH, within 3-5 days   for hospital follow- up.    BMP and INR.    -Resume losartan as instructed by primary care or cardiology when renal   function stabilized  -adjust coumadin as instructed based upon INR               DISPOSITION     Home    SUMMARY OF HOSPITAL COURSE:      82F wth COPD, afib on coumadin, CKD3, HTN, HLD, who presented to ED for evaluation of progressive shortness of breath and initially required BiPAP for support and treated for multifactorial etiologies of SOB including COPD exacerbation, acute on chronic HF, possible CAP.  Weaned off O2 and feeling well and discharged to  home.      #Acute respiratory failure with hypoxia and hypercarbia; likely multifactorial, COPD exacerbation, acute on chronic heart failure, possible community-acquired pneumonia.   --On admission, CXR reported left upper lobe nodular opacity, increased right basilar airspace opacities which may be infectious or inflammatory  --Patient briefly needed BiPAP in ED then transition to nasal cannula.  Currently at 1-2 L nasal cannula, titrate O2 as able.    --did not qualify for home O2     #COPD with acute exacerbation;  #Probable community-acquired pneumonia;  --IV steroid transition to prednisone with taper.  Complete 1 more day omnicef, completed azithro  --Appreciated pulmonary input and reported they will arrange outpatient follow-up     #Acute on chronic systolic heart failure;  -- On admission, echo reported EF 45 to 50%.  Previous echo on 8/2018 reported EF 40%  -- diuresed to euvolemia    #CKD 3 - recent baseline Cr seems to be about ~1.6.  Up from admission 1.3 to 1.7 this admission, though 1.3 was probably diluted.    -discarge on bumex 2qday  -hold losartan until outpatient f/u      #Chronic A. Fib;  -- Heart rate controlled.  changed to toprol-xl from lopressor  -- coumadin (see below regarding dosing)     #Essential hypertension;  -started on hydralazine instead of norvasc   -continue BB, and diuretic.   -resume ARB with stable renal function.     Thrombocytopenia, chronic;  --Fairly stable.  Monitor intermittently         Discharge Medications with Med changes:     Current Discharge Medication List      START taking these medications    Details   cefdinir (OMNICEF) 300 MG capsule Take 1 capsule (300 mg) by mouth daily  Qty: 1 capsule, Refills: 0    Associated Diagnoses: COPD exacerbation (H)      hydrALAZINE (APRESOLINE) 50 MG tablet Take 1 tablet (50 mg) by mouth 2 times daily  Qty: 60 tablet, Refills: 2    Associated Diagnoses: Essential hypertension      metoprolol succinate ER (TOPROL XL) 25 MG 24 hr  tablet Take 3 tablets (75 mg) by mouth daily  Qty: 30 tablet, Refills: 3    Associated Diagnoses: Chronic atrial fibrillation (H)      nystatin (MYCOSTATIN) 351238 UNIT/ML suspension Take 5 mLs (500,000 Units) by mouth 4 times daily for 5 days  Qty: 100 mL, Refills: 0    Associated Diagnoses: Thrush         CONTINUE these medications which have CHANGED    Details   bumetanide (BUMEX) 1 MG tablet Take 2 tablets (2 mg) by mouth daily  Qty: 60 tablet, Refills: 3    Associated Diagnoses: Heart failure with reduced ejection fraction (H)      predniSONE (DELTASONE) 10 MG tablet Take 4 tabs by mouth daily x 1 days, then 3 tabs daily x 3 days, then 2 tab daily x 3 days, then 1 tab daily x 3 days.  Qty: 20 tablet, Refills: 0    Associated Diagnoses: COPD exacerbation (H)         CONTINUE these medications which have NOT CHANGED    Details   albuterol (PROAIR HFA/PROVENTIL HFA/VENTOLIN HFA) 108 (90 Base) MCG/ACT inhaler Inhale 2 puffs into the lungs every 6 hours as needed for shortness of breath, wheezing or cough      ascorbic acid, vitamin C, (VITAMIN C) 1000 MG tablet [ASCORBIC ACID, VITAMIN C, (VITAMIN C) 1000 MG TABLET] Take 1,000 mg by mouth daily.       atorvastatin (LIPITOR) 20 MG tablet Take 20 mg by mouth daily      azelastine (ASTELIN) 0.1 % nasal spray Spray 1 spray into both nostrils 2 times daily      budesonide (PULMICORT) 1 MG/2ML neb solution Take 1 mg by nebulization 2 times daily      calcium, as carbonate, (OS-GILL) 500 mg calcium (1,250 mg) tablet [CALCIUM, AS CARBONATE, (OS-GILL) 500 MG CALCIUM (1,250 MG) TABLET] Take 1 tablet by mouth 2 (two) times a day.      cholecalciferol, vitamin D3, 2,000 unit Tab [CHOLECALCIFEROL, VITAMIN D3, 2,000 UNIT TAB] Take 2,000 Units by mouth daily.      ipratropium-albuterol (DUO-NEB) 0.5-2.5 mg/3 mL nebulizer [IPRATROPIUM-ALBUTEROL (DUO-NEB) 0.5-2.5 MG/3 ML NEBULIZER] Take 3 mL by nebulization 3 (three) times a day. Then use up to 4 times daily as needed for shortness of  breath.  Qty: 60 vial, Refills: 6    Associated Diagnoses: Chronic obstructive pulmonary disease, unspecified COPD type (H)      magnesium oxide (MAG-OX) 400 mg (241.3 mg magnesium) tablet [MAGNESIUM OXIDE (MAG-OX) 400 MG (241.3 MG MAGNESIUM) TABLET] 400mg in the morning and 800 mg at bedtime  Qty: 270 tablet, Refills: 3    Associated Diagnoses: Chronic atrial fibrillation (H)      methylcellulose (CITRUCEL ORAL) Take 1 Dose by mouth daily      omeprazole (PRILOSEC) 20 MG DR capsule Take 20 mg by mouth every morning (before breakfast)      PARoxetine (PAXIL) 20 MG tablet [PAROXETINE (PAXIL) 20 MG TABLET] Take 1 tablet (20 mg total) by mouth daily.  Qty: 90 tablet, Refills: 3    Associated Diagnoses: Dysthymic disorder      tiotropium (SPIRIVA) 18 mcg inhalation capsule [TIOTROPIUM (SPIRIVA) 18 MCG INHALATION CAPSULE] Place 18 mcg into inhaler and inhale daily.      vitamin E 200 UNIT capsule [VITAMIN E 200 UNIT CAPSULE] Take 200 Units by mouth daily.      warfarin ANTICOAGULANT (COUMADIN) 5 MG tablet Take 2.5-5 mg by mouth daily Take 5mg on Monday, Wednesday and Friday and 2.5mg the rest of the days         STOP taking these medications       amLODIPine (NORVASC) 5 MG tablet Comments:   Reason for Stopping:         losartan (COZAAR) 100 MG tablet Comments:   Reason for Stopping:         metoprolol tartrate (LOPRESSOR) 50 MG tablet Comments:   Reason for Stopping:                     Rationale for medication changes:      toprol-xl for Afib  Hold norvasc and started hydralazine instead for CHF  bumex increased for CHF  Losartan held temporarily for rising Cr  omnicef and pred taper for COPD        Consults     IP RESPIRATORY CARE CHRONIC PULMONARY DISEASE SPECIALIST  PULMONARY IP CONSULT  PHARMACY TO DOSE WARFARIN  CORE CLINIC EVALUATION IP CONSULT  OCCUPATIONAL THERAPY ADULT IP CONSULT  NUTRITION SERVICES ADULT IP CONSULT  CARE MANAGEMENT / SOCIAL WORK IP CONSULT  CARDIOLOGY IP CONSULT  IP RESPIRATORY CARE CHRONIC  PULMONARY DISEASE SPECIALIST  PHYSICAL THERAPY ADULT IP CONSULT    Immunizations given this encounter     Most Recent Immunizations   Administered Date(s) Administered     COVID-19 Vaccine 18+ (Moderna) 11/15/2021     COVID-19 Vaccine Bivalent Booster 18+ (Moderna) 10/13/2022     FLU 6-35 months 09/13/2011     Flu, Unspecified 09/25/2017     Influenza (H1N1) 02/01/2010     Influenza (High Dose) 3 valent vaccine 09/24/2018     Influenza (intradermal) 09/01/2020     Influenza Vaccine, 6+MO IM (QUADRIVALENT W/PRESERVATIVES) 09/27/2012     Pneumo Conj 13-V (2010&after) 03/19/2015     Pneumococcal 23 valent 11/18/2008     Td,adult,historic,unspecified 01/08/2004     Tdap (Adacel,Boostrix) 10/28/2016     Tdap (Adult) Unspecified Formulation 01/01/2016     Zoster vaccine, live 09/01/2020           Anticoagulation Information      On coumadin.  supratherapeutic  3.8 on admission and coumadin held for 2 days.  Now subtherapeutic Resume at prior to admission dosing and repeat in several days.      SIGNIFICANT IMAGING FINDINGS     Results for orders placed or performed during the hospital encounter of 12/18/22   XR Chest Port 1 View    Impression    IMPRESSION: Redemonstrated left upper lobe nodular opacity. Increased right basilar airspace opacities which may be infectious or inflammatory. Normal heart size. No pleural effusions.   Echocardiogram Complete   Result Value Ref Range    LVEF  45-50%          Discharge Orders        Reason for your hospital stay    You were admitted with shortness of breath related to COPD and CHF     Follow-up and recommended labs and tests    Follow up with primary care provider, ASHISH REGAN, within 3-5 days for hospital follow- up.    BMP and INR.    -Resume losartan as instructed by primary care or cardiology when renal function stabilized  -adjust coumadin as instructed based upon INR     Activity    Your activity upon discharge: activity as tolerated     Monitor and record    Weigh  yourself every morning     When to contact your care team    Contact your care team If symptoms get worse, If increased shortness of breath, If waking up at night with difficulty breathing, If unable to lie down for sleep due to symptoms, If weight gain of 2 pounds a day for 2 days in a row OR 5 pounds in 1 week., Increased swelling in your ankles or legs, and Dizziness or lightheadedness     Discharge Follow Up - with Primary Care clinic within 3-5 days (RN to schedule prior to d/c for HE/Entira primary care     Add follow up information to the AVS prior to printing     Diet    Follow this diet upon discharge: Orders Placed This Encounter      2 Gram Sodium Diet Other - please comment       Examination   Physical Exam   Temp:  [97.7  F (36.5  C)-98.2  F (36.8  C)] 97.7  F (36.5  C)  Pulse:  [] 70  Resp:  [18-20] 18  BP: (120-187)/(54-86) 168/86  SpO2:  [94 %-98 %] 98 %  Wt Readings from Last 1 Encounters:   12/21/22 67.9 kg (149 lb 12.8 oz)       Feeling well.  Breathing at baseline.  No wheeze.      Please see EMR for more detailed significant labs, imaging, consultant notes etc.    IYumiko MD, personally saw the patient today and spent greater than 30 minutes discharging this patient.    Yumiko Mishra MD  [facility]    CC:Adan Elizabeth

## 2022-12-21 NOTE — PLAN OF CARE
Problem: Plan of Care - These are the overarching goals to be used throughout the patient stay.    Goal: Optimal Comfort and Wellbeing  12/21/2022 1143 by Mariaelena Castro RN  Outcome: Met  12/21/2022 1143 by Mariaelena Castro RN  Outcome: Progressing     Problem: Plan of Care - These are the overarching goals to be used throughout the patient stay.    Goal: Readiness for Transition of Care  12/21/2022 1143 by Mariaelena Castro RN  Outcome: Met  12/21/2022 1143 by Mariaelena Castro RN  Outcome: Progressing   Goal Outcome Evaluation:      Plan of Care Reviewed With: patient    Overall Patient Progress: improvingOverall Patient Progress: improving     Pt is AXOX4, on RA, and independent in the room. Home O2 eval complete. K and Mg protocol. Pt to discharge home. All teaching complete.

## 2022-12-21 NOTE — PROGRESS NOTES
Patient has been assessed for Home Oxygen needs.     Pulse oximetry (SpO2) and Oxygen flow readings:    SpO2 = 94% on room air at rest while awake.    SpO2 improved to 94% on 0 liters/minute at rest.    SpO2 = 91% on room air during activity/with exercise.    *SpO2 improved to 92% on 0 liters/minute during activity/with exercise.

## 2022-12-21 NOTE — PLAN OF CARE
Problem: Pneumonia  Goal: Fluid Balance  Outcome: Progressing  Goal: Resolution of Infection Signs and Symptoms  Outcome: Progressing  Goal: Effective Oxygenation and Ventilation  Outcome: Progressing     Problem: Risk for Delirium  Goal: Improved Behavioral Control  Outcome: Progressing   Goal Outcome Evaluation:       Pt is on RA.  Pt was waling in hallway with no distress.    PT has strong congested cough. Pt Denies pain.  Pt is calm cooperative axox4 independent in room

## 2022-12-21 NOTE — PROGRESS NOTES
RESPIRATORY CARE NOTE   Patient is on RA, BS Diminished, gave Duoneb treatment x1, BS post treatment no change, patient perceives mild improvement, patient tolerated treatment well.     Star Yessica, RT

## 2022-12-21 NOTE — PLAN OF CARE
Problem: Heart Failure  Goal: Optimal Coping  Outcome: Progressing  Goal: Optimal Cardiac Output  Outcome: Progressing  Goal: Stable Heart Rate and Rhythm  Outcome: Progressing  Goal: Optimal Functional Ability  Outcome: Progressing  Intervention: Optimize Functional Ability  Recent Flowsheet Documentation  Taken 12/21/2022 0040 by Lauren Ayala RN  Activity Management: activity adjusted per tolerance  Goal: Fluid and Electrolyte Balance  Outcome: Progressing  Goal: Improved Oral Intake  Outcome: Progressing  Goal: Effective Oxygenation and Ventilation  Outcome: Progressing  Intervention: Promote Airway Secretion Clearance  Recent Flowsheet Documentation  Taken 12/21/2022 0040 by Lauren Ayala RN  Cough And Deep Breathing: done independently per patient  Activity Management: activity adjusted per tolerance  Intervention: Optimize Oxygenation and Ventilation  Recent Flowsheet Documentation  Taken 12/21/2022 0040 by Lauren Ayala RN  Head of Bed (HOB) Positioning: HOB at 20 degrees  Goal: Effective Breathing Pattern During Sleep  Outcome: Progressing  Intervention: Monitor and Manage Obstructive Sleep Apnea  Recent Flowsheet Documentation  Taken 12/21/2022 0040 by Lauren Ayala RN  Medication Review/Management: medications reviewed      Problem: Pneumonia  Goal: Fluid Balance  Outcome: Progressing  Goal: Resolution of Infection Signs and Symptoms  Outcome: Progressing  Goal: Effective Oxygenation and Ventilation  Outcome: Progressing  Intervention: Promote Airway Secretion Clearance  Recent Flowsheet Documentation  Taken 12/21/2022 0040 by Lauren Ayala RN  Cough And Deep Breathing: done independently per patient  Intervention: Optimize Oxygenation and Ventilation  Recent Flowsheet Documentation  Taken 12/21/2022 0040 by Lauren Ayala RN  Head of Bed (HOB) Positioning: HOB at 20 degrees      Goal Outcome Evaluation: Patient is aox4. Denied pain. Has a-fib with  controlled HR. VSS except for BP. Highest /86 this shift. No prn BP medication available. Dr. Shine updated and no new orders. Home BP med on hold per MD due to rise in creatinine. Cr 1.72 today. Was 1.55 yesterday. Continue to monitor.

## 2022-12-22 ENCOUNTER — PATIENT OUTREACH (OUTPATIENT)
Dept: CARE COORDINATION | Facility: CLINIC | Age: 82
End: 2022-12-22

## 2022-12-22 ENCOUNTER — TELEPHONE (OUTPATIENT)
Dept: RESPIRATORY THERAPY | Facility: HOSPITAL | Age: 82
End: 2022-12-22

## 2022-12-22 NOTE — TELEPHONE ENCOUNTER
I spoke with Christina. She is feeling better since discharge as far as her breathing is concerned. She is able to get and take her medications and is compliant in taking them.She has also been successful in not smoking since her discharge. I congratulated her on this achievement and encouraged her to maintain cessation. She had no problems or questions for me today.    Shahnaz Longo, RT, TTS, Chronic Pulmonary Disease Specialist

## 2022-12-22 NOTE — PROGRESS NOTES
Clinic Care Coordination Contact  [facility]: Post-Discharge Note  SITUATION                                                      Admission:    Admission Date: 12/18/22   Reason for Admission: Acute respiratory failure with hypoxia  Discharge:   Discharge Date: 12/21/22  Discharge Diagnosis: Acute respiratory failure with hypoxia    Essential hypertension    Chronic atrial fibrillation (H)    Heart failure with reduced ejection fraction (H)    Dilated cardiomyopathy (H)    CKD (chronic kidney disease) stage 3, GFR 30-59 ml/min (H)    Thrombocytopenia (H)    COPD exacerbation (H)    BACKGROUND                                                      Per hospital discharge summary and inpatient provider notes:    SUMMARY OF HOSPITAL COURSE:       82F wth COPD, afib on coumadin, CKD3, HTN, HLD, who presented to ED for evaluation of progressive shortness of breath and initially required BiPAP for support and treated for multifactorial etiologies of SOB including COPD exacerbation, acute on chronic HF, possible CAP.  Weaned off O2 and feeling well and discharged to home.      #Acute respiratory failure with hypoxia and hypercarbia; likely multifactorial, COPD exacerbation, acute on chronic heart failure, possible community-acquired pneumonia.   --On admission, CXR reported left upper lobe nodular opacity, increased right basilar airspace opacities which may be infectious or inflammatory  --Patient briefly needed BiPAP in ED then transition to nasal cannula.  Currently at 1-2 L nasal cannula, titrate O2 as able.    --did not qualify for home O2     #COPD with acute exacerbation;  #Probable community-acquired pneumonia;  --IV steroid transition to prednisone with taper.  Complete 1 more day omnicef, completed azithro  --Appreciated pulmonary input and reported they will arrange outpatient follow-up     #Acute on chronic systolic heart failure;  -- On admission, echo reported EF 45 to 50%.  Previous echo on 8/2018  "reported EF 40%  -- diuresed to euvolemia     #CKD 3 - recent baseline Cr seems to be about ~1.6.  Up from admission 1.3 to 1.7 this admission, though 1.3 was probably diluted.    -discarge on bumex 2qday  -hold losartan until outpatient f/u      #Chronic A. Fib;  -- Heart rate controlled.  changed to toprol-xl from lopressor  -- coumadin (see below regarding dosing)     #Essential hypertension;  -started on hydralazine instead of norvasc   -continue BB, and diuretic.   -resume ARB with stable renal function.     Thrombocytopenia, chronic;  --Fairly stable.  Monitor intermittently      ASSESSMENT           Discharge Assessment  How are you doing now that you are home?: Pt states feeling \"much better and my breathing's not too bad; if I do a lot of walking I breathe a little harder, but it comes right back as soon as I stop.\"  States her PCP, Dr. Elizabeth, called her yesterday afternoon to see how she was doing and she's scheduled to see him 12/28/22 for hospital follow up.  She has started weighing daily and will keep a wt log, and agrees to bring log to her appts going forward.  How are your symptoms? (Red Flag symptoms escalate to triage hotline per guidelines): Improved  Do you feel your condition is stable enough to be safe at home until your provider visit?: Yes  Does the patient have their discharge instructions? : Yes  Does the patient have questions regarding their discharge instructions? : No  Were you started on any new medications or were there changes to any of your previous medications? : Yes  Does the patient have all of their medications?: Yes  Do you have questions regarding any of your medications? : No  Do you have all of your needed medical supplies or equipment (DME)?  (i.e. oxygen tank, CPAP, cane, etc.): Yes  Discharge follow-up appointment scheduled within 14 calendar days? : Yes  Discharge Follow Up Appointment Date: 12/28/22  Discharge Follow Up Appointment Scheduled with?: Primary Care " Provider (PCP 12/28/22, HF NP 1/4/23)         Post-op (Clinicians Only)  Did the patient have surgery or a procedure: No        PLAN                                                      Outpatient Plan:      Follow up with primary care provider, ASHISH REGAN, within 3-5 days   for hospital follow- up.    BMP and INR.     -Resume losartan as instructed by primary care or cardiology when renal function stabilized  -adjust coumadin as instructed based upon INR       Future Appointments   Date Time Provider Department Center   1/4/2023 10:30 AM Val Sosa, APRN CNP [facility]   3/8/2023  1:00 PM [facility] RM 1 [facility]   3/8/2023  2:00 PM Luke Medellin MD [facility]         For any urgent concerns, please contact our 24 hour nurse triage line: [phone] ([phone])         Megha Zamudio RN

## 2022-12-28 ENCOUNTER — TELEPHONE (OUTPATIENT)
Dept: RESPIRATORY THERAPY | Facility: HOSPITAL | Age: 82
End: 2022-12-28

## 2022-12-28 NOTE — TELEPHONE ENCOUNTER
I spoke with Christina. She has developed a cough, was seen today and given prescriptions. She has been successful in not resuming smoking. She is able to get and take her medications and is compliant in taking them. Reviewed COPD Action Plan and encouraged her to again seek care if her symptoms do not improve with current prescriptions.    Shahnaz Longo, RT, TTS, Chronic Pulmonary Disease Specialist

## 2023-01-20 ENCOUNTER — TELEPHONE (OUTPATIENT)
Dept: RESPIRATORY THERAPY | Facility: HOSPITAL | Age: 83
End: 2023-01-20
Payer: COMMERCIAL

## 2023-01-20 NOTE — TELEPHONE ENCOUNTER
Patient was in the bathroom, person on phone said she would have her call us when she gets out, confirmed number with her.  Zara Lubin, RT, CTTS, Chronic Pulmonary Disease Specialist

## 2023-01-25 ENCOUNTER — OFFICE VISIT (OUTPATIENT)
Dept: CARDIOLOGY | Facility: CLINIC | Age: 83
End: 2023-01-25
Attending: INTERNAL MEDICINE
Payer: COMMERCIAL

## 2023-01-25 VITALS
DIASTOLIC BLOOD PRESSURE: 86 MMHG | HEIGHT: 67 IN | BODY MASS INDEX: 25.27 KG/M2 | WEIGHT: 161 LBS | HEART RATE: 64 BPM | SYSTOLIC BLOOD PRESSURE: 128 MMHG | RESPIRATION RATE: 16 BRPM

## 2023-01-25 DIAGNOSIS — I48.20 CHRONIC ATRIAL FIBRILLATION (H): ICD-10-CM

## 2023-01-25 DIAGNOSIS — I50.20 HEART FAILURE WITH REDUCED EJECTION FRACTION (H): Primary | ICD-10-CM

## 2023-01-25 DIAGNOSIS — N18.32 STAGE 3B CHRONIC KIDNEY DISEASE (H): ICD-10-CM

## 2023-01-25 DIAGNOSIS — I10 ESSENTIAL HYPERTENSION: ICD-10-CM

## 2023-01-25 LAB
ANION GAP SERPL CALCULATED.3IONS-SCNC: 13 MMOL/L (ref 7–15)
BUN SERPL-MCNC: 39.2 MG/DL (ref 8–23)
CALCIUM SERPL-MCNC: 10.1 MG/DL (ref 8.8–10.2)
CHLORIDE SERPL-SCNC: 96 MMOL/L (ref 98–107)
CREAT SERPL-MCNC: 1.7 MG/DL (ref 0.51–0.95)
DEPRECATED HCO3 PLAS-SCNC: 27 MMOL/L (ref 22–29)
GFR SERPL CREATININE-BSD FRML MDRD: 30 ML/MIN/1.73M2
GLUCOSE SERPL-MCNC: 150 MG/DL (ref 70–99)
NT-PROBNP SERPL-MCNC: 3652 PG/ML (ref 0–1800)
POTASSIUM SERPL-SCNC: 3.6 MMOL/L (ref 3.4–5.3)
SODIUM SERPL-SCNC: 136 MMOL/L (ref 136–145)

## 2023-01-25 PROCEDURE — 83880 ASSAY OF NATRIURETIC PEPTIDE: CPT | Performed by: NURSE PRACTITIONER

## 2023-01-25 PROCEDURE — 80048 BASIC METABOLIC PNL TOTAL CA: CPT | Performed by: NURSE PRACTITIONER

## 2023-01-25 PROCEDURE — 36415 COLL VENOUS BLD VENIPUNCTURE: CPT | Performed by: NURSE PRACTITIONER

## 2023-01-25 PROCEDURE — 99214 OFFICE O/P EST MOD 30 MIN: CPT | Performed by: NURSE PRACTITIONER

## 2023-01-25 RX ORDER — NYSTATIN 100000/ML
SUSPENSION, ORAL (FINAL DOSE FORM) ORAL
COMMUNITY
Start: 2023-01-23 | End: 2023-02-14

## 2023-01-25 RX ORDER — LOSARTAN POTASSIUM 100 MG/1
1 TABLET ORAL
COMMUNITY
Start: 2023-01-13

## 2023-01-25 RX ORDER — SPIRONOLACTONE 25 MG/1
25 TABLET ORAL DAILY
COMMUNITY
Start: 2023-01-23

## 2023-01-25 NOTE — LETTER
1/25/2023    ASHISH REGAN  [facility] [address] [facility] [address]    RE: Christina Jacobler       Dear Colleague,     I had the pleasure of seeing Christina Umana in the [facility].        Assessment/Recommendations   Assessment:    1.  heart failure with mildly reduced ejection fraction, ejection fraction 45-50%, NYHA class III: Decompensated.  She has had increased lower extremity edema since hospitalization.  She has chronic dyspnea on exertion.  Her weight has also increased.  We discussed monitoring symptoms, following a low-sodium diet and monitoring daily weights.  She states her primary provider decreased her Bumex to 2 mg one day and 1 mg the other day.  About a week ago she increased her Bumex to 2 mg daily on her own.  2.  Hypertension: Controlled.  Blood pressure 128/86  3.  Chronic kidney disease stage III: BMP pending  4.  Permanent atrial fibrillation: Rate controlled.  She continues warfarin for anticoagulation and Toprol for rate control    Plan:  1.  BMP and NT proBNP pending  2.  Continue current medications  3.  Stressed importance of following a low-sodium diet and monitoring daily weights    Christina Umana will follow up with Dr. Vang in 8 weeks and in the heart failure clinic in 3 weeks.     History of Present Illness/Subjective    Ms. Christina Umana is a 82 year old female seen at [facility] heart failure clinic today for continued follow-up.  She follows up for heart failure with mildly reduced ejection fraction.  She was hospitalized December 18 to December 21 with shortness of breath and acute respiratory failure with hypoxia.  She required BiPAP for support.  Likely multifactorial including COPD exacerbation, acute heart failure and possible CAP.  She was given IV steroids and antibiotics.  She was also given IV diuretics which improved symptoms.  She had an echocardiogram which showed ejection fraction of 45 to 50%.  Her losartan was held due to  "acute kidney injury and recommended rechecking outpatient and possible restarting losartan.  She is a past medical history significant for hypertension, thrombocytopenia, permanent atrial fibrillation, COPD, chronic kidney disease stage III, GERD.    Today, she comes in with increased lower extremity edema and weight gain.  She has chronic dyspnea exertion.  She denies lightheadedness, orthopnea, PND, palpitations, chest pain and abdominal fullness/bloating.      She is monitoring home weights which have increased to 157 pounds.  She is following a low sodium diet.      ECHOCARDIOGRAM: 12/18/2022  Interpretation Summary     The left ventricle is normal in size.  The visual ejection fraction is 45-50%.  Possible small area of inferobasilar hypokinesis that looks similar in 2018  echo.  Suspect mild reduction in RVEF  There is mild to moderate (1-2+) mitral regurgitation.  The right ventricular systolic pressure is approximated at 29mmHg plus the  right atrial pressure.  The rhythm was atrial fibrillation with controlled ventricular rate at rest.  Findings similar to echo from 2018.     Physical Examination Review of Systems   /86 (BP Location: Left arm, Patient Position: Sitting, Cuff Size: Adult Regular)   Pulse 64   Resp 16   Ht 1.702 m (5' 7\")   Wt 73 kg (161 lb)   BMI 25.22 kg/m    Body mass index is 25.22 kg/m .  Wt Readings from Last 3 Encounters:   01/25/23 73 kg (161 lb)   12/21/22 67.9 kg (149 lb 12.8 oz)   07/13/21 74.8 kg (165 lb)       General Appearance:   no acute distress   ENT/Mouth: Wearing mask   EYES:  no scleral icterus, normal conjunctivae   Neck: no thyromegaly   Chest/Lungs:   lungs are clear to auscultation, no rales or wheezing, equal chest wall expansion    Cardiovascular:    Irregularly irregular. Normal first and second heart sounds with no murmurs, rubs, or gallops, 2+ edema bilaterally    Abdomen:  bowel sounds are present   Extremities: no cyanosis or clubbing   Skin: no " xanthelasma, warm.    Neurologic: normal  bilateral, no tremors     Psychiatric: alert and oriented x3                                              Medical History  Surgical History Family History Social History   Past Medical History:   Diagnosis Date     Arthritis      Atrial fibrillation (H)      Cancer (H) 01/01/2005     CHF (congestive heart failure) (H)      Chronic kidney disease      Clotting disorder (H) 04/01/2017     COPD (chronic obstructive pulmonary disease) (H)      Degenerative disc disease, lumbar      Emphysema lung (H)      GERD (gastroesophageal reflux disease)      Hyperlipidemia      Hypertension      Kidney stone 04/01/2017     Osteoporosis      Scoliosis      Ventral hernia     Past Surgical History:   Procedure Laterality Date     BIOPSY BREAST Right 2012     BUNIONECTOMY Bilateral 1988     COLECTOMY N/A 03/31/2003     COLON SURGERY       CYST REMOVAL Left 02/01/2007     HERNIORRHAPHY VENTRAL  02/25/2005     HYSTERECTOMY  2004     IR LUMBAR EPIDURAL STEROID INJECTION  8/8/2011     IR LUMBAR EPIDURAL STEROID INJECTION  10/10/2011     IR LUMBAR EPIDURAL STEROID INJECTION  3/8/2012     IR LUMBAR EPIDURAL STEROID INJECTION  4/10/2012     LUMPECTOMY BREAST Right 04/03/2009     OOPHORECTOMY  2004     REPAIR HAMMER TOE Bilateral 1988     STOMACH SURGERY      hyernia repair    Family History   Problem Relation Age of Onset     Brain Cancer Son 29.00     Dementia Mother      Cerebrovascular Disease Mother      Prostate Cancer Father      Aortic aneurysm Father         Abdominal     Hypertension Sister      No Known Problems Daughter      No Known Problems Maternal Grandmother      No Known Problems Maternal Grandfather      No Known Problems Paternal Grandmother      No Known Problems Paternal Grandfather      Breast Cancer Maternal Aunt 70.00     No Known Problems Paternal Aunt      Breast Cancer Maternal Aunt      Other - See Comments Brother         Shant     Other - See Comments Brother          polio     Hereditary Breast and Ovarian Cancer Syndrome No family hx of      Colon Cancer No family hx of      Endometrial Cancer No family hx of      Ovarian Cancer No family hx of     Social History     Socioeconomic History     Marital status:      Spouse name: Not on file     Number of children: Not on file     Years of education: Not on file     Highest education level: Not on file   Occupational History     Not on file   Tobacco Use     Smoking status: Every Day     Packs/day: 0.50     Years: 60.00     Pack years: 30.00     Types: Cigarettes     Last attempt to quit: 2018     Years since quittin.9     Smokeless tobacco: Never     Tobacco comments:     Seen IP by CTTS on 22. Down to 3 cigarettes/Day. Ready for quit attempt.   Vaping Use     Vaping Use: Never used   Substance and Sexual Activity     Alcohol use: No     Drug use: No     Sexual activity: Never   Other Topics Concern     Not on file   Social History Narrative     Not on file     Social Determinants of Health     Financial Resource Strain: Not on file   Food Insecurity: Not on file   Transportation Needs: Not on file   Physical Activity: Not on file   Stress: Not on file   Social Connections: Not on file   Intimate Partner Violence: Not on file   Housing Stability: Not on file          Medications  Allergies   Current Outpatient Medications   Medication Sig Dispense Refill     albuterol (PROAIR HFA/PROVENTIL HFA/VENTOLIN HFA) 108 (90 Base) MCG/ACT inhaler Inhale 2 puffs into the lungs every 6 hours as needed for shortness of breath, wheezing or cough       ascorbic acid, vitamin C, (VITAMIN C) 1000 MG tablet [ASCORBIC ACID, VITAMIN C, (VITAMIN C) 1000 MG TABLET] Take 1,000 mg by mouth daily.        atorvastatin (LIPITOR) 20 MG tablet Take 20 mg by mouth daily       azelastine (ASTELIN) 0.1 % nasal spray Spray 1 spray into both nostrils 2 times daily       budesonide (PULMICORT) 1 MG/2ML neb solution Take 1 mg by nebulization  2 times daily       bumetanide (BUMEX) 1 MG tablet Take 2 tablets (2 mg) by mouth daily 60 tablet 3     calcium, as carbonate, (OS-GILL) 500 mg calcium (1,250 mg) tablet [CALCIUM, AS CARBONATE, (OS-GILL) 500 MG CALCIUM (1,250 MG) TABLET] Take 1 tablet by mouth 2 (two) times a day.       cholecalciferol, vitamin D3, 2,000 unit Tab [CHOLECALCIFEROL, VITAMIN D3, 2,000 UNIT TAB] Take 2,000 Units by mouth daily.       diphenhydrAMINE (BENADRYL) 25 MG tablet Take 25 mg by mouth       ipratropium-albuterol (DUO-NEB) 0.5-2.5 mg/3 mL nebulizer [IPRATROPIUM-ALBUTEROL (DUO-NEB) 0.5-2.5 MG/3 ML NEBULIZER] Take 3 mL by nebulization 3 (three) times a day. Then use up to 4 times daily as needed for shortness of breath. 60 vial 6     losartan (COZAAR) 100 MG tablet Take 1 tablet by mouth daily at 2 pm       magnesium oxide (MAG-OX) 400 mg (241.3 mg magnesium) tablet [MAGNESIUM OXIDE (MAG-OX) 400 MG (241.3 MG MAGNESIUM) TABLET] 400mg in the morning and 800 mg at bedtime 270 tablet 3     methylcellulose (CITRUCEL ORAL) Take 1 Dose by mouth daily       metoprolol succinate ER (TOPROL XL) 25 MG 24 hr tablet Take 3 tablets (75 mg) by mouth daily 30 tablet 3     nystatin (MYCOSTATIN) 099304 UNIT/ML suspension        omeprazole (PRILOSEC) 20 MG DR capsule Take 20 mg by mouth every morning (before breakfast)       PARoxetine (PAXIL) 20 MG tablet [PAROXETINE (PAXIL) 20 MG TABLET] Take 1 tablet (20 mg total) by mouth daily. 90 tablet 3     spironolactone (ALDACTONE) 25 MG tablet        tiotropium (SPIRIVA) 18 mcg inhalation capsule [TIOTROPIUM (SPIRIVA) 18 MCG INHALATION CAPSULE] Place 18 mcg into inhaler and inhale daily.       vitamin E 200 UNIT capsule [VITAMIN E 200 UNIT CAPSULE] Take 200 Units by mouth daily.       warfarin ANTICOAGULANT (COUMADIN) 5 MG tablet Take 2.5-5 mg by mouth daily Take 5mg on Monday, Wednesday and Friday and 2.5mg the rest of the days       cefdinir (OMNICEF) 300 MG capsule Take 1 capsule (300 mg) by mouth daily  (Patient not taking: Reported on 1/25/2023) 1 capsule 0     hydrALAZINE (APRESOLINE) 50 MG tablet Take 1 tablet (50 mg) by mouth 2 times daily (Patient not taking: Reported on 1/25/2023) 60 tablet 2     predniSONE (DELTASONE) 10 MG tablet Take 4 tabs by mouth daily x 1 days, then 3 tabs daily x 3 days, then 2 tab daily x 3 days, then 1 tab daily x 3 days. (Patient not taking: Reported on 1/25/2023) 20 tablet 0    No Known Allergies      Lab Results    Chemistry/lipid CBC Cardiac Enzymes/BNP/TSH/INR   Lab Results   Component Value Date    CHOL 108 08/03/2018    HDL 37 (L) 08/03/2018    TRIG 83 08/03/2018    BUN 59.8 (H) 12/21/2022     12/21/2022    CO2 29 12/21/2022    Lab Results   Component Value Date    WBC 7.8 12/18/2022    HGB 14.9 12/18/2022    HCT 45.6 12/18/2022    MCV 83 12/18/2022     (L) 12/21/2022    Lab Results   Component Value Date    TROPONINI 0.01 07/13/2021    BNP 1,578 (H) 08/04/2018    TSH 2.08 08/06/2019    INR 1.77 (H) 12/21/2022             This note has been dictated using voice recognition software. Any grammatical, typographical, or context distortions are unintentional and inherent to the software    30 minutes spent on the date of encounter doing chart review, review of outside records, review of test results, interpretation with above tests, patient visit and documentation.                Thank you for allowing me to participate in the care of your patient.      Sincerely,     AISLINN Desir [facility]  cc:   Yvette Acosta MD  [address] [place] [address]

## 2023-01-25 NOTE — PROGRESS NOTES
Assessment/Recommendations   Assessment:    1.  heart failure with mildly reduced ejection fraction, ejection fraction 45-50%, NYHA class III: Decompensated.  She has had increased lower extremity edema since hospitalization.  She has chronic dyspnea on exertion.  Her weight has also increased.  We discussed monitoring symptoms, following a low-sodium diet and monitoring daily weights.  She states her primary provider decreased her Bumex to 2 mg one day and 1 mg the other day.  About a week ago she increased her Bumex to 2 mg daily on her own.  2.  Hypertension: Controlled.  Blood pressure 128/86  3.  Chronic kidney disease stage III: BMP pending  4.  Permanent atrial fibrillation: Rate controlled.  She continues warfarin for anticoagulation and Toprol for rate control    Plan:  1.  BMP and NT proBNP pending  2.  Continue current medications  3.  Stressed importance of following a low-sodium diet and monitoring daily weights    Christina Umana will follow up with Dr. Vang in 8 weeks and in the heart failure clinic in 3 weeks.     History of Present Illness/Subjective    Ms. Christina Umana is a 82 year old female seen at [facility] heart failure clinic today for continued follow-up.  She follows up for heart failure with mildly reduced ejection fraction.  She was hospitalized December 18 to December 21 with shortness of breath and acute respiratory failure with hypoxia.  She required BiPAP for support.  Likely multifactorial including COPD exacerbation, acute heart failure and possible CAP.  She was given IV steroids and antibiotics.  She was also given IV diuretics which improved symptoms.  She had an echocardiogram which showed ejection fraction of 45 to 50%.  Her losartan was held due to acute kidney injury and recommended rechecking outpatient and possible restarting losartan.  She is a past medical history significant for hypertension, thrombocytopenia, permanent atrial fibrillation, COPD, chronic  "kidney disease stage III, GERD.    Today, she comes in with increased lower extremity edema and weight gain.  She has chronic dyspnea exertion.  She denies lightheadedness, orthopnea, PND, palpitations, chest pain and abdominal fullness/bloating.      She is monitoring home weights which have increased to 157 pounds.  She is following a low sodium diet.      ECHOCARDIOGRAM: 12/18/2022  Interpretation Summary     The left ventricle is normal in size.  The visual ejection fraction is 45-50%.  Possible small area of inferobasilar hypokinesis that looks similar in 2018  echo.  Suspect mild reduction in RVEF  There is mild to moderate (1-2+) mitral regurgitation.  The right ventricular systolic pressure is approximated at 29mmHg plus the  right atrial pressure.  The rhythm was atrial fibrillation with controlled ventricular rate at rest.  Findings similar to echo from 2018.     Physical Examination Review of Systems   /86 (BP Location: Left arm, Patient Position: Sitting, Cuff Size: Adult Regular)   Pulse 64   Resp 16   Ht 1.702 m (5' 7\")   Wt 73 kg (161 lb)   BMI 25.22 kg/m    Body mass index is 25.22 kg/m .  Wt Readings from Last 3 Encounters:   01/25/23 73 kg (161 lb)   12/21/22 67.9 kg (149 lb 12.8 oz)   07/13/21 74.8 kg (165 lb)       General Appearance:   no acute distress   ENT/Mouth: Wearing mask   EYES:  no scleral icterus, normal conjunctivae   Neck: no thyromegaly   Chest/Lungs:   lungs are clear to auscultation, no rales or wheezing, equal chest wall expansion    Cardiovascular:    Irregularly irregular. Normal first and second heart sounds with no murmurs, rubs, or gallops, 2+ edema bilaterally    Abdomen:  bowel sounds are present   Extremities: no cyanosis or clubbing   Skin: no xanthelasma, warm.    Neurologic: normal  bilateral, no tremors     Psychiatric: alert and oriented x3                                              Medical History  Surgical History Family History Social History "   Past Medical History:   Diagnosis Date     Arthritis      Atrial fibrillation (H)      Cancer (H) 01/01/2005     CHF (congestive heart failure) (H)      Chronic kidney disease      Clotting disorder (H) 04/01/2017     COPD (chronic obstructive pulmonary disease) (H)      Degenerative disc disease, lumbar      Emphysema lung (H)      GERD (gastroesophageal reflux disease)      Hyperlipidemia      Hypertension      Kidney stone 04/01/2017     Osteoporosis      Scoliosis      Ventral hernia     Past Surgical History:   Procedure Laterality Date     BIOPSY BREAST Right 2012     BUNIONECTOMY Bilateral 1988     COLECTOMY N/A 03/31/2003     COLON SURGERY       CYST REMOVAL Left 02/01/2007     HERNIORRHAPHY VENTRAL  02/25/2005     HYSTERECTOMY  2004     IR LUMBAR EPIDURAL STEROID INJECTION  8/8/2011     IR LUMBAR EPIDURAL STEROID INJECTION  10/10/2011     IR LUMBAR EPIDURAL STEROID INJECTION  3/8/2012     IR LUMBAR EPIDURAL STEROID INJECTION  4/10/2012     LUMPECTOMY BREAST Right 04/03/2009     OOPHORECTOMY  2004     REPAIR HAMMER TOE Bilateral 1988     STOMACH SURGERY      hyernia repair    Family History   Problem Relation Age of Onset     Brain Cancer Son 29.00     Dementia Mother      Cerebrovascular Disease Mother      Prostate Cancer Father      Aortic aneurysm Father         Abdominal     Hypertension Sister      No Known Problems Daughter      No Known Problems Maternal Grandmother      No Known Problems Maternal Grandfather      No Known Problems Paternal Grandmother      No Known Problems Paternal Grandfather      Breast Cancer Maternal Aunt 70.00     No Known Problems Paternal Aunt      Breast Cancer Maternal Aunt      Other - See Comments Brother         Polio     Other - See Comments Brother         polio     Hereditary Breast and Ovarian Cancer Syndrome No family hx of      Colon Cancer No family hx of      Endometrial Cancer No family hx of      Ovarian Cancer No family hx of     Social History      Socioeconomic History     Marital status:      Spouse name: Not on file     Number of children: Not on file     Years of education: Not on file     Highest education level: Not on file   Occupational History     Not on file   Tobacco Use     Smoking status: Every Day     Packs/day: 0.50     Years: 60.00     Pack years: 30.00     Types: Cigarettes     Last attempt to quit: 2018     Years since quittin.9     Smokeless tobacco: Never     Tobacco comments:     Seen IP by CTTS on 22. Down to 3 cigarettes/Day. Ready for quit attempt.   Vaping Use     Vaping Use: Never used   Substance and Sexual Activity     Alcohol use: No     Drug use: No     Sexual activity: Never   Other Topics Concern     Not on file   Social History Narrative     Not on file     Social Determinants of Health     Financial Resource Strain: Not on file   Food Insecurity: Not on file   Transportation Needs: Not on file   Physical Activity: Not on file   Stress: Not on file   Social Connections: Not on file   Intimate Partner Violence: Not on file   Housing Stability: Not on file          Medications  Allergies   Current Outpatient Medications   Medication Sig Dispense Refill     albuterol (PROAIR HFA/PROVENTIL HFA/VENTOLIN HFA) 108 (90 Base) MCG/ACT inhaler Inhale 2 puffs into the lungs every 6 hours as needed for shortness of breath, wheezing or cough       ascorbic acid, vitamin C, (VITAMIN C) 1000 MG tablet [ASCORBIC ACID, VITAMIN C, (VITAMIN C) 1000 MG TABLET] Take 1,000 mg by mouth daily.        atorvastatin (LIPITOR) 20 MG tablet Take 20 mg by mouth daily       azelastine (ASTELIN) 0.1 % nasal spray Spray 1 spray into both nostrils 2 times daily       budesonide (PULMICORT) 1 MG/2ML neb solution Take 1 mg by nebulization 2 times daily       bumetanide (BUMEX) 1 MG tablet Take 2 tablets (2 mg) by mouth daily 60 tablet 3     calcium, as carbonate, (OS-GILL) 500 mg calcium (1,250 mg) tablet [CALCIUM, AS CARBONATE, (OS-GILL)  500 MG CALCIUM (1,250 MG) TABLET] Take 1 tablet by mouth 2 (two) times a day.       cholecalciferol, vitamin D3, 2,000 unit Tab [CHOLECALCIFEROL, VITAMIN D3, 2,000 UNIT TAB] Take 2,000 Units by mouth daily.       diphenhydrAMINE (BENADRYL) 25 MG tablet Take 25 mg by mouth       ipratropium-albuterol (DUO-NEB) 0.5-2.5 mg/3 mL nebulizer [IPRATROPIUM-ALBUTEROL (DUO-NEB) 0.5-2.5 MG/3 ML NEBULIZER] Take 3 mL by nebulization 3 (three) times a day. Then use up to 4 times daily as needed for shortness of breath. 60 vial 6     losartan (COZAAR) 100 MG tablet Take 1 tablet by mouth daily at 2 pm       magnesium oxide (MAG-OX) 400 mg (241.3 mg magnesium) tablet [MAGNESIUM OXIDE (MAG-OX) 400 MG (241.3 MG MAGNESIUM) TABLET] 400mg in the morning and 800 mg at bedtime 270 tablet 3     methylcellulose (CITRUCEL ORAL) Take 1 Dose by mouth daily       metoprolol succinate ER (TOPROL XL) 25 MG 24 hr tablet Take 3 tablets (75 mg) by mouth daily 30 tablet 3     nystatin (MYCOSTATIN) 673513 UNIT/ML suspension        omeprazole (PRILOSEC) 20 MG DR capsule Take 20 mg by mouth every morning (before breakfast)       PARoxetine (PAXIL) 20 MG tablet [PAROXETINE (PAXIL) 20 MG TABLET] Take 1 tablet (20 mg total) by mouth daily. 90 tablet 3     spironolactone (ALDACTONE) 25 MG tablet        tiotropium (SPIRIVA) 18 mcg inhalation capsule [TIOTROPIUM (SPIRIVA) 18 MCG INHALATION CAPSULE] Place 18 mcg into inhaler and inhale daily.       vitamin E 200 UNIT capsule [VITAMIN E 200 UNIT CAPSULE] Take 200 Units by mouth daily.       warfarin ANTICOAGULANT (COUMADIN) 5 MG tablet Take 2.5-5 mg by mouth daily Take 5mg on Monday, Wednesday and Friday and 2.5mg the rest of the days       cefdinir (OMNICEF) 300 MG capsule Take 1 capsule (300 mg) by mouth daily (Patient not taking: Reported on 1/25/2023) 1 capsule 0     hydrALAZINE (APRESOLINE) 50 MG tablet Take 1 tablet (50 mg) by mouth 2 times daily (Patient not taking: Reported on 1/25/2023) 60 tablet 2      predniSONE (DELTASONE) 10 MG tablet Take 4 tabs by mouth daily x 1 days, then 3 tabs daily x 3 days, then 2 tab daily x 3 days, then 1 tab daily x 3 days. (Patient not taking: Reported on 1/25/2023) 20 tablet 0    No Known Allergies      Lab Results    Chemistry/lipid CBC Cardiac Enzymes/BNP/TSH/INR   Lab Results   Component Value Date    CHOL 108 08/03/2018    HDL 37 (L) 08/03/2018    TRIG 83 08/03/2018    BUN 59.8 (H) 12/21/2022     12/21/2022    CO2 29 12/21/2022    Lab Results   Component Value Date    WBC 7.8 12/18/2022    HGB 14.9 12/18/2022    HCT 45.6 12/18/2022    MCV 83 12/18/2022     (L) 12/21/2022    Lab Results   Component Value Date    TROPONINI 0.01 07/13/2021    BNP 1,578 (H) 08/04/2018    TSH 2.08 08/06/2019    INR 1.77 (H) 12/21/2022             This note has been dictated using voice recognition software. Any grammatical, typographical, or context distortions are unintentional and inherent to the software    30 minutes spent on the date of encounter doing chart review, review of outside records, review of test results, interpretation with above tests, patient visit and documentation.

## 2023-01-25 NOTE — PATIENT INSTRUCTIONS
Christina Umana,    It was a pleasure to see you today at [facility].     My recommendations after this visit include:  - Please follow up with Dr Vang in 8 weeks   - Please follow up with Val Sosa in 3 weeks  - I have checked labs and will contact you with results  - Continue current medications    Val Sosa, CNP

## 2023-02-14 ENCOUNTER — OFFICE VISIT (OUTPATIENT)
Dept: CARDIOLOGY | Facility: CLINIC | Age: 83
End: 2023-02-14
Payer: COMMERCIAL

## 2023-02-14 VITALS
BODY MASS INDEX: 24.9 KG/M2 | RESPIRATION RATE: 14 BRPM | DIASTOLIC BLOOD PRESSURE: 88 MMHG | WEIGHT: 159 LBS | HEART RATE: 60 BPM | SYSTOLIC BLOOD PRESSURE: 138 MMHG

## 2023-02-14 DIAGNOSIS — I48.20 CHRONIC ATRIAL FIBRILLATION (H): ICD-10-CM

## 2023-02-14 DIAGNOSIS — I50.20 HEART FAILURE WITH REDUCED EJECTION FRACTION (H): Primary | ICD-10-CM

## 2023-02-14 DIAGNOSIS — I10 ESSENTIAL HYPERTENSION: ICD-10-CM

## 2023-02-14 PROCEDURE — 99214 OFFICE O/P EST MOD 30 MIN: CPT | Performed by: NURSE PRACTITIONER

## 2023-02-14 NOTE — PATIENT INSTRUCTIONS
Christina Umana,    It was a pleasure to see you today at [facility].     My recommendations after this visit include:  - Please follow up with Dr Vang March 29   - Please follow up with Val Sosa in 4 months  - Continue current medications    Val Sosa, CNP

## 2023-02-14 NOTE — LETTER
2/14/2023    ASHISH REGAN  [facility] [address]  [place] [address]    RE: Christina Umana       Dear Colleague,     I had the pleasure of seeing Christina Umana in the [facility].        Assessment/Recommendations   Assessment:    1.  heart failure with mildly reduced ejection fraction, ejection fraction 45-50%, NYHA class II: She continues to have dyspnea on exertion and occasional lower extremity edema.  She does state her lower extremity edema has improved overall.  Her weight has been stable.  She is trying to follow a low-sodium diet.  2.  Hypertension: Slightly elevated today.  Blood pressure 138/88  3.  Permanent atrial fibrillation: Rate controlled.  She continues warfarin for anticoagulation and Toprol for rate control    Plan:  1.  Continue current medications  2.  Continue monitoring daily weights and following a low-sodium diet  3.  PFTs scheduled March 8    Christina Umana will follow up with Dr. Vang March 29 and in the heart failure clinic in 4 months or sooner if needed.     History of Present Illness/Subjective    Ms. Christina Umana is a 82 year old female seen at [facility] heart failure clinic today for continued follow-up.  She follows up for heart failure with preserved ejection fraction.  She was hospitalized in December with shortness of breath and acute respiratory failure with hypoxia.  She required BiPAP for support.  Likely multifactorial including COPD exacerbation, acute heart failure and possible CAP.  She was given IV steroids and antibiotics.  She was also given IV diuretics which improved symptoms.  She had an echocardiogram which showed ejection fraction of 45 to 50%. She is a past medical history significant for hypertension, thrombocytopenia, permanent atrial fibrillation, COPD, chronic kidney disease stage III, GERD.    Today, she continues to have fatigue, dyspnea on exertion and occasional lower extremity edema.  She denies  lightheadedness, orthopnea, PND, palpitations, chest pain and abdominal fullness/bloating.      She is monitoring home weights which are stable between 156-159 pounds.  She is following a low sodium diet.      Physical Examination Review of Systems   /88 (BP Location: Left arm, Patient Position: Sitting, Cuff Size: Adult Regular)   Pulse 60   Resp 14   Wt 72.1 kg (159 lb)   BMI 24.90 kg/m    Body mass index is 24.9 kg/m .  Wt Readings from Last 3 Encounters:   02/14/23 72.1 kg (159 lb)   01/25/23 73 kg (161 lb)   12/21/22 67.9 kg (149 lb 12.8 oz)       General Appearance:   no acute distress   ENT/Mouth: Wearing mask   EYES:  no scleral icterus, normal conjunctivae   Neck: no thyromegaly   Chest/Lungs:   lungs are decreased to auscultation, no rales or wheezing, equal chest wall expansion    Cardiovascular:    Irregularly irregular. Normal first and second heart sounds with no murmurs, rubs, or gallops, 1+ left lower extremity edema, trace right lower extremity edema    Abdomen:  bowel sounds are present   Extremities: no cyanosis or clubbing   Skin: no xanthelasma, warm.    Neurologic: normal  bilateral, no tremors     Psychiatric: alert and oriented x3                                              Medical History  Surgical History Family History Social History   Past Medical History:   Diagnosis Date     Arthritis      Atrial fibrillation (H)      Cancer (H) 01/01/2005     CHF (congestive heart failure) (H)      Chronic kidney disease      Clotting disorder (H) 04/01/2017     COPD (chronic obstructive pulmonary disease) (H)      Degenerative disc disease, lumbar      Emphysema lung (H)      GERD (gastroesophageal reflux disease)      Hyperlipidemia      Hypertension      Kidney stone 04/01/2017     Osteoporosis      Scoliosis      Ventral hernia     Past Surgical History:   Procedure Laterality Date     BIOPSY BREAST Right 2012     BUNIONECTOMY Bilateral 1988     COLECTOMY N/A 03/31/2003     COLON  SURGERY       CYST REMOVAL Left 2007     HERNIORRHAPHY VENTRAL  2005     HYSTERECTOMY  2004     IR LUMBAR EPIDURAL STEROID INJECTION  2011     IR LUMBAR EPIDURAL STEROID INJECTION  10/10/2011     IR LUMBAR EPIDURAL STEROID INJECTION  3/8/2012     IR LUMBAR EPIDURAL STEROID INJECTION  4/10/2012     LUMPECTOMY BREAST Right 2009     OOPHORECTOMY  2004     REPAIR HAMMER TOE Bilateral 1988     STOMACH SURGERY      hyernia repair    Family History   Problem Relation Age of Onset     Brain Cancer Son 29.00     Dementia Mother      Cerebrovascular Disease Mother      Prostate Cancer Father      Aortic aneurysm Father         Abdominal     Hypertension Sister      No Known Problems Daughter      No Known Problems Maternal Grandmother      No Known Problems Maternal Grandfather      No Known Problems Paternal Grandmother      No Known Problems Paternal Grandfather      Breast Cancer Maternal Aunt 70.00     No Known Problems Paternal Aunt      Breast Cancer Maternal Aunt      Other - See Comments Brother         Polio     Other - See Comments Brother         polio     Hereditary Breast and Ovarian Cancer Syndrome No family hx of      Colon Cancer No family hx of      Endometrial Cancer No family hx of      Ovarian Cancer No family hx of     Social History     Socioeconomic History     Marital status:      Spouse name: Not on file     Number of children: Not on file     Years of education: Not on file     Highest education level: Not on file   Occupational History     Not on file   Tobacco Use     Smoking status: Every Day     Packs/day: 0.50     Years: 60.00     Pack years: 30.00     Types: Cigarettes     Last attempt to quit: 2018     Years since quittin.9     Smokeless tobacco: Never     Tobacco comments:     Seen IP by CTTS on 22. Down to 3 cigarettes/Day. Ready for quit attempt.   Vaping Use     Vaping Use: Never used   Substance and Sexual Activity     Alcohol use: No     Drug  use: No     Sexual activity: Never   Other Topics Concern     Not on file   Social History Narrative     Not on file     Social Determinants of Health     Financial Resource Strain: Not on file   Food Insecurity: Not on file   Transportation Needs: Not on file   Physical Activity: Not on file   Stress: Not on file   Social Connections: Not on file   Intimate Partner Violence: Not on file   Housing Stability: Not on file          Medications  Allergies   Current Outpatient Medications   Medication Sig Dispense Refill     albuterol (PROAIR HFA/PROVENTIL HFA/VENTOLIN HFA) 108 (90 Base) MCG/ACT inhaler Inhale 2 puffs into the lungs every 6 hours as needed for shortness of breath, wheezing or cough       ascorbic acid, vitamin C, (VITAMIN C) 1000 MG tablet [ASCORBIC ACID, VITAMIN C, (VITAMIN C) 1000 MG TABLET] Take 1,000 mg by mouth daily.        atorvastatin (LIPITOR) 20 MG tablet Take 20 mg by mouth daily       azelastine (ASTELIN) 0.1 % nasal spray Spray 1 spray into both nostrils 2 times daily       budesonide (PULMICORT) 1 MG/2ML neb solution Take 1 mg by nebulization 2 times daily       bumetanide (BUMEX) 1 MG tablet Take 2 tablets (2 mg) by mouth daily 60 tablet 3     calcium, as carbonate, (OS-GILL) 500 mg calcium (1,250 mg) tablet [CALCIUM, AS CARBONATE, (OS-GILL) 500 MG CALCIUM (1,250 MG) TABLET] Take 1 tablet by mouth 2 (two) times a day.       cholecalciferol, vitamin D3, 2,000 unit Tab [CHOLECALCIFEROL, VITAMIN D3, 2,000 UNIT TAB] Take 2,000 Units by mouth daily.       diphenhydrAMINE (BENADRYL) 25 MG tablet Take 25 mg by mouth       ipratropium-albuterol (DUO-NEB) 0.5-2.5 mg/3 mL nebulizer [IPRATROPIUM-ALBUTEROL (DUO-NEB) 0.5-2.5 MG/3 ML NEBULIZER] Take 3 mL by nebulization 3 (three) times a day. Then use up to 4 times daily as needed for shortness of breath. 60 vial 6     losartan (COZAAR) 100 MG tablet Take 1 tablet by mouth daily at 2 pm       magnesium oxide (MAG-OX) 400 mg (241.3 mg magnesium) tablet  [MAGNESIUM OXIDE (MAG-OX) 400 MG (241.3 MG MAGNESIUM) TABLET] 400mg in the morning and 800 mg at bedtime 270 tablet 3     methylcellulose (CITRUCEL ORAL) Take 1 Dose by mouth daily       metoprolol succinate ER (TOPROL XL) 25 MG 24 hr tablet Take 3 tablets (75 mg) by mouth daily 30 tablet 3     omeprazole (PRILOSEC) 20 MG DR capsule Take 20 mg by mouth every morning (before breakfast)       PARoxetine (PAXIL) 20 MG tablet [PAROXETINE (PAXIL) 20 MG TABLET] Take 1 tablet (20 mg total) by mouth daily. 90 tablet 3     spironolactone (ALDACTONE) 25 MG tablet        tiotropium (SPIRIVA) 18 mcg inhalation capsule [TIOTROPIUM (SPIRIVA) 18 MCG INHALATION CAPSULE] Place 18 mcg into inhaler and inhale daily.       vitamin E 200 UNIT capsule [VITAMIN E 200 UNIT CAPSULE] Take 200 Units by mouth daily.       warfarin ANTICOAGULANT (COUMADIN) 5 MG tablet Take 2.5-5 mg by mouth daily Take 5mg on Monday, Wednesday and Friday and 2.5mg the rest of the days      No Known Allergies      Lab Results    Chemistry/lipid CBC Cardiac Enzymes/BNP/TSH/INR   Lab Results   Component Value Date    CHOL 108 08/03/2018    HDL 37 (L) 08/03/2018    TRIG 83 08/03/2018    BUN 39.2 (H) 01/25/2023     01/25/2023    CO2 27 01/25/2023    Lab Results   Component Value Date    WBC 7.8 12/18/2022    HGB 14.9 12/18/2022    HCT 45.6 12/18/2022    MCV 83 12/18/2022     (L) 12/21/2022    Lab Results   Component Value Date    TROPONINI 0.01 07/13/2021    BNP 1,578 (H) 08/04/2018    TSH 2.08 08/06/2019    INR 1.77 (H) 12/21/2022             This note has been dictated using voice recognition software. Any grammatical, typographical, or context distortions are unintentional and inherent to the software    30 minutes spent on the date of encounter doing chart review, review of outside records, review of test results, interpretation with above tests, patient visit and documentation.                Thank you for allowing me to participate in the care of  your patient.      Sincerely,     AISLINN Desir [facility]  cc:   No referring provider defined for this encounter.

## 2023-02-14 NOTE — PROGRESS NOTES
Assessment/Recommendations   Assessment:    1.  heart failure with mildly reduced ejection fraction, ejection fraction 45-50%, NYHA class II: She continues to have dyspnea on exertion and occasional lower extremity edema.  She does state her lower extremity edema has improved overall.  Her weight has been stable.  She is trying to follow a low-sodium diet.  2.  Hypertension: Slightly elevated today.  Blood pressure 138/88  3.  Permanent atrial fibrillation: Rate controlled.  She continues warfarin for anticoagulation and Toprol for rate control    Plan:  1.  Continue current medications  2.  Continue monitoring daily weights and following a low-sodium diet  3.  PFTs scheduled March 8    Christina Umana will follow up with Dr. Vang March 29 and in the heart failure clinic in 4 months or sooner if needed.     History of Present Illness/Subjective    Ms. Christina Umana is a 82 year old female seen at [facility] heart failure clinic today for continued follow-up.  She follows up for heart failure with preserved ejection fraction.  She was hospitalized in December with shortness of breath and acute respiratory failure with hypoxia.  She required BiPAP for support.  Likely multifactorial including COPD exacerbation, acute heart failure and possible CAP.  She was given IV steroids and antibiotics.  She was also given IV diuretics which improved symptoms.  She had an echocardiogram which showed ejection fraction of 45 to 50%. She is a past medical history significant for hypertension, thrombocytopenia, permanent atrial fibrillation, COPD, chronic kidney disease stage III, GERD.    Today, she continues to have fatigue, dyspnea on exertion and occasional lower extremity edema.  She denies lightheadedness, orthopnea, PND, palpitations, chest pain and abdominal fullness/bloating.      She is monitoring home weights which are stable between 156-159 pounds.  She is following a low sodium diet.      Physical  Examination Review of Systems   /88 (BP Location: Left arm, Patient Position: Sitting, Cuff Size: Adult Regular)   Pulse 60   Resp 14   Wt 72.1 kg (159 lb)   BMI 24.90 kg/m    Body mass index is 24.9 kg/m .  Wt Readings from Last 3 Encounters:   02/14/23 72.1 kg (159 lb)   01/25/23 73 kg (161 lb)   12/21/22 67.9 kg (149 lb 12.8 oz)       General Appearance:   no acute distress   ENT/Mouth: Wearing mask   EYES:  no scleral icterus, normal conjunctivae   Neck: no thyromegaly   Chest/Lungs:   lungs are decreased to auscultation, no rales or wheezing, equal chest wall expansion    Cardiovascular:    Irregularly irregular. Normal first and second heart sounds with no murmurs, rubs, or gallops, 1+ left lower extremity edema, trace right lower extremity edema    Abdomen:  bowel sounds are present   Extremities: no cyanosis or clubbing   Skin: no xanthelasma, warm.    Neurologic: normal  bilateral, no tremors     Psychiatric: alert and oriented x3                                              Medical History  Surgical History Family History Social History   Past Medical History:   Diagnosis Date     Arthritis      Atrial fibrillation (H)      Cancer (H) 01/01/2005     CHF (congestive heart failure) (H)      Chronic kidney disease      Clotting disorder (H) 04/01/2017     COPD (chronic obstructive pulmonary disease) (H)      Degenerative disc disease, lumbar      Emphysema lung (H)      GERD (gastroesophageal reflux disease)      Hyperlipidemia      Hypertension      Kidney stone 04/01/2017     Osteoporosis      Scoliosis      Ventral hernia     Past Surgical History:   Procedure Laterality Date     BIOPSY BREAST Right 2012     BUNIONECTOMY Bilateral 1988     COLECTOMY N/A 03/31/2003     COLON SURGERY       CYST REMOVAL Left 02/01/2007     HERNIORRHAPHY VENTRAL  02/25/2005     HYSTERECTOMY  2004     IR LUMBAR EPIDURAL STEROID INJECTION  8/8/2011     IR LUMBAR EPIDURAL STEROID INJECTION  10/10/2011     IR LUMBAR  EPIDURAL STEROID INJECTION  3/8/2012     IR LUMBAR EPIDURAL STEROID INJECTION  4/10/2012     LUMPECTOMY BREAST Right 2009     OOPHORECTOMY  2004     REPAIR HAMMER TOE Bilateral 1988     STOMACH SURGERY      hyernia repair    Family History   Problem Relation Age of Onset     Brain Cancer Son 29.00     Dementia Mother      Cerebrovascular Disease Mother      Prostate Cancer Father      Aortic aneurysm Father         Abdominal     Hypertension Sister      No Known Problems Daughter      No Known Problems Maternal Grandmother      No Known Problems Maternal Grandfather      No Known Problems Paternal Grandmother      No Known Problems Paternal Grandfather      Breast Cancer Maternal Aunt 70.00     No Known Problems Paternal Aunt      Breast Cancer Maternal Aunt      Other - See Comments Brother         Polilyssa     Other - See Comments Brother         polilyssa     Hereditary Breast and Ovarian Cancer Syndrome No family hx of      Colon Cancer No family hx of      Endometrial Cancer No family hx of      Ovarian Cancer No family hx of     Social History     Socioeconomic History     Marital status:      Spouse name: Not on file     Number of children: Not on file     Years of education: Not on file     Highest education level: Not on file   Occupational History     Not on file   Tobacco Use     Smoking status: Every Day     Packs/day: 0.50     Years: 60.00     Pack years: 30.00     Types: Cigarettes     Last attempt to quit: 2018     Years since quittin.9     Smokeless tobacco: Never     Tobacco comments:     Seen IP by CTTS on 22. Down to 3 cigarettes/Day. Ready for quit attempt.   Vaping Use     Vaping Use: Never used   Substance and Sexual Activity     Alcohol use: No     Drug use: No     Sexual activity: Never   Other Topics Concern     Not on file   Social History Narrative     Not on file     Social Determinants of Health     Financial Resource Strain: Not on file   Food Insecurity: Not on  file   Transportation Needs: Not on file   Physical Activity: Not on file   Stress: Not on file   Social Connections: Not on file   Intimate Partner Violence: Not on file   Housing Stability: Not on file          Medications  Allergies   Current Outpatient Medications   Medication Sig Dispense Refill     albuterol (PROAIR HFA/PROVENTIL HFA/VENTOLIN HFA) 108 (90 Base) MCG/ACT inhaler Inhale 2 puffs into the lungs every 6 hours as needed for shortness of breath, wheezing or cough       ascorbic acid, vitamin C, (VITAMIN C) 1000 MG tablet [ASCORBIC ACID, VITAMIN C, (VITAMIN C) 1000 MG TABLET] Take 1,000 mg by mouth daily.        atorvastatin (LIPITOR) 20 MG tablet Take 20 mg by mouth daily       azelastine (ASTELIN) 0.1 % nasal spray Spray 1 spray into both nostrils 2 times daily       budesonide (PULMICORT) 1 MG/2ML neb solution Take 1 mg by nebulization 2 times daily       bumetanide (BUMEX) 1 MG tablet Take 2 tablets (2 mg) by mouth daily 60 tablet 3     calcium, as carbonate, (OS-GILL) 500 mg calcium (1,250 mg) tablet [CALCIUM, AS CARBONATE, (OS-GILL) 500 MG CALCIUM (1,250 MG) TABLET] Take 1 tablet by mouth 2 (two) times a day.       cholecalciferol, vitamin D3, 2,000 unit Tab [CHOLECALCIFEROL, VITAMIN D3, 2,000 UNIT TAB] Take 2,000 Units by mouth daily.       diphenhydrAMINE (BENADRYL) 25 MG tablet Take 25 mg by mouth       ipratropium-albuterol (DUO-NEB) 0.5-2.5 mg/3 mL nebulizer [IPRATROPIUM-ALBUTEROL (DUO-NEB) 0.5-2.5 MG/3 ML NEBULIZER] Take 3 mL by nebulization 3 (three) times a day. Then use up to 4 times daily as needed for shortness of breath. 60 vial 6     losartan (COZAAR) 100 MG tablet Take 1 tablet by mouth daily at 2 pm       magnesium oxide (MAG-OX) 400 mg (241.3 mg magnesium) tablet [MAGNESIUM OXIDE (MAG-OX) 400 MG (241.3 MG MAGNESIUM) TABLET] 400mg in the morning and 800 mg at bedtime 270 tablet 3     methylcellulose (CITRUCEL ORAL) Take 1 Dose by mouth daily       metoprolol succinate ER (TOPROL XL)  25 MG 24 hr tablet Take 3 tablets (75 mg) by mouth daily 30 tablet 3     omeprazole (PRILOSEC) 20 MG DR capsule Take 20 mg by mouth every morning (before breakfast)       PARoxetine (PAXIL) 20 MG tablet [PAROXETINE (PAXIL) 20 MG TABLET] Take 1 tablet (20 mg total) by mouth daily. 90 tablet 3     spironolactone (ALDACTONE) 25 MG tablet        tiotropium (SPIRIVA) 18 mcg inhalation capsule [TIOTROPIUM (SPIRIVA) 18 MCG INHALATION CAPSULE] Place 18 mcg into inhaler and inhale daily.       vitamin E 200 UNIT capsule [VITAMIN E 200 UNIT CAPSULE] Take 200 Units by mouth daily.       warfarin ANTICOAGULANT (COUMADIN) 5 MG tablet Take 2.5-5 mg by mouth daily Take 5mg on Monday, Wednesday and Friday and 2.5mg the rest of the days      No Known Allergies      Lab Results    Chemistry/lipid CBC Cardiac Enzymes/BNP/TSH/INR   Lab Results   Component Value Date    CHOL 108 08/03/2018    HDL 37 (L) 08/03/2018    TRIG 83 08/03/2018    BUN 39.2 (H) 01/25/2023     01/25/2023    CO2 27 01/25/2023    Lab Results   Component Value Date    WBC 7.8 12/18/2022    HGB 14.9 12/18/2022    HCT 45.6 12/18/2022    MCV 83 12/18/2022     (L) 12/21/2022    Lab Results   Component Value Date    TROPONINI 0.01 07/13/2021    BNP 1,578 (H) 08/04/2018    TSH 2.08 08/06/2019    INR 1.77 (H) 12/21/2022             This note has been dictated using voice recognition software. Any grammatical, typographical, or context distortions are unintentional and inherent to the software    30 minutes spent on the date of encounter doing chart review, review of outside records, review of test results, interpretation with above tests, patient visit and documentation.

## 2023-02-21 ENCOUNTER — TELEPHONE (OUTPATIENT)
Dept: RESPIRATORY THERAPY | Facility: HOSPITAL | Age: 83
End: 2023-02-21
Payer: COMMERCIAL

## 2023-02-21 NOTE — TELEPHONE ENCOUNTER
COPD Education follow up call:  Left message with call back number should pt have questions or concerns and COPD Action Plan reminder.  Shahnaz Longo, RT, TTS, Chronic Pulmonary Disease Specialist

## 2023-03-21 ENCOUNTER — TELEPHONE (OUTPATIENT)
Dept: RESPIRATORY THERAPY | Facility: HOSPITAL | Age: 83
End: 2023-03-21
Payer: COMMERCIAL

## 2023-03-21 NOTE — TELEPHONE ENCOUNTER
I spoke with Christina. She is feeling well today and at baseline as far as her breathing is concerned. She is able to get and take her medications and is compliant in taking them. Reviewed COPD Action Plan. Christina had no problems or questions for me today.    Shahnaz Longo, RT, TTS, Chronic Pulmonary Disease Specialist

## 2023-04-11 ENCOUNTER — HOSPITAL ENCOUNTER (OUTPATIENT)
Dept: PET IMAGING | Facility: HOSPITAL | Age: 83
Discharge: HOME | End: 2023-04-11
Attending: INTERNAL MEDICINE | Admitting: INTERNAL MEDICINE
Payer: COMMERCIAL

## 2023-04-11 DIAGNOSIS — R91.1 LUNG NODULE: ICD-10-CM

## 2023-04-11 LAB — GLUCOSE BLDC GLUCOMTR-MCNC: 122 MG/DL (ref 70–99)

## 2023-04-11 PROCEDURE — 343N000001 HC RX 343

## 2023-04-11 PROCEDURE — A9552 F18 FDG: HCPCS

## 2023-04-11 PROCEDURE — 82962 GLUCOSE BLOOD TEST: CPT

## 2023-04-11 PROCEDURE — 78815 PET IMAGE W/CT SKULL-THIGH: CPT | Mod: PI

## 2023-04-11 RX ADMIN — FLUDEOXYGLUCOSE F-18 10.16 MILLICURIE: 500 INJECTION, SOLUTION INTRAVENOUS at 11:24

## 2023-04-19 ENCOUNTER — NURSE TRIAGE (OUTPATIENT)
Dept: NURSING | Facility: CLINIC | Age: 83
End: 2023-04-19
Payer: COMMERCIAL

## 2023-04-19 NOTE — TELEPHONE ENCOUNTER
Joanna Peck is calling to give verbal orders for the patient to obtain PFT's in clinic.   [facility] unable to fullfill request, transferred caller to [facility] for pulmonology scheduling.

## 2023-04-20 ENCOUNTER — TELEPHONE (OUTPATIENT)
Dept: RESPIRATORY THERAPY | Facility: HOSPITAL | Age: 83
End: 2023-04-20
Payer: COMMERCIAL

## 2023-04-20 NOTE — TELEPHONE ENCOUNTER
Spoke with Christina, doing good, no questions for me to day. no issues getting and/or taking their medications, reviewed their action plan. Reminded when we will call again and to call before if there is a question.  Zara Lubin, RT, CTTS, Chronic Pulmonary Disease Specialist

## 2023-05-22 ENCOUNTER — TELEPHONE (OUTPATIENT)
Dept: RESPIRATORY THERAPY | Facility: HOSPITAL | Age: 83
End: 2023-05-22
Payer: COMMERCIAL

## 2023-06-21 ENCOUNTER — TELEPHONE (OUTPATIENT)
Dept: RESPIRATORY THERAPY | Facility: HOSPITAL | Age: 83
End: 2023-06-21
Payer: COMMERCIAL

## 2023-06-21 NOTE — TELEPHONE ENCOUNTER
Spoke with Christina, doing alright, tired today, did too much this morning, briefly talked about pacing, no questions for me to day. no issues getting and/or taking their medications, reviewed their action plan, not regular but trying too.  Reminded when we will call again and to call before if there is a question.  Zara Lubin, RT, CTTS, Chronic Pulmonary Disease Specialist

## 2023-07-07 ENCOUNTER — OFFICE VISIT (OUTPATIENT)
Dept: CARDIOLOGY | Facility: CLINIC | Age: 83
End: 2023-07-07
Attending: NURSE PRACTITIONER
Payer: COMMERCIAL

## 2023-07-07 VITALS
BODY MASS INDEX: 24.28 KG/M2 | DIASTOLIC BLOOD PRESSURE: 82 MMHG | RESPIRATION RATE: 20 BRPM | WEIGHT: 155 LBS | HEART RATE: 58 BPM | SYSTOLIC BLOOD PRESSURE: 134 MMHG | OXYGEN SATURATION: 98 %

## 2023-07-07 DIAGNOSIS — I50.20 HEART FAILURE WITH REDUCED EJECTION FRACTION (H): Primary | ICD-10-CM

## 2023-07-07 DIAGNOSIS — I48.20 CHRONIC ATRIAL FIBRILLATION (H): ICD-10-CM

## 2023-07-07 DIAGNOSIS — I10 ESSENTIAL HYPERTENSION: ICD-10-CM

## 2023-07-07 LAB
ANION GAP SERPL CALCULATED.3IONS-SCNC: 13 MMOL/L (ref 7–15)
BUN SERPL-MCNC: 42 MG/DL (ref 8–23)
CALCIUM SERPL-MCNC: 9.9 MG/DL (ref 8.8–10.2)
CHLORIDE SERPL-SCNC: 98 MMOL/L (ref 98–107)
CREAT SERPL-MCNC: 1.53 MG/DL (ref 0.51–0.95)
DEPRECATED HCO3 PLAS-SCNC: 26 MMOL/L (ref 22–29)
GFR SERPL CREATININE-BSD FRML MDRD: 34 ML/MIN/1.73M2
GLUCOSE SERPL-MCNC: 129 MG/DL (ref 70–99)
POTASSIUM SERPL-SCNC: 4.2 MMOL/L (ref 3.4–5.3)
SODIUM SERPL-SCNC: 137 MMOL/L (ref 136–145)

## 2023-07-07 PROCEDURE — 36415 COLL VENOUS BLD VENIPUNCTURE: CPT | Performed by: NURSE PRACTITIONER

## 2023-07-07 PROCEDURE — 80048 BASIC METABOLIC PNL TOTAL CA: CPT | Performed by: NURSE PRACTITIONER

## 2023-07-07 PROCEDURE — 99214 OFFICE O/P EST MOD 30 MIN: CPT | Performed by: NURSE PRACTITIONER

## 2023-07-07 NOTE — LETTER
7/7/2023    ASHISH REGAN  [facility] [address] [facility] [address]    RE: Christina Umana       Dear Colleague,     I had the pleasure of seeing Christina Umana in the [facility].        Assessment/Recommendations   Assessment:    1.  heart failure with mildly reduced ejection fraction, ejection fraction 45-50%, NYHA class III: Compensated.  She has chronic dyspnea on exertion.  Her weights have been stable.  She has trace left lower extremity edema.  2.  Hypertension: Slightly elevated.  Blood pressure 162/74 with a recheck of 134/82  3.  Permanent atrial fibrillation: Rate controlled.  She continues warfarin for anticoagulation and Toprol for rate control    Plan:  1.  Continue current medications  2.  Continue monitoring daily weights and following a low-salt diet  3.  Encouraged regular activity  4.  BMP pending    Christina REDDY Lyndsay will follow up with Dr. Vang August 29 and in the heart failure clinic in 4 months.     History of Present Illness/Subjective    Ms. Christina Umana is a 83 year old female seen at [facility] heart failure clinic today for continued follow-up. She follows up for heart failure with mildly reduced ejection fraction.  She had an echocardiogram in December 2022 which showed ejection fraction of 45 to 50%. She is a past medical history significant for hypertension, thrombocytopenia, permanent atrial fibrillation, COPD, chronic kidney disease stage III, GERD.  She was recently diagnosed this spring with left lung cancer.    Today, she has chronic dyspnea on exertion and trace left lower extremity edema.  She denies lightheadedness, orthopnea, PND, palpitations, chest pain and abdominal fullness/bloating.      She is monitoring home weights which are stable between 152-156 pounds.  She is following a low sodium diet.       Physical Examination Review of Systems   /82   Pulse 58   Resp 20   Wt 70.3 kg (155 lb)   SpO2 98%   BMI 24.28 kg/m     Body mass index is 24.28 kg/m .  Wt Readings from Last 3 Encounters:   07/07/23 70.3 kg (155 lb)   02/14/23 72.1 kg (159 lb)   01/25/23 73 kg (161 lb)       General Appearance:   no acute distress   ENT/Mouth: No abnormalities   EYES:  no scleral icterus, normal conjunctivae   Neck: no thyromegaly   Chest/Lungs:   lungs are decreased to auscultation, no rales or wheezing, equal chest wall expansion    Cardiovascular:    Irregularly irregular. Normal first and second heart sounds with no murmurs, rubs, or gallops, trace left lower extremity edema     Abdomen:  bowel sounds are present   Extremities: no cyanosis or clubbing   Skin: warm   Neurologic: no tremors     Psychiatric: alert and oriented x3                                              Medical History  Surgical History Family History Social History   Past Medical History:   Diagnosis Date    Arthritis     Atrial fibrillation (H)     Cancer (H) 01/01/2005    CHF (congestive heart failure) (H)     Chronic kidney disease     Clotting disorder (H) 04/01/2017    COPD (chronic obstructive pulmonary disease) (H)     Degenerative disc disease, lumbar     Emphysema lung (H)     GERD (gastroesophageal reflux disease)     Hyperlipidemia     Hypertension     Kidney stone 04/01/2017    Osteoporosis     Scoliosis     Ventral hernia     Past Surgical History:   Procedure Laterality Date    BIOPSY BREAST Right 2012    BUNIONECTOMY Bilateral 1988    COLECTOMY N/A 03/31/2003    COLON SURGERY      CYST REMOVAL Left 02/01/2007    HERNIORRHAPHY VENTRAL  02/25/2005    HYSTERECTOMY  2004    IR LUMBAR EPIDURAL STEROID INJECTION  8/8/2011    IR LUMBAR EPIDURAL STEROID INJECTION  10/10/2011    IR LUMBAR EPIDURAL STEROID INJECTION  3/8/2012    IR LUMBAR EPIDURAL STEROID INJECTION  4/10/2012    LUMPECTOMY BREAST Right 04/03/2009    OOPHORECTOMY  2004    REPAIR HAMMER TOE Bilateral 1988    STOMACH SURGERY      hyernia repair    Family History   Problem Relation Age of Onset    Brain  Cancer Son 29.00    Dementia Mother     Cerebrovascular Disease Mother     Prostate Cancer Father     Aortic aneurysm Father         Abdominal    Hypertension Sister     No Known Problems Daughter     No Known Problems Maternal Grandmother     No Known Problems Maternal Grandfather     No Known Problems Paternal Grandmother     No Known Problems Paternal Grandfather     Breast Cancer Maternal Aunt 70.00    No Known Problems Paternal Aunt     Breast Cancer Maternal Aunt     Other - See Comments Brother         Jayy    Other - See Comments Brother         jayy    Hereditary Breast and Ovarian Cancer Syndrome No family hx of     Colon Cancer No family hx of     Endometrial Cancer No family hx of     Ovarian Cancer No family hx of     Social History     Socioeconomic History    Marital status:      Spouse name: Not on file    Number of children: Not on file    Years of education: Not on file    Highest education level: Not on file   Occupational History    Not on file   Tobacco Use    Smoking status: Every Day     Packs/day: 0.50     Years: 60.00     Pack years: 30.00     Types: Cigarettes     Last attempt to quit: 2018     Years since quittin.3    Smokeless tobacco: Never    Tobacco comments:     Seen IP by CTTS on 22. Down to 3 cigarettes/Day. Ready for quit attempt.   Vaping Use    Vaping Use: Never used   Substance and Sexual Activity    Alcohol use: No    Drug use: No    Sexual activity: Never   Other Topics Concern    Not on file   Social History Narrative    Not on file     Social Determinants of Health     Financial Resource Strain: Not on file   Food Insecurity: Not on file   Transportation Needs: Not on file   Physical Activity: Not on file   Stress: Not on file   Social Connections: Not on file   Intimate Partner Violence: Not on file   Housing Stability: Not on file          Medications  Allergies   Current Outpatient Medications   Medication Sig Dispense Refill    albuterol (PROAIR  HFA/PROVENTIL HFA/VENTOLIN HFA) 108 (90 Base) MCG/ACT inhaler Inhale 2 puffs into the lungs every 6 hours as needed for shortness of breath, wheezing or cough      ascorbic acid, vitamin C, (VITAMIN C) 1000 MG tablet [ASCORBIC ACID, VITAMIN C, (VITAMIN C) 1000 MG TABLET] Take 1,000 mg by mouth daily.       atorvastatin (LIPITOR) 20 MG tablet Take 20 mg by mouth daily      azelastine (ASTELIN) 0.1 % nasal spray Spray 1 spray into both nostrils 2 times daily      budesonide (PULMICORT) 1 MG/2ML neb solution Take 1 mg by nebulization 2 times daily      bumetanide (BUMEX) 1 MG tablet Take 2 tablets (2 mg) by mouth daily 60 tablet 3    calcium, as carbonate, (OS-GILL) 500 mg calcium (1,250 mg) tablet [CALCIUM, AS CARBONATE, (OS-GILL) 500 MG CALCIUM (1,250 MG) TABLET] Take 1 tablet by mouth 2 (two) times a day.      cholecalciferol, vitamin D3, 2,000 unit Tab [CHOLECALCIFEROL, VITAMIN D3, 2,000 UNIT TAB] Take 2,000 Units by mouth daily.      diphenhydrAMINE (BENADRYL) 25 MG tablet Take 25 mg by mouth daily as needed      ipratropium-albuterol (DUO-NEB) 0.5-2.5 mg/3 mL nebulizer [IPRATROPIUM-ALBUTEROL (DUO-NEB) 0.5-2.5 MG/3 ML NEBULIZER] Take 3 mL by nebulization 3 (three) times a day. Then use up to 4 times daily as needed for shortness of breath. 60 vial 6    losartan (COZAAR) 100 MG tablet Take 1 tablet by mouth daily at 2 pm      magnesium oxide (MAG-OX) 400 mg (241.3 mg magnesium) tablet [MAGNESIUM OXIDE (MAG-OX) 400 MG (241.3 MG MAGNESIUM) TABLET] 400mg in the morning and 800 mg at bedtime 270 tablet 3    methylcellulose (CITRUCEL ORAL) Take 1 Dose by mouth daily      metoprolol succinate ER (TOPROL XL) 25 MG 24 hr tablet Take 3 tablets (75 mg) by mouth daily 30 tablet 3    omeprazole (PRILOSEC) 20 MG DR capsule Take 20 mg by mouth every morning (before breakfast)      PARoxetine (PAXIL) 20 MG tablet [PAROXETINE (PAXIL) 20 MG TABLET] Take 1 tablet (20 mg total) by mouth daily. 90 tablet 3    spironolactone (ALDACTONE)  25 MG tablet Take 25 mg by mouth daily      tiotropium (SPIRIVA) 18 mcg inhalation capsule [TIOTROPIUM (SPIRIVA) 18 MCG INHALATION CAPSULE] Place 18 mcg into inhaler and inhale daily.      vitamin E 200 UNIT capsule [VITAMIN E 200 UNIT CAPSULE] Take 200 Units by mouth daily.      warfarin ANTICOAGULANT (COUMADIN) 5 MG tablet Take 2.5-5 mg by mouth daily Take 5mg on Monday, Wednesday and Friday and 2.5mg the rest of the days      No Known Allergies      Lab Results    Chemistry/lipid CBC Cardiac Enzymes/BNP/TSH/INR   Lab Results   Component Value Date    CHOL 108 08/03/2018    HDL 37 (L) 08/03/2018    TRIG 83 08/03/2018    BUN 39.2 (H) 01/25/2023     01/25/2023    CO2 27 01/25/2023    Lab Results   Component Value Date    WBC 7.8 12/18/2022    HGB 14.9 12/18/2022    HCT 45.6 12/18/2022    MCV 83 12/18/2022     (L) 12/21/2022    Lab Results   Component Value Date    TROPONINI 0.01 07/13/2021    BNP 1,578 (H) 08/04/2018    TSH 2.08 08/06/2019    INR 1.77 (H) 12/21/2022             This note has been dictated using voice recognition software. Any grammatical, typographical, or context distortions are unintentional and inherent to the software    30 minutes spent on the date of encounter doing chart review, review of outside records, review of test results, interpretation with above tests, patient visit and documentation.                  Thank you for allowing me to participate in the care of your patient.      Sincerely,     AISLINN Desir CNP     [facility]  cc:   AISLINN Desir CNP  [address], [address]  [place] [address]

## 2023-07-07 NOTE — PATIENT INSTRUCTIONS
Christina Umana,    It was a pleasure to see you today at [facility].     My recommendations after this visit include:  - Please follow up with Dr Vang August 29   - Please follow up with Val Sosa in November  - Continue current medications  - I have checked labs    Val Sosa, CNP

## 2023-07-07 NOTE — PROGRESS NOTES
Assessment/Recommendations   Assessment:    1.  heart failure with mildly reduced ejection fraction, ejection fraction 45-50%, NYHA class III: Compensated.  She has chronic dyspnea on exertion.  Her weights have been stable.  She has trace left lower extremity edema.  2.  Hypertension: Slightly elevated.  Blood pressure 162/74 with a recheck of 134/82  3.  Permanent atrial fibrillation: Rate controlled.  She continues warfarin for anticoagulation and Toprol for rate control    Plan:  1.  Continue current medications  2.  Continue monitoring daily weights and following a low-salt diet  3.  Encouraged regular activity  4.  BMP pending    Christina Umana will follow up with Dr. Vang August 29 and in the heart failure clinic in 4 months.     History of Present Illness/Subjective    Ms. Christina Umana is a 83 year old female seen at [facility] heart failure clinic today for continued follow-up. She follows up for heart failure with mildly reduced ejection fraction.  She had an echocardiogram in December 2022 which showed ejection fraction of 45 to 50%. She is a past medical history significant for hypertension, thrombocytopenia, permanent atrial fibrillation, COPD, chronic kidney disease stage III, GERD.  She was recently diagnosed this spring with left lung cancer.    Today, she has chronic dyspnea on exertion and trace left lower extremity edema.  She denies lightheadedness, orthopnea, PND, palpitations, chest pain and abdominal fullness/bloating.      She is monitoring home weights which are stable between 152-156 pounds.  She is following a low sodium diet.       Physical Examination Review of Systems   /82   Pulse 58   Resp 20   Wt 70.3 kg (155 lb)   SpO2 98%   BMI 24.28 kg/m    Body mass index is 24.28 kg/m .  Wt Readings from Last 3 Encounters:   07/07/23 70.3 kg (155 lb)   02/14/23 72.1 kg (159 lb)   01/25/23 73 kg (161 lb)       General Appearance:   no acute distress   ENT/Mouth: No  abnormalities   EYES:  no scleral icterus, normal conjunctivae   Neck: no thyromegaly   Chest/Lungs:   lungs are decreased to auscultation, no rales or wheezing, equal chest wall expansion    Cardiovascular:    Irregularly irregular. Normal first and second heart sounds with no murmurs, rubs, or gallops, trace left lower extremity edema     Abdomen:  bowel sounds are present   Extremities: no cyanosis or clubbing   Skin: warm   Neurologic: no tremors     Psychiatric: alert and oriented x3                                              Medical History  Surgical History Family History Social History   Past Medical History:   Diagnosis Date     Arthritis      Atrial fibrillation (H)      Cancer (H) 01/01/2005     CHF (congestive heart failure) (H)      Chronic kidney disease      Clotting disorder (H) 04/01/2017     COPD (chronic obstructive pulmonary disease) (H)      Degenerative disc disease, lumbar      Emphysema lung (H)      GERD (gastroesophageal reflux disease)      Hyperlipidemia      Hypertension      Kidney stone 04/01/2017     Osteoporosis      Scoliosis      Ventral hernia     Past Surgical History:   Procedure Laterality Date     BIOPSY BREAST Right 2012     BUNIONECTOMY Bilateral 1988     COLECTOMY N/A 03/31/2003     COLON SURGERY       CYST REMOVAL Left 02/01/2007     HERNIORRHAPHY VENTRAL  02/25/2005     HYSTERECTOMY  2004     IR LUMBAR EPIDURAL STEROID INJECTION  8/8/2011     IR LUMBAR EPIDURAL STEROID INJECTION  10/10/2011     IR LUMBAR EPIDURAL STEROID INJECTION  3/8/2012     IR LUMBAR EPIDURAL STEROID INJECTION  4/10/2012     LUMPECTOMY BREAST Right 04/03/2009     OOPHORECTOMY  2004     REPAIR HAMMER TOE Bilateral 1988     STOMACH SURGERY      hyernia repair    Family History   Problem Relation Age of Onset     Brain Cancer Son 29.00     Dementia Mother      Cerebrovascular Disease Mother      Prostate Cancer Father      Aortic aneurysm Father         Abdominal     Hypertension Sister      No Known  Problems Daughter      No Known Problems Maternal Grandmother      No Known Problems Maternal Grandfather      No Known Problems Paternal Grandmother      No Known Problems Paternal Grandfather      Breast Cancer Maternal Aunt 70.00     No Known Problems Paternal Aunt      Breast Cancer Maternal Aunt      Other - See Comments Brother         Jayy     Other - See Comments Brother jayy     Hereditary Breast and Ovarian Cancer Syndrome No family hx of      Colon Cancer No family hx of      Endometrial Cancer No family hx of      Ovarian Cancer No family hx of     Social History     Socioeconomic History     Marital status:      Spouse name: Not on file     Number of children: Not on file     Years of education: Not on file     Highest education level: Not on file   Occupational History     Not on file   Tobacco Use     Smoking status: Every Day     Packs/day: 0.50     Years: 60.00     Pack years: 30.00     Types: Cigarettes     Last attempt to quit: 2018     Years since quittin.3     Smokeless tobacco: Never     Tobacco comments:     Seen IP by CTTS on 22. Down to 3 cigarettes/Day. Ready for quit attempt.   Vaping Use     Vaping Use: Never used   Substance and Sexual Activity     Alcohol use: No     Drug use: No     Sexual activity: Never   Other Topics Concern     Not on file   Social History Narrative     Not on file     Social Determinants of Health     Financial Resource Strain: Not on file   Food Insecurity: Not on file   Transportation Needs: Not on file   Physical Activity: Not on file   Stress: Not on file   Social Connections: Not on file   Intimate Partner Violence: Not on file   Housing Stability: Not on file          Medications  Allergies   Current Outpatient Medications   Medication Sig Dispense Refill     albuterol (PROAIR HFA/PROVENTIL HFA/VENTOLIN HFA) 108 (90 Base) MCG/ACT inhaler Inhale 2 puffs into the lungs every 6 hours as needed for shortness of breath, wheezing or  cough       ascorbic acid, vitamin C, (VITAMIN C) 1000 MG tablet [ASCORBIC ACID, VITAMIN C, (VITAMIN C) 1000 MG TABLET] Take 1,000 mg by mouth daily.        atorvastatin (LIPITOR) 20 MG tablet Take 20 mg by mouth daily       azelastine (ASTELIN) 0.1 % nasal spray Spray 1 spray into both nostrils 2 times daily       budesonide (PULMICORT) 1 MG/2ML neb solution Take 1 mg by nebulization 2 times daily       bumetanide (BUMEX) 1 MG tablet Take 2 tablets (2 mg) by mouth daily 60 tablet 3     calcium, as carbonate, (OS-GILL) 500 mg calcium (1,250 mg) tablet [CALCIUM, AS CARBONATE, (OS-GILL) 500 MG CALCIUM (1,250 MG) TABLET] Take 1 tablet by mouth 2 (two) times a day.       cholecalciferol, vitamin D3, 2,000 unit Tab [CHOLECALCIFEROL, VITAMIN D3, 2,000 UNIT TAB] Take 2,000 Units by mouth daily.       diphenhydrAMINE (BENADRYL) 25 MG tablet Take 25 mg by mouth daily as needed       ipratropium-albuterol (DUO-NEB) 0.5-2.5 mg/3 mL nebulizer [IPRATROPIUM-ALBUTEROL (DUO-NEB) 0.5-2.5 MG/3 ML NEBULIZER] Take 3 mL by nebulization 3 (three) times a day. Then use up to 4 times daily as needed for shortness of breath. 60 vial 6     losartan (COZAAR) 100 MG tablet Take 1 tablet by mouth daily at 2 pm       magnesium oxide (MAG-OX) 400 mg (241.3 mg magnesium) tablet [MAGNESIUM OXIDE (MAG-OX) 400 MG (241.3 MG MAGNESIUM) TABLET] 400mg in the morning and 800 mg at bedtime 270 tablet 3     methylcellulose (CITRUCEL ORAL) Take 1 Dose by mouth daily       metoprolol succinate ER (TOPROL XL) 25 MG 24 hr tablet Take 3 tablets (75 mg) by mouth daily 30 tablet 3     omeprazole (PRILOSEC) 20 MG DR capsule Take 20 mg by mouth every morning (before breakfast)       PARoxetine (PAXIL) 20 MG tablet [PAROXETINE (PAXIL) 20 MG TABLET] Take 1 tablet (20 mg total) by mouth daily. 90 tablet 3     spironolactone (ALDACTONE) 25 MG tablet Take 25 mg by mouth daily       tiotropium (SPIRIVA) 18 mcg inhalation capsule [TIOTROPIUM (SPIRIVA) 18 MCG INHALATION  CAPSULE] Place 18 mcg into inhaler and inhale daily.       vitamin E 200 UNIT capsule [VITAMIN E 200 UNIT CAPSULE] Take 200 Units by mouth daily.       warfarin ANTICOAGULANT (COUMADIN) 5 MG tablet Take 2.5-5 mg by mouth daily Take 5mg on Monday, Wednesday and Friday and 2.5mg the rest of the days      No Known Allergies      Lab Results    Chemistry/lipid CBC Cardiac Enzymes/BNP/TSH/INR   Lab Results   Component Value Date    CHOL 108 08/03/2018    HDL 37 (L) 08/03/2018    TRIG 83 08/03/2018    BUN 39.2 (H) 01/25/2023     01/25/2023    CO2 27 01/25/2023    Lab Results   Component Value Date    WBC 7.8 12/18/2022    HGB 14.9 12/18/2022    HCT 45.6 12/18/2022    MCV 83 12/18/2022     (L) 12/21/2022    Lab Results   Component Value Date    TROPONINI 0.01 07/13/2021    BNP 1,578 (H) 08/04/2018    TSH 2.08 08/06/2019    INR 1.77 (H) 12/21/2022             This note has been dictated using voice recognition software. Any grammatical, typographical, or context distortions are unintentional and inherent to the software    30 minutes spent on the date of encounter doing chart review, review of outside records, review of test results, interpretation with above tests, patient visit and documentation.

## 2023-07-24 ENCOUNTER — TELEPHONE (OUTPATIENT)
Dept: RESPIRATORY THERAPY | Facility: HOSPITAL | Age: 83
End: 2023-07-24
Payer: COMMERCIAL

## 2023-07-24 NOTE — TELEPHONE ENCOUNTER
Left message for them call back with any questions they may have, our phone number, reminders, and when we plan to call again.    Zara Lubin, RT, Chronic Pulmonary Disease Specialist

## 2023-08-21 ENCOUNTER — TELEPHONE (OUTPATIENT)
Dept: RESPIRATORY THERAPY | Facility: HOSPITAL | Age: 83
End: 2023-08-21
Payer: COMMERCIAL

## 2023-08-21 NOTE — TELEPHONE ENCOUNTER
Spoke with Christina, doing good, no questions for me to day. no issues getting and/or taking their medications, reviewed their action plan. She just cleaned a bunch of papers up and did not see her blue folder or action plan. Will mail out new inforation and action plan to her. Reminded when we will call again and to call before if there is a question.  Zara Lubin, RT, CTTS, Chronic Pulmonary Disease Specialist

## 2023-08-29 ENCOUNTER — OFFICE VISIT (OUTPATIENT)
Dept: CARDIOLOGY | Facility: CLINIC | Age: 83
End: 2023-08-29
Payer: COMMERCIAL

## 2023-08-29 VITALS
RESPIRATION RATE: 20 BRPM | WEIGHT: 154 LBS | DIASTOLIC BLOOD PRESSURE: 66 MMHG | OXYGEN SATURATION: 94 % | SYSTOLIC BLOOD PRESSURE: 158 MMHG | BODY MASS INDEX: 24.12 KG/M2 | HEART RATE: 64 BPM

## 2023-08-29 DIAGNOSIS — I25.5 ISCHEMIC CARDIOMYOPATHY: Primary | ICD-10-CM

## 2023-08-29 PROCEDURE — 99214 OFFICE O/P EST MOD 30 MIN: CPT | Performed by: INTERNAL MEDICINE

## 2023-08-29 NOTE — LETTER
8/29/2023    ASHISH REGAN  [facility] [address]  [place] [address]    RE: Christina Jacobler       Dear Colleague,     I had the pleasure of seeing Christina Umana in the [facility].    HEART CARE ENCOUNTER CONSULTATON NOTE      [facility]  [phone]      Assessment/Recommendations   Assessment:    1.  Chronic heart failure with reduced ejection fraction: Appears well compensated at this time.  2.  Hypertension: Mildly elevated however normally better controlled  3.  Chronic atrial fibrillation  4.  Cardiomyopathy.  Cannot find prior ischemic work-up and will recommend Lexiscan nuclear stress testing.  Discussed with patient given her recent diagnosis of lung cancer and inclination not to proceed with treatment for this.  She would like to proceed with stress testing for further evaluation.  5.  Chronic kidney disease  6.  COPD     Plan:  1.  Continue on Coumadin for anticoagulation  2.  Continue on Bumex 2 mg daily, spironolactone  3.  Continue on low doses of metoprolol and losartan  4.  Lexiscan nuclear stress test  Follow-up in 6 months         History of Present Illness/Subjective    HPI: Christina Umana is a 83 year old female with history of chronic heart failure with reduced ejection fraction with an LVEF of 45-50%, active tobacco abuse, lung nodules (likely lung cancer has elected not to proceed with further diagnosis/management), COPD, stage III chronic kidney disease, chronic atrial fibrillation, hypertension who I am seeing today in follow-up.  Have not seen her for a few years now.  Since I last saw her she was diagnosed with likely lung cancer and has elected not to proceed with treatment.  Her weights have remained stable around 155-157 pounds.  Denies any chest pain.  She has chronic dyspnea on exertion.    Echocardiogram 12/18/2022  The left ventricle is normal in size.  The visual ejection fraction is 45-50%.  Possible small area of inferobasilar  hypokinesis that looks similar in 2018  echo.  Suspect mild reduction in RVEF  There is mild to moderate (1-2+) mitral regurgitation.  The right ventricular systolic pressure is approximated at 29mmHg plus the  right atrial pressure.  The rhythm was atrial fibrillation with controlled ventricular rate at rest.  Findings similar to echo from 2018.       Physical Examination  Review of Systems   Vitals: BP (!) 158/66 (BP Location: Right arm, Patient Position: Sitting, Cuff Size: Adult Regular)   Pulse 64   Resp 20   Wt 69.9 kg (154 lb)   SpO2 94%   BMI 24.12 kg/m    BMI= Body mass index is 24.12 kg/m .  Wt Readings from Last 3 Encounters:   08/29/23 69.9 kg (154 lb)   07/07/23 70.3 kg (155 lb)   02/14/23 72.1 kg (159 lb)       General Appearance:   no distress, normal body habitus   ENT/Mouth: membranes moist, no oral lesions or bleeding gums.      EYES:  no scleral icterus, normal conjunctivae   Neck: no carotid bruits or thyromegaly   Chest/Lungs:   lungs are clear to auscultation   Cardiovascular:   Regular. Normal first and second heart sounds with no murmur no edema bilaterally        Extremities: no cyanosis or clubbing   Skin: no xanthelasma, warm.    Neurologic: normal  bilateral, no tremors     Psychiatric: alert and oriented x3, calm        Please refer above for cardiac ROS details.        Medical History  Surgical History Family History Social History   Past Medical History:   Diagnosis Date    Arthritis     Atrial fibrillation (H)     Cancer (H) 01/01/2005    CHF (congestive heart failure) (H)     Chronic kidney disease     Clotting disorder (H) 04/01/2017    COPD (chronic obstructive pulmonary disease) (H)     Degenerative disc disease, lumbar     Emphysema lung (H)     GERD (gastroesophageal reflux disease)     Hyperlipidemia     Hypertension     Kidney stone 04/01/2017    Osteoporosis     Scoliosis     Ventral hernia      Past Surgical History:   Procedure Laterality Date    BIOPSY BREAST  Right 2012    BUNIONECTOMY Bilateral 1988    COLECTOMY N/A 2003    COLON SURGERY      CYST REMOVAL Left 2007    HERNIORRHAPHY VENTRAL  2005    HYSTERECTOMY  2004    IR LUMBAR EPIDURAL STEROID INJECTION  2011    IR LUMBAR EPIDURAL STEROID INJECTION  10/10/2011    IR LUMBAR EPIDURAL STEROID INJECTION  3/8/2012    IR LUMBAR EPIDURAL STEROID INJECTION  4/10/2012    LUMPECTOMY BREAST Right 2009    OOPHORECTOMY  2004    REPAIR HAMMER TOE Bilateral 1988    STOMACH SURGERY      hyernia repair     Family History   Problem Relation Age of Onset    Brain Cancer Son 29.00    Dementia Mother     Cerebrovascular Disease Mother     Prostate Cancer Father     Aortic aneurysm Father         Abdominal    Hypertension Sister     No Known Problems Daughter     No Known Problems Maternal Grandmother     No Known Problems Maternal Grandfather     No Known Problems Paternal Grandmother     No Known Problems Paternal Grandfather     Breast Cancer Maternal Aunt 70.00    No Known Problems Paternal Aunt     Breast Cancer Maternal Aunt     Other - See Comments Brother         Polio    Other - See Comments Brother         polio    Hereditary Breast and Ovarian Cancer Syndrome No family hx of     Colon Cancer No family hx of     Endometrial Cancer No family hx of     Ovarian Cancer No family hx of         Social History     Socioeconomic History    Marital status:      Spouse name: Not on file    Number of children: Not on file    Years of education: Not on file    Highest education level: Not on file   Occupational History    Not on file   Tobacco Use    Smoking status: Every Day     Packs/day: 0.50     Years: 60.00     Pack years: 30.00     Types: Cigarettes     Last attempt to quit: 2018     Years since quittin.5    Smokeless tobacco: Never    Tobacco comments:     Seen IP by CTTS on 22. Down to 3 cigarettes/Day. Ready for quit attempt.   Vaping Use    Vaping Use: Never used   Substance and  Sexual Activity    Alcohol use: No    Drug use: No    Sexual activity: Never   Other Topics Concern    Not on file   Social History Narrative    Not on file     Social Determinants of Health     Financial Resource Strain: Not on file   Food Insecurity: Not on file   Transportation Needs: Not on file   Physical Activity: Not on file   Stress: Not on file   Social Connections: Not on file   Intimate Partner Violence: Not on file   Housing Stability: Not on file           Medications  Allergies   Current Outpatient Medications   Medication Sig Dispense Refill    albuterol (PROAIR HFA/PROVENTIL HFA/VENTOLIN HFA) 108 (90 Base) MCG/ACT inhaler Inhale 2 puffs into the lungs every 6 hours as needed for shortness of breath, wheezing or cough      ascorbic acid, vitamin C, (VITAMIN C) 1000 MG tablet [ASCORBIC ACID, VITAMIN C, (VITAMIN C) 1000 MG TABLET] Take 1,000 mg by mouth daily.       atorvastatin (LIPITOR) 20 MG tablet Take 20 mg by mouth daily      azelastine (ASTELIN) 0.1 % nasal spray Spray 1 spray into both nostrils 2 times daily      budesonide (PULMICORT) 1 MG/2ML neb solution Take 1 mg by nebulization 2 times daily      bumetanide (BUMEX) 1 MG tablet Take 2 tablets (2 mg) by mouth daily 60 tablet 3    calcium, as carbonate, (OS-GILL) 500 mg calcium (1,250 mg) tablet [CALCIUM, AS CARBONATE, (OS-GILL) 500 MG CALCIUM (1,250 MG) TABLET] Take 1 tablet by mouth 2 (two) times a day.      cholecalciferol, vitamin D3, 2,000 unit Tab [CHOLECALCIFEROL, VITAMIN D3, 2,000 UNIT TAB] Take 2,000 Units by mouth daily.      diphenhydrAMINE (BENADRYL) 25 MG tablet Take 25 mg by mouth daily as needed      ipratropium-albuterol (DUO-NEB) 0.5-2.5 mg/3 mL nebulizer [IPRATROPIUM-ALBUTEROL (DUO-NEB) 0.5-2.5 MG/3 ML NEBULIZER] Take 3 mL by nebulization 3 (three) times a day. Then use up to 4 times daily as needed for shortness of breath. 60 vial 6    losartan (COZAAR) 100 MG tablet Take 1 tablet by mouth daily at 2 pm      magnesium oxide  (MAG-OX) 400 mg (241.3 mg magnesium) tablet [MAGNESIUM OXIDE (MAG-OX) 400 MG (241.3 MG MAGNESIUM) TABLET] 400mg in the morning and 800 mg at bedtime 270 tablet 3    methylcellulose (CITRUCEL ORAL) Take 1 Dose by mouth daily      metoprolol succinate ER (TOPROL XL) 25 MG 24 hr tablet Take 3 tablets (75 mg) by mouth daily 30 tablet 3    omeprazole (PRILOSEC) 20 MG DR capsule Take 20 mg by mouth every morning (before breakfast)      PARoxetine (PAXIL) 20 MG tablet [PAROXETINE (PAXIL) 20 MG TABLET] Take 1 tablet (20 mg total) by mouth daily. 90 tablet 3    spironolactone (ALDACTONE) 25 MG tablet Take 25 mg by mouth daily      tiotropium (SPIRIVA) 18 mcg inhalation capsule [TIOTROPIUM (SPIRIVA) 18 MCG INHALATION CAPSULE] Place 18 mcg into inhaler and inhale daily.      vitamin E 200 UNIT capsule [VITAMIN E 200 UNIT CAPSULE] Take 200 Units by mouth daily.      warfarin ANTICOAGULANT (COUMADIN) 5 MG tablet Take 2.5-5 mg by mouth daily Take 5mg on Monday, Wednesday and Friday and 2.5mg the rest of the days       No Known Allergies       Lab Results    Chemistry/lipid CBC Cardiac Enzymes/BNP/TSH/INR   Recent Labs   Lab Test 08/03/18  0903   CHOL 108   HDL 37*   LDL 54   TRIG 83     Recent Labs   Lab Test 08/03/18  0903   LDL 54     Recent Labs   Lab Test 07/07/23  1528      POTASSIUM 4.2   CHLORIDE 98   CO2 26   *   BUN 42.0*   CR 1.53*   GFRESTIMATED 34*   GILL 9.9     Recent Labs   Lab Test 07/07/23  1528 01/25/23  1528 12/21/22  0447   CR 1.53* 1.70* 1.72*     Recent Labs   Lab Test 08/06/18  0433 02/17/18  0237 02/09/16  1420   A1C 6.3* 6.1 5.9          Recent Labs   Lab Test 12/21/22  0447 12/18/22  0741   WBC  --  7.8   HGB  --  14.9   HCT  --  45.6   MCV  --  83   * 111*     Recent Labs   Lab Test 12/18/22  0741 07/13/21  1500 02/02/21  1036   HGB 14.9 14.7 15.8    Recent Labs   Lab Test 07/13/21  1459 08/05/18  0900 08/05/18  0329   TROPONINI 0.01 0.01 0.02     Recent Labs   Lab Test 01/25/23  1528  12/18/22  0741 08/04/18  2153 08/03/18  0903 04/17/18  1352   BNP  --   --  1,578* 855* 718*   NTBNPI  --  6,531*  --   --   --    NTBNP 3,652*  --   --   --   --      Recent Labs   Lab Test 08/06/19  1348   TSH 2.08     Recent Labs   Lab Test 12/21/22  0447 12/20/22  0430 12/19/22  0441   INR 1.77* 2.36* 3.35*        Yesi Vang MD      Thank you for allowing me to participate in the care of your patient.      Sincerely,     Yesi Vang MD     [facility]  cc:   No referring provider defined for this encounter.

## 2023-08-29 NOTE — PROGRESS NOTES
HEART CARE ENCOUNTER CONSULTATON NOTE      [facility]  [phone]      Assessment/Recommendations   Assessment:    1.  Chronic heart failure with reduced ejection fraction: Appears well compensated at this time.  2.  Hypertension: Mildly elevated however normally better controlled  3.  Chronic atrial fibrillation  4.  Cardiomyopathy.  Cannot find prior ischemic work-up and will recommend Lexiscan nuclear stress testing.  Discussed with patient given her recent diagnosis of lung cancer and inclination not to proceed with treatment for this.  She would like to proceed with stress testing for further evaluation.  5.  Chronic kidney disease  6.  COPD     Plan:  1.  Continue on Coumadin for anticoagulation  2.  Continue on Bumex 2 mg daily, spironolactone  3.  Continue on low doses of metoprolol and losartan  4.  Lexiscan nuclear stress test  Follow-up in 6 months         History of Present Illness/Subjective    HPI: Christina Umana is a 83 year old female with history of chronic heart failure with reduced ejection fraction with an LVEF of 45-50%, active tobacco abuse, lung nodules (likely lung cancer has elected not to proceed with further diagnosis/management), COPD, stage III chronic kidney disease, chronic atrial fibrillation, hypertension who I am seeing today in follow-up.  Have not seen her for a few years now.  Since I last saw her she was diagnosed with likely lung cancer and has elected not to proceed with treatment.  Her weights have remained stable around 155-157 pounds.  Denies any chest pain.  She has chronic dyspnea on exertion.    Echocardiogram 12/18/2022  The left ventricle is normal in size.  The visual ejection fraction is 45-50%.  Possible small area of inferobasilar hypokinesis that looks similar in 2018  echo.  Suspect mild reduction in RVEF  There is mild to moderate (1-2+) mitral regurgitation.  The right ventricular systolic pressure is approximated at 29mmHg plus  the  right atrial pressure.  The rhythm was atrial fibrillation with controlled ventricular rate at rest.  Findings similar to echo from 2018.       Physical Examination  Review of Systems   Vitals: BP (!) 158/66 (BP Location: Right arm, Patient Position: Sitting, Cuff Size: Adult Regular)   Pulse 64   Resp 20   Wt 69.9 kg (154 lb)   SpO2 94%   BMI 24.12 kg/m    BMI= Body mass index is 24.12 kg/m .  Wt Readings from Last 3 Encounters:   08/29/23 69.9 kg (154 lb)   07/07/23 70.3 kg (155 lb)   02/14/23 72.1 kg (159 lb)       General Appearance:   no distress, normal body habitus   ENT/Mouth: membranes moist, no oral lesions or bleeding gums.      EYES:  no scleral icterus, normal conjunctivae   Neck: no carotid bruits or thyromegaly   Chest/Lungs:   lungs are clear to auscultation   Cardiovascular:   Regular. Normal first and second heart sounds with no murmur no edema bilaterally        Extremities: no cyanosis or clubbing   Skin: no xanthelasma, warm.    Neurologic: normal  bilateral, no tremors     Psychiatric: alert and oriented x3, calm        Please refer above for cardiac ROS details.        Medical History  Surgical History Family History Social History   Past Medical History:   Diagnosis Date     Arthritis      Atrial fibrillation (H)      Cancer (H) 01/01/2005     CHF (congestive heart failure) (H)      Chronic kidney disease      Clotting disorder (H) 04/01/2017     COPD (chronic obstructive pulmonary disease) (H)      Degenerative disc disease, lumbar      Emphysema lung (H)      GERD (gastroesophageal reflux disease)      Hyperlipidemia      Hypertension      Kidney stone 04/01/2017     Osteoporosis      Scoliosis      Ventral hernia      Past Surgical History:   Procedure Laterality Date     BIOPSY BREAST Right 2012     BUNIONECTOMY Bilateral 1988     COLECTOMY N/A 03/31/2003     COLON SURGERY       CYST REMOVAL Left 02/01/2007     HERNIORRHAPHY VENTRAL  02/25/2005     HYSTERECTOMY  2004     IR  LUMBAR EPIDURAL STEROID INJECTION  2011     IR LUMBAR EPIDURAL STEROID INJECTION  10/10/2011     IR LUMBAR EPIDURAL STEROID INJECTION  3/8/2012     IR LUMBAR EPIDURAL STEROID INJECTION  4/10/2012     LUMPECTOMY BREAST Right 2009     OOPHORECTOMY  2004     REPAIR HAMMER TOE Bilateral 1988     STOMACH SURGERY      hyernia repair     Family History   Problem Relation Age of Onset     Brain Cancer Son 29.00     Dementia Mother      Cerebrovascular Disease Mother      Prostate Cancer Father      Aortic aneurysm Father         Abdominal     Hypertension Sister      No Known Problems Daughter      No Known Problems Maternal Grandmother      No Known Problems Maternal Grandfather      No Known Problems Paternal Grandmother      No Known Problems Paternal Grandfather      Breast Cancer Maternal Aunt 70.00     No Known Problems Paternal Aunt      Breast Cancer Maternal Aunt      Other - See Comments Brother         Polio     Other - See Comments Brother         polio     Hereditary Breast and Ovarian Cancer Syndrome No family hx of      Colon Cancer No family hx of      Endometrial Cancer No family hx of      Ovarian Cancer No family hx of         Social History     Socioeconomic History     Marital status:      Spouse name: Not on file     Number of children: Not on file     Years of education: Not on file     Highest education level: Not on file   Occupational History     Not on file   Tobacco Use     Smoking status: Every Day     Packs/day: 0.50     Years: 60.00     Pack years: 30.00     Types: Cigarettes     Last attempt to quit: 2018     Years since quittin.5     Smokeless tobacco: Never     Tobacco comments:     Seen IP by CTTS on 22. Down to 3 cigarettes/Day. Ready for quit attempt.   Vaping Use     Vaping Use: Never used   Substance and Sexual Activity     Alcohol use: No     Drug use: No     Sexual activity: Never   Other Topics Concern     Not on file   Social History Narrative      Not on file     Social Determinants of Health     Financial Resource Strain: Not on file   Food Insecurity: Not on file   Transportation Needs: Not on file   Physical Activity: Not on file   Stress: Not on file   Social Connections: Not on file   Intimate Partner Violence: Not on file   Housing Stability: Not on file           Medications  Allergies   Current Outpatient Medications   Medication Sig Dispense Refill     albuterol (PROAIR HFA/PROVENTIL HFA/VENTOLIN HFA) 108 (90 Base) MCG/ACT inhaler Inhale 2 puffs into the lungs every 6 hours as needed for shortness of breath, wheezing or cough       ascorbic acid, vitamin C, (VITAMIN C) 1000 MG tablet [ASCORBIC ACID, VITAMIN C, (VITAMIN C) 1000 MG TABLET] Take 1,000 mg by mouth daily.        atorvastatin (LIPITOR) 20 MG tablet Take 20 mg by mouth daily       azelastine (ASTELIN) 0.1 % nasal spray Spray 1 spray into both nostrils 2 times daily       budesonide (PULMICORT) 1 MG/2ML neb solution Take 1 mg by nebulization 2 times daily       bumetanide (BUMEX) 1 MG tablet Take 2 tablets (2 mg) by mouth daily 60 tablet 3     calcium, as carbonate, (OS-GILL) 500 mg calcium (1,250 mg) tablet [CALCIUM, AS CARBONATE, (OS-GILL) 500 MG CALCIUM (1,250 MG) TABLET] Take 1 tablet by mouth 2 (two) times a day.       cholecalciferol, vitamin D3, 2,000 unit Tab [CHOLECALCIFEROL, VITAMIN D3, 2,000 UNIT TAB] Take 2,000 Units by mouth daily.       diphenhydrAMINE (BENADRYL) 25 MG tablet Take 25 mg by mouth daily as needed       ipratropium-albuterol (DUO-NEB) 0.5-2.5 mg/3 mL nebulizer [IPRATROPIUM-ALBUTEROL (DUO-NEB) 0.5-2.5 MG/3 ML NEBULIZER] Take 3 mL by nebulization 3 (three) times a day. Then use up to 4 times daily as needed for shortness of breath. 60 vial 6     losartan (COZAAR) 100 MG tablet Take 1 tablet by mouth daily at 2 pm       magnesium oxide (MAG-OX) 400 mg (241.3 mg magnesium) tablet [MAGNESIUM OXIDE (MAG-OX) 400 MG (241.3 MG MAGNESIUM) TABLET] 400mg in the morning and 800  mg at bedtime 270 tablet 3     methylcellulose (CITRUCEL ORAL) Take 1 Dose by mouth daily       metoprolol succinate ER (TOPROL XL) 25 MG 24 hr tablet Take 3 tablets (75 mg) by mouth daily 30 tablet 3     omeprazole (PRILOSEC) 20 MG DR capsule Take 20 mg by mouth every morning (before breakfast)       PARoxetine (PAXIL) 20 MG tablet [PAROXETINE (PAXIL) 20 MG TABLET] Take 1 tablet (20 mg total) by mouth daily. 90 tablet 3     spironolactone (ALDACTONE) 25 MG tablet Take 25 mg by mouth daily       tiotropium (SPIRIVA) 18 mcg inhalation capsule [TIOTROPIUM (SPIRIVA) 18 MCG INHALATION CAPSULE] Place 18 mcg into inhaler and inhale daily.       vitamin E 200 UNIT capsule [VITAMIN E 200 UNIT CAPSULE] Take 200 Units by mouth daily.       warfarin ANTICOAGULANT (COUMADIN) 5 MG tablet Take 2.5-5 mg by mouth daily Take 5mg on Monday, Wednesday and Friday and 2.5mg the rest of the days       No Known Allergies       Lab Results    Chemistry/lipid CBC Cardiac Enzymes/BNP/TSH/INR   Recent Labs   Lab Test 08/03/18  0903   CHOL 108   HDL 37*   LDL 54   TRIG 83     Recent Labs   Lab Test 08/03/18  0903   LDL 54     Recent Labs   Lab Test 07/07/23  1528      POTASSIUM 4.2   CHLORIDE 98   CO2 26   *   BUN 42.0*   CR 1.53*   GFRESTIMATED 34*   GILL 9.9     Recent Labs   Lab Test 07/07/23  1528 01/25/23  1528 12/21/22  0447   CR 1.53* 1.70* 1.72*     Recent Labs   Lab Test 08/06/18  0433 02/17/18  0237 02/09/16  1420   A1C 6.3* 6.1 5.9          Recent Labs   Lab Test 12/21/22  0447 12/18/22  0741   WBC  --  7.8   HGB  --  14.9   HCT  --  45.6   MCV  --  83   * 111*     Recent Labs   Lab Test 12/18/22  0741 07/13/21  1500 02/02/21  1036   HGB 14.9 14.7 15.8    Recent Labs   Lab Test 07/13/21  1459 08/05/18  0900 08/05/18  0329   TROPONINI 0.01 0.01 0.02     Recent Labs   Lab Test 01/25/23  1528 12/18/22  0741 08/04/18  2153 08/03/18  0903 04/17/18  1352   BNP  --   --  1,578* 855* 718*   NTBNPI  --  6,531*  --   --    --    NTBNP 3,652*  --   --   --   --      Recent Labs   Lab Test 08/06/19  1348   TSH 2.08     Recent Labs   Lab Test 12/21/22  0447 12/20/22  0430 12/19/22  0441   INR 1.77* 2.36* 3.35*        Yesi Vang MD

## 2023-09-21 ENCOUNTER — TELEPHONE (OUTPATIENT)
Dept: RESPIRATORY THERAPY | Facility: HOSPITAL | Age: 83
End: 2023-09-21
Payer: COMMERCIAL

## 2023-09-21 NOTE — TELEPHONE ENCOUNTER
Spoke with Christina, doing pretty good, no questions for me to day. no issues getting and/or taking their medications, reviewed their action plan, in their green zone. Reminded when we will call again and to call before if there is a question.  Zara Lubin, RT, CTTS, Chronic Pulmonary Disease Specialist

## 2023-10-08 ENCOUNTER — HEALTH MAINTENANCE LETTER (OUTPATIENT)
Age: 83
End: 2023-10-08

## 2023-10-20 ENCOUNTER — TELEPHONE (OUTPATIENT)
Dept: RESPIRATORY THERAPY | Facility: HOSPITAL | Age: 83
End: 2023-10-20
Payer: COMMERCIAL

## 2023-10-20 NOTE — TELEPHONE ENCOUNTER
Spoke with Christina, doing good, no questions for me to day. no issues getting and/or taking their medications, reviewed their action plan, in their green zone.  Reminded when we will call again and to call before if there is a question.  Zara Lubin, RT, CTTS, Chronic Pulmonary Disease Specialist

## 2024-05-04 ENCOUNTER — HOSPITAL ENCOUNTER (EMERGENCY)
Facility: HOSPITAL | Age: 84
Discharge: HOME | End: 2024-05-04
Attending: EMERGENCY MEDICINE | Admitting: EMERGENCY MEDICINE
Payer: COMMERCIAL

## 2024-05-04 ENCOUNTER — APPOINTMENT (OUTPATIENT)
Dept: CT IMAGING | Facility: HOSPITAL | Age: 84
End: 2024-05-04
Attending: EMERGENCY MEDICINE
Payer: COMMERCIAL

## 2024-05-04 VITALS
HEIGHT: 66 IN | BODY MASS INDEX: 24.75 KG/M2 | RESPIRATION RATE: 21 BRPM | DIASTOLIC BLOOD PRESSURE: 71 MMHG | WEIGHT: 154 LBS | TEMPERATURE: 97.9 F | SYSTOLIC BLOOD PRESSURE: 146 MMHG | OXYGEN SATURATION: 95 % | HEART RATE: 58 BPM

## 2024-05-04 DIAGNOSIS — R07.89 LEFT-SIDED CHEST WALL PAIN: ICD-10-CM

## 2024-05-04 DIAGNOSIS — J18.9 COMMUNITY ACQUIRED PNEUMONIA OF RIGHT LOWER LOBE OF LUNG: ICD-10-CM
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
## 2024-05-04 LAB
ANION GAP SERPL CALCULATED.3IONS-SCNC: 8 MMOL/L (ref 7–15)
BASOPHILS # BLD AUTO: 0 10E3/UL (ref 0–0.2)
BASOPHILS NFR BLD AUTO: 0 %
BUN SERPL-MCNC: 21.8 MG/DL (ref 8–23)
CALCIUM SERPL-MCNC: 9.8 MG/DL (ref 8.8–10.2)
CHLORIDE SERPL-SCNC: 98 MMOL/L (ref 98–107)
CREAT SERPL-MCNC: 1.24 MG/DL (ref 0.51–0.95)
DEPRECATED HCO3 PLAS-SCNC: 29 MMOL/L (ref 22–29)
EGFRCR SERPLBLD CKD-EPI 2021: 43 ML/MIN/1.73M2
EOSINOPHIL # BLD AUTO: 0 10E3/UL (ref 0–0.7)
EOSINOPHIL NFR BLD AUTO: 0 %
ERYTHROCYTE [DISTWIDTH] IN BLOOD BY AUTOMATED COUNT: 14.5 % (ref 10–15)
FLUAV RNA SPEC QL NAA+PROBE: NEGATIVE
FLUBV RNA RESP QL NAA+PROBE: NEGATIVE
GLUCOSE SERPL-MCNC: 162 MG/DL (ref 70–99)
HCT VFR BLD AUTO: 42.7 % (ref 35–47)
HGB BLD-MCNC: 14.1 G/DL (ref 11.7–15.7)
IMM GRANULOCYTES # BLD: 0 10E3/UL
IMM GRANULOCYTES NFR BLD: 1 %
INR PPP: 3.69 (ref 0.85–1.15)
LYMPHOCYTES # BLD AUTO: 1 10E3/UL (ref 0.8–5.3)
LYMPHOCYTES NFR BLD AUTO: 12 %
MAGNESIUM SERPL-MCNC: 2 MG/DL (ref 1.7–2.3)
MCH RBC QN AUTO: 28.1 PG (ref 26.5–33)
MCHC RBC AUTO-ENTMCNC: 33 G/DL (ref 31.5–36.5)
MCV RBC AUTO: 85 FL (ref 78–100)
MONOCYTES # BLD AUTO: 0.6 10E3/UL (ref 0–1.3)
MONOCYTES NFR BLD AUTO: 6 %
NEUTROPHILS # BLD AUTO: 7.2 10E3/UL (ref 1.6–8.3)
NEUTROPHILS NFR BLD AUTO: 81 %
NRBC # BLD AUTO: 0 10E3/UL
NRBC BLD AUTO-RTO: 0 /100
PLATELET # BLD AUTO: 82 10E3/UL (ref 150–450)
POTASSIUM SERPL-SCNC: 4.8 MMOL/L (ref 3.4–5.3)
RBC # BLD AUTO: 5.02 10E6/UL (ref 3.8–5.2)
RSV RNA SPEC NAA+PROBE: NEGATIVE
SARS-COV-2 RNA RESP QL NAA+PROBE: NEGATIVE
SODIUM SERPL-SCNC: 135 MMOL/L (ref 135–145)
WBC # BLD AUTO: 8.8 10E3/UL (ref 4–11)

## 2024-05-04 PROCEDURE — 72125 CT NECK SPINE W/O DYE: CPT

## 2024-05-04 PROCEDURE — 96374 THER/PROPH/DIAG INJ IV PUSH: CPT

## 2024-05-04 PROCEDURE — 85610 PROTHROMBIN TIME: CPT | Performed by: EMERGENCY MEDICINE

## 2024-05-04 PROCEDURE — 250N000013 HC RX MED GY IP 250 OP 250 PS 637: Performed by: EMERGENCY MEDICINE

## 2024-05-04 PROCEDURE — 71250 CT THORAX DX C-: CPT

## 2024-05-04 PROCEDURE — 83735 ASSAY OF MAGNESIUM: CPT | Performed by: EMERGENCY MEDICINE

## 2024-05-04 PROCEDURE — 93005 ELECTROCARDIOGRAM TRACING: CPT | Performed by: EMERGENCY MEDICINE

## 2024-05-04 PROCEDURE — 36415 COLL VENOUS BLD VENIPUNCTURE: CPT | Performed by: EMERGENCY MEDICINE

## 2024-05-04 PROCEDURE — 85025 COMPLETE CBC W/AUTO DIFF WBC: CPT | Performed by: EMERGENCY MEDICINE

## 2024-05-04 PROCEDURE — 96376 TX/PRO/DX INJ SAME DRUG ADON: CPT

## 2024-05-04 PROCEDURE — 250N000011 HC RX IP 250 OP 636: Performed by: EMERGENCY MEDICINE

## 2024-05-04 PROCEDURE — 99285 EMERGENCY DEPT VISIT HI MDM: CPT | Mod: 25

## 2024-05-04 PROCEDURE — 82374 ASSAY BLOOD CARBON DIOXIDE: CPT | Performed by: EMERGENCY MEDICINE

## 2024-05-04 PROCEDURE — 70450 CT HEAD/BRAIN W/O DYE: CPT

## 2024-05-04 PROCEDURE — 87637 SARSCOV2&INF A&B&RSV AMP PRB: CPT | Performed by: EMERGENCY MEDICINE

## 2024-05-04 RX ORDER — FENTANYL CITRATE 50 UG/ML
100 INJECTION, SOLUTION INTRAMUSCULAR; INTRAVENOUS ONCE
Status: COMPLETED | OUTPATIENT
Start: 2024-05-04 | End: 2024-05-04

## 2024-05-04 RX ORDER — AZITHROMYCIN 250 MG/1
TABLET, FILM COATED ORAL
Qty: 6 TABLET | Refills: 0 | Status: SHIPPED | OUTPATIENT
Start: 2024-05-04 | End: 2024-05-09

## 2024-05-04 RX ADMIN — FENTANYL CITRATE 100 MCG: 50 INJECTION, SOLUTION INTRAMUSCULAR; INTRAVENOUS at 06:50

## 2024-05-04 RX ADMIN — FENTANYL CITRATE 100 MCG: 50 INJECTION, SOLUTION INTRAMUSCULAR; INTRAVENOUS at 07:49

## 2024-05-04 RX ADMIN — AMOXICILLIN AND CLAVULANATE POTASSIUM 1 TABLET: 875; 125 TABLET, FILM COATED ORAL at 08:56

## 2024-05-04 ASSESSMENT — ACTIVITIES OF DAILY LIVING (ADL)
ADLS_ACUITY_SCORE: 37
ADLS_ACUITY_SCORE: 37
ADLS_ACUITY_SCORE: 35
ADLS_ACUITY_SCORE: 37

## 2024-05-04 ASSESSMENT — COLUMBIA-SUICIDE SEVERITY RATING SCALE - C-SSRS
6. HAVE YOU EVER DONE ANYTHING, STARTED TO DO ANYTHING, OR PREPARED TO DO ANYTHING TO END YOUR LIFE?: NO
1. IN THE PAST MONTH, HAVE YOU WISHED YOU WERE DEAD OR WISHED YOU COULD GO TO SLEEP AND NOT WAKE UP?: NO
2. HAVE YOU ACTUALLY HAD ANY THOUGHTS OF KILLING YOURSELF IN THE PAST MONTH?: NO

## 2024-05-04 NOTE — DISCHARGE INSTRUCTIONS
Please use the incentive spirometer four times per day while your chest wall is healing, so you don't have worsening of your pneumonia.

## 2024-05-04 NOTE — ED PROVIDER NOTES
EMERGENCY DEPARTMENT ENCOUNTER      NAME: Christina Umana  AGE: 83 year old female  YOB: 1940  MRN: [id number]  EVALUATION DATE & TIME: 2024  5:15 AM    PCP: Adan Elizabeth    ED PROVIDER: Meghan Gomez M.D.      Chief Complaint   Patient presents with    Fall         FINAL IMPRESSION:  1. Fall, initial encounter    2. Left-sided chest wall pain    3. Community acquired pneumonia of right lower lobe of lung          ED COURSE & MEDICAL DECISION MAKIN:16 AM I met the patient and performed my initial exam.     ED Course as of 24 0840   Sat May 04, 2024   0625 Patient with left low lateral chest wall pain after fall 4 days ago, on coumadin, struck forehead, prescribed narcotic therapy by PMD without relief and no imaging ordered by PMD after 2 discussions this week, pending CT head and c-spine, CAP, INR, screening CBC and chemistry with viral PCR with chronic cough, known right lower and upper lobe masses with lung CA and still currently smoking, masses auscultated but no new cough or SOB or fever to suggest pneumonia and VS WNL with known HTN, patient and daughter in law ok with plan for extensive imaging and analgesia   0745 Patient used her home MDI for chronic COPD as she normally uses this in the AM. Viral PCR negative, CBC and chemistry WNL, mangesium normal, INR 3.69 on coumadin as expected and minimally higher than usual, pending imaging, patiient to imaging now   0818 CT head and c-spine without acute traumatic pathology, old previously seen compression deformities present but with no mildline back pain and previously seen on imaging, no acute fractures noted on c-spine imaging    0837 CT with old rib fx but no new rib fx, and as this is CT and no focal pain to palpation, no new fracture likely. With right lower lobe known CA and coase BS and increased mucous plugging with known COPD, this could increase pain with coughing, will begin antibiotic therapy for superimposed CAP  "along with advise to continue home PMD analgesia therapy with incentive spirometry. Patient discharged after being provided with extensive anticipatory guidance and given return precautions, importance of PMD follow-up emphasized.        Pertinent Labs & Imaging studies reviewed. (See chart for details)      At the conclusion of the encounter I discussed the results of all of the tests and the disposition. The questions were answered. The patient or family acknowledged understanding and was agreeable with the care plan.     MEDICATIONS GIVEN IN THE EMERGENCY:  Medications   amoxicillin-clavulanate (AUGMENTIN) 875-125 MG per tablet 1 tablet (has no administration in time range)   fentaNYL (PF) (SUBLIMAZE) injection 100 mcg (100 mcg Intravenous $Given 5/4/24 0607)   fentaNYL (PF) (SUBLIMAZE) injection 100 mcg (100 mcg Intravenous $Given 5/4/24 0749)       NEW PRESCRIPTIONS STARTED AT TODAY'S ER VISIT  New Prescriptions    AMOXICILLIN-CLAVULANATE (AUGMENTIN) 875-125 MG TABLET    Take 1 tablet by mouth 2 times daily for 10 days    AZITHROMYCIN (ZITHROMAX) 250 MG TABLET    Take 2 tablets (500 mg) by mouth daily for 1 day, THEN 1 tablet (250 mg) daily for 4 days.          =================================================================    HPI      Christina Umana is a 83 year old female with PMHx of  COPD,AFIB on coumadin CKD 3 HTN, HLD, Chronic anemia, and ongoing tobacco abusewho presents to the ED today via wheelchair with family member for evaluation of left side pain.     The patient report having a fall on 04/3024 and currently endorses \"dull and aching pain\" on the left side. Patient states before the fall, she felt dizzy while she was outside, lost her balance and fell. She fell on her left side while having her arm tucked to the side. Patient hit her head during the fall. The next day, (05/1/24) patient talked to her provider, but did not see him and was prescribed with tramadol.She states the tramadol did not " provide any relief.  On 05/03/24, patient was prescribed with Oxycodone again with no relief. She also took tylenol with the oxycodone with no relief. She states the pain has gotten worse since the fall. She rates the pain a 5/10 right now, but states with movement or coughing, the pain would be at a 10/10. The patient did not get any imaging completed because her provider did not discuss anything about X-ray. She also mentioned having lung cancer on the right lower lobe. No other complaint at this time.    REVIEW OF SYSTEMS   All other systems reviewed and are negative except as noted above in HPI.    PAST MEDICAL HISTORY:  Past Medical History:   Diagnosis Date    Arthritis     Atrial fibrillation (H)     Cancer (H) 01/01/2005    CHF (congestive heart failure) (H)     Chronic kidney disease     Clotting disorder (H) 04/01/2017    COPD (chronic obstructive pulmonary disease) (H)     Degenerative disc disease, lumbar     Emphysema lung (H)     GERD (gastroesophageal reflux disease)     Hyperlipidemia     Hypertension     Kidney stone 04/01/2017    Osteoporosis     Scoliosis     Ventral hernia        PAST SURGICAL HISTORY:  Past Surgical History:   Procedure Laterality Date    BIOPSY BREAST Right 2012    BUNIONECTOMY Bilateral 1988    COLECTOMY N/A 03/31/2003    COLON SURGERY      CYST REMOVAL Left 02/01/2007    HERNIORRHAPHY VENTRAL  02/25/2005    HYSTERECTOMY  2004    IR LUMBAR EPIDURAL STEROID INJECTION  8/8/2011    IR LUMBAR EPIDURAL STEROID INJECTION  10/10/2011    IR LUMBAR EPIDURAL STEROID INJECTION  3/8/2012    IR LUMBAR EPIDURAL STEROID INJECTION  4/10/2012    LUMPECTOMY BREAST Right 04/03/2009    OOPHORECTOMY  2004    REPAIR HAMMER TOE Bilateral 1988    STOMACH SURGERY      hyernia repair       CURRENT MEDICATIONS:    amoxicillin-clavulanate (AUGMENTIN) 875-125 MG tablet  azithromycin (ZITHROMAX) 250 MG tablet  albuterol (PROAIR HFA/PROVENTIL HFA/VENTOLIN HFA) 108 (90 Base) MCG/ACT inhaler  ascorbic acid,  vitamin C, (VITAMIN C) 1000 MG tablet  atorvastatin (LIPITOR) 20 MG tablet  azelastine (ASTELIN) 0.1 % nasal spray  budesonide (PULMICORT) 1 MG/2ML neb solution  bumetanide (BUMEX) 1 MG tablet  calcium, as carbonate, (OS-GILL) 500 mg calcium (1,250 mg) tablet  cholecalciferol, vitamin D3, 2,000 unit Tab  diphenhydrAMINE (BENADRYL) 25 MG tablet  ipratropium-albuterol (DUO-NEB) 0.5-2.5 mg/3 mL nebulizer  losartan (COZAAR) 100 MG tablet  magnesium oxide (MAG-OX) 400 mg (241.3 mg magnesium) tablet  methylcellulose (CITRUCEL ORAL)  metoprolol succinate ER (TOPROL XL) 25 MG 24 hr tablet  omeprazole (PRILOSEC) 20 MG DR capsule  PARoxetine (PAXIL) 20 MG tablet  spironolactone (ALDACTONE) 25 MG tablet  tiotropium (SPIRIVA) 18 mcg inhalation capsule  vitamin E 200 UNIT capsule  warfarin ANTICOAGULANT (COUMADIN) 5 MG tablet        ALLERGIES:  No Known Allergies    FAMILY HISTORY:  Family History   Problem Relation Age of Onset    Brain Cancer Son 29.00    Dementia Mother     Cerebrovascular Disease Mother     Prostate Cancer Father     Aortic aneurysm Father         Abdominal    Hypertension Sister     No Known Problems Daughter     No Known Problems Maternal Grandmother     No Known Problems Maternal Grandfather     No Known Problems Paternal Grandmother     No Known Problems Paternal Grandfather     Breast Cancer Maternal Aunt 70.00    No Known Problems Paternal Aunt     Breast Cancer Maternal Aunt     Other - See Comments Brother         Polio    Other - See Comments Brother         polio    Hereditary Breast and Ovarian Cancer Syndrome No family hx of     Colon Cancer No family hx of     Endometrial Cancer No family hx of     Ovarian Cancer No family hx of        SOCIAL HISTORY:   Social History     Socioeconomic History    Marital status:    Tobacco Use    Smoking status: Every Day     Current packs/day: 0.00     Average packs/day: 0.5 packs/day for 60.0 years (30.0 ttl pk-yrs)     Types: Cigarettes     Start date:  "1958     Last attempt to quit: 2018     Years since quittin.2    Smokeless tobacco: Never    Tobacco comments:     Seen IP by CTT[facility] on 22. Down to 3 cigarettes/Day. Ready for quit attempt.   Vaping Use    Vaping status: Never Used   Substance and Sexual Activity    Alcohol use: No    Drug use: No    Sexual activity: Never     Social Determinants of Health      [place] [facility] [place] [facility] [place] [facility] [place] [facility] [place]    Social Connections       VITALS:  Patient Vitals for the past 24 hrs:   BP Temp Temp src Pulse Resp SpO2 Height Weight   24 0815 (!) 146/71 -- -- 58 21 -- -- --   24 0738 -- -- -- 60 21 -- -- --   24 0737 -- -- -- 62 30 -- -- --   24 0736 (!) 183/84 -- -- 53 -- -- -- --   24 0645 (!) 185/85 -- -- 64 23 95 % -- --   24 0630 (!) 170/75 -- -- 58 -- 95 % -- --   24 0615 (!) 183/81 -- -- 66 -- 95 % -- --   24 0600 (!) 167/80 -- -- 59 -- 92 % -- --   24 0545 (!) 167/79 -- -- 61 -- 94 % -- --   24 0526 (!) 182/83 -- -- 65 -- 96 % -- --   24 0510 (!) 211/88 -- -- -- -- -- -- --   24 0508 -- 97.9  F (36.6  C) Temporal 68 24 94 % 1.676 m (5' 6\") 69.9 kg (154 lb)       PHYSICAL EXAM    VITAL SIGNS: BP (!) 146/71   Pulse 58   Temp 97.9  F (36.6  C) (Temporal)   Resp 21   Ht 1.676 m (5' 6\")   Wt 69.9 kg (154 lb)   SpO2 95%   BMI 24.86 kg/m     GENERAL: Awake, alert.  In no acute distress. GCS 15  HEENT: Normocephalic, atraumatic.  Pupils equal, round and reactive.  Conjunctiva normal.  EOMI. No zhu sign, no racoon eyes, no mastoid tenderness, no hemotympanum, no facial instability, no nasal bridge pain, no nasal septal hematoma, no intraoral lacerations, no loose teeth, no mandible pain or deformity  NECK: No stridor or apparent deformity. No midline pain to palpation.  PULMONARY: Right lower lobe coarse breath sound. Lungs clear to auscultation bilaterally without " wheezes, rhonchi or rales.  CARDIO: Regular rate and rhythm.  No significant murmur, rub or gallop.  Radial pulses strong and symmetrical.  THORAX: No focal chest wall deformity or crepitus. No reproducible tenderness or bruising   BACK: No focal tenderness or deformity to each vertebral level in midline  ABDOMINAL: Abdomen soft, non-distended and non-tender to palpation.  No CVAT, BL. No reproducible tenderness.   EXTREMITIES: No lower extremity swelling or edema. Pelvis stable and without focal tenderness. Bilateral pedal pulses 2+ and equal. Faded non tender bruising to the left-forearm. Left arm and elbow full range of motion.   NEURO: Alert and oriented to person, place and time.  Cranial nerves grossly intact.  No focal motor deficit. Sensation globally intact.  PSYCH: Normal mood and affect  SKIN: No rashes.   LAB:  All pertinent labs reviewed and interpreted.  Results for orders placed or performed during the hospital encounter of 05/04/24   CT Head w/o Contrast    Impression    IMPRESSION:  1.  No acute intracranial process.  2.  Stable progression of mild chronic small vessel ischemic disease and mild to moderate generalized brain parenchymal volume loss since 05/13/2018.     CT Cervical Spine w/o Contrast    Impression    IMPRESSION:  1.  No acute fracture or traumatic subluxation of the cervical spine.  2.  Multilevel cervical spondylosis.  3.  Chronic mild T3 superior endplate and partially visualized T4 superior endplate compression fractures, present dating back to at least 07/21/2022.     CT Chest Abdomen Pelvis w/o Contrast    Impression    IMPRESSION:  1.  No evidence for acute thoracic, abdominal or pelvic injury.  2.  New opacification of the right middle and right lower lobe bronchi which may be secondary to mucous plugging versus aspiration.  3.  New airspace changes within the posterior aspect of the right lower lobe which may be secondary to aspiration versus atelectasis.  4.  Trace amount of  left pleural fluid.  5.  New, mild splenomegaly.  6.  Bulky calcified atherosclerotic changes of the aortic bifurcation extending to the common iliac artery origins producing at least moderate to severe stenosis. Recommend correlation with claudication symptoms to assess need for further assessment  7.  Old left rib fractures.   Result Value Ref Range    INR 3.69 (H) 0.85 - 1.15   Basic metabolic panel   Result Value Ref Range    Sodium 135 135 - 145 mmol/L    Potassium 4.8 3.4 - 5.3 mmol/L    Chloride 98 98 - 107 mmol/L    Carbon Dioxide (CO2) 29 22 - 29 mmol/L    Anion Gap 8 7 - 15 mmol/L    Urea Nitrogen 21.8 8.0 - 23.0 mg/dL    Creatinine 1.24 (H) 0.51 - 0.95 mg/dL    GFR Estimate 43 (L) >60 mL/min/1.73m2    Calcium 9.8 8.8 - 10.2 mg/dL    Glucose 162 (H) 70 - 99 mg/dL   Result Value Ref Range    Magnesium 2.0 1.7 - 2.3 mg/dL   Symptomatic Influenza A/B, RSV, & SARS-CoV2 PCR (COVID-19) Nose    Specimen: Nose; Swab   Result Value Ref Range    Influenza A PCR Negative Negative    Influenza B PCR Negative Negative    RSV PCR Negative Negative    SARS CoV2 PCR Negative Negative   CBC with platelets and differential   Result Value Ref Range    WBC Count 8.8 4.0 - 11.0 10e3/uL    RBC Count 5.02 3.80 - 5.20 10e6/uL    Hemoglobin 14.1 11.7 - 15.7 g/dL    Hematocrit 42.7 35.0 - 47.0 %    MCV 85 78 - 100 fL    MCH 28.1 26.5 - 33.0 pg    MCHC 33.0 31.5 - 36.5 g/dL    RDW 14.5 10.0 - 15.0 %    Platelet Count 82 (L) 150 - 450 10e3/uL    % Neutrophils 81 %    % Lymphocytes 12 %    % Monocytes 6 %    % Eosinophils 0 %    % Basophils 0 %    % Immature Granulocytes 1 %    NRBCs per 100 WBC 0 <1 /100    Absolute Neutrophils 7.2 1.6 - 8.3 10e3/uL    Absolute Lymphocytes 1.0 0.8 - 5.3 10e3/uL    Absolute Monocytes 0.6 0.0 - 1.3 10e3/uL    Absolute Eosinophils 0.0 0.0 - 0.7 10e3/uL    Absolute Basophils 0.0 0.0 - 0.2 10e3/uL    Absolute Immature Granulocytes 0.0 <=0.4 10e3/uL    Absolute NRBCs 0.0 10e3/uL       RADIOLOGY:  Reviewed  all pertinent imaging. Please see official radiology report.  CT Chest Abdomen Pelvis w/o Contrast   Final Result   IMPRESSION:   1.  No evidence for acute thoracic, abdominal or pelvic injury.   2.  New opacification of the right middle and right lower lobe bronchi which may be secondary to mucous plugging versus aspiration.   3.  New airspace changes within the posterior aspect of the right lower lobe which may be secondary to aspiration versus atelectasis.   4.  Trace amount of left pleural fluid.   5.  New, mild splenomegaly.   6.  Bulky calcified atherosclerotic changes of the aortic bifurcation extending to the common iliac artery origins producing at least moderate to severe stenosis. Recommend correlation with claudication symptoms to assess need for further assessment   7.  Old left rib fractures.      CT Cervical Spine w/o Contrast   Preliminary Result   IMPRESSION:   1.  No acute fracture or traumatic subluxation of the cervical spine.   2.  Multilevel cervical spondylosis.   3.  Chronic mild T3 superior endplate and partially visualized T4 superior endplate compression fractures, present dating back to at least 07/21/2022.         CT Head w/o Contrast   Preliminary Result   IMPRESSION:   1.  No acute intracranial process.   2.  Stable progression of mild chronic small vessel ischemic disease and mild to moderate generalized brain parenchymal volume loss since 05/13/2018.               EKG:    Performed at: 6:47 AM  Impression: Atrial fibrillation.   Rate: 50 BPM  Comparison:  When compared with ECG of 18-DEC-2022, similar appearing.     I have independently reviewed and interpreted the EKG(s) documented above.       I, Dali Mcnally, am serving as a scribe to document services personally performed by Dr. Meghan Gomez based on my observation and the provider's statements to me. I, Meghan Gomez MD attest that Dali Mcnally  is acting in a scribe capacity, has observed my performance of the services and has documented  them in accordance with my direction.       Meghan Gomez MD  05/04/24 0824

## 2024-05-04 NOTE — ED TRIAGE NOTES
Patient had a fall on Tuesday. Patient states that she felt dizzy while outside and fel, landing with her left arm tucked into her left side. Patient has had pain to her left rib cage/breast since that time. Patient has a history of COPD and lung cancer, patient has a frequent cough at baseline. Patient has extreme pain during coughing. Patient splinting left side in triage. Patient received a prescription from her primary care provider for the pain, she reports taking oxycodone, but is having pain despite the prescription. Patient reports that she did not see her doctor in person, so no imaging was performed at that time.

## 2024-10-23 ENCOUNTER — APPOINTMENT (OUTPATIENT)
Dept: CT IMAGING | Facility: HOSPITAL | Age: 84
End: 2024-10-23
Attending: EMERGENCY MEDICINE
Payer: COMMERCIAL

## 2024-10-23 ENCOUNTER — HOSPITAL ENCOUNTER (EMERGENCY)
Facility: CLINIC | Age: 84
End: 2024-10-23
Payer: COMMERCIAL

## 2024-10-23 ENCOUNTER — HOSPITAL ENCOUNTER (EMERGENCY)
Facility: HOSPITAL | Age: 84
Discharge: HOME | End: 2024-10-23
Attending: EMERGENCY MEDICINE | Admitting: EMERGENCY MEDICINE
Payer: COMMERCIAL

## 2024-10-23 VITALS
HEIGHT: 66 IN | BODY MASS INDEX: 19.61 KG/M2 | HEART RATE: 70 BPM | OXYGEN SATURATION: 99 % | SYSTOLIC BLOOD PRESSURE: 154 MMHG | WEIGHT: 122 LBS | TEMPERATURE: 97.5 F | DIASTOLIC BLOOD PRESSURE: 78 MMHG | RESPIRATION RATE: 20 BRPM

## 2024-10-23 DIAGNOSIS — R14.0 ABDOMINAL DISTENSION: ICD-10-CM

## 2024-10-23 DIAGNOSIS — R11.0 NAUSEA: ICD-10-CM

## 2024-10-23 LAB
ALBUMIN SERPL BCG-MCNC: 4.2 G/DL (ref 3.5–5.2)
ALP SERPL-CCNC: 81 U/L (ref 40–150)
ALT SERPL W P-5'-P-CCNC: 22 U/L (ref 0–50)
ANION GAP SERPL CALCULATED.3IONS-SCNC: 16 MMOL/L (ref 7–15)
APTT PPP: 34 SECONDS (ref 22–38)
AST SERPL W P-5'-P-CCNC: 26 U/L (ref 0–45)
BASOPHILS # BLD AUTO: 0 10E3/UL (ref 0–0.2)
BASOPHILS NFR BLD AUTO: 0 %
BILIRUB DIRECT SERPL-MCNC: <0.2 MG/DL (ref 0–0.3)
BILIRUB SERPL-MCNC: 0.5 MG/DL
BUN SERPL-MCNC: 54.2 MG/DL (ref 8–23)
CALCIUM SERPL-MCNC: 9.9 MG/DL (ref 8.8–10.4)
CHLORIDE SERPL-SCNC: 95 MMOL/L (ref 98–107)
CREAT SERPL-MCNC: 1.55 MG/DL (ref 0.51–0.95)
EGFRCR SERPLBLD CKD-EPI 2021: 33 ML/MIN/1.73M2
EOSINOPHIL # BLD AUTO: 0 10E3/UL (ref 0–0.7)
EOSINOPHIL NFR BLD AUTO: 0 %
ERYTHROCYTE [DISTWIDTH] IN BLOOD BY AUTOMATED COUNT: 13.4 % (ref 10–15)
GLUCOSE SERPL-MCNC: 146 MG/DL (ref 70–99)
HCO3 SERPL-SCNC: 25 MMOL/L (ref 22–29)
HCT VFR BLD AUTO: 48.8 % (ref 35–47)
HGB BLD-MCNC: 16.1 G/DL (ref 11.7–15.7)
HOLD SPECIMEN: NORMAL
IMM GRANULOCYTES # BLD: 0.1 10E3/UL
IMM GRANULOCYTES NFR BLD: 1 %
INR PPP: 1.72 (ref 0.85–1.15)
LIPASE SERPL-CCNC: 41 U/L (ref 13–60)
LYMPHOCYTES # BLD AUTO: 2.1 10E3/UL (ref 0.8–5.3)
LYMPHOCYTES NFR BLD AUTO: 21 %
MAGNESIUM SERPL-MCNC: 2.4 MG/DL (ref 1.7–2.3)
MCH RBC QN AUTO: 28.4 PG (ref 26.5–33)
MCHC RBC AUTO-ENTMCNC: 33 G/DL (ref 31.5–36.5)
MCV RBC AUTO: 86 FL (ref 78–100)
MONOCYTES # BLD AUTO: 0.5 10E3/UL (ref 0–1.3)
MONOCYTES NFR BLD AUTO: 5 %
NEUTROPHILS # BLD AUTO: 6.9 10E3/UL (ref 1.6–8.3)
NEUTROPHILS NFR BLD AUTO: 72 %
NRBC # BLD AUTO: 0 10E3/UL
NRBC BLD AUTO-RTO: 0 /100
PLATELET # BLD AUTO: 210 10E3/UL (ref 150–450)
POTASSIUM SERPL-SCNC: 4.2 MMOL/L (ref 3.4–5.3)
PROT SERPL-MCNC: 8.2 G/DL (ref 6.4–8.3)
RBC # BLD AUTO: 5.66 10E6/UL (ref 3.8–5.2)
SODIUM SERPL-SCNC: 136 MMOL/L (ref 135–145)
WBC # BLD AUTO: 9.6 10E3/UL (ref 4–11)

## 2024-10-23 PROCEDURE — 80053 COMPREHEN METABOLIC PANEL: CPT | Performed by: EMERGENCY MEDICINE

## 2024-10-23 PROCEDURE — 74177 CT ABD & PELVIS W/CONTRAST: CPT

## 2024-10-23 PROCEDURE — 83735 ASSAY OF MAGNESIUM: CPT | Performed by: EMERGENCY MEDICINE

## 2024-10-23 PROCEDURE — 82248 BILIRUBIN DIRECT: CPT | Performed by: EMERGENCY MEDICINE

## 2024-10-23 PROCEDURE — 85025 COMPLETE CBC W/AUTO DIFF WBC: CPT | Performed by: EMERGENCY MEDICINE

## 2024-10-23 PROCEDURE — 85610 PROTHROMBIN TIME: CPT | Performed by: EMERGENCY MEDICINE

## 2024-10-23 PROCEDURE — 250N000011 HC RX IP 250 OP 636: Performed by: EMERGENCY MEDICINE

## 2024-10-23 PROCEDURE — 36415 COLL VENOUS BLD VENIPUNCTURE: CPT | Performed by: EMERGENCY MEDICINE

## 2024-10-23 PROCEDURE — 83690 ASSAY OF LIPASE: CPT | Performed by: EMERGENCY MEDICINE

## 2024-10-23 PROCEDURE — 85730 THROMBOPLASTIN TIME PARTIAL: CPT | Performed by: EMERGENCY MEDICINE

## 2024-10-23 PROCEDURE — 99285 EMERGENCY DEPT VISIT HI MDM: CPT | Mod: 25

## 2024-10-23 RX ORDER — IOPAMIDOL 755 MG/ML
59 INJECTION, SOLUTION INTRAVASCULAR ONCE
Status: COMPLETED | OUTPATIENT
Start: 2024-10-23 | End: 2024-10-23

## 2024-10-23 RX ADMIN — IOPAMIDOL 59 ML: 755 INJECTION, SOLUTION INTRAVENOUS at 11:01

## 2024-10-23 ASSESSMENT — COLUMBIA-SUICIDE SEVERITY RATING SCALE - C-SSRS
2. HAVE YOU ACTUALLY HAD ANY THOUGHTS OF KILLING YOURSELF IN THE PAST MONTH?: NO
6. HAVE YOU EVER DONE ANYTHING, STARTED TO DO ANYTHING, OR PREPARED TO DO ANYTHING TO END YOUR LIFE?: NO
1. IN THE PAST MONTH, HAVE YOU WISHED YOU WERE DEAD OR WISHED YOU COULD GO TO SLEEP AND NOT WAKE UP?: NO

## 2024-10-23 ASSESSMENT — ACTIVITIES OF DAILY LIVING (ADL)
ADLS_ACUITY_SCORE: 0

## 2024-10-23 ASSESSMENT — ENCOUNTER SYMPTOMS
BLOOD IN STOOL: 0
DIARRHEA: 1
NAUSEA: 1
ABDOMINAL DISTENTION: 1
ABDOMINAL PAIN: 1
COUGH: 1
VOMITING: 1
DYSURIA: 0
CONSTIPATION: 1
FEVER: 0
SHORTNESS OF BREATH: 0

## 2024-10-23 NOTE — ED TRIAGE NOTES
"Patient here with son, states one month history of nausea , diarrhea, last stool this am, no blood in stool, \"the diarrhea comes and goes\" history of COPD, smoker, \"I cough up phlegm that smells like poop\" sats at 100% RA        "

## 2024-10-23 NOTE — ED NOTES
Expected Patient Referral to ED  9:10 AM    Referring Clinic/Provider:  Dr. Elizabeth    Reason for referral/Clinical facts:  Diarrhea vs constipation  Today coughed or belched and might have tasted stool-like (per phone call with PCP)  H/o colon cancer with last colonscopy in 2017  COPD  Known lung cancer (elected not to treat)  Chronic afib (INR 1.6 yesterday)    Recommendations provided:  Send to ED for further evaluation    Caller was informed that this institution does possess the capabilities and/or resources to provide for patient and should be transferred to our facility.    Discussed that if direct admit is sought and any hurdles are encountered, this ED would be happy to see the patient and evaluate.    Informed caller that recommendations provided are recommendations based only on the facts provided and that they responsible to accept or reject the advice, or to seek a formal in person consultation as needed and that this ED will see/treat patient should they arrive.      Deandra Vidal MD  [facility] EMERGENCY DEPARTMENT  [address] [place] [id number]  [phone]       Deandra Vidal MD  10/23/24 0913

## 2024-10-23 NOTE — ED PROVIDER NOTES
EMERGENCY DEPARTMENT ENCOUNTER      NAME: Christina Umana  AGE: 84 year old female  YOB: 1940  MRN: [id number]  EVALUATION DATE & TIME: 10/23/2024  9:33 AM    PCP: Adan Elizabeth    ED PROVIDER: Deandra Vidal M.D.        Chief Complaint   Patient presents with    Diarrhea         FINAL IMPRESSION:    1. Nausea    2. Abdominal distension            MEDICAL DECISION MAKING:    Christina Umana is a 84 year old female with history of colon cancer, GERD, hypertension, anxiety, chronic A-fib on warfarin, CHF, CKD, COPD, lung cancer who presents to the ER with complaints of abdominal distention and fluctuating diarrhea and constipation.    For the last 1 month or so she will have an episode of diarrhea and then a goal a couple of days before her next bowel movement.  She will also experience sometimes some vomiting like that.  Mainly complains of abdominal distention and bloating.  She called primary care today because she coughed and smelled/tasted stool like.  Otherwise denies any other changes or concerns.    INR was subtherapeutic today though I do not think this represents an acute pulmonary etiology.    CT scan overall reassuring.  Does not show any acute GI pathology.  Intestinal distention is once again noted but noted to be decreased compared to CT in May.  Once again the lung cancer was seen and stable.  She does not appear toxic or septic.  At this time I do recommend that she continue to work with primary care to look more into her GI symptoms.  I did encourage her to think about what she is eating.  Talk to her about trying things like simethicone to help decrease her test and all gas and distention.  Nothing to suggest true obstruction.  She was not able to provide a stool sample today.  I think is unlikely to represent C. difficile or other GI pathogens or infections.    Nothing to suggest fistula.  I do not think she is having any actual pneumonia or true pulmonary  infections.    At this time I feel that she is appropriate for discharge home with family continue workup with primary care as an outpatient.      ED COURSE:  10:00 AM  I met with the patient to gather history and perform my exam. ED course and treatment discussed.     10:50 AM   Latest Reference Range & Units 12/21/22 04:47 01/25/23 15:28 07/07/23 15:28 05/04/24 06:42 10/23/24 09:45   Creatinine 0.51 - 0.95 mg/dL 1.72 (H) 1.70 (H) 1.53 (H) 1.24 (H) 1.55 (H)     12:36 PM  The patient is aware of all of their results and agrees with the plan for discharge.  They understand what to watch for, when to return to the ER, and all of their questions have been answered.  Has been going on now for about a month.  No obvious or concerning findings today.  She has known lung cancer that they are just been monitoring and I think that that is what they see on the CT scan with that solid lung lesion.  May be infectious versus inflammatory changes but she is not having any other true infectious symptoms.  WBC normal, not having any fevers.  I do not think she requires emergent antibiotics at this time.    At this time I feel is most appropriate for her to try over-the-counter things like simethicone to see if this helps with her gaseous distention and follow-up with primary care for ongoing workup.  She is interested in sending a stool sample to her primary care and therefore she was provided with urine cups for her to provide that sample but will have her touch base with primary care about how to collect it and where and when to take it.  Sound like her stools are somewhat unpredictable and only happen once every couple of days.  Encouraged her to monitor what she is eating.  Otherwise she does not appear toxic or septic.  Abdomen soft and benign.  Nothing to suggest a fistula that would cause her to cough and taste/smell stool.  May be that when she coughs she also belched and some of that was the gas coming from her intestinal  "tract.    I do not think that this represents ACS, PE, ruptured AAA, aortic dissection, bowel obstruction, bowel ischemia, cholecystitis, pancreatitis, appendicitis, diverticulitis, kidney stone, pyelonephritis, incarcerated or strangulated hernia, ovarian torsion,  viscus perforation, perforated GI ulcer, or other such etiologies at this time.    At the conclusion of the encounter I discussed the results of all of the tests and the disposition. Their questions were answered. The patient (and any family present) acknowledged understanding and were agreeable with the care plan.    CONSULTANTS:  none      MEDICATIONS GIVEN IN THE EMERGENCY:  Medications   iopamidol (ISOVUE-370) solution 59 mL (59 mLs Intravenous $Given 10/23/24 1101)           NEW PRESCRIPTIONS STARTED AT TODAY'S ER VISIT     Medication List      There are no discharge medications for this visit.             CONDITION:  stable        DISPOSITION:  D.c home with family         =================================================================  =================================================================  TRIAGE ASSESSMENT:  Patient here with son, states one month history of nausea , diarrhea, last stool this am, no blood in stool, \"the diarrhea comes and goes\" history of COPD, smoker, \"I cough up phlegm that smells like poop\" sats at 100% RA    ED Triage Vitals   Encounter Vitals Group      BP 10/23/24 0929 136/75      Systolic BP Percentile --       Diastolic BP Percentile --       Pulse 10/23/24 0929 81      Resp 10/23/24 0929 20      Temp 10/23/24 0929 97.5  F (36.4  C)      Temp src --       SpO2 10/23/24 0940 100 %      Weight 10/23/24 0929 55.3 kg (122 lb)      Height 10/23/24 0929 1.676 m (5' 6\")     ================================================================  ================================================================    HPI    Patient information was obtained from: patient and family and PCP (by phone)    Use of Intrepreter: " "N/A     Christina Umana is a 84 year old female with history of colon cancer, GERD, hypertension, anxiety, chronic A-fib on warfarin, CHF, CKD, COPD, lung cancer who presents to the ER with complaints of abdominal distention and fluctuating diarrhea and constipation.    For the past 1 month or so she will have an episode of diarrhea and then will not have a bowel movement for a couple of days.  Then her next stool will again be a loose stool.  Only having 1 of those loose stools per day when she does have it.  She is also had intermittent episodes of vomiting, last episode a few days ago.    She complains of abdominal distention and bloating.  She denies any blood in her stools.    She states she coughed today and it smelled/tasted like stool.    Called her primary care doctor who referred her to the ER for evaluation.    Otherwise denies fevers, chest pain, shortness of breath, abdominal pain.  She states once in a while she will get a \"twinge \"in her abdomen but denies any pain currently.    She states she uses Metamucil but otherwise does not really eat vegetables.  Family states that she avoids vegetables due to her warfarin use.    Primary care did call the ED and I did take the referral note from them.  See separate referral note for that documentation.      CHART REVIEW:  See ED course      REVIEW OF SYSTEMS  Review of Systems   Constitutional:  Negative for fever.   Respiratory:  Positive for cough. Negative for shortness of breath.    Cardiovascular:  Negative for chest pain.   Gastrointestinal:  Positive for abdominal distention, abdominal pain (\"twinges\", no pain currently), constipation, diarrhea, nausea and vomiting. Negative for blood in stool.   Genitourinary:  Negative for dysuria.   All other systems reviewed and are negative.        PAST MEDICAL HISTORY:  Past Medical History:   Diagnosis Date    Arthritis     Atrial fibrillation (H)     Cancer (H) 01/01/2005    CHF (congestive heart failure) (H)  "    Chronic kidney disease     Clotting disorder (H) 04/01/2017    COPD (chronic obstructive pulmonary disease) (H)     Degenerative disc disease, lumbar     Emphysema lung (H)     GERD (gastroesophageal reflux disease)     Hyperlipidemia     Hypertension     Kidney stone 04/01/2017    Osteoporosis     Scoliosis     Ventral hernia          PAST SURGICAL HISTORY:  Past Surgical History:   Procedure Laterality Date    BIOPSY BREAST Right 2012    BUNIONECTOMY Bilateral 1988    COLECTOMY N/A 03/31/2003    COLON SURGERY      CYST REMOVAL Left 02/01/2007    HERNIORRHAPHY VENTRAL  02/25/2005    HYSTERECTOMY  2004    IR LUMBAR EPIDURAL STEROID INJECTION  8/8/2011    IR LUMBAR EPIDURAL STEROID INJECTION  10/10/2011    IR LUMBAR EPIDURAL STEROID INJECTION  3/8/2012    IR LUMBAR EPIDURAL STEROID INJECTION  4/10/2012    LUMPECTOMY BREAST Right 04/03/2009    OOPHORECTOMY  2004    REPAIR HAMMER TOE Bilateral 1988    STOMACH SURGERY      hyernia repair         CURRENT MEDICATIONS:    Prior to Admission medications    Medication Sig Start Date End Date Taking? Authorizing Provider   albuterol (PROAIR HFA/PROVENTIL HFA/VENTOLIN HFA) 108 (90 Base) MCG/ACT inhaler Inhale 2 puffs into the lungs every 6 hours as needed for shortness of breath, wheezing or cough    Unknown, Entered By History   ascorbic acid, vitamin C, (VITAMIN C) 1000 MG tablet [ASCORBIC ACID, VITAMIN C, (VITAMIN C) 1000 MG TABLET] Take 1,000 mg by mouth daily.  9/9/19   Provider, Historical   atorvastatin (LIPITOR) 20 MG tablet Take 20 mg by mouth daily    Unknown, Entered By History   azelastine (ASTELIN) 0.1 % nasal spray Spray 1 spray into both nostrils 2 times daily    Unknown, Entered By History   budesonide (PULMICORT) 1 MG/2ML neb solution Take 1 mg by nebulization 2 times daily    Unknown, Entered By History   bumetanide (BUMEX) 1 MG tablet Take 2 tablets (2 mg) by mouth daily 12/22/22   Yumiko Mishra MD   calcium, as carbonate, (OS-GILL) 500 mg calcium (1,250  mg) tablet [CALCIUM, AS CARBONATE, (OS-GILL) 500 MG CALCIUM (1,250 MG) TABLET] Take 1 tablet by mouth 2 (two) times a day. 8/4/18   Provider, Historical   cholecalciferol, vitamin D3, 2,000 unit Tab [CHOLECALCIFEROL, VITAMIN D3, 2,000 UNIT TAB] Take 2,000 Units by mouth daily. 7/2/19   Provider, Historical   diphenhydrAMINE (BENADRYL) 25 MG tablet Take 25 mg by mouth daily as needed    Reported, Patient   ipratropium-albuterol (DUO-NEB) 0.5-2.5 mg/3 mL nebulizer [IPRATROPIUM-ALBUTEROL (DUO-NEB) 0.5-2.5 MG/3 ML NEBULIZER] Take 3 mL by nebulization 3 (three) times a day. Then use up to 4 times daily as needed for shortness of breath. 3/18/19   Thor Santamaria MD   losartan (COZAAR) 100 MG tablet Take 1 tablet by mouth daily at 2 pm 1/13/23   Reported, Patient   magnesium oxide (MAG-OX) 400 mg (241.3 mg magnesium) tablet [MAGNESIUM OXIDE (MAG-OX) 400 MG (241.3 MG MAGNESIUM) TABLET] 400mg in the morning and 800 mg at bedtime 5/10/19   Thor Santamaria MD   methylcellulose (CITRUCEL ORAL) Take 1 Dose by mouth daily 11/3/20   Provider, Historical   metoprolol succinate ER (TOPROL XL) 25 MG 24 hr tablet Take 3 tablets (75 mg) by mouth daily 12/22/22   Yumiko Mishra MD   omeprazole (PRILOSEC) 20 MG DR capsule Take 20 mg by mouth every morning (before breakfast) 2/11/20   Provider, Historical   PARoxetine (PAXIL) 20 MG tablet [PAROXETINE (PAXIL) 20 MG TABLET] Take 1 tablet (20 mg total) by mouth daily. 9/8/18   Thor Santamaria MD   spironolactone (ALDACTONE) 25 MG tablet Take 25 mg by mouth daily 1/23/23   Reported, Patient   tiotropium (SPIRIVA) 18 mcg inhalation capsule [TIOTROPIUM (SPIRIVA) 18 MCG INHALATION CAPSULE] Place 18 mcg into inhaler and inhale daily. 10/5/20   Provider, Historical   vitamin E 200 UNIT capsule [VITAMIN E 200 UNIT CAPSULE] Take 200 Units by mouth daily. 8/4/18   Provider, Historical   warfarin ANTICOAGULANT (COUMADIN) 5 MG tablet Take 2.5-5 mg by mouth daily Take 5mg on Monday, Wednesday and Friday  and 2.5mg the rest of the days    Unknown, Entered By History         ALLERGIES:  No Known Allergies      FAMILY HISTORY:  Family History   Problem Relation Age of Onset    Brain Cancer Son 29.00    Dementia Mother     Cerebrovascular Disease Mother     Prostate Cancer Father     Aortic aneurysm Father         Abdominal    Hypertension Sister     No Known Problems Daughter     No Known Problems Maternal Grandmother     No Known Problems Maternal Grandfather     No Known Problems Paternal Grandmother     No Known Problems Paternal Grandfather     Breast Cancer Maternal Aunt 70.00    No Known Problems Paternal Aunt     Breast Cancer Maternal Aunt     Other - See Comments Brother         Jayy    Other - See Comments Brother         jayy    Hereditary Breast and Ovarian Cancer Syndrome No family hx of     Colon Cancer No family hx of     Endometrial Cancer No family hx of     Ovarian Cancer No family hx of          SOCIAL HISTORY:  Social History     Socioeconomic History    Marital status:    Tobacco Use    Smoking status: Every Day     Current packs/day: 0.00     Average packs/day: 0.5 packs/day for 60.0 years (30.0 ttl pk-yrs)     Types: Cigarettes     Start date: 1958     Last attempt to quit: 2018     Years since quittin.6    Smokeless tobacco: Never    Tobacco comments:     Seen IP by CTTS on 22. Down to 3 cigarettes/Day. Ready for quit attempt.   Vaping Use    Vaping status: Never Used   Substance and Sexual Activity    Alcohol use: No    Drug use: No    Sexual activity: Never     Social Drivers of Health      Received from [employer] [facility], [employer] [facility]    Social Connections         VITALS:  Patient Vitals for the past 24 hrs:   BP Temp Pulse Resp SpO2 Height Weight   10/23/24 1230 (!) 154/78 -- 70 -- 99 % -- --   10/23/24 1200 (!) 152/69 -- 65 -- 100 % -- --   10/23/24 1130 126/71 -- 59 -- 97 % -- --   10/23/24 1100 138/63  "-- 63 -- 99 % -- --   10/23/24 1055 129/66 -- 66 -- 99 % -- --   10/23/24 1030 131/67 -- 56 -- 99 % -- --   10/23/24 1015 127/66 -- 58 -- 98 % -- --   10/23/24 1000 123/73 -- 63 -- 98 % -- --   10/23/24 0940 (!) 176/84 -- 60 -- 100 % -- --   10/23/24 0929 136/75 97.5  F (36.4  C) 81 20 -- 1.676 m (5' 6\") 55.3 kg (122 lb)       Wt Readings from Last 3 Encounters:   10/23/24 55.3 kg (122 lb)   05/04/24 69.9 kg (154 lb)   08/29/23 69.9 kg (154 lb)       Estimated Creatinine Clearance: 24 mL/min (A) (based on SCr of 1.55 mg/dL (H)).    PHYSICAL EXAM    Constitutional:  Well developed, Well nourished, NAD  HENT:  Normocephalic, Atraumatic, Bilateral external ears normal, Nose normal. Neck- Supple, No stridor.   Eyes:  PERRL, EOMI, Conjunctiva normal, No discharge.  Respiratory:  Normal breath sounds, No respiratory distress, No wheezing, Speaks full sentences easily. No cough.   Cardiovascular:  Normal heart rate, Regular rhythm, No murmurs, No rubs, No gallops.   GI:  No excessive obesity.  Bowel sounds normal, Soft, No tenderness, No masses, No flank tenderness. No rebound or guarding.   : deferred  Musculoskeletal: No cyanosis, No clubbing. Good range of motion in all major joints. No major deformities noted.   Integument:  Warm, Dry, No erythema, No rash.  No petechiae.   Neurologic:  Alert & oriented x 3  Psychiatric:  Affect normal, Cooperative         LAB:  All pertinent labs reviewed and interpreted.  Recent Results (from the past 24 hours)   Basic metabolic panel    Collection Time: 10/23/24  9:45 AM   Result Value Ref Range    Sodium 136 135 - 145 mmol/L    Potassium 4.2 3.4 - 5.3 mmol/L    Chloride 95 (L) 98 - 107 mmol/L    Carbon Dioxide (CO2) 25 22 - 29 mmol/L    Anion Gap 16 (H) 7 - 15 mmol/L    Urea Nitrogen 54.2 (H) 8.0 - 23.0 mg/dL    Creatinine 1.55 (H) 0.51 - 0.95 mg/dL    GFR Estimate 33 (L) >60 mL/min/1.73m2    Calcium 9.9 8.8 - 10.4 mg/dL    Glucose 146 (H) 70 - 99 mg/dL   Hepatic function panel " "   Collection Time: 10/23/24  9:45 AM   Result Value Ref Range    Protein Total 8.2 6.4 - 8.3 g/dL    Albumin 4.2 3.5 - 5.2 g/dL    Bilirubin Total 0.5 <=1.2 mg/dL    Alkaline Phosphatase 81 40 - 150 U/L    AST 26 0 - 45 U/L    ALT 22 0 - 50 U/L    Bilirubin Direct <0.20 0.00 - 0.30 mg/dL   Lipase    Collection Time: 10/23/24  9:45 AM   Result Value Ref Range    Lipase 41 13 - 60 U/L   Magnesium    Collection Time: 10/23/24  9:45 AM   Result Value Ref Range    Magnesium 2.4 (H) 1.7 - 2.3 mg/dL   CBC with platelets and differential    Collection Time: 10/23/24  9:45 AM   Result Value Ref Range    WBC Count 9.6 4.0 - 11.0 10e3/uL    RBC Count 5.66 (H) 3.80 - 5.20 10e6/uL    Hemoglobin 16.1 (H) 11.7 - 15.7 g/dL    Hematocrit 48.8 (H) 35.0 - 47.0 %    MCV 86 78 - 100 fL    MCH 28.4 26.5 - 33.0 pg    MCHC 33.0 31.5 - 36.5 g/dL    RDW 13.4 10.0 - 15.0 %    Platelet Count 210 150 - 450 10e3/uL    % Neutrophils 72 %    % Lymphocytes 21 %    % Monocytes 5 %    % Eosinophils 0 %    % Basophils 0 %    % Immature Granulocytes 1 %    NRBCs per 100 WBC 0 <1 /100    Absolute Neutrophils 6.9 1.6 - 8.3 10e3/uL    Absolute Lymphocytes 2.1 0.8 - 5.3 10e3/uL    Absolute Monocytes 0.5 0.0 - 1.3 10e3/uL    Absolute Eosinophils 0.0 0.0 - 0.7 10e3/uL    Absolute Basophils 0.0 0.0 - 0.2 10e3/uL    Absolute Immature Granulocytes 0.1 <=0.4 10e3/uL    Absolute NRBCs 0.0 10e3/uL   Extra Blue Top Tube    Collection Time: 10/23/24  9:45 AM   Result Value Ref Range    Hold Specimen JIC    INR    Collection Time: 10/23/24  9:45 AM   Result Value Ref Range    INR 1.72 (H) 0.85 - 1.15   PTT    Collection Time: 10/23/24  9:45 AM   Result Value Ref Range    aPTT 34 22 - 38 Seconds       No results found for: \"ABORH\"        RADIOLOGY:  Reviewed all pertinent imaging. Please see official radiology report.    CT Chest/Abdomen/Pelvis w Contrast   Final Result   IMPRESSION:   1.  Distention of the colon with air and debris; this has improved from the " comparison study.   2.  Debris in the airways with bronchial wall thickening and atelectasis. New pulmonary opacities are likely infectious or inflammatory in nature. A left upper lobe groundglass opacity with a solid component has not changed. Recommend attention on    follow-up imaging.   3.  Atherosclerotic disease including coronary artery disease.   4.  Splenomegaly.            EKG:    none      PROCEDURES:  none    Medical Decision Making  Obtained supplemental history:Supplemental history obtained?: Documented in chart and Family Member/Significant Other  Reviewed external records: External records reviewed?: Documented in chart and Prior Labs: see ed course  Care impacted by chronic illness:Documented in Chart  Did you consider but not order tests?: Work up considered but not performed and documented in chart, if applicable  Did you interpret images independently?: Independent interpretation of ECG and images noted in documentation, when applicable.  Consultation discussion with other provider:Did you involve another provider (consultant, , pharmacy, etc.)?: I discussed the care with another health care provider, see documentation for details.  Discharge. No recommendations on prescription strength medication(s). I considered admission, but ultimately discharged patient after reassuring laboratories and imaging.    MIPS: Not Applicable      Deandra Vidal M.D. [facility]  Emergency Medicine and Medical Toxicology  [facility] EMERGENCY DEPARTMENT  [facility] [facility] [address] [phone]  Dept: [phone]           Deandra Vidal MD  10/23/24 0111

## 2024-10-23 NOTE — ED NOTES
Expected Patient Referral to ED  9:02 AM    Referring Clinic/Provider:  Dr. Elizabeth    Reason for referral/Clinical facts:  Pt called physician for intermittent diarrhea/constipation for the past week.  Pt called this AM reporting she vomited up possibly stool.  Pt has a remote history of colon cancer and now known lung cancer that's small and she's declining care for this.  Pt on coumadin for Afib.  Sending in for further evaluation.    Dr. Christensen cell if any questions/needs:  [phone]    Recommendations provided:  Send to ED for further evaluation    Caller was informed that this institution does possess the capabilities and/or resources to provide for patient and should be transferred to our facility.    Discussed that if direct admit is sought and any hurdles are encountered, this ED would be happy to see the patient and evaluate.    Informed caller that recommendations provided are recommendations based only on the facts provided and that they responsible to accept or reject the advice, or to seek a formal in person consultation as needed and that this ED will see/treat patient should they arrive.      Figueroa Steele [facility] EMERGENCY ROOM  [address] [place] [id number] [phone]       Figueroa Steele MD  10/23/24 0942

## 2024-11-30 ENCOUNTER — HEALTH MAINTENANCE LETTER (OUTPATIENT)
Age: 84
End: 2024-11-30